# Patient Record
Sex: MALE | Race: WHITE | Employment: OTHER | URBAN - METROPOLITAN AREA
[De-identification: names, ages, dates, MRNs, and addresses within clinical notes are randomized per-mention and may not be internally consistent; named-entity substitution may affect disease eponyms.]

---

## 2017-02-02 ENCOUNTER — CLINICAL SUPPORT (OUTPATIENT)
Dept: CARDIOLOGY CLINIC | Age: 82
End: 2017-02-02

## 2017-02-02 DIAGNOSIS — Z79.01 LONG TERM (CURRENT) USE OF ANTICOAGULANTS: Primary | ICD-10-CM

## 2017-02-02 DIAGNOSIS — I10 ESSENTIAL HYPERTENSION, BENIGN: ICD-10-CM

## 2017-02-02 DIAGNOSIS — I48.0 PAROXYSMAL ATRIAL FIBRILLATION (HCC): ICD-10-CM

## 2017-02-02 LAB
INR BLD: 1.5
PT POC: 17.6 SEC
VALID INTERNAL CONTROL?: YES

## 2017-02-10 ENCOUNTER — CLINICAL SUPPORT (OUTPATIENT)
Dept: CARDIOLOGY CLINIC | Age: 82
End: 2017-02-10

## 2017-02-10 DIAGNOSIS — I48.0 PAROXYSMAL ATRIAL FIBRILLATION (HCC): ICD-10-CM

## 2017-02-10 DIAGNOSIS — Z79.01 LONG TERM (CURRENT) USE OF ANTICOAGULANTS: Primary | ICD-10-CM

## 2017-02-10 DIAGNOSIS — I10 ESSENTIAL HYPERTENSION, BENIGN: ICD-10-CM

## 2017-02-10 LAB
INR BLD: 1.9
PT POC: 21.5 SEC
VALID INTERNAL CONTROL?: YES

## 2017-03-01 ENCOUNTER — PATIENT OUTREACH (OUTPATIENT)
Dept: CARDIOLOGY CLINIC | Age: 82
End: 2017-03-01

## 2017-03-01 ENCOUNTER — CLINICAL SUPPORT (OUTPATIENT)
Dept: CARDIOLOGY CLINIC | Age: 82
End: 2017-03-01

## 2017-03-01 DIAGNOSIS — Z79.01 LONG TERM (CURRENT) USE OF ANTICOAGULANTS: Primary | ICD-10-CM

## 2017-03-01 DIAGNOSIS — I48.0 PAROXYSMAL ATRIAL FIBRILLATION (HCC): ICD-10-CM

## 2017-03-01 LAB
INR BLD: 1.6
PT POC: 19.4 SEC
VALID INTERNAL CONTROL?: YES

## 2017-03-01 NOTE — PROGRESS NOTES
Asked to assist with coverage determination for NOAC options for Mr. Debbi Monae- he does not have Part D coverage- he pays out of pocket for RX. Review of income requirements for Eliquis, Pradaxa and Xarelto- he will not meet so is not eligible for PAP. Ricky Guille is an option per provider- the co-pay card can contribute $270 toward the cost of medication- I checked with Albina Frias- the 30 day cost is $325 for either strength- 30 or 60 mg- it is a once a day medication. Therefore- his monthly out of pocket costs would be the difference of 325-270= $55 a month. Dosing determined by:  [facility] [facility] Calculator :    (140-89) 44.11 kg  cr cl- 1.26    =  77.14 for his Creatinine Clearance Value:77.14 mL/min    He will take 30 mg daily per above. Note: he had been maintained in therapeutic range but recently he has not been consistently in range. Reviewing lifestyle, health and current medications- there are no indicators for lower values- He will check with pharmacy ([facility]) to see if they have filled his RX with a different  for his warfarin. PLAN:  He will return next week and will discuss options and check with provider about changing from warfarin to Ricky Guille.

## 2017-03-08 ENCOUNTER — CLINICAL SUPPORT (OUTPATIENT)
Dept: CARDIOLOGY CLINIC | Age: 82
End: 2017-03-08

## 2017-03-08 DIAGNOSIS — Z79.01 LONG TERM (CURRENT) USE OF ANTICOAGULANTS: Primary | ICD-10-CM

## 2017-03-08 DIAGNOSIS — I10 ESSENTIAL HYPERTENSION, BENIGN: ICD-10-CM

## 2017-03-08 DIAGNOSIS — I48.0 PAROXYSMAL ATRIAL FIBRILLATION (HCC): ICD-10-CM

## 2017-03-08 LAB
INR BLD: 1.4
PT POC: 17.1 SEC
VALID INTERNAL CONTROL?: YES

## 2017-03-15 ENCOUNTER — CLINICAL SUPPORT (OUTPATIENT)
Dept: CARDIOLOGY CLINIC | Age: 82
End: 2017-03-15

## 2017-03-15 DIAGNOSIS — I48.0 PAROXYSMAL ATRIAL FIBRILLATION (HCC): ICD-10-CM

## 2017-03-15 DIAGNOSIS — Z79.01 LONG TERM (CURRENT) USE OF ANTICOAGULANTS: Primary | ICD-10-CM

## 2017-03-15 DIAGNOSIS — I63.411 EMBOLIC STROKE INVOLVING RIGHT MIDDLE CEREBRAL ARTERY (HCC): Primary | ICD-10-CM

## 2017-03-15 LAB
INR BLD: 2.5
PT POC: 29.8 SEC
VALID INTERNAL CONTROL?: YES

## 2017-03-15 NOTE — PROGRESS NOTES
Met with Mr. Connie Feliz- follow up from recent conversation about not maintaining therapeutic INR ranges. Consulted with Provider previously and today to confirm dose of Savaysa. Cr cl= 77.14- so he will take 60 mg daily. e will start taking Friday night, Saturday morning- depending if he will do an evening vs. Morning schedule for taking his Savaysa. Provided samples and voucher for free month trial as well as co-pay card- he has not had Medicare Part D coverage- he pays cash for all his medications so he is able to use the reduced co-pay cad which will provide $270 toward cost of the medication. Discussed him checking with pharmacy to determine lower price and I will send script for future use when he is needing to fill. Provided my contact card to call and let me know which pharmacy he needs to fill at. I will [facility] so pharmacist can determine price. Wrote for them not to fill.

## 2017-04-10 ENCOUNTER — PATIENT OUTREACH (OUTPATIENT)
Dept: CARDIOLOGY CLINIC | Age: 82
End: 2017-04-10

## 2017-04-10 DIAGNOSIS — I63.411 EMBOLIC STROKE INVOLVING RIGHT MIDDLE CEREBRAL ARTERY (HCC): ICD-10-CM

## 2017-04-10 DIAGNOSIS — I48.0 PAROXYSMAL ATRIAL FIBRILLATION (HCC): ICD-10-CM

## 2017-04-10 NOTE — TELEPHONE ENCOUNTER
Mr. Rojelio Hernández said that [facility] said they did not have  The script- he asked if I would send in one. See patient outreach for further details.

## 2017-04-10 NOTE — PROGRESS NOTES
Received phone call from Mr. An Barrios- he states that he is doing well on his [de-identified]. He wanted to let me know that the coupons are working well with the cost.   He thanked me for my help.

## 2017-05-01 ENCOUNTER — OFFICE VISIT (OUTPATIENT)
Dept: CARDIOLOGY CLINIC | Age: 82
End: 2017-05-01

## 2017-05-01 VITALS
SYSTOLIC BLOOD PRESSURE: 110 MMHG | DIASTOLIC BLOOD PRESSURE: 69 MMHG | HEIGHT: 69 IN | BODY MASS INDEX: 25.77 KG/M2 | HEART RATE: 47 BPM | OXYGEN SATURATION: 98 % | RESPIRATION RATE: 19 BRPM | WEIGHT: 174 LBS

## 2017-05-01 DIAGNOSIS — I48.0 PAROXYSMAL ATRIAL FIBRILLATION (HCC): ICD-10-CM

## 2017-05-01 DIAGNOSIS — I10 ESSENTIAL HYPERTENSION, BENIGN: ICD-10-CM

## 2017-05-01 DIAGNOSIS — I49.5 SINUS NODE DYSFUNCTION (HCC): Primary | ICD-10-CM

## 2017-05-01 DIAGNOSIS — E78.5 DYSLIPIDEMIA: ICD-10-CM

## 2017-05-01 NOTE — PROGRESS NOTES
Visit Vitals    /69 (BP 1 Location: Left arm, BP Patient Position: Sitting)    Pulse (!) 47    Resp 19    Ht 5' 9\" (1.753 m)    Wt 174 lb (78.9 kg)    SpO2 98%    BMI 25.7 kg/m2

## 2017-05-01 NOTE — PROGRESS NOTES
[address], [address]  [address],  [address]  [place], [address] [facility]    Office [phone]  Fax [phone]  Cell [phone]         Ivonne Simmonds is a 80 y.o. male. Last seen 1 year ago. Assessment  Encounter Diagnoses   Name Primary?  Sinus node dysfunction (HCC) Yes    Paroxysmal atrial fibrillation (HCC)     Dyslipidemia     Essential hypertension, benign      Recommendations:    Mr. Mika Ramirez has chronic AF in the setting of conduction disease (Sinus/AV Node dysfunction), completely asymptomatic. He has no ischemic or structural heart disease. He's had no bleeding problem on Savaysa. Bin Bailey continued rate controlled strategy. Reviewed symptoms that would raise concern for bradycardia. Normotensive on low dose Lisinopril. I encouraged him to remain active. Follow-up Disposition:  Return in about 6 months (around 11/1/2017). Subjective:    Mr. Mika Ramirez is doing well with no recurrent AF spells. Amiodarone was discontinued almost 1 year ago due to balance issues. He is normotensive today on Lisinopril alone. Denies any lightheadedness or dizziness. He remains active with housework without any exertional symptoms. Activity is limited by neuropathic pain in his feet and arms with chronic balance issues. Denies any falls. He is here with his wife today. Cardiac risk factors   HTN yes  DM  no  Smoking no  Family hx of CAD no    Cardiac testing  Echo Nov 2013 - LVH, EF 60%, LA upper nl  MRA neck/brain Nov 2013 - normal  Loop Monitor 1/17/14-PAC's and periods of ectopic atrial rhythm.   Period of Atrial Fibrillation with rates of  BPM with aberrant beats     Past Medical History:   Diagnosis Date    A-fib ([address]     Anxiety     Arthritis     right shoulder    [facility] 2002    colon resection    Cough     Depression 9/28/2015    DJD (degenerative joint disease) of knee     left TKR    Dyslipidemia     Embolic stroke involving right middle cerebral artery [facility]) Nov 2013    presented with dysarthria, MRI Several tiny acute infarcts in the right middle cerebral artery    Falls     Fatigue     Gout     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    Hypertension     Joint pain     Memory loss     Muscle pain     Paroxysmal atrial fibrillation ([address] 9/23/2014    Peripheral neuropathy (HCC)     Ringing in ears     Skipped beats     Vertigo         Current Outpatient Prescriptions   Medication Sig Dispense Refill    edoxaban (SAVAYSA) 60 mg tab tablet Take 1 Tab by mouth daily. 30 Tab 6    lisinopril (PRINIVIL, ZESTRIL) 10 mg tablet Take  by mouth daily.  folic acid (FOLVITE) 1 mg tablet Take  by mouth daily.  sertraline (ZOLOFT) 50 mg tablet Take  by mouth daily.  allopurinol (ZYLOPRIM) 300 mg tablet Take 300 mg by mouth every evening.  simvastatin (ZOCOR) 40 mg tablet Take 20 mg by mouth daily. No Known Allergies     . Retired commercial developer    Review of Systems  Constitutional: Negative for fever, chills, and diaphoresis. Positive for fatigue. Respiratory: Negative for cough, hemoptysis, sputum production, shortness of breath and wheezing. Cardiovascular: Negative for chest pain, palpitations, orthopnea, claudication, leg swelling and PND. Gastrointestinal: Negative for heartburn, nausea, vomiting, blood in stool and melena. Genitourinary: Negative for dysuria and flank pain. Musculoskeletal: Negative for joint pain and back pain. Skin: Negative for rash. Neurological: Negative for focal weakness, seizures, loss of consciousness, weakness and headaches. Positive for dysequilibrium, dizziness, and neuropathy. Endo/Heme/Allergies: Does not bruise/bleed easily. Psychiatric/Behavioral: Negative for memory loss. The patient does not have insomnia.       Physical Exam    Visit Vitals    /69 (BP 1 Location: Left arm, BP Patient Position: Sitting)  Pulse (!) 47    Resp 19    Ht 5' 9\" (1.753 m)    Wt 174 lb (78.9 kg)    SpO2 98%    BMI 25.7 kg/m2     Wt Readings from Last 3 Encounters:   05/01/17 174 lb (78.9 kg)   06/21/16 173 lb (78.5 kg)   04/12/16 171 lb 6.4 oz (77.7 kg)      General - well developed well nourished  Neck - JVP normal, thyroid nl  Cardiac - normal S1,S2, no murmurs, rubs or gallops. No clicks  Vascular - carotids without bruits, radials, femorals and pedal pulses equal bilateral  Lungs - clear to auscultation bilaterals, no rales ,wheezing or rhonchi  Abd - soft nontender, no HSM, no abd bruits  Extremities - no edema  Skin - no rash  Neuro - nonfocal  Psych - normal mood and affect    Cardiographics  ECG 5/20/14 - normal sinus rhythm, 1st degree AVB, otherwise normal  EKG 5/6/15 - atrial flutter 80  EKG 9/28/15 - sinus calderon 48, first degree AV block, , mild IVCD  EKG 4/12/16 - AF 50s  EKG 05/01/17-Atrial fibrillation/flutter 70s.      Written by Elsa [employer], as dictated by Nelly De Los Santos MD.   Nelly De Los Santos MD

## 2017-05-01 NOTE — MR AVS SNAPSHOT
Visit Information Date & Time Provider Department Dept. Phone Encounter #  
 5/1/2017  3:00 PM Lila Joel MD [facility] Ralph Pillai [phone] [id number] Follow-up Instructions Return in about 6 months (around 11/1/2017). Upcoming Health Maintenance Date Due DTaP/Tdap/Td series (1 - Tdap) 5/25/1948 ZOSTER VACCINE AGE 60> 5/25/1987 GLAUCOMA SCREENING Q2Y 5/25/1992 Pneumococcal 65+ Low/Medium Risk (1 of 2 - PCV13) 5/25/1992 MEDICARE YEARLY EXAM 5/25/1992 INFLUENZA AGE 9 TO ADULT 8/1/2017 Allergies as of 5/1/2017  Review Complete On: 5/1/2017 By: Monica Parrish No Known Allergies Current Immunizations  Never Reviewed No immunizations on file. Not reviewed this visit You Were Diagnosed With   
  
 Codes Comments Sinus node dysfunction (HCC)    -  Primary ICD-10-CM: I49.5 ICD-9-CM: 427.81 Paroxysmal atrial fibrillation (HCC)     ICD-10-CM: I48.0 ICD-9-CM: 427.31 Dyslipidemia     ICD-10-CM: E78.5 ICD-9-CM: 272.4 Essential hypertension, benign     ICD-10-CM: I10 
ICD-9-CM: 401.1 Vitals BP Pulse Resp Height(growth percentile) Weight(growth percentile) SpO2  
 110/69 (BP 1 Location: Left arm, BP Patient Position: Sitting) (!) 47 19 5' 9\" (1.753 m) 174 lb (78.9 kg) 98% BMI Smoking Status 25.7 kg/m2 Never Smoker Vitals History BMI and BSA Data Body Mass Index Body Surface Area 25.7 kg/m 2 1.96 m 2 Preferred Pharmacy Pharmacy Name Phone [facility] [phone] [facility] [place] [address] [phone] Your Updated Medication List  
  
   
This list is accurate as of: 5/1/17  3:52 PM.  Always use your most recent med list.  
  
  
  
  
 allopurinol 300 mg tablet Commonly known as:  Bettejane Chelsi Take 300 mg by mouth every evening.  
  
 edoxaban 60 mg Tab tablet Commonly known as:  SAVAYSA Take 1 Tab by mouth daily. folic acid 1 mg tablet Commonly known as:  Cuong Take  by mouth daily. lisinopril 10 mg tablet Commonly known as:  Devyn Casper Take  by mouth daily. simvastatin 40 mg tablet Commonly known as:  ZOCOR Take 20 mg by mouth daily. ZOLOFT 50 mg tablet Generic drug:  sertraline Take  by mouth daily. We Performed the Following AMB POC EKG ROUTINE W/ 12 LEADS, INTER & REP [52422 CPT(R)] Follow-up Instructions Return in about 6 months (around 11/1/2017). Introducing [facility] SERVICES! Dear Constantin Zavaleta: 
Thank you for requesting a [employer] account. Our records indicate that you already have an active [employer] account. You can access your account anytime at [email or web address] Did you know that you can access your hospital and ER discharge instructions at any time in [employer]? You can also review all of your test results from your hospital stay or ER visit. Additional Information If you have questions, please visit the Frequently Asked Questions section of the [employer] website at [email or web address] Remember, [employer] is NOT to be used for urgent needs. For medical emergencies, dial 911. Now available from your iPhone and Android! Please provide this summary of care documentation to your next provider. Your primary care clinician is listed as Randene Bumpers II. If you have any questions after today's visit, please call [phone].

## 2017-06-02 DIAGNOSIS — I63.411 EMBOLIC STROKE INVOLVING RIGHT MIDDLE CEREBRAL ARTERY (HCC): ICD-10-CM

## 2017-06-02 DIAGNOSIS — I48.0 PAROXYSMAL ATRIAL FIBRILLATION (HCC): ICD-10-CM

## 2017-06-02 NOTE — TELEPHONE ENCOUNTER
Provided samples for Mr. Stanford Reasoner for Savaysa 60 mg Daily. He is traveling out of state and cannot get a refill before he leaves- too early and will need more pills until he returns back home. He will  later today. Note- glad he is getting out of town with family and will have help with his wife's care- they will be visiting daughter at [facility].

## 2017-08-14 DIAGNOSIS — I48.0 PAROXYSMAL ATRIAL FIBRILLATION (HCC): ICD-10-CM

## 2017-08-14 DIAGNOSIS — I63.411 EMBOLIC STROKE INVOLVING RIGHT MIDDLE CEREBRAL ARTERY (HCC): ICD-10-CM

## 2017-08-14 NOTE — TELEPHONE ENCOUNTER
Cardiology NN note:  Met with Oscar Kelli Bautista- there was an overall cost increase done for Savays- effective first of June 2017- the price he will be paying outside the $270 co-pay assist is doubled- around $95 per month. Provided samples- he will be traveling again outside the state to [place]- he will need to make sure he has extra tablets to ensure he will not run out- does not want to get scripts filled out of town. He is dealing with local not national chain pharmacy.

## 2017-11-10 ENCOUNTER — OFFICE VISIT (OUTPATIENT)
Dept: CARDIOLOGY CLINIC | Age: 82
End: 2017-11-10

## 2017-11-10 VITALS
OXYGEN SATURATION: 94 % | DIASTOLIC BLOOD PRESSURE: 80 MMHG | BODY MASS INDEX: 24.96 KG/M2 | SYSTOLIC BLOOD PRESSURE: 118 MMHG | RESPIRATION RATE: 18 BRPM | HEART RATE: 72 BPM | WEIGHT: 169 LBS

## 2017-11-10 DIAGNOSIS — I63.411 EMBOLIC STROKE INVOLVING RIGHT MIDDLE CEREBRAL ARTERY (HCC): ICD-10-CM

## 2017-11-10 DIAGNOSIS — E78.5 DYSLIPIDEMIA: ICD-10-CM

## 2017-11-10 DIAGNOSIS — I10 ESSENTIAL HYPERTENSION, BENIGN: Primary | ICD-10-CM

## 2017-11-10 DIAGNOSIS — I49.5 SINUS NODE DYSFUNCTION (HCC): ICD-10-CM

## 2017-11-10 DIAGNOSIS — I48.20 CHRONIC ATRIAL FIBRILLATION (HCC): ICD-10-CM

## 2017-11-10 NOTE — PROGRESS NOTES
[address], [address] [facility]    Office [phone]  Fax [phone]         Randal Duncan is a 80 y.o. male. Last seen 6 months ago. Assessment  Encounter Diagnoses   Name Primary?  Essential hypertension, benign Yes    Chronic atrial fibrillation (HCC)     Sinus node dysfunction (HCC)     Dyslipidemia     Embolic stroke involving right middle cerebral artery [facility])      Recommendations:  Mr. Les Díaz has chronic AF in the setting of conduction disease (Sinus/AV Node dysfunction), completely asymptomatic. He has no ischemic or structural heart disease. He's had no bleeding problem on Savaysa. Alvaro Stands continued rate controlled strategy. Reviewed symptoms that would raise concern for bradycardia. Normotensive on low dose Lisinopril. He was encouraged to continue current medications, remain active and call with any new complaints or concerns. Follow-up Disposition:  Return in about 6 months (around 5/10/2018). Subjective:  Mr. Les Díaz denies any interval issues. He reports feeling well. He denies any tachycardia, lightheadedness or dizziness. He remains active with housework without any exertional symptoms. Activity is limited by neuropathic pain in his feet and arms with chronic balance issues. Denies any falls. He notes some emotional stress due to Mrs. Barr's health. Reports a history of \"feeling down\" but states that he is doing well with \"managing things\" at this time. No bleeding or bruising concerns on Savaysa. Lipids and routine lab work followed by Dr Elida Cavazos. Cardiac risk factors   HTN yes  DM  no  Smoking no  Family hx of CAD no    Cardiac testing  Echo Nov 2013 - LVH, EF 60%, LA upper nl  MRA neck/brain Nov 2013 - normal  Loop Monitor 1/17/14-PAC's and periods of ectopic atrial rhythm.   Period of Atrial Fibrillation with rates of  BPM with aberrant beats     Past Medical History:   Diagnosis Date    [facility] ([facility]     Anxiety     Arthritis     right shoulder    [facility]    colon resection    Cough     Depression 9/28/2015    DJD (degenerative joint disease) of knee     left TKR    Dyslipidemia     Embolic stroke involving right middle cerebral artery [facility]) Nov 2013    presented with dysarthria, MRI Several tiny acute infarcts in the right middle cerebral artery    Falls     Fatigue     Gout     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    Hypertension     Joint pain     Memory loss     Muscle pain     [facility] 9/23/2014    Peripheral neuropathy     Ringing in ears     Skipped beats     Vertigo         Current Outpatient Prescriptions   Medication Sig Dispense Refill    edoxaban (SAVAYSA) 60 mg tab tablet Take 1 Tab by mouth daily. 28 Tab 6    lisinopril (PRINIVIL, ZESTRIL) 10 mg tablet Take  by mouth daily.  folic acid (FOLVITE) 1 mg tablet Take  by mouth daily.  sertraline (ZOLOFT) 50 mg tablet Take 25 mg by mouth daily.  allopurinol (ZYLOPRIM) 300 mg tablet Take 300 mg by mouth every evening.  simvastatin (ZOCOR) 40 mg tablet Take 10 mg by mouth daily. No Known Allergies     . Retired commercial developer    Review of Systems  Constitutional: Negative for fever, chills, and diaphoresis. Positive for fatigue. Respiratory: Negative for cough, hemoptysis, sputum production, shortness of breath and wheezing. Cardiovascular: Negative for chest pain, palpitations, orthopnea, claudication, leg swelling and PND. Gastrointestinal: Negative for heartburn, nausea, vomiting, blood in stool and melena. Genitourinary: Negative for dysuria and flank pain. Musculoskeletal: Negative for joint pain and back pain. Skin: Negative for rash. Neurological: Negative for focal weakness, seizures, loss of consciousness, weakness and headaches. Endo/Heme/Allergies: Does not bruise/bleed easily. Psychiatric/Behavioral: Negative for memory loss. The patient does not have insomnia. Physical Exam  Visit Vitals    /80    Pulse 72    Resp 18    Wt 169 lb (76.7 kg)    SpO2 94%    BMI 24.96 kg/m2     Wt Readings from Last 3 Encounters:   11/10/17 169 lb (76.7 kg)   05/01/17 174 lb (78.9 kg)   06/21/16 173 lb (78.5 kg)      General - well developed well nourished  Neck - JVP normal, thyroid nl  Cardiac - irregular, no murmurs, rubs or gallops. No clicks  Vascular - carotids without bruits, radials, femorals and pedal pulses equal bilateral  Lungs - clear to auscultation bilaterals, no rales ,wheezing or rhonchi  Abd - soft nontender, no HSM, no abd bruits  Extremities - no edema  Skin - no rash  Neuro - nonfocal  Psych - normal mood and affect    Cardiographics  ECG 5/20/14 - normal sinus rhythm, 1st degree AVB, otherwise normal  EKG 5/6/15 - atrial flutter 80  EKG 9/28/15 - sinus calderon 48, first degree AV block, , mild IVCD  EKG 4/12/16 - AF 50s  EKG 05/01/17-Atrial fibrillation/flutter 70s.      Addison Dunbar NP

## 2017-11-10 NOTE — PATIENT INSTRUCTIONS
Please continue your current medications. Stay active and monitor for any abnormal bleeding on the Savaysa.       Please call us with any questions or concerns prior to your next office visist.

## 2017-11-10 NOTE — MR AVS SNAPSHOT
Visit Information Date & Time Provider Department Dept. Phone Encounter #  
 11/10/2017  9:00 AM Katie Fagan NP [facility] Manny Salguero [phone] [id number] Follow-up Instructions Return in about 6 months (around 5/10/2018). Upcoming Health Maintenance Date Due DTaP/Tdap/Td series (1 - Tdap) 5/25/1948 ZOSTER VACCINE AGE 60> 3/25/1987 GLAUCOMA SCREENING Q2Y 5/25/1992 Pneumococcal 65+ Low/Medium Risk (1 of 2 - PCV13) 5/25/1992 MEDICARE YEARLY EXAM 5/25/1992 Influenza Age 5 to Adult 8/1/2017 Allergies as of 11/10/2017  Review Complete On: 11/10/2017 By: Katie Fagan NP No Known Allergies Current Immunizations  Never Reviewed No immunizations on file. Not reviewed this visit You Were Diagnosed With   
  
 Codes Comments Essential hypertension, benign    -  Primary ICD-10-CM: I10 
ICD-9-CM: 343. 1 Chronic atrial fibrillation (HCC)     ICD-10-CM: B67.8 ICD-9-CM: 427.31 Sinus node dysfunction (HCC)     ICD-10-CM: I49.5 ICD-9-CM: 427.81 Dyslipidemia     ICD-10-CM: E78.5 ICD-9-CM: 272.4 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)     ICD-10-CM: H23.787 ICD-9-CM: 434.11 Vitals BP Pulse Resp Weight(growth percentile) SpO2 BMI  
 118/80 72 18 169 lb (76.7 kg) 94% 24.96 kg/m2 Smoking Status Never Smoker BMI and BSA Data Body Mass Index Body Surface Area 24.96 kg/m 2 1.93 m 2 Preferred Pharmacy Pharmacy Name Phone [facility] [address] [place] [address], [address] [facility] [address] [phone] Your Updated Medication List  
  
   
This list is accurate as of: 11/10/17  9:30 AM.  Always use your most recent med list.  
  
  
  
  
 allopurinol 300 mg tablet Commonly known as:  Rito Night Take 300 mg by mouth every evening.  
  
 edoxaban 60 mg Tab tablet Commonly known as:  SAVAYSA Take 1 Tab by mouth daily. folic acid 1 mg tablet Commonly known as:  Google Take  by mouth daily. lisinopril 10 mg tablet Commonly known as:  Sima Primmer Take  by mouth daily. simvastatin 40 mg tablet Commonly known as:  ZOCOR Take 10 mg by mouth daily. ZOLOFT 50 mg tablet Generic drug:  sertraline Take 25 mg by mouth daily. Follow-up Instructions Return in about 6 months (around 5/10/2018). Patient Instructions Please continue your current medications. Stay active and monitor for any abnormal bleeding on the Savaysa. Please call us with any questions or concerns prior to your next office visist.   
 
 
  
Introducing [facility] & HEALTH SERVICES! Dear Lauren Sears: 
Thank you for requesting a [employer] account. Our records indicate that you already have an active [employer] account. You can access your account anytime at [email or web address] Did you know that you can access your hospital and ER discharge instructions at any time in [employer]? You can also review all of your test results from your hospital stay or ER visit. Additional Information If you have questions, please visit the Frequently Asked Questions section of the [employer] website at [email or web address] Remember, [employer] is NOT to be used for urgent needs. For medical emergencies, dial 911. Now available from your iPhone and Android! Please provide this summary of care documentation to your next provider. Your primary care clinician is listed as Edwardo Alvarado Ii. If you have any questions after today's visit, please call [phone].

## 2017-12-27 DIAGNOSIS — I10 ESSENTIAL HYPERTENSION, BENIGN: ICD-10-CM

## 2017-12-27 DIAGNOSIS — E78.5 DYSLIPIDEMIA: ICD-10-CM

## 2017-12-27 DIAGNOSIS — I49.5 SINUS NODE DYSFUNCTION (HCC): ICD-10-CM

## 2017-12-27 DIAGNOSIS — I48.20 CHRONIC ATRIAL FIBRILLATION (HCC): ICD-10-CM

## 2017-12-27 DIAGNOSIS — I63.411 EMBOLIC STROKE INVOLVING RIGHT MIDDLE CEREBRAL ARTERY (HCC): ICD-10-CM

## 2017-12-27 NOTE — TELEPHONE ENCOUNTER
Received request from Mr. Isaura Lockwood for any assistance available to help with the costs of his Lashayreina Pipery- will be able to provide samples for one month- until he is able to look for lower costs- he pays out of pocket- he has no Medicare RX coverage and his income is too high to secure PAP for any NOAC.

## 2018-02-13 DIAGNOSIS — I63.411 EMBOLIC STROKE INVOLVING RIGHT MIDDLE CEREBRAL ARTERY (HCC): ICD-10-CM

## 2018-02-13 DIAGNOSIS — I48.20 CHRONIC ATRIAL FIBRILLATION (HCC): ICD-10-CM

## 2018-02-13 NOTE — TELEPHONE ENCOUNTER
Cardiology NN-CM note:  Met with Mr. Rosette Fernandez in the office to discuss options for NOAC cost.  He has been using the co-pay card- paying as a cash patient- because he has no Medicare Part D. Savaysa co-pay card helps with $270 toward the cost of their product as a \"cash paying\" participant. The price continues to go up on the total cost- initially he was paying about $58 out of pocket per month but has increased to $98 and now $126 out of pocket for one month. He does not qualify for PAP due to not meeting the income eligibility. Provided him the phone number for [employer]- to see if the could be helpful. He is also going to check into the price of the medication at [facility]. Printed script for him to take and an additional co-pay card. Provided samples until he can secure option. Out of the 4 choices for NOAC- this was the one that was the lowest cost option for him. He would prefer not to return to warfarin. He will investigate and get back with me.

## 2018-05-11 ENCOUNTER — OFFICE VISIT (OUTPATIENT)
Dept: CARDIOLOGY CLINIC | Age: 83
End: 2018-05-11

## 2018-05-11 VITALS
BODY MASS INDEX: 25.06 KG/M2 | HEART RATE: 70 BPM | HEIGHT: 69 IN | RESPIRATION RATE: 20 BRPM | DIASTOLIC BLOOD PRESSURE: 58 MMHG | OXYGEN SATURATION: 97 % | WEIGHT: 169.2 LBS | SYSTOLIC BLOOD PRESSURE: 104 MMHG

## 2018-05-11 DIAGNOSIS — I10 ESSENTIAL HYPERTENSION, BENIGN: ICD-10-CM

## 2018-05-11 DIAGNOSIS — I48.20 CHRONIC ATRIAL FIBRILLATION (HCC): Primary | ICD-10-CM

## 2018-05-11 DIAGNOSIS — E78.5 DYSLIPIDEMIA: ICD-10-CM

## 2018-05-11 DIAGNOSIS — I49.5 SINUS NODE DYSFUNCTION (HCC): ICD-10-CM

## 2018-05-11 RX ORDER — LISINOPRIL 10 MG/1
10 TABLET ORAL EVERY OTHER DAY
Qty: 45 TAB | Refills: 3
Start: 2018-05-11 | End: 2018-11-12

## 2018-05-11 RX ORDER — SIMVASTATIN 10 MG/1
10 TABLET, FILM COATED ORAL
COMMUNITY

## 2018-05-11 NOTE — PATIENT INSTRUCTIONS
Reduce Lisinopril to 10 mg every other day. Monitor your blood pressure at home. Think about getting physical therapy.

## 2018-05-11 NOTE — PROGRESS NOTES
[id number]  [place], [address] [facility]    Office [phone]  Fax [phone]         Jessy Franco is a 80 y.o. male. Last seen 6 months ago by Kole Bashir NP. Last seen 1 year ago by me. Assessment  Encounter Diagnoses   Name Primary?  Chronic atrial fibrillation (HCC) Yes    Sinus node dysfunction (HCC)     Dyslipidemia     Essential hypertension, benign      Recommendations:  Mr. Summer Thapa has chronic AF in the setting of conduction disease (Sinus/AV Node dysfunction), completely asymptomatic. He has no sxs of bradycardia. Continue anticoagulation with Savaysa. HTN. Low normal BP on monotherapy with Lisinopril. I am concerned about orthostatic potential. Reduce to 10 mg every other day. Monitor BP at home. Peripheral neuropathy. Concerned about balance and stamina. I encouraged him to consider PT at . Follow-up Disposition:  Return in about 6 months (around 11/11/2018). Subjective:  Mr. Summer Thapa feels well overall although his daughter has noticed that he tends to fall asleep more often. He leads a fairly sedentary lifestyle and primarily stays in his house. He denies any exertional sxs with his ADLs. He states his energy is low overall. He does not monitor his BP at home. He will feel a little dizzy/lightheaded with positional changes. He notes the numbness from his peripheral neuropathy in his legs and hands has been significantly bothering him. He admits he does not religiously do exercises to improve this. He has not done any PT recently. Patient denies any exertional chest pain, dyspnea, palpitations, syncope, orthopnea, edema or paroxysmal nocturnal dyspnea. No bleeding issues on Savaysa. Pt lives at [facility]. He is here with his daughter today.     Cardiac risk factors   HTN yes  DM  no  Smoking no  Family hx of CAD no    Cardiac testing  Echo Nov 2013 - LVH, EF 60%, LA upper nl  MRA neck/brain Nov 2013 - normal  Loop Monitor 1/17/14-PAC's and periods of ectopic atrial rhythm. Period of Atrial Fibrillation with rates of  BPM with aberrant beats     Past Medical History:   Diagnosis Date    [facility]fib [facility] [address]     Anxiety     Arthritis     right shoulder    Colon cancer ([facility] [address] 2002    colon resection    Cough     Depression 9/28/2015    DJD (degenerative joint disease) of knee     left TKR    Dyslipidemia     Embolic stroke involving right middle cerebral artery [facility]) Nov 2013    presented with dysarthria, MRI Several tiny acute infarcts in the right middle cerebral artery    Falls     Fatigue     Gout     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    Hypertension     Joint pain     Memory loss     Muscle pain     Paroxysmal atrial fibrillation ([facility] [address] 9/23/2014    Peripheral neuropathy     Ringing in ears     Skipped beats     Vertigo         Current Outpatient Prescriptions   Medication Sig Dispense Refill    simvastatin (ZOCOR) 20 mg tablet Take 10 mg by mouth nightly.  SAVAYSA 60 mg tab tablet TAKE ONE TABLET BY MOUTH EVERY DAY 30 Tab 6    lisinopril (PRINIVIL, ZESTRIL) 10 mg tablet Take  by mouth daily.  folic acid (FOLVITE) 1 mg tablet Take  by mouth daily.  sertraline (ZOLOFT) 50 mg tablet Take 50 mg by mouth daily.  allopurinol (ZYLOPRIM) 300 mg tablet Take 300 mg by mouth every evening. No Known Allergies     . Retired commercial developer    Review of Systems  Constitutional: Negative for fever, chills, and diaphoresis. +fatigue, +decreased energy. Respiratory: Negative for cough, hemoptysis, sputum production, shortness of breath and wheezing. Cardiovascular: Negative for chest pain, palpitations, orthopnea, claudication, leg swelling and PND. Gastrointestinal: Negative for heartburn, nausea, vomiting, blood in stool and melena. Genitourinary: Negative for dysuria and flank pain.    Musculoskeletal: Negative for joint pain and back pain.   Skin: Negative for rash. Neurological: Negative for focal weakness, seizures, loss of consciousness, weakness and headaches. +bilateral hand and foot neuropathy, +dizzy/lightheaded with positional changes  Endo/Heme/Allergies: Does not bruise/bleed easily. Psychiatric/Behavioral: Negative for memory loss. The patient does not have insomnia. Physical Exam  Visit Vitals    /58 (BP 1 Location: Left arm, BP Patient Position: Sitting)    Pulse 70    Resp 20    Ht 5' 9\" (1.753 m)    Wt 169 lb 3.2 oz (76.7 kg)    SpO2 97%    BMI 24.99 kg/m2     Wt Readings from Last 3 Encounters:   05/11/18 169 lb 3.2 oz (76.7 kg)   11/10/17 169 lb (76.7 kg)   05/01/17 174 lb (78.9 kg)      General - well developed well nourished  Neck - JVP normal, thyroid nl  Cardiac - irregular, no murmurs, rubs or gallops. No clicks  Vascular - carotids without bruits, radials, femorals and pedal pulses equal bilateral  Lungs - clear to auscultation bilaterals, no rales ,wheezing or rhonchi  Abd - soft nontender, no HSM, no abd bruits  Extremities - no edema  Skin - no rash  Neuro - nonfocal  Psych - normal mood and affect    Cardiographics  ECG 5/20/14 - normal sinus rhythm, 1st degree AVB, otherwise normal  EKG 5/6/15 - atrial flutter 80  EKG 9/28/15 - sinus calderon 48, first degree AV block, , mild IVCD  EKG 4/12/16 - AF 50s  EKG 05/01/17-Atrial fibrillation/flutter 70s.    EKG 5/11/18- AF 80s, frequent PVCs    Written by Fabby Mejia, dictated by Fadi Hodges MD.  Fadi Hodges MD

## 2018-05-11 NOTE — PROGRESS NOTES
Visit Vitals    /58 (BP 1 Location: Left arm, BP Patient Position: Sitting)    Pulse 70    Resp 20    Ht 5' 9\" (1.753 m)    Wt 169 lb 3.2 oz (76.7 kg)    SpO2 97%    BMI 24.99 kg/m2

## 2018-05-11 NOTE — MR AVS SNAPSHOT
[address] [facility] [address] [id number] Patient: Kierra Cordero MRN: [id number] RXF:[id number] Visit Information Date & Time Provider Department Dept. Phone Encounter #  
 5/11/2018  2:20 PM Christiano Curran MD [facility] Kendrick Blankenship [phone] [id number] Follow-up Instructions Return in about 6 months (around 11/11/2018). Upcoming Health Maintenance Date Due DTaP/Tdap/Td series (1 - Tdap) 5/25/1948 ZOSTER VACCINE AGE 60> 3/25/1987 GLAUCOMA SCREENING Q2Y 5/25/1992 Pneumococcal 65+ Low/Medium Risk (1 of 2 - PCV13) 5/25/1992 MEDICARE YEARLY EXAM 3/14/2018 Influenza Age 5 to Adult 8/1/2018 Allergies as of 5/11/2018  Review Complete On: 5/11/2018 By: Olayinka Soler LPN No Known Allergies Current Immunizations  Never Reviewed No immunizations on file. Not reviewed this visit You Were Diagnosed With   
  
 Codes Comments Chronic atrial fibrillation (HCC)    -  Primary ICD-10-CM: R39.5 ICD-9-CM: 427.31 Sinus node dysfunction (HCC)     ICD-10-CM: I49.5 ICD-9-CM: 427.81 Dyslipidemia     ICD-10-CM: E78.5 ICD-9-CM: 272.4 Essential hypertension, benign     ICD-10-CM: I10 
ICD-9-CM: 401.1 Vitals BP Pulse Resp Height(growth percentile) Weight(growth percentile) SpO2  
 104/58 (BP 1 Location: Left arm, BP Patient Position: Sitting) 70 20 5' 9\" (1.753 m) 169 lb 3.2 oz (76.7 kg) 97% BMI Smoking Status 24.99 kg/m2 Never Smoker Vitals History BMI and BSA Data Body Mass Index Body Surface Area 24.99 kg/m 2 1.93 m 2 Preferred Pharmacy Pharmacy Name Phone [address], [address] [phone] Your Updated Medication List  
  
   
This list is accurate as of 5/11/18  3:09 PM.  Always use your most recent med list.  
  
  
  
  
 allopurinol 300 mg tablet Commonly known as:  Merna Jangs Take 300 mg by mouth every evening. folic acid 1 mg tablet Commonly known as:  Google Take  by mouth daily. lisinopril 10 mg tablet Commonly known as:  Burma Fairy Take  by mouth daily. SAVAYSA 60 mg Tab tablet Generic drug:  edoxaban TAKE ONE TABLET BY MOUTH EVERY DAY  
  
 ZOCOR 20 mg tablet Generic drug:  simvastatin Take 10 mg by mouth nightly. ZOLOFT 50 mg tablet Generic drug:  sertraline Take 50 mg by mouth daily. We Performed the Following AMB POC EKG ROUTINE W/ 12 LEADS, INTER & REP [36637 CPT(R)] Follow-up Instructions Return in about 6 months (around 11/11/2018). Patient Instructions Reduce Lisinopril to 10 mg every other day. Monitor your blood pressure at home. Think about getting physical therapy. Introducing [facility] & HEALTH SERVICES! Dear Charisma Bravo: 
Thank you for requesting a Playboox account. Our records indicate that you already have an active Playboox account. You can access your account anytime at [email or web address] Did you know that you can access your hospital and ER discharge instructions at any time in Playboox? You can also review all of your test results from your hospital stay or ER visit. Additional Information If you have questions, please visit the Frequently Asked Questions section of the Playboox website at [email or web address] Remember, Playboox is NOT to be used for urgent needs. For medical emergencies, dial 911. Now available from your iPhone and Android! Please provide this summary of care documentation to your next provider. Your primary care clinician is listed as Jeniffer Lai Ii. If you have any questions after today's visit, please call [phone].

## 2018-07-08 ENCOUNTER — HOSPITAL ENCOUNTER (EMERGENCY)
Age: 83
Discharge: HOME | End: 2018-07-08
Attending: EMERGENCY MEDICINE | Admitting: EMERGENCY MEDICINE
Payer: MEDICARE

## 2018-07-08 VITALS
DIASTOLIC BLOOD PRESSURE: 74 MMHG | HEIGHT: 67 IN | SYSTOLIC BLOOD PRESSURE: 113 MMHG | TEMPERATURE: 98 F | HEART RATE: 73 BPM | OXYGEN SATURATION: 96 % | BODY MASS INDEX: 26.68 KG/M2 | WEIGHT: 170 LBS | RESPIRATION RATE: 16 BRPM

## 2018-07-08 DIAGNOSIS — R00.2 PALPITATIONS: Primary | ICD-10-CM

## 2018-07-08 LAB
ANION GAP SERPL CALC-SCNC: 8 MMOL/L (ref 5–15)
BASOPHILS # BLD: 0 K/UL (ref 0–0.1)
BASOPHILS NFR BLD: 0 % (ref 0–1)
BUN SERPL-MCNC: 19 MG/DL (ref 6–20)
BUN/CREAT SERPL: 17 (ref 12–20)
CALCIUM SERPL-MCNC: 8.9 MG/DL (ref 8.5–10.1)
CHLORIDE SERPL-SCNC: 108 MMOL/L (ref 97–108)
CO2 SERPL-SCNC: 27 MMOL/L (ref 21–32)
CREAT SERPL-MCNC: 1.11 MG/DL (ref 0.7–1.3)
DIFFERENTIAL METHOD BLD: ABNORMAL
EOSINOPHIL # BLD: 0.2 K/UL (ref 0–0.4)
EOSINOPHIL NFR BLD: 2 % (ref 0–7)
ERYTHROCYTE [DISTWIDTH] IN BLOOD BY AUTOMATED COUNT: 14.7 % (ref 11.5–14.5)
GLUCOSE SERPL-MCNC: 113 MG/DL (ref 65–100)
HCT VFR BLD AUTO: 36.5 % (ref 36.6–50.3)
HGB BLD-MCNC: 11.8 G/DL (ref 12.1–17)
IMM GRANULOCYTES # BLD: 0 K/UL (ref 0–0.04)
IMM GRANULOCYTES NFR BLD AUTO: 0 % (ref 0–0.5)
LYMPHOCYTES # BLD: 1.6 K/UL (ref 0.8–3.5)
LYMPHOCYTES NFR BLD: 19 % (ref 12–49)
MAGNESIUM SERPL-MCNC: 2.3 MG/DL (ref 1.6–2.4)
MCH RBC QN AUTO: 33.1 PG (ref 26–34)
MCHC RBC AUTO-ENTMCNC: 32.3 G/DL (ref 30–36.5)
MCV RBC AUTO: 102.2 FL (ref 80–99)
MONOCYTES # BLD: 0.8 K/UL (ref 0–1)
MONOCYTES NFR BLD: 10 % (ref 5–13)
NEUTS SEG # BLD: 5.9 K/UL (ref 1.8–8)
NEUTS SEG NFR BLD: 70 % (ref 32–75)
NRBC # BLD: 0 K/UL (ref 0–0.01)
NRBC BLD-RTO: 0 PER 100 WBC
PLATELET # BLD AUTO: 207 K/UL (ref 150–400)
PMV BLD AUTO: 10.6 FL (ref 8.9–12.9)
POTASSIUM SERPL-SCNC: 4.2 MMOL/L (ref 3.5–5.1)
RBC # BLD AUTO: 3.57 M/UL (ref 4.1–5.7)
SODIUM SERPL-SCNC: 143 MMOL/L (ref 136–145)
TROPONIN I SERPL-MCNC: <0.05 NG/ML
WBC # BLD AUTO: 8.5 K/UL (ref 4.1–11.1)

## 2018-07-08 PROCEDURE — 93005 ELECTROCARDIOGRAM TRACING: CPT

## 2018-07-08 PROCEDURE — 83735 ASSAY OF MAGNESIUM: CPT | Performed by: EMERGENCY MEDICINE

## 2018-07-08 PROCEDURE — 80048 BASIC METABOLIC PNL TOTAL CA: CPT | Performed by: EMERGENCY MEDICINE

## 2018-07-08 PROCEDURE — 99285 EMERGENCY DEPT VISIT HI MDM: CPT

## 2018-07-08 PROCEDURE — 36415 COLL VENOUS BLD VENIPUNCTURE: CPT | Performed by: EMERGENCY MEDICINE

## 2018-07-08 PROCEDURE — 85025 COMPLETE CBC W/AUTO DIFF WBC: CPT | Performed by: EMERGENCY MEDICINE

## 2018-07-08 PROCEDURE — 84484 ASSAY OF TROPONIN QUANT: CPT | Performed by: EMERGENCY MEDICINE

## 2018-07-08 NOTE — ED NOTES
Pt reports he came into the ED for a \"high heart rate\". Pt sinus rhythm with occasional PVCs on monitor. Pt denies nausea/vomiting/shortness of breath/chest pain and pressure/jaw and arm pain.

## 2018-07-08 NOTE — ED PROVIDER NOTES
HPI Comments: 80 y.o. male with extensive past medical history, please see list, significant for HTN, A-fib, embolic stroke, and vertigo who presents via private vehicle from home accompanied by his daughter and wife with chief complaint of palpitations. Patient measured his HR at home with finger pulse oximeter and decided to come to the ED after noting a reading of 144. Patient also c/o cough productive of sputum, but notes he has experienced cold/flu-like symptoms for the past week and taken OTC medication. Patient notes normal PO intake. Patient denies chest pain, abdominal pain, shortness of breath, n/v, and urinary or bowel changes. There are no other acute medical concerns at this time. Social hx: Never tobacco smoker; Denies smokeless tobacco use; Endorses rare EtOH use  PCP: Shawn Pabon MD    Note written by Eva Galicia, as dictated by Juan Manuel Cain. Ciatlin Zee MD 6:50 PM    The history is provided by the patient, the spouse and a relative.         Past Medical History:   Diagnosis Date    A-fib [facility])     Anxiety     Arthritis     right shoulder    Colon cancer ([facility] [address] 2002    colon resection    Cough     Depression 9/28/2015    DJD (degenerative joint disease) of knee     left TKR    Dyslipidemia     Embolic stroke involving right middle cerebral artery [facility]) Nov 2013    presented with dysarthria, MRI Several tiny acute infarcts in the right middle cerebral artery    Falls     Fatigue     Gout     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    Hypertension     Joint pain     Memory loss     Muscle pain     Paroxysmal atrial fibrillation ([facility] [address] 9/23/2014    Peripheral neuropathy     Ringing in ears     Skipped beats     Vertigo        Past Surgical History:   Procedure Laterality Date    ABDOMEN SURGERY PROC UNLISTED      colorectal    CARDIAC SURG PROCEDURE UNLIST      cardio version    HX CATARACT REMOVAL      HX GI      colon resection    HX ORTHOPAEDIC      left knee, left shoulder         Family History:   Problem Relation Age of Onset    Stroke Father     Heart Disease Father     Stroke Sister        Social History     Social History    Marital status:      Spouse name: N/A    Number of children: N/A    Years of education: N/A     Occupational History    Not on file. Social History Main Topics    Smoking status: Never Smoker    Smokeless tobacco: Never Used    Alcohol use 0.6 oz/week     1 Standard drinks or equivalent per week      Comment: rarely    Drug use: Not on file    Sexual activity: Not on file     Other Topics Concern    Not on file     Social History Narrative         ALLERGIES: Review of patient's allergies indicates no known allergies. Review of Systems   Constitutional: Negative for chills and fever. HENT: Negative for ear pain and sore throat. Eyes: Negative for pain. Respiratory: Positive for cough (productive of sputum). Negative for chest tightness and shortness of breath. Cardiovascular: Positive for palpitations. Negative for chest pain and leg swelling. Gastrointestinal: Negative for abdominal pain, constipation, diarrhea, nausea and vomiting. Genitourinary: Negative for difficulty urinating, dysuria and flank pain. Musculoskeletal: Negative for back pain. Skin: Negative for rash. Neurological: Negative for headaches. All other systems reviewed and are negative. Note written by Eva Pak, as dictated by Cat Harrison MD 6:50 PM    Vitals:    07/08/18 1739 07/08/18 1800   BP: 184/72 139/76   Pulse: (!) 147 87   Resp: 15 13   Temp: 98 °F (36.7 °C)    SpO2: 100% 97%   Weight: 77.1 kg (170 lb)    Height: 5' 7\" (1.702 m)             Physical Exam   Constitutional: No distress. Elderly; well-appearing. HENT:   Head: Normocephalic and atraumatic. Eyes: Conjunctivae are normal. Pupils are equal, round, and reactive to light. No scleral icterus.    Neck: No tracheal deviation present. Cardiovascular: Normal rate and regular rhythm. Pulmonary/Chest: Effort normal and breath sounds normal. No stridor. No respiratory distress. Abdominal: Soft. He exhibits no distension. There is no tenderness. Musculoskeletal: He exhibits no edema or deformity. Neurological: He is alert. Skin: Skin is warm and dry. Psychiatric: He has a normal mood and affect. Nursing note and vitals reviewed. Note written by Eva Mcdonald, as dictated by Isidro Lafleur. Michelle Ca MD 6:50 PM    MDM  Number of Diagnoses or Management Options  Palpitations:   Diagnosis management comments: 79 yo male with h/o atrial fib presents with c/o tachycardia on home pulse ox. Initially tachy at triage but remained with normal HR during ED stay. - labs unremarkable  - advised to follow up with Cardiologist  - Patient dissatisfied and angry with wait. Unfortunately ED was inundated with critical patients during his stay and his visit exceeded 2.5 hours. I apologized for the delay and attempted to explain. Pt was still angry at time of discharge. - He was given return precautions. Isidro Lafleur. Michelle Ca MD          ED Course       Procedures     ED EKG interpretation:  Rhythm: atrial fib; and irregular. Rate (approx.): 98; Axis: left axis deviation; ST/T wave: non-specific changes; Minimal voltage criteria for LVH. Prolonged QT. Abnormal ECG. Note written by Eva Mcdonald, as dictated by Isidro Lafleur.  Michelle Ca MD 5:47 PM

## 2018-07-08 NOTE — ED TRIAGE NOTES
PT reports tachycardic. PT reports having a history of a-fib. Reports that he dioes not stay in a-fib. During triage heart rate is palpable to be 147 and irregular.

## 2018-07-09 LAB
ATRIAL RATE: 98 BPM
CALCULATED R AXIS, ECG10: -35 DEGREES
CALCULATED T AXIS, ECG11: 58 DEGREES
DIAGNOSIS, 93000: NORMAL
Q-T INTERVAL, ECG07: 380 MS
QRS DURATION, ECG06: 100 MS
QTC CALCULATION (BEZET), ECG08: 485 MS
VENTRICULAR RATE, ECG03: 98 BPM

## 2018-07-09 NOTE — DISCHARGE INSTRUCTIONS
Palpitations: Care Instructions  Your Care Instructions    Heart palpitations are the uncomfortable sensation that your heart is beating fast or irregularly. You might feel pounding or fluttering in your chest. It might feel like your heart is skipping a beat. Although palpitations may be caused by a heart problem, they also occur because of stress, fatigue, or use of alcohol, caffeine, or nicotine. Many medicines, including diet pills, antihistamines, decongestants, and some herbal products, can cause heart palpitations. Nearly everyone has palpitations from time to time. Depending on your symptoms, your doctor may need to do more tests to try to find the cause of your palpitations. Follow-up care is a key part of your treatment and safety. Be sure to make and go to all appointments, and call your doctor if you are having problems. It's also a good idea to know your test results and keep a list of the medicines you take. How can you care for yourself at home? · Avoid caffeine, nicotine, and excess alcohol. · Do not take illegal drugs, such as methamphetamines and cocaine. · Do not take weight loss or diet medicines unless you talk with your doctor first.  · Get plenty of sleep. · Do not overeat. · If you have palpitations again, take deep breaths and try to relax. This may slow a racing heart. · If you start to feel lightheaded, lie down to avoid injuries that might result if you pass out and fall down. · Keep a record of your palpitations and bring it to your next doctor's appointment. Write down:  ¨ The date and time. ¨ Your pulse. (If your heart is beating fast, it may be hard to count your pulse.)  ¨ What you were doing when the palpitations started. ¨ How long the palpitations lasted. ¨ Any other symptoms. · If an activity causes palpitations, slow down or stop. Talk to your doctor before you do that activity again. · Take your medicines exactly as prescribed.  Call your doctor if you think you are having a problem with your medicine. When should you call for help? Call 911 anytime you think you may need emergency care. For example, call if:  ? · You passed out (lost consciousness). ? · You have symptoms of a heart attack. These may include:  ¨ Chest pain or pressure, or a strange feeling in the chest.  ¨ Sweating. ¨ Shortness of breath. ¨ Pain, pressure, or a strange feeling in the back, neck, jaw, or upper belly or in one or both shoulders or arms. ¨ Lightheadedness or sudden weakness. ¨ A fast or irregular heartbeat. After you call 911, the  may tell you to chew 1 adult-strength or 2 to 4 low-dose aspirin. Wait for an ambulance. Do not try to drive yourself. ? · You have symptoms of a stroke. These may include:  ¨ Sudden numbness, tingling, weakness, or loss of movement in your face, arm, or leg, especially on only one side of your body. ¨ Sudden vision changes. ¨ Sudden trouble speaking. ¨ Sudden confusion or trouble understanding simple statements. ¨ Sudden problems with walking or balance. ¨ A sudden, severe headache that is different from past headaches. ?Call your doctor now or seek immediate medical care if:  ? · You have heart palpitations and:  ¨ Are dizzy or lightheaded, or you feel like you may faint. ¨ Have new or increased shortness of breath. ? Watch closely for changes in your health, and be sure to contact your doctor if:  ? · You continue to have heart palpitations. Where can you learn more? Go to http://olivier-jj.info/. Enter R508 in the search box to learn more about \"Palpitations: Care Instructions. \"  Current as of: September 21, 2016  Content Version: 11.4  © 0262-9291 [employer]. Care instructions adapted under license by [employer] (which disclaims liability or warranty for this information).  If you have questions about a medical condition or this instruction, always ask your healthcare professional. [address] any warranty or liability for your use of this information.

## 2018-07-16 ENCOUNTER — OFFICE VISIT (OUTPATIENT)
Dept: CARDIOLOGY CLINIC | Age: 83
End: 2018-07-16

## 2018-07-16 VITALS
RESPIRATION RATE: 16 BRPM | HEART RATE: 102 BPM | WEIGHT: 166.6 LBS | OXYGEN SATURATION: 97 % | BODY MASS INDEX: 26.15 KG/M2 | SYSTOLIC BLOOD PRESSURE: 102 MMHG | HEIGHT: 67 IN | DIASTOLIC BLOOD PRESSURE: 72 MMHG

## 2018-07-16 DIAGNOSIS — I48.20 CHRONIC ATRIAL FIBRILLATION (HCC): Primary | ICD-10-CM

## 2018-07-16 DIAGNOSIS — E78.5 DYSLIPIDEMIA: ICD-10-CM

## 2018-07-16 DIAGNOSIS — I49.5 SINUS NODE DYSFUNCTION (HCC): ICD-10-CM

## 2018-07-16 DIAGNOSIS — I10 ESSENTIAL HYPERTENSION, BENIGN: ICD-10-CM

## 2018-07-16 NOTE — MR AVS SNAPSHOT
16[address] [facility] [address] 100[address] [facility] 
[phone] Patient: Toni Monge MRN: [id number] ANT:2/07/2597 Visit Information Date & Time Provider Department Dept. Phone Encounter #  
 7/16/2018  4:00 PM Alfredo Rodríguez MD [facility] Carson Whitaker [phone] [id number] Your Appointments 11/12/2018  1:40 PM  
ESTABLISHED PATIENT with Alfredo Rodríguez MD  
[facility] (COMFORT SCHEDULING) Appt Note: 6 mo fup  
 [address] [facility] 600 100[address] [facility]  
[address] [facility] [address] [address] Upcoming Health Maintenance Date Due DTaP/Tdap/Td series (1 - Tdap) 5/25/1948 ZOSTER VACCINE AGE 60> 3/25/1987 GLAUCOMA SCREENING Q2Y 5/25/1992 Pneumococcal 65+ Low/Medium Risk (1 of 2 - PCV13) 5/25/1992 MEDICARE YEARLY EXAM 3/14/2018 Influenza Age 5 to Adult 8/1/2018 Allergies as of 7/16/2018  Review Complete On: 7/16/2018 By: Angelica Samson LPN No Known Allergies Current Immunizations  Reviewed on 7/16/2018 No immunizations on file. Reviewed by Angelica Samson LPN on 5/91/3929 at  3:56 PM  
You Were Diagnosed With   
  
 Codes Comments Chronic atrial fibrillation (HCC)    -  Primary ICD-10-CM: P04.5 ICD-9-CM: 427.31 Sinus node dysfunction (HCC)     ICD-10-CM: I49.5 ICD-9-CM: 427.81 Dyslipidemia     ICD-10-CM: E78.5 ICD-9-CM: 272.4 Essential hypertension, benign     ICD-10-CM: I10 
ICD-9-CM: 401.1 Vitals BP Pulse Resp Height(growth percentile) Weight(growth percentile) SpO2  
 102/72 (BP 1 Location: Left arm, BP Patient Position: Sitting) (!) 102 16 5' 7\" (1.702 m) 166 lb 9.6 oz (75.6 kg) 97% BMI Smoking Status 26.09 kg/m2 Never Smoker Vitals History BMI and BSA Data Body Mass Index Body Surface Area 26.09 kg/m 2 1.89 m 2 Preferred Pharmacy Pharmacy Name Phone [address], [address] [phone] Your Updated Medication List  
  
   
This list is accurate as of 7/16/18  4:47 PM.  Always use your most recent med list.  
  
  
  
  
 allopurinol 300 mg tablet Commonly known as:  Andrea Will Take 300 mg by mouth every evening. folic acid 1 mg tablet Commonly known as:  Google Take  by mouth daily. lisinopril 10 mg tablet Commonly known as:  Robold Files Take 1 Tab by mouth every other day. SAVAYSA 60 mg Tab tablet Generic drug:  edoxaban TAKE ONE TABLET BY MOUTH EVERY DAY  
  
 ZOCOR 20 mg tablet Generic drug:  simvastatin Take 10 mg by mouth nightly. ZOLOFT 50 mg tablet Generic drug:  sertraline Take 50 mg by mouth daily. Introducing [facility] & HEALTH SERVICES! Dear Spring Cueto: 
Thank you for requesting a Nint[employer] account. Our records indicate that you already have an active Nintu Oy account. You can access your account anytime at [email or web address] Did you know that you can access your hospital and ER discharge instructions at any time in Nintu Oy? You can also review all of your test results from your hospital stay or ER visit. Additional Information If you have questions, please visit the Frequently Asked Questions section of the Nintu Oy website at [email or web address] Remember, Nintu Oy is NOT to be used for urgent needs. For medical emergencies, dial 911. Now available from your iPhone and Android! Please provide this summary of care documentation to your next provider. Your primary care clinician is listed as Gladys Spence Ii. If you have any questions after today's visit, please call [phone].

## 2018-07-16 NOTE — PROGRESS NOTES
[id number] [place], [address] [facility]    Office [phone]  Fax [phone]         Lena Duenas is a 80 y.o. male. Last seen by me 2 months ago. Assessment  Encounter Diagnoses   Name Primary?  Chronic atrial fibrillation (HCC) Yes    Sinus node dysfunction (HCC)     Dyslipidemia     Essential hypertension, benign      Recommendations:  Mr. Micha Rodriguez has chronic AF in the setting of conduction disease (Sinus/AV Node dysfunction), completely asymptomatic, now with tachy/calderon. Will get 24 hour Holter to evaluate heart rate. We discussed the role of pacemaker should this be necessary. Continue anticoagulation with Savaysa. HTN. Normotensive on low dose lisinopril. Phone follow up after reviewing tests    Follow-up Disposition: Not on File     Subjective:  Mr. Micha Rodriguez states on 7/8/18 his pulse ox showed his pulse was 168. He was not symptomatic, but presented to the ED for evaluation. Per daughter, his pulse also occasionally decreases to the 40s. He states he intermittently becomes lightheaded/dizzy. Patient denies any exertional chest pain, dyspnea, syncope, orthopnea, edema or paroxysmal nocturnal dyspnea. He is here with his daughter today. Cardiac risk factors   HTN yes  DM  no  Smoking no  Family hx of CAD no    Cardiac testing  Echo Nov 2013 - LVH, EF 60%, LA upper nl  MRA neck/brain Nov 2013 - normal  Loop Monitor 1/17/14-PAC's and periods of ectopic atrial rhythm.   Period of Atrial Fibrillation with rates of  BPM with aberrant beats     Past Medical History:   Diagnosis Date    Anxiety     Arthritis     right shoulder    Atrial fibrillation, chronic ([address] 09/23/2014    Colon cancer ([address] 2002    colon resection    Depression 9/28/2015    DJD (degenerative joint disease) of knee     left TKR    Dyslipidemia     Embolic stroke involving right middle cerebral artery [facility]) Nov 2013    presented with dysarthria, MRI Several tiny acute infarcts in the right middle cerebral artery    Falls     Gout     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    Hypertension     Memory loss     Peripheral neuropathy     Vertigo       Current Outpatient Prescriptions   Medication Sig Dispense Refill    simvastatin (ZOCOR) 20 mg tablet Take 10 mg by mouth nightly.  lisinopril (PRINIVIL, ZESTRIL) 10 mg tablet Take 1 Tab by mouth every other day. 45 Tab 3    SAVAYSA 60 mg tab tablet TAKE ONE TABLET BY MOUTH EVERY DAY 30 Tab 6    folic acid (FOLVITE) 1 mg tablet Take  by mouth daily.  sertraline (ZOLOFT) 50 mg tablet Take 50 mg by mouth daily.  allopurinol (ZYLOPRIM) 300 mg tablet Take 300 mg by mouth every evening. No Known Allergies     . Retired commercial developer    Review of Systems  Constitutional: Negative for fever, chills, and diaphoresis. Respiratory: Negative for cough, hemoptysis, sputum production, shortness of breath and wheezing. Cardiovascular: Negative for chest pain, palpitations, orthopnea, claudication, leg swelling and PND. Gastrointestinal: Negative for heartburn, nausea, vomiting, blood in stool and melena. Genitourinary: Negative for dysuria and flank pain. Musculoskeletal: Negative for joint pain and back pain. Skin: Negative for rash. Neurological: Negative for focal weakness, seizures, loss of consciousness, weakness and headaches. +intermittent dizziness/lightheadedness  Endo/Heme/Allergies: Does not bruise/bleed easily. Psychiatric/Behavioral: Negative for memory loss. The patient does not have insomnia.       Physical Exam  Visit Vitals    /72 (BP 1 Location: Left arm, BP Patient Position: Sitting)    Pulse (!) 102    Resp 16    Ht 5' 7\" (1.702 m)    Wt 166 lb 9.6 oz (75.6 kg)    SpO2 97%    BMI 26.09 kg/m2     Wt Readings from Last 3 Encounters:   07/16/18 166 lb 9.6 oz (75.6 kg)   07/08/18 170 lb (77.1 kg)   05/11/18 169 lb 3.2 oz (76.7 kg) General - well developed well nourished  Neck - JVP normal, thyroid nl  Cardiac - irregular, no murmurs, rubs or gallops. No clicks  Vascular - carotids without bruits, radials, femorals and pedal pulses equal bilateral  Lungs - clear to auscultation bilaterals, no rales ,wheezing or rhonchi  Abd - soft nontender, no HSM, no abd bruits  Extremities - no edema  Skin - no rash  Neuro - nonfocal  Psych - normal mood and affect    Cardiographics  ECG 5/20/14 - normal sinus rhythm, 1st degree AVB, otherwise normal  EKG 5/6/15 - atrial flutter 80  EKG 9/28/15 - sinus calderon 48, first degree AV block, , mild IVCD  EKG 4/12/16 - AF 50s  EKG 05/01/17-Atrial fibrillation/flutter 70s. EKG 5/11/18- AF 80s, frequent PVCs    Written by Capo Charles, as dictated by Dr. Chetna Ruiz.      Chetna Ruiz MD

## 2018-07-16 NOTE — PROGRESS NOTES
Chief Complaint   Patient presents with    Follow-up     chronic atrial fib,embolic stroke involving right middle cerebral artery    Cholesterol Problem    Hypertension     1. Have you been to the ER, urgent care clinic since your last visit? Hospitalized since your last visit? [place] [place]    2. Have you seen or consulted any other health care providers outside of the [facility] since your last visit? Include any pap smears or colon screening.  No    Visit Vitals    /72 (BP 1 Location: Left arm, BP Patient Position: Sitting)    Pulse (!) 102    Resp 16    Ht 5' 7\" (1.702 m)    Wt 166 lb 9.6 oz (75.6 kg)    SpO2 97%    BMI 26.09 kg/m2

## 2018-07-23 ENCOUNTER — CLINICAL SUPPORT (OUTPATIENT)
Dept: CARDIOLOGY CLINIC | Age: 83
End: 2018-07-23

## 2018-07-23 DIAGNOSIS — I48.20 CHRONIC ATRIAL FIBRILLATION (HCC): ICD-10-CM

## 2018-07-23 DIAGNOSIS — I49.5 SINUS NODE DYSFUNCTION (HCC): ICD-10-CM

## 2018-07-23 DIAGNOSIS — I10 ESSENTIAL HYPERTENSION, BENIGN: ICD-10-CM

## 2018-07-23 DIAGNOSIS — E78.5 DYSLIPIDEMIA: ICD-10-CM

## 2018-08-07 ENCOUNTER — TELEPHONE (OUTPATIENT)
Dept: CARDIOLOGY CLINIC | Age: 83
End: 2018-08-07

## 2018-08-07 NOTE — TELEPHONE ENCOUNTER
Pt notified that haltor monitor results are not available yet.   Pt will be called when results available

## 2018-09-17 ENCOUNTER — TELEPHONE (OUTPATIENT)
Dept: CARDIOLOGY CLINIC | Age: 83
End: 2018-09-17

## 2018-09-17 NOTE — TELEPHONE ENCOUNTER
Cardiac testing  Echo Nov 2013 - LVH, EF 60%, LA upper nl  MRA neck/brain Nov 2013 - normal  Loop Monitor 1/17/14-PAC's and periods of ectopic atrial rhythm. Period of Atrial Fibrillation with rates of  BPM with aberrant beats    24 Hr Holter monitor 7/23/18 - Atrial flutter, rates 67 to 149. Rare episodes of AF with RVR. Frequent PVC's. Mr. Avril Jones was seen by Dr. Rachelle Muniz on 7/16/18 - hx of chronic AF with concerns for tachy/calderon syndrome based on home monitoring of HR's, asymptomatic. I have reviewed data from Holter monitor with Dr. Lila Red. Note, frequent PVC's, but no evidence of bradycardia. Bradycardia reported from home monitoring is likely reflective of incorrect readings due to excessive PVC's. No indication for pacemaker at this time. Plan to continue current medication regimen and follow up as previously scheduled. Encouraged him to call back prior to follow up with any questions or concerns.

## 2018-11-12 ENCOUNTER — OFFICE VISIT (OUTPATIENT)
Dept: CARDIOLOGY CLINIC | Age: 83
End: 2018-11-12

## 2018-11-12 VITALS
DIASTOLIC BLOOD PRESSURE: 70 MMHG | HEIGHT: 67 IN | SYSTOLIC BLOOD PRESSURE: 98 MMHG | WEIGHT: 163.3 LBS | BODY MASS INDEX: 25.63 KG/M2 | OXYGEN SATURATION: 96 % | HEART RATE: 100 BPM

## 2018-11-12 DIAGNOSIS — I48.20 CHRONIC ATRIAL FIBRILLATION (HCC): Primary | ICD-10-CM

## 2018-11-12 DIAGNOSIS — E78.5 DYSLIPIDEMIA: ICD-10-CM

## 2018-11-12 DIAGNOSIS — I49.5 SINUS NODE DYSFUNCTION (HCC): ICD-10-CM

## 2018-11-12 DIAGNOSIS — I10 ESSENTIAL HYPERTENSION, BENIGN: ICD-10-CM

## 2018-11-12 NOTE — PROGRESS NOTES
[id number], [address] [facility]    Office [phone]  Fax [phone]         Ba Mauricio is a 80 y.o. male. Last seen by me 4 months ago. Assessment  Encounter Diagnoses   Name Primary?  Chronic atrial fibrillation (HCC) Yes    Sinus node dysfunction (HCC)     Dyslipidemia     Essential hypertension, benign         Recommendations:  Mr. Yovani Davis has chronic AF in the setting of conduction disease (Sinus/AV Node dysfunction), completely asymptomatic, now with RVR. 24 hour holter in 7/2018 demonstrated no bradycardia but frequent PVCs. Will replace lisinopril with Atenolol 25 mg daily. Reassess HR and BP in 2-3 weeks. discussed the role of pacemaker should this be necessary. Continue anticoagulation with Savaysa. He is under considerable stress due to his wife's recent fall. He is her sole caregiver. Follow-up Disposition: Not on File     Subjective:  Mr. 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 He reports associated mental stress and some difficulty sleeping. He states his daughter lives nearby, which is helpful for him. He states he sometimes feels his heart rate is fast.     Patient denies any exertional chest pain, dyspnea, syncope, orthopnea, edema or paroxysmal nocturnal dyspnea. Cardiac risk factors   HTN yes  DM  no  Smoking no  Family hx of CAD no    Cardiac testing  Echo Nov 2013 - LVH, EF 60%, LA upper nl  MRA neck/brain Nov 2013 - normal  Loop Monitor 1/17/14-PAC's and periods of ectopic atrial rhythm. Period of Atrial Fibrillation with rates of  BPM with aberrant beats    24 Hr Holter monitor 7/23/18 - Atrial flutter, rates 67 to 149. Rare episodes of AF with RVR. Frequent PVC's.     Past Medical History:   Diagnosis Date    Anxiety     Arthritis     right shoulder    Atrial fibrillation, chronic ([address] 09/23/2014    [address] cancer ([facility] 2002    colon resection    Depression 9/28/2015    DJD (degenerative joint disease) of knee     left TKR    Dyslipidemia     Embolic stroke involving right middle cerebral artery [facility]) Nov 2013    presented with dysarthria, MRI Several tiny acute infarcts in the right middle cerebral artery    Falls     Gout     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    Hypertension     Memory loss     Peripheral neuropathy     Vertigo       Current Outpatient Medications   Medication Sig Dispense Refill    simvastatin (ZOCOR) 20 mg tablet Take 10 mg by mouth nightly.  SAVAYSA 60 mg tab tablet TAKE ONE TABLET BY MOUTH EVERY DAY 30 Tab 6    folic acid (FOLVITE) 1 mg tablet Take  by mouth daily.  sertraline (ZOLOFT) 50 mg tablet Take 50 mg by mouth daily.  allopurinol (ZYLOPRIM) 300 mg tablet Take 300 mg by mouth every evening. No Known Allergies     . Retired commercial developer    Review of Systems  Constitutional: Negative for fever, chills, and diaphoresis. Respiratory: Negative for cough, hemoptysis, sputum production, shortness of breath and wheezing. Cardiovascular: Negative for chest pain, palpitations, orthopnea, claudication, leg swelling and PND. Gastrointestinal: Negative for heartburn, nausea, vomiting, blood in stool and melena. Genitourinary: Negative for dysuria and flank pain. Musculoskeletal: Negative for joint pain and back pain. Skin: Negative for rash. Neurological: Negative for focal weakness, seizures, loss of consciousness, weakness and headaches. +intermittent dizziness/lightheadedness  Endo/Heme/Allergies: Does not bruise/bleed easily. Psychiatric/Behavioral: Negative for memory loss. The patient does not have insomnia.       Physical Exam  Visit Vitals  BP 98/70 (BP 1 Location: Right arm, BP Patient Position: Sitting)   Pulse 100   Ht 5' 7\" (1.702 m)   Wt 163 lb 4.8 oz (74.1 kg)   SpO2 96%   BMI 25.58 kg/m²     Wt Readings from Last 3 Encounters:   11/12/18 163 lb 4.8 oz (74.1 kg)   07/16/18 166 lb 9.6 oz (75.6 kg)   07/08/18 170 lb (77.1 kg)      General - well developed well nourished  Neck - JVP normal, thyroid nl  Cardiac - irregular, no murmurs, rubs or gallops. No clicks  Vascular - carotids without bruits, radials, femorals and pedal pulses equal bilateral  Lungs - clear to auscultation bilaterals, no rales ,wheezing or rhonchi  Abd - soft nontender, no HSM, no abd bruits  Extremities - no edema  Skin - no rash  Neuro - nonfocal  Psych - normal mood and affect    Cardiographics  ECG 5/20/14 - normal sinus rhythm, 1st degree AVB, otherwise normal  EKG 5/6/15 - atrial flutter 80  EKG 9/28/15 - sinus calderon 48, first degree AV block, , mild IVCD  EKG 4/12/16 - AF 50s  EKG 05/01/17-Atrial fibrillation/flutter 70s. EKG 5/11/18- AF 80s, frequent PVCs    Written by Sathya Cason, as dictated by Dr. Edwin Isaacs.      Edwin Isaacs MD

## 2018-11-12 NOTE — PROGRESS NOTES
Visit Vitals  BP 98/70 (BP 1 Location: Right arm, BP Patient Position: Sitting)   Pulse 100   Ht 5' 7\" (1.702 m)   Wt 163 lb 4.8 oz (74.1 kg)   SpO2 96%   BMI 25.58 kg/m²      No cardiac complaints today per patient

## 2018-11-13 ENCOUNTER — TELEPHONE (OUTPATIENT)
Dept: CARDIOLOGY CLINIC | Age: 83
End: 2018-11-13

## 2018-11-13 NOTE — TELEPHONE ENCOUNTER
Pharmacist from [facility] called following up on a replacement for Lisinopril medication patient called pharmacist about. Patient currently at Pharmacy now.   Phone # [phone]  Call passed to [facility]

## 2018-11-16 RX ORDER — ATENOLOL 25 MG/1
25 TABLET ORAL DAILY
Qty: 90 TAB | Refills: 3 | Status: SHIPPED | OUTPATIENT
Start: 2018-11-16 | End: 2019-01-14 | Stop reason: SDUPTHER

## 2018-11-26 ENCOUNTER — OFFICE VISIT (OUTPATIENT)
Dept: CARDIOLOGY CLINIC | Age: 83
End: 2018-11-26

## 2018-11-26 VITALS
HEIGHT: 67 IN | RESPIRATION RATE: 16 BRPM | BODY MASS INDEX: 25.8 KG/M2 | WEIGHT: 164.4 LBS | OXYGEN SATURATION: 97 % | SYSTOLIC BLOOD PRESSURE: 128 MMHG | HEART RATE: 64 BPM | DIASTOLIC BLOOD PRESSURE: 72 MMHG

## 2018-11-26 DIAGNOSIS — Z79.01 LONG TERM (CURRENT) USE OF ANTICOAGULANTS: ICD-10-CM

## 2018-11-26 DIAGNOSIS — I49.5 SINUS NODE DYSFUNCTION (HCC): ICD-10-CM

## 2018-11-26 DIAGNOSIS — I48.20 CHRONIC ATRIAL FIBRILLATION (HCC): Primary | ICD-10-CM

## 2018-11-26 DIAGNOSIS — I10 ESSENTIAL HYPERTENSION, BENIGN: ICD-10-CM

## 2018-11-26 NOTE — PROGRESS NOTES
Angelica Brewer, ROXY  [address]  [place], [address] [facility]    Office [phone]  Fax [phone]        Natalie Tamayo is a 80 y.o. male who is followed outpatient by Dr. Dorothea Leger and was last seen 11/12/18. Patient is here today for follow-up of his afib. Assessment  Encounter Diagnoses   Name Primary?  Sinus node dysfunction (HCC)     Chronic atrial fibrillation (HCC) Yes    Long term (current) use of anticoagulants     Essential hypertension, benign        Recommendations:  Mr. Gabriel Hernandez has chronic AF in the setting of conduction disease (Sinus/AV Node dysfunction), completely asymptomatic, prev with RVR. 24 hour holter in 7/2018 demonstrated no bradycardia but frequent PVCs. Pt now on Atenolol 25 mg daily with improvement in HR and BP. Continue anticoagulation with Savaysa.      He is under considerable stress due to his wife's recent fall and multiple other medical conditions; he reports hx of depression but feels his is doing well currently.       Follow-up Disposition:  Return in about 6 months (around 5/26/2019), or if symptoms worsen or fail to improve. Subjective:  Mr. 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 He reports associated mental stress and history of depression. He states his daughter lives nearby and is very helpful. At last visit pt's lisinopril was changed to atenolol. Since this time pt has had less dizziness. Denies racing heart beat when not appropriate (such as during activity). Continues to have palpitations / skipped heart beats.       Patient denies any exertional chest pain, dyspnea, syncope, orthopnea, edema or paroxysmal nocturnal dyspnea. Feels he is doing well. Denies abnormal bleeding. Cardiac testing  Echo Nov 2013 - LVH, EF 60%, LA upper nl  MRA neck/brain Nov 2013 - normal  Loop Monitor 1/17/14-PAC's and periods of ectopic atrial rhythm.  Period of Atrial Fibrillation with rates of  BPM with aberrant beats    24 Hr Holter monitor 7/23/18 - Atrial flutter, rates 67 to 149. Rare episodes of AF with RVR. Frequent PVC's. Past Medical History:   Diagnosis Date    Anxiety     Arthritis     right shoulder    Atrial fibrillation, chronic ([facility] 09/23/2014    Colon cancer ([facility] [address] 2002    colon resection    Depression 9/28/2015    DJD (degenerative joint disease) of knee     left TKR    Dyslipidemia     Embolic stroke involving right middle cerebral artery [facility]) Nov 2013    presented with dysarthria, MRI Several tiny acute infarcts in the right middle cerebral artery    Falls     Gout     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    Hypertension     Memory loss     Peripheral neuropathy     Vertigo         Current Outpatient Medications   Medication Sig Dispense Refill    atenolol (TENORMIN) 25 mg tablet Take 1 Tab by mouth daily. 90 Tab 3    simvastatin (ZOCOR) 20 mg tablet Take 10 mg by mouth nightly.  SAVAYSA 60 mg tab tablet TAKE ONE TABLET BY MOUTH EVERY DAY 30 Tab 6    folic acid (FOLVITE) 1 mg tablet Take  by mouth daily.  sertraline (ZOLOFT) 50 mg tablet Take 50 mg by mouth daily.  allopurinol (ZYLOPRIM) 300 mg tablet Take 300 mg by mouth every evening. No Known Allergies       Review of Systems  Constitutional: Negative for fever, chills, and diaphoresis. Respiratory: Negative for cough, hemoptysis, sputum production, shortness of breath and wheezing. Cardiovascular: Negative for chest pain, orthopnea, claudication, leg swelling and PND. +palpiations / skipped beats   Gastrointestinal: Negative for heartburn, nausea, vomiting, blood in stool and melena. Genitourinary: Negative for dysuria and flank pain. Neurological: Negative for focal weakness, seizures, loss of consciousness, weakness and headaches. +intermittent dizziness/lightheadedness with positional changes  Endo/Heme/Allergies: Negative for abnormal bleeding. Psychiatric/Behavioral: Negative for memory loss. Physical Exam    Visit Vitals  /72 (BP 1 Location: Left arm, BP Patient Position: Sitting)   Pulse 64   Resp 16   Ht 5' 7\" (1.702 m)   Wt 164 lb 6.4 oz (74.6 kg)   SpO2 97%   BMI 25.75 kg/m²     Wt Readings from Last 3 Encounters:   11/26/18 164 lb 6.4 oz (74.6 kg)   11/12/18 163 lb 4.8 oz (74.1 kg)   07/16/18 166 lb 9.6 oz (75.6 kg)      General - well developed well nourished  Neck - JVP normal  Cardiac - irregular, no murmurs, rubs or gallops. No clicks  Vascular - radials and pedal pulses equal bilateral  Lungs - clear to auscultation bilaterals, no rales ,wheezing or rhonchi  Abd - soft nontender, non distended, +BS  Extremities - no edema, warm, well perfused  Skin - no rash  Neuro - nonfocal  Psych - normal mood and affect      Cardiographics  ECG 5/20/14 - normal sinus rhythm, 1st degree AVB, otherwise normal  EKG 5/6/15 - atrial flutter 80  EKG 9/28/15 - sinus calderon 48, first degree AV block, , mild IVCD  EKG 4/12/16 - AF 50s  EKG 05/01/17-Atrial fibrillation/flutter 70s.    EKG 5/11/18- AF 80s, frequent PVCs  EKG 11/26/18 - AF 60s, PVC    Niki Alyson, ANP

## 2018-11-26 NOTE — PROGRESS NOTES
Room 7 [facility])  Visit Vitals  /72 (BP 1 Location: Left arm, BP Patient Position: Sitting)   Pulse 64   Resp 16   Ht 5' 7\" (1.702 m)   Wt 164 lb 6.4 oz (74.6 kg)   SpO2 97%   BMI 25.75 kg/m²     C/o occasional \" balance issues\", denies dizziness

## 2018-12-20 ENCOUNTER — TELEPHONE (OUTPATIENT)
Dept: CARDIOLOGY CLINIC | Age: 83
End: 2018-12-20

## 2018-12-20 DIAGNOSIS — I48.20 CHRONIC ATRIAL FIBRILLATION (HCC): Primary | ICD-10-CM

## 2018-12-20 DIAGNOSIS — R00.0 TACHYCARDIA: ICD-10-CM

## 2018-12-20 NOTE — TELEPHONE ENCOUNTER
----- Message from Dain Anderson MD sent at 12/20/2018  9:54 AM EST -----  Regarding: Holter  Chronic Afib with tachycardia. Atenolol increased. Nasima Sharma, can you arrange for 24 holter next week?      Ny, office visit after completed  Thx!

## 2018-12-20 NOTE — TELEPHONE ENCOUNTER
----- Message from Viraj Lopez MD sent at 12/20/2018  9:54 AM EST -----  Regarding: Holter  Chronic Afib with tachycardia. Atenolol increased. Manuel Pulido, can you arrange for 24 holter next week?      Ny, office visit after completed  Thx!

## 2018-12-20 NOTE — PROGRESS NOTES
Saw Dr Kori Flowers for back pain. HR noted be in 130s, asx. Sys . Advise increasing atenolol to 25mg bid.  Will arrange for another 24 holter next week, then f/u visit

## 2018-12-27 ENCOUNTER — DOCUMENTATION ONLY (OUTPATIENT)
Dept: CARDIOLOGY CLINIC | Age: 83
End: 2018-12-27

## 2018-12-27 ENCOUNTER — CLINICAL SUPPORT (OUTPATIENT)
Dept: CARDIOLOGY CLINIC | Age: 83
End: 2018-12-27

## 2018-12-27 DIAGNOSIS — I48.20 CHRONIC ATRIAL FIBRILLATION (HCC): ICD-10-CM

## 2018-12-27 DIAGNOSIS — R00.0 TACHYCARDIA: ICD-10-CM

## 2018-12-27 NOTE — PROGRESS NOTES
Pt started 24hr holter monitor for atrial fibrillation per Dr. Raheem Billy. Pt given instructions and will return monitor tomorrow. Pt verbalized understanding.

## 2019-01-02 NOTE — PROGRESS NOTES
Applied 24 hr holter per Dr Krissy Blankenship dx: afib. Pt has #[id number] & due back on 12/28. Chargeable visit.

## 2019-01-14 ENCOUNTER — OFFICE VISIT (OUTPATIENT)
Dept: CARDIOLOGY CLINIC | Age: 84
End: 2019-01-14

## 2019-01-14 VITALS
HEIGHT: 67 IN | BODY MASS INDEX: 25.75 KG/M2 | OXYGEN SATURATION: 96 % | RESPIRATION RATE: 20 BRPM | HEART RATE: 62 BPM | DIASTOLIC BLOOD PRESSURE: 60 MMHG | SYSTOLIC BLOOD PRESSURE: 110 MMHG

## 2019-01-14 DIAGNOSIS — I48.20 CHRONIC ATRIAL FIBRILLATION (HCC): Primary | ICD-10-CM

## 2019-01-14 DIAGNOSIS — I49.5 SINUS NODE DYSFUNCTION (HCC): ICD-10-CM

## 2019-01-14 DIAGNOSIS — E78.5 DYSLIPIDEMIA: ICD-10-CM

## 2019-01-14 DIAGNOSIS — I10 ESSENTIAL HYPERTENSION, BENIGN: ICD-10-CM

## 2019-01-14 RX ORDER — ATENOLOL 50 MG/1
50 TABLET ORAL DAILY
Qty: 90 TAB | Refills: 3 | Status: SHIPPED | OUTPATIENT
Start: 2019-01-14 | End: 2020-02-20

## 2019-01-14 NOTE — PROGRESS NOTES
[id number], [address] [facility]  Office [phone]  Fax [phone]    Ratna Ritchie is a 80 y.o. male. Here to review the results of his recent Holter monitor. Assessment  Encounter Diagnoses   Name Primary?  Chronic atrial fibrillation (HCC) Yes    Sinus node dysfunction (HCC)     Essential hypertension, benign     Dyslipidemia       Recommendations:  Mr. Rhys Burden has chronic AF in the setting of conduction disease (Sinus/AV Node dysfunction), completely asymptomatic with episodes of tachycardia. Update Holter on Atenolol 50 mg/d demonstrated acceptable HR control. Need to watch for symptomatic bradycardia. Discussed the role of pacemaker should this be necessary. Continue anticoagulation with Savaysa. His quality of life remains good. He has a sharp wit. Follow-up Disposition:  Return in about 6 months (around 7/14/2019). Subjective:  Mr. Rhys Burden saw me 3 months ago and then saw BODØ 2 months ago and was doing fine. He saw Dr. Tatiana Peraza in 12/2018 and his HR was noted to be in the 130, asx. I advise he increase Atenolol to 25 mg BID. Subsequent 24 hour holter demonstrated average HR 82, ranging from . He was in atrial flutter the entire time. Today, he states he is doing well. He denies any exertional chest pain, dyspnea, syncope, orthopnea, edema or paroxysmal nocturnal dyspnea. He is here today with his wife and his daughter     Cardiac risk factors   HTN yes  DM  no  Smoking no  Family hx of CAD no    Cardiac testing  Echo Nov 2013 - LVH, EF 60%, LA upper nl  MRA neck/brain Nov 2013 - normal  Loop Monitor 1/17/14-PAC's and periods of ectopic atrial rhythm. Period of Atrial Fibrillation with rates of  BPM with aberrant beats    24 Hr Holter monitor 7/23/18 - Atrial flutter, rates 67 to 149. Rare episodes of AF with RVR. Frequent PVC's.     Past Medical History:   Diagnosis Date    Anxiety     Arthritis     right shoulder    Atrial fibrillation, chronic ([facility] [address] 09/23/2014    Colon cancer ([facility] [address] [address] 2002    colon resection    Depression 9/28/2015    DJD (degenerative joint disease) of knee     left TKR    Dyslipidemia     Embolic stroke involving right middle cerebral artery [facility]) Nov 2013    presented with dysarthria, MRI Several tiny acute infarcts in the right middle cerebral artery    Falls     Gout     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    Hypertension     Memory loss     Peripheral neuropathy     Vertigo       Current Outpatient Medications   Medication Sig Dispense Refill    atenolol (TENORMIN) 25 mg tablet Take 1 Tab by mouth daily. (Patient taking differently: Take 50 mg by mouth daily.) 90 Tab 3    simvastatin (ZOCOR) 20 mg tablet Take 10 mg by mouth nightly.  SAVAYSA 60 mg tab tablet TAKE ONE TABLET BY MOUTH EVERY DAY 30 Tab 6    folic acid (FOLVITE) 1 mg tablet Take  by mouth daily.  sertraline (ZOLOFT) 50 mg tablet Take 50 mg by mouth daily.  allopurinol (ZYLOPRIM) 300 mg tablet Take 150 mg by mouth every evening. No Known Allergies     . Retired commercial developer    Review of Systems  Constitutional: Negative for fever, chills, and diaphoresis. Respiratory: Negative for cough, hemoptysis, sputum production, shortness of breath and wheezing. Cardiovascular: Negative for chest pain, palpitations, orthopnea, claudication, leg swelling and PND. Gastrointestinal: Negative for heartburn, nausea, vomiting, blood in stool and melena. Genitourinary: Negative for dysuria and flank pain. Musculoskeletal: Negative for joint pain and back pain. Skin: Negative for rash. Neurological: Negative for focal weakness, seizures, loss of consciousness, weakness and headaches. +intermittent dizziness/lightheadedness  Endo/Heme/Allergies: Does not bruise/bleed easily. Psychiatric/Behavioral: Negative for memory loss. The patient does not have insomnia.       Physical Exam  Visit Vitals  /60   Pulse 62   Resp 20   Ht 5' 7\" (1.702 m)   SpO2 96%   BMI 25.75 kg/m²     Wt Readings from Last 3 Encounters:   11/26/18 164 lb 6.4 oz (74.6 kg)   11/12/18 163 lb 4.8 oz (74.1 kg)   07/16/18 166 lb 9.6 oz (75.6 kg)      General - well developed well nourished  Neck - JVP normal, thyroid nl  Cardiac - irregular, no murmurs, rubs or gallops. No clicks  Vascular - carotids without bruits, radials, femorals and pedal pulses equal bilateral  Lungs - clear to auscultation bilaterals, no rales ,wheezing or rhonchi  Abd - soft nontender, no HSM, no abd bruits  Extremities - no edema  Skin - no rash  Neuro - nonfocal  Psych - normal mood and affect    Cardiographics  ECG 5/20/14 - normal sinus rhythm, 1st degree AVB, otherwise normal  EKG 5/6/15 - atrial flutter 80  EKG 9/28/15 - sinus calderon 48, first degree AV block, , mild IVCD  EKG 4/12/16 - AF 50s  EKG 05/01/17-Atrial fibrillation/flutter 70s. EKG 5/11/18- AF 80s, frequent PVCs    Written by Shawnee Mora, as dictated by Dr. Nguyen White.      Nguyen White MD

## 2019-01-14 NOTE — PROGRESS NOTES
Visit Vitals  /60   Pulse 62   Resp 20   Ht 5' 7\" (1.702 m)   SpO2 96%   BMI 25.75 kg/m²     Denies any complaints at this time.

## 2019-05-31 ENCOUNTER — OFFICE VISIT (OUTPATIENT)
Dept: CARDIOLOGY CLINIC | Age: 84
End: 2019-05-31

## 2019-05-31 VITALS
WEIGHT: 153 LBS | SYSTOLIC BLOOD PRESSURE: 118 MMHG | RESPIRATION RATE: 20 BRPM | HEART RATE: 60 BPM | BODY MASS INDEX: 24.01 KG/M2 | HEIGHT: 67 IN | DIASTOLIC BLOOD PRESSURE: 60 MMHG | OXYGEN SATURATION: 98 %

## 2019-05-31 DIAGNOSIS — I49.5 SINUS NODE DYSFUNCTION (HCC): ICD-10-CM

## 2019-05-31 DIAGNOSIS — I48.20 CHRONIC ATRIAL FIBRILLATION (HCC): Primary | ICD-10-CM

## 2019-05-31 NOTE — PROGRESS NOTES
Visit Vitals  /60   Pulse 60   Resp 20   Ht 5' 7\" (1.702 m)   Wt 153 lb (69.4 kg)   SpO2 98%   BMI 23.96 kg/m²     Denies CP  Occasional SOB  Denies edema.   Increase fatigue

## 2019-05-31 NOTE — PROGRESS NOTES
[facility]  [address]  [address]  [place], [address] [facility]    Office [phone]  Fax [phone]  Cell [phone]      Carlos Martinez is a 80 y.o. male. Last seen by me 5 months ago. Assessment  Encounter Diagnoses   Name Primary?  Chronic atrial fibrillation (HCC) Yes    Sinus node dysfunction (HCC)         Recommendations:  1. Chronic AF in the setting of conduction disease (Sinus/AV Node dysfunction), with no symptoms of bradycardia. Recent Holter demonstrated acceptable HR control. Continue anticoagulation with Savaysa. His quality of life is fairly good other than recent issues with balance and neuropathy. His mind remains sharp. He will be traveling to [place] this summer with his family. Follow-up and Dispositions    · Return in about 4 months (around 9/30/2019). Patient Active Problem List    Diagnosis    Chronic atrial fibrillation (HCC)    Sinus node dysfunction (HCC)    Depression    Dyslipidemia    Gout    Hx of carcinoma in situ of prostate    Essential hypertension, benign    Kidney stones    Embolic stroke involving right middle cerebral artery ([address]     presented with dysarthria, MRI Several tiny acute infarcts in the right middle cerebral artery         Subjective:  Pt reports that he has been doing okay, however, he feels his fatigue has slightly worsened in the interim, stating that his daily activities are not quite as easy as before. He ambulates with a cane but notes he fell recently and bruised his ribs when walking to the bathroom. He is not doing much activity at home due to his neuropathy. He endorses occasional lightheadedness which is no different from before (he attributes it to age). He denies any exertional chest pain, dyspnea, syncope, orthopnea, edema or paroxysmal nocturnal dyspnea. He is here today with his daughter. He plans on driving to [place] soon.      Cardiac risk factors   HTN yes  DM no  Smoking no  Family hx of CAD no    Cardiac testing  Echo Nov 2013 - LVH, EF 60%, LA upper nl  MRA neck/brain Nov 2013 - normal  Loop Monitor 1/17/14-PAC's and periods of ectopic atrial rhythm. Period of Atrial Fibrillation with rates of  BPM with aberrant beats    24 Hr Holter monitor 7/23/18 - Atrial flutter, rates 67 to 149. Rare episodes of AF with RVR. Frequent PVC's. Past Medical History:   Diagnosis Date    Anxiety     Arthritis     right shoulder    Atrial fibrillation, chronic ([facility] [address] 09/23/2014    Colon cancer ([facility] [address] 2002    colon resection    Depression 9/28/2015    DJD (degenerative joint disease) of knee     left TKR    Dyslipidemia     Embolic stroke involving right middle cerebral artery [facility]) Nov 2013    presented with dysarthria, MRI Several tiny acute infarcts in the right middle cerebral artery    Falls     Gout     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    Hypertension     Memory loss     Peripheral neuropathy     Vertigo       Current Outpatient Medications   Medication Sig Dispense Refill    SAVAYSA 60 mg tab tablet TAKE ONE TABLET BY MOUTH EVERY DAY 30 Tab 6    atenolol (TENORMIN) 50 mg tablet Take 1 Tab by mouth daily. 90 Tab 3    simvastatin (ZOCOR) 20 mg tablet Take 10 mg by mouth nightly.  folic acid (FOLVITE) 1 mg tablet Take  by mouth daily.  sertraline (ZOLOFT) 50 mg tablet Take 50 mg by mouth daily.  allopurinol (ZYLOPRIM) 300 mg tablet Take 150 mg by mouth every evening. No Known Allergies     . Retired commercial developer    Review of Systems  Constitutional: Negative for fever, chills, and diaphoresis. Respiratory: Negative for cough, hemoptysis, sputum production, shortness of breath and wheezing. Cardiovascular: Negative for chest pain, palpitations, orthopnea, claudication, leg swelling and PND. Gastrointestinal: Negative for heartburn, nausea, vomiting, blood in stool and melena.    Genitourinary: Negative for dysuria and flank pain. Musculoskeletal: Negative for joint pain and back pain. Skin: Negative for rash. Neurological: Negative for focal weakness, seizures, loss of consciousness, weakness and headaches. +intermittent dizziness/lightheadedness  Endo/Heme/Allergies: Does not bruise/bleed easily. Psychiatric/Behavioral: Negative for memory loss. The patient does not have insomnia. Physical Exam  Visit Vitals  /60   Pulse 60   Resp 20   Ht 5' 7\" (1.702 m)   Wt 153 lb (69.4 kg)   SpO2 98%   BMI 23.96 kg/m²     Wt Readings from Last 3 Encounters:   05/31/19 153 lb (69.4 kg)   11/26/18 164 lb 6.4 oz (74.6 kg)   11/12/18 163 lb 4.8 oz (74.1 kg)      General - well developed well nourished  Neck - JVP normal, thyroid nl  Cardiac - irregular rhythm, no murmurs, rubs or gallops. No clicks  Vascular - carotids without bruits, radials, femorals and pedal pulses equal bilateral  Lungs - clear to auscultation bilaterals, no rales ,wheezing or rhonchi  Abd - soft nontender, no HSM, no abd bruits  Extremities - no edema  Skin - no rash  Neuro - nonfocal  Psych - normal mood and affect    Cardiographics  ECG 5/20/14 - normal sinus rhythm, 1st degree AVB, otherwise normal  EKG 5/6/15 - atrial flutter 80  EKG 9/28/15 - sinus calderon 48, first degree AV block, , mild IVCD  EKG 4/12/16 - AF 50s  EKG 05/01/17-Atrial fibrillation/flutter 70s. EKG 5/11/18- AF 80s, frequent PVCs      Medical documentation entered into the chart by Luann Valadez medical scribe for Vivi Eduardo MD on 5/31/2019.      Vivi Eduardo MD

## 2019-10-08 ENCOUNTER — OFFICE VISIT (OUTPATIENT)
Dept: CARDIOLOGY CLINIC | Age: 84
End: 2019-10-08

## 2019-10-08 VITALS
HEIGHT: 67 IN | RESPIRATION RATE: 20 BRPM | BODY MASS INDEX: 24.01 KG/M2 | SYSTOLIC BLOOD PRESSURE: 130 MMHG | DIASTOLIC BLOOD PRESSURE: 70 MMHG | WEIGHT: 153 LBS | OXYGEN SATURATION: 93 % | HEART RATE: 62 BPM

## 2019-10-08 DIAGNOSIS — E78.5 DYSLIPIDEMIA: ICD-10-CM

## 2019-10-08 DIAGNOSIS — I49.5 SINUS NODE DYSFUNCTION (HCC): ICD-10-CM

## 2019-10-08 DIAGNOSIS — I48.20 CHRONIC ATRIAL FIBRILLATION (HCC): Primary | ICD-10-CM

## 2019-10-08 NOTE — LETTER
11/19/19 Patient: Allison Roland YOB: 1927 Date of Visit: 10/8/2019 Nasima Staley MD 
[address] [address] VIA Facsimile: [phone] Dear Nasima Staley MD, Thank you for referring Mr. Ilda Ferris to [address] [place] [address] for evaluation. My notes for this consultation are attached. If you have questions, please do not hesitate to call me. I look forward to following your patient along with you. Sincerely, Gael Mitchell MD

## 2019-10-08 NOTE — PROGRESS NOTES
Visit Vitals  /70   Pulse 62   Resp 20   Ht 5' 7\" (1.702 m)   Wt 153 lb (69.4 kg)   SpO2 93%   BMI 23.96 kg/m²     Increase in SOB , Denies CP and edema.   Moved from [address] [place] [address] to [facility]

## 2019-10-08 NOTE — PROGRESS NOTES
[address]  [address]  [address]  [place], [address] [facility]    Office [phone]  Fax [phone]  Cell [phone]      Jay Jay Pugh is a 80 y.o. male. Last seen by me 5 months ago. Assessment  Encounter Diagnoses   Name Primary?  Chronic atrial fibrillation Yes    Sinus node dysfunction (HCC)     Dyslipidemia         Recommendations:    Chronic AF in the setting of conduction disease (Sinus/AV Node dysfunction), with no symptoms of bradycardia. Recent Holter 1 year demonstrated acceptable HR control. Continue anticoagulation with Savaysa. His quality of life is fairly good other than balance and neuropathy. Follow-up and Dispositions    · Return in about 4 months (around 2/8/2020). Subjective: Jay Jay Pugh reports he moved from [facility] to [facility] because the food became an issue. He is doing well overall with no interval issues. His stamina is improving. He walks with no exertional sxs as long as he paces himself. He denies any exertional chest pain, dyspnea, syncope, orthopnea, edema or paroxysmal nocturnal dyspnea. He ambulates with a cane. Cardiac risk factors   HTN yes  DM  no  Smoking no  Family hx of CAD no    Cardiac testing  Echo Nov 2013 - LVH, EF 60%, LA upper nl  MRA neck/brain Nov 2013 - normal  Loop Monitor 1/17/14-PAC's and periods of ectopic atrial rhythm. Period of Atrial Fibrillation with rates of  BPM with aberrant beats    24 Hr Holter monitor 7/23/18 - Atrial flutter, rates 67 to 149. Rare episodes of AF with RVR. Frequent PVC's.     Past Medical History:   Diagnosis Date    Anxiety     Arthritis     right shoulder    Atrial fibrillation, chronic 09/23/2014    Colon cancer ([facility] [address] 2002    colon resection    Depression 9/28/2015    DJD (degenerative joint disease) of knee     left TKR    Dyslipidemia     Embolic stroke involving right middle cerebral artery [facility]) Nov 2013 presented with dysarthria, MRI Several tiny acute infarcts in the right middle cerebral artery    Falls     Gout     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    Hypertension     Memory loss     Peripheral neuropathy     Vertigo       Current Outpatient Medications   Medication Sig Dispense Refill    SAVAYSA 60 mg tab tablet TAKE ONE TABLET BY MOUTH EVERY DAY 30 Tab 6    atenolol (TENORMIN) 50 mg tablet Take 1 Tab by mouth daily. 90 Tab 3    simvastatin (ZOCOR) 20 mg tablet Take 10 mg by mouth nightly.  folic acid (FOLVITE) 1 mg tablet Take  by mouth daily.  sertraline (ZOLOFT) 50 mg tablet Take 50 mg by mouth daily.  allopurinol (ZYLOPRIM) 300 mg tablet Take 150 mg by mouth every evening. No Known Allergies     . Retired commercial developer    Review of Systems  Constitutional: Negative for fever, chills, and diaphoresis. Respiratory: Negative for cough, hemoptysis, sputum production, shortness of breath and wheezing. Cardiovascular: Negative for chest pain, palpitations, orthopnea, claudication, leg swelling and PND. Gastrointestinal: Negative for heartburn, nausea, vomiting, blood in stool and melena. Genitourinary: Negative for dysuria and flank pain. Musculoskeletal: Negative for joint pain and back pain. Skin: Negative for rash. Neurological: Negative for focal weakness, seizures, loss of consciousness, weakness and headaches. Endo/Heme/Allergies: Does not bruise/bleed easily. Psychiatric/Behavioral: Negative for memory loss. The patient does not have insomnia.       Physical Exam  Visit Vitals  /70   Pulse 62   Resp 20   Ht 5' 7\" (1.702 m)   Wt 153 lb (69.4 kg)   SpO2 93%   BMI 23.96 kg/m²     Wt Readings from Last 3 Encounters:   10/08/19 153 lb (69.4 kg)   05/31/19 153 lb (69.4 kg)   11/26/18 164 lb 6.4 oz (74.6 kg)      General - well developed well nourished  Neck - JVP normal, thyroid nl  Cardiac - irregular rhythm, no murmurs, rubs or gallops. No clicks  Vascular - carotids without bruits, radials, femorals and pedal pulses equal bilateral  Lungs - clear to auscultation bilaterals, no rales ,wheezing or rhonchi  Abd - soft nontender, no HSM, no abd bruits  Extremities - no edema  Skin - no rash  Neuro - nonfocal  Psych - normal mood and affect    Cardiographics  EKG 5/6/15 - atrial flutter 80  EKG 9/28/15 - sinus calderon 48, first degree AV block, , mild IVCD  EKG 4/12/16 - AF 50s  EKG 05/01/17-Atrial fibrillation/flutter 70s. EKG 5/11/18- AF 80s, frequent PVCs      Written by Harvy Schwab, as dictated by Alice Marshall M.D.      Alice Marshall MD

## 2020-02-06 ENCOUNTER — TELEPHONE (OUTPATIENT)
Dept: CARDIOLOGY CLINIC | Age: 85
End: 2020-02-06

## 2020-02-06 NOTE — TELEPHONE ENCOUNTER
Left message at [facility] that appointment on Monday cancelled and needs to reschedule.  Also patient needs to leave current phone number

## 2020-02-20 ENCOUNTER — OFFICE VISIT (OUTPATIENT)
Dept: CARDIOLOGY CLINIC | Age: 85
End: 2020-02-20

## 2020-02-20 VITALS
BODY MASS INDEX: 23.86 KG/M2 | DIASTOLIC BLOOD PRESSURE: 86 MMHG | SYSTOLIC BLOOD PRESSURE: 120 MMHG | WEIGHT: 152 LBS | OXYGEN SATURATION: 99 % | HEART RATE: 59 BPM | HEIGHT: 67 IN

## 2020-02-20 DIAGNOSIS — E78.5 DYSLIPIDEMIA: ICD-10-CM

## 2020-02-20 DIAGNOSIS — I63.411 EMBOLIC STROKE INVOLVING RIGHT MIDDLE CEREBRAL ARTERY (HCC): ICD-10-CM

## 2020-02-20 DIAGNOSIS — I49.5 SINUS NODE DYSFUNCTION (HCC): ICD-10-CM

## 2020-02-20 DIAGNOSIS — I10 ESSENTIAL HYPERTENSION, BENIGN: ICD-10-CM

## 2020-02-20 DIAGNOSIS — I48.20 CHRONIC ATRIAL FIBRILLATION (HCC): Primary | ICD-10-CM

## 2020-02-20 NOTE — PATIENT INSTRUCTIONS
- Reduce atenolol to half tablet daily  - Check blood pressure at least 3-4 times a week and keep a log of it and heart rate. Bring log when you see me next.

## 2020-02-20 NOTE — LETTER
2/23/20 Patient: Oh Class YOB: 1927 Date of Visit: 2/20/2020 Gisella Nicholas MD 
[address] [address] VIA Facsimile: [phone] Dear Gisella Nicholas MD, Thank you for referring Mr. Rose John to [address] for evaluation. My notes for this consultation are attached. If you have questions, please do not hesitate to call me. I look forward to following your patient along with you. Sincerely, Amparo Sidhu MD

## 2020-02-20 NOTE — PROGRESS NOTES
Adi Hall is a 80 y.o. male    Chief Complaint   Patient presents with    Irregular Heart Beat     afib       Chest pain   SOB no  Dizziness no  Swelling no  Recent hospital visit no  Refills no  Visit Vitals  Ht 5' 7\" (1.702 m)   BMI 23.96 kg/m²

## 2020-02-20 NOTE — PROGRESS NOTES
[address]  [address]  [place], [address] [facility]    Office [phone]  Fax [phone]  Cell [phone]      Adi Hall is a 80 y.o. male. Last seen by me 4 months ago. Assessment  Encounter Diagnoses   Name Primary?  Chronic atrial fibrillation Yes    Essential hypertension, benign     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)     Sinus node dysfunction (HCC)     Dyslipidemia         Recommendations:    Chronic AF w/ flutter cycle of 266 ms in the setting of conduction disease (Sinus/AV Node dysfunction). No clear symptoms of bradycardia, other than positional dizziness. Holter 2018 demonstrated acceptable HR control.   - Will reduce Atenolol to 25 mg/d  - Monitor HR and BP at home  - Discussed potential role of pacemaker  - Continue anticoagulation with Savaysa for stroke prevention    His quality of life is fairly good other than balance and neuropathy. F/u 3 months. Follow-up and Dispositions    · Return in about 3 months (around 5/20/2020). Subjective: Adi Hall reports he is experiencing balance issues and abnormal weight loss. He reports no problems with diet, but increased bowel movements, may be absorption component. He reports stable pattern of SOB. He reports positional dizziness on standing that resolves within a few seconds of standing. We discussed possibility of pacemaker in the future for symptomatic bradytcardia    He ambulates with cane and/or rollator. He is here today with his son. Cardiac risk factors   HTN yes  DM  no  Smoking no  Family hx of CAD no    Cardiac testing  Echo Nov 2013 - LVH, EF 60%, LA upper nl  MRA neck/brain Nov 2013 - normal  Loop Monitor 1/17/14-PAC's and periods of ectopic atrial rhythm. Period of Atrial Fibrillation with rates of  BPM with aberrant beats    24 Hr Holter monitor 7/23/18 - Atrial flutter, rates 67 to 149.  Rare episodes of AF with RVR. Frequent PVC's. Past Medical History:   Diagnosis Date    Anxiety     Arthritis     right shoulder    Atrial fibrillation, chronic 09/23/2014    Colon cancer ([facility] [address] 2002    colon resection    Depression 9/28/2015    DJD (degenerative joint disease) of knee     left TKR    Dyslipidemia     Embolic stroke involving right middle cerebral artery [facility]) Nov 2013    presented with dysarthria, MRI Several tiny acute infarcts in the right middle cerebral artery    Falls     Gout     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    Hypertension     Memory loss     Peripheral neuropathy     Vertigo       Current Outpatient Medications   Medication Sig Dispense Refill    SAVAYSA 60 mg tab tablet TAKE ONE TABLET BY MOUTH EVERY DAY 30 Tab 6    atenolol (TENORMIN) 50 mg tablet Take 1 Tab by mouth daily. 90 Tab 3    simvastatin (ZOCOR) 20 mg tablet Take 10 mg by mouth nightly.  folic acid (FOLVITE) 1 mg tablet Take  by mouth daily.  sertraline (ZOLOFT) 50 mg tablet Take 50 mg by mouth daily.  allopurinol (ZYLOPRIM) 300 mg tablet Take 150 mg by mouth every evening. No Known Allergies     . Retired commercial developer    Review of Systems  Constitutional: Negative for fever, chills, and diaphoresis. +abnormal weight loss  Respiratory: Negative for cough, hemoptysis, sputum production, shortness of breath and wheezing. Cardiovascular: Negative for chest pain, palpitations, orthopnea, claudication, leg swelling and PND.  +dizziness, SOB  Gastrointestinal: Negative for heartburn, nausea, vomiting, blood in stool and melena. Genitourinary: Negative for dysuria and flank pain. Musculoskeletal: Negative for joint pain and back pain. Skin: Negative for rash. Neurological: Negative for focal weakness, seizures, loss of consciousness, weakness and headaches. +balance issues  Endo/Heme/Allergies: Does not bruise/bleed easily.    Psychiatric/Behavioral: Negative for memory loss. The patient does not have insomnia. Physical Exam  Visit Vitals  /86 (BP 1 Location: Left arm, BP Patient Position: Sitting)   Pulse (!) 59   Ht 5' 7\" (1.702 m)   Wt 152 lb (68.9 kg)   SpO2 99%   BMI 23.81 kg/m²     Wt Readings from Last 3 Encounters:   02/20/20 152 lb (68.9 kg)   10/08/19 153 lb (69.4 kg)   05/31/19 153 lb (69.4 kg)      General - well developed well nourished  Neck - JVP normal, thyroid nl  Cardiac - irregular rhythm, no murmurs, rubs or gallops. No clicks  Vascular - carotids without bruits, radials, femorals and pedal pulses equal bilateral  Lungs - clear to auscultation bilaterals, no rales ,wheezing or rhonchi  Abd - soft nontender, no HSM, no abd bruits  Extremities - no edema  Skin - no rash  Neuro - nonfocal  Psych - normal mood and affect    Cardiographics  EKG 5/6/15 - atrial flutter 80  EKG 9/28/15 - sinus calderon 48, first degree AV block, , mild IVCD  EKG 4/12/16 - AF 50s  EKG 05/01/17-Atrial fibrillation/flutter 70s. EKG 5/11/18 - AF 80s, frequent PVCs  EKG 2/20/20 -  AF, VR 58, LVH      Written by [facility], as dictated by Cecilio Roberts M.D.      Cecilio Roberts MD

## 2020-02-23 RX ORDER — ATENOLOL 25 MG/1
25 TABLET ORAL DAILY
Qty: 90 TAB | Refills: 3
Start: 2020-02-23 | End: 2020-03-16 | Stop reason: DRUGHIGH

## 2020-03-16 RX ORDER — ATENOLOL 25 MG/1
25 TABLET ORAL DAILY
Qty: 90 TAB | Refills: 0 | Status: SHIPPED | OUTPATIENT
Start: 2020-03-16 | End: 2021-07-31

## 2020-03-16 NOTE — TELEPHONE ENCOUNTER
Refill approved  Atenolol decreased to 25mg daily  Requested Prescriptions     Pending Prescriptions Disp Refills    atenoloL (TENORMIN) 25 mg tablet [Pharmacy Med Name: Atenolol Oral Tablet 50 MG] 90 Tab 0     Sig: Take 1 Tab by mouth daily.

## 2021-01-19 ENCOUNTER — HOSPITAL ENCOUNTER (EMERGENCY)
Age: 86
Discharge: HOME | End: 2021-01-19
Attending: EMERGENCY MEDICINE
Payer: MEDICARE

## 2021-01-19 VITALS
BODY MASS INDEX: 23.86 KG/M2 | HEART RATE: 68 BPM | TEMPERATURE: 98.7 F | RESPIRATION RATE: 29 BRPM | DIASTOLIC BLOOD PRESSURE: 67 MMHG | WEIGHT: 152 LBS | SYSTOLIC BLOOD PRESSURE: 129 MMHG | OXYGEN SATURATION: 94 % | HEIGHT: 67 IN

## 2021-01-19 DIAGNOSIS — R53.81 MALAISE AND FATIGUE: ICD-10-CM

## 2021-01-19 DIAGNOSIS — R53.83 MALAISE AND FATIGUE: ICD-10-CM

## 2021-01-19 DIAGNOSIS — R11.2 NAUSEA VOMITING AND DIARRHEA: ICD-10-CM

## 2021-01-19 DIAGNOSIS — U07.1 COVID-19: Primary | ICD-10-CM

## 2021-01-19 DIAGNOSIS — E86.0 DEHYDRATION: ICD-10-CM

## 2021-01-19 DIAGNOSIS — R19.7 NAUSEA VOMITING AND DIARRHEA: ICD-10-CM

## 2021-01-19 LAB
ALBUMIN SERPL-MCNC: 3.1 G/DL (ref 3.5–5)
ALBUMIN/GLOB SERPL: 0.8 {RATIO} (ref 1.1–2.2)
ALP SERPL-CCNC: 64 U/L (ref 45–117)
ALT SERPL-CCNC: 14 U/L (ref 12–78)
ANION GAP SERPL CALC-SCNC: 7 MMOL/L (ref 5–15)
AST SERPL-CCNC: 33 U/L (ref 15–37)
BASOPHILS # BLD: 0 K/UL (ref 0–0.1)
BASOPHILS NFR BLD: 0 % (ref 0–1)
BILIRUB SERPL-MCNC: 0.7 MG/DL (ref 0.2–1)
BUN SERPL-MCNC: 19 MG/DL (ref 6–20)
BUN/CREAT SERPL: 17 (ref 12–20)
CALCIUM SERPL-MCNC: 8.5 MG/DL (ref 8.5–10.1)
CHLORIDE SERPL-SCNC: 105 MMOL/L (ref 97–108)
CO2 SERPL-SCNC: 24 MMOL/L (ref 21–32)
COMMENT, HOLDF: NORMAL
CREAT SERPL-MCNC: 1.09 MG/DL (ref 0.7–1.3)
DIFFERENTIAL METHOD BLD: ABNORMAL
EOSINOPHIL # BLD: 0 K/UL (ref 0–0.4)
EOSINOPHIL NFR BLD: 0 % (ref 0–7)
ERYTHROCYTE [DISTWIDTH] IN BLOOD BY AUTOMATED COUNT: 14.6 % (ref 11.5–14.5)
GLOBULIN SER CALC-MCNC: 3.7 G/DL (ref 2–4)
GLUCOSE SERPL-MCNC: 115 MG/DL (ref 65–100)
HCT VFR BLD AUTO: 37.8 % (ref 36.6–50.3)
HGB BLD-MCNC: 12.9 G/DL (ref 12.1–17)
IMM GRANULOCYTES # BLD AUTO: 0 K/UL
IMM GRANULOCYTES NFR BLD AUTO: 0 %
LYMPHOCYTES # BLD: 0.6 K/UL (ref 0.8–3.5)
LYMPHOCYTES NFR BLD: 10 % (ref 12–49)
MCH RBC QN AUTO: 33 PG (ref 26–34)
MCHC RBC AUTO-ENTMCNC: 34.1 G/DL (ref 30–36.5)
MCV RBC AUTO: 96.7 FL (ref 80–99)
MONOCYTES # BLD: 0.3 K/UL (ref 0–1)
MONOCYTES NFR BLD: 5 % (ref 5–13)
NEUTS BAND NFR BLD MANUAL: 14 % (ref 0–6)
NEUTS SEG # BLD: 4.6 K/UL (ref 1.8–8)
NEUTS SEG NFR BLD: 71 % (ref 32–75)
NRBC # BLD: 0 K/UL (ref 0–0.01)
NRBC BLD-RTO: 0 PER 100 WBC
PLATELET # BLD AUTO: 162 K/UL (ref 150–400)
PMV BLD AUTO: 11.2 FL (ref 8.9–12.9)
POTASSIUM SERPL-SCNC: 4 MMOL/L (ref 3.5–5.1)
PROT SERPL-MCNC: 6.8 G/DL (ref 6.4–8.2)
RBC # BLD AUTO: 3.91 M/UL (ref 4.1–5.7)
RBC MORPH BLD: ABNORMAL
SAMPLES BEING HELD,HOLD: NORMAL
SODIUM SERPL-SCNC: 136 MMOL/L (ref 136–145)
WBC # BLD AUTO: 5.5 K/UL (ref 4.1–11.1)
WBC MORPH BLD: ABNORMAL

## 2021-01-19 PROCEDURE — 80053 COMPREHEN METABOLIC PANEL: CPT

## 2021-01-19 PROCEDURE — 85025 COMPLETE CBC W/AUTO DIFF WBC: CPT

## 2021-01-19 PROCEDURE — 74011250636 HC RX REV CODE- 250/636: Performed by: EMERGENCY MEDICINE

## 2021-01-19 PROCEDURE — 74011250637 HC RX REV CODE- 250/637: Performed by: EMERGENCY MEDICINE

## 2021-01-19 PROCEDURE — 99284 EMERGENCY DEPT VISIT MOD MDM: CPT

## 2021-01-19 PROCEDURE — 96361 HYDRATE IV INFUSION ADD-ON: CPT

## 2021-01-19 PROCEDURE — 93005 ELECTROCARDIOGRAM TRACING: CPT

## 2021-01-19 PROCEDURE — 36415 COLL VENOUS BLD VENIPUNCTURE: CPT

## 2021-01-19 PROCEDURE — 96360 HYDRATION IV INFUSION INIT: CPT

## 2021-01-19 RX ORDER — UREA 10 %
220 LOTION (ML) TOPICAL DAILY
Qty: 30 TAB | Refills: 0 | Status: SHIPPED | OUTPATIENT
Start: 2021-01-19

## 2021-01-19 RX ORDER — ACETAMINOPHEN 500 MG
1000 TABLET ORAL
Status: COMPLETED | OUTPATIENT
Start: 2021-01-19 | End: 2021-01-19

## 2021-01-19 RX ORDER — ASCORBIC ACID 500 MG
500 TABLET ORAL 2 TIMES DAILY
Qty: 60 TAB | Refills: 0 | Status: SHIPPED | OUTPATIENT
Start: 2021-01-19 | End: 2021-02-18

## 2021-01-19 RX ORDER — ONDANSETRON 4 MG/1
4 TABLET, ORALLY DISINTEGRATING ORAL
Qty: 20 TAB | Refills: 0 | Status: SHIPPED | OUTPATIENT
Start: 2021-01-19

## 2021-01-19 RX ORDER — ONDANSETRON 4 MG/1
4 TABLET, ORALLY DISINTEGRATING ORAL
Qty: 20 TAB | Refills: 0 | Status: SHIPPED | OUTPATIENT
Start: 2021-01-19 | End: 2021-01-19 | Stop reason: SDUPTHER

## 2021-01-19 RX ADMIN — SODIUM CHLORIDE 1000 ML: 9 INJECTION, SOLUTION INTRAVENOUS at 19:44

## 2021-01-19 RX ADMIN — ACETAMINOPHEN 1000 MG: 500 TABLET ORAL at 21:03

## 2021-01-20 ENCOUNTER — PATIENT OUTREACH (OUTPATIENT)
Dept: CASE MANAGEMENT | Age: 86
End: 2021-01-20

## 2021-01-20 LAB
ATRIAL RATE: 97 BPM
CALCULATED R AXIS, ECG10: -21 DEGREES
CALCULATED T AXIS, ECG11: 46 DEGREES
DIAGNOSIS, 93000: NORMAL
Q-T INTERVAL, ECG07: 452 MS
QRS DURATION, ECG06: 84 MS
QTC CALCULATION (BEZET), ECG08: 477 MS
VENTRICULAR RATE, ECG03: 67 BPM

## 2021-01-20 NOTE — ED PROVIDER NOTES
Shunt is a 80year-old gentleman with a history of atrial fibrillation and a recent diagnosis of COVID-19 infection who was sent in by his family because he has had fevers and fatigue. Patient has no complaints and reports that he is \"okay\". He has not had any pain. History and review of systems are limited by an inability to communicate with the and secondary to hearing impairment. The history is provided by the EMS personnel. History limited by: patient extremely hard of hearing.         Past Medical History:   Diagnosis Date    Anxiety     Arthritis     right shoulder    Atrial fibrillation, chronic 09/23/2014    Colon cancer ([facility] [address] 2002    colon resection    Depression 9/28/2015    DJD (degenerative joint disease) of knee     left TKR    Dyslipidemia     Embolic stroke involving right middle cerebral artery [facility]) Nov 2013    presented with dysarthria, MRI Several tiny acute infarcts in the right middle cerebral artery    Falls     Gout     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    Hypertension     Memory loss     Peripheral neuropathy     Vertigo        Past Surgical History:   Procedure Laterality Date    ABDOMEN SURGERY PROC UNLISTED      colorectal    CARDIAC SURG PROCEDURE UNLIST      cardio version    HX CATARACT REMOVAL      HX GI      colon resection    HX ORTHOPAEDIC      left knee, left shoulder         Family History:   Problem Relation Age of Onset    Stroke Father     Heart Disease Father     Stroke Sister        Social History     Socioeconomic History    Marital status:      Spouse name: Not on file    Number of children: Not on file    Years of education: Not on file    Highest education level: Not on file   Occupational History    Not on file   Social Needs    Financial resource strain: Not on file    Food insecurity     Worry: Not on file     Inability: Not on file    Transportation needs     Medical: Not on file     Non-medical: Not on file   Tobacco Use   • Smoking status: Never Smoker   • Smokeless tobacco: Never Used   Substance and Sexual Activity   • Alcohol use: Yes     Alcohol/week: 1.0 standard drinks     Types: 1 Standard drinks or equivalent per week     Comment: rarely   • Drug use: Not on file   • Sexual activity: Not on file   Lifestyle   • Physical activity     Days per week: Not on file     Minutes per session: Not on file   • Stress: Not on file   Relationships   • Social connections     Talks on phone: Not on file     Gets together: Not on file     Attends Orthodoxy service: Not on file     Active member of club or organization: Not on file     Attends meetings of clubs or organizations: Not on file     Relationship status: Not on file   • Intimate partner violence     Fear of current or ex partner: Not on file     Emotionally abused: Not on file     Physically abused: Not on file     Forced sexual activity: Not on file   Other Topics Concern   • Not on file   Social History Narrative   • Not on file         ALLERGIES: Patient has no known allergies.    Review of Systems   Unable to perform ROS: Other   Constitutional: Positive for activity change, fatigue and fever.   Gastrointestinal: Positive for diarrhea, nausea and vomiting.       Vitals:    01/19/21 1921   BP: 129/67   Pulse: 68   Resp: 29   Temp: 98.7 °F (37.1 °C)   SpO2: 94%   Weight: 68.9 kg (152 lb)   Height: 5' 7\" (1.702 m)            Physical Exam  Vitals signs and nursing note reviewed.   Constitutional:       General: He is not in acute distress.     Appearance: He is well-developed.   HENT:      Head: Normocephalic and atraumatic.   Eyes:      Conjunctiva/sclera: Conjunctivae normal.      Pupils: Pupils are equal, round, and reactive to light.   Neck:      Musculoskeletal: Normal range of motion.   Cardiovascular:      Rate and Rhythm: Normal rate and regular rhythm.   Pulmonary:      Effort: Pulmonary effort is normal. Tachypnea present. No accessory muscle  usage or respiratory distress. Abdominal:      General: There is no distension. Palpations: Abdomen is soft. Tenderness: There is no abdominal tenderness. Musculoskeletal: Normal range of motion. Skin:     General: Skin is warm and dry. Capillary Refill: Capillary refill takes less than 2 seconds. Neurological:      Mental Status: He is alert. Psychiatric:         Mood and Affect: Mood normal.         Behavior: Behavior normal.          MDM       Procedures      EKG at 7:24 PM  Atrial fibrillation, 67 bpm, nonspecific lateral ST depressions, nonspecific T wave flattening, no STEMI  EKG interpreted by Eleno Morrison MD       Clara Fuentes was evaluated in the Emergency Department on 1/19/2021 for the symptoms described in the history of present illness. He/she was evaluated in the context of the global COVID-19 pandemic, which necessitated consideration that the patient might be at risk for infection with the SARS-CoV-2 virus that causes COVID-19. Institutional protocols and algorithms that pertain to the evaluation of patients at risk for COVID-19 are in a state of rapid change based on information released by regulatory bodies including the CDC and federal and state organizations. These policies and algorithms were followed during the patient's care in the ED. patient was ambulatory in the emergency department without episodes of significant desaturation or respiratory distress. I had an extensive dissection with his daughter, [facility], about monitoring his symptoms, using a pulse oximeter to check for low oxygen levels, and precautions for returning to the emergency department with any worsening signs or symptoms. Patient is already on monoclonal antibody treatment as well as zinc and vitamin C.  Prescription for Zofran to help manage his gastrointestinal symptoms was provided.     Surrogate Decision Maker (Who do you want to make decisions for you in the event you are not able to?): Brett Figueroa, daughter [phone]      The patient's results have been reviewed with them and/or available family. Patient and/or family verbally conveyed their understanding and agreement of the patient's signs, symptoms, diagnosis, treatment and prognosis and additionally agree to follow up as recommended in the discharge instructions or to return to the Emergency Room should their condition change prior to their follow-up appointment. The patient/family verbally agrees with the care-plan and verbally conveys that all of their questions have been answered. The discharge instructions have also been provided to the patient and/or family with some educational information regarding the patient's diagnosis as well a list of reasons why the patient would want to return to the ER prior to their follow-up appointment, should their condition change.

## 2021-01-20 NOTE — ED NOTES
Discharge given by provider. Pt verbalizes understanding. Pt is leaving by wheelchair with daughter.

## 2021-01-20 NOTE — ED TRIAGE NOTES
Coming from home spiked fever today 102, denies difficulty breathing/sob \"feels sick and weak\". Admits to n/v/d. Covid+ x1week.

## 2021-01-20 NOTE — PROGRESS NOTES
Patient contacted regarding DAGDL-12 diagnosis\". Discussed COVID-19 related testing which was not done at this time. Test results were not done. Patient informed of results, if available? N/A     Care Transition Nurse/ Ambulatory Care Manager contacted the patient by telephone to perform post discharge assessment; unable to reach patient. Call within 2 business days of discharge: Yes     Pt was advised to f/u with PCP (Dr. Carol Harmon Provider) post-discharge. Patient has following risk factors of: no known risk factors. Rx - zinc sulfate, vitamin c, zofran.

## 2021-01-21 NOTE — PROGRESS NOTES
1/21/2021  1:21 PM    Second patient outreach attempt by this ACM to perform initial post-discharge assessment; unable to reach patient. [employer] message routed to patient with COVID-19 resources. COVID Care Transitions episode resolved at this time due to ACM inability to reach patient.

## 2021-04-08 ENCOUNTER — ANCILLARY PROCEDURE (OUTPATIENT)
Dept: CARDIOLOGY CLINIC | Age: 86
End: 2021-04-08
Payer: MEDICARE

## 2021-04-08 VITALS
WEIGHT: 152 LBS | BODY MASS INDEX: 23.86 KG/M2 | DIASTOLIC BLOOD PRESSURE: 70 MMHG | HEIGHT: 67 IN | SYSTOLIC BLOOD PRESSURE: 128 MMHG

## 2021-04-08 DIAGNOSIS — I10 ESSENTIAL HYPERTENSION: ICD-10-CM

## 2021-04-08 DIAGNOSIS — I48.20 CHRONIC ATRIAL FIBRILLATION (HCC): ICD-10-CM

## 2021-04-08 DIAGNOSIS — I31.39 PERICARDIAL EFFUSION: ICD-10-CM

## 2021-04-08 PROCEDURE — 93306 TTE W/DOPPLER COMPLETE: CPT | Performed by: SPECIALIST

## 2021-04-09 LAB
ECHO AO ROOT DIAM: 3.28 CM
ECHO AV AREA PEAK VELOCITY: 1.5 CM2
ECHO AV AREA VTI: 1.74 CM2
ECHO AV AREA/BSA PEAK VELOCITY: 0.8 CM2/M2
ECHO AV AREA/BSA VTI: 1 CM2/M2
ECHO AV MEAN GRADIENT: 8.66 MMHG
ECHO AV PEAK GRADIENT: 16.03 MMHG
ECHO AV PEAK VELOCITY: 200.2 CM/S
ECHO AV VTI: 35.26 CM
ECHO EST RA PRESSURE: 3 MMHG
ECHO LA AREA 4C: 23.45 CM2
ECHO LA MAJOR AXIS: 4.32 CM
ECHO LA MINOR AXIS: 2.4 CM
ECHO LA VOL 2C: 57.97 ML (ref 18–58)
ECHO LA VOL 4C: 69.68 ML (ref 18–58)
ECHO LA VOL BP: 67.71 ML (ref 18–58)
ECHO LA VOL/BSA BIPLANE: 37.63 ML/M2 (ref 16–28)
ECHO LA VOLUME INDEX A2C: 32.22 ML/M2 (ref 16–28)
ECHO LA VOLUME INDEX A4C: 38.72 ML/M2 (ref 16–28)
ECHO LV E' LATERAL VELOCITY: 5.23 CM/S
ECHO LV E' SEPTAL VELOCITY: 2.91 CM/S
ECHO LV EDV A2C: 83.5 ML
ECHO LV EDV A4C: 72.36 ML
ECHO LV EDV BP: 78.49 ML (ref 67–155)
ECHO LV EDV INDEX A4C: 40.2 ML/M2
ECHO LV EDV INDEX BP: 43.6 ML/M2
ECHO LV EDV NDEX A2C: 46.4 ML/M2
ECHO LV EJECTION FRACTION A2C: 60 PERCENT
ECHO LV EJECTION FRACTION A4C: 60 PERCENT
ECHO LV EJECTION FRACTION BIPLANE: 60.5 PERCENT (ref 55–100)
ECHO LV ESV A2C: 33.25 ML
ECHO LV ESV A4C: 28.74 ML
ECHO LV ESV BP: 30.98 ML (ref 22–58)
ECHO LV ESV INDEX A2C: 18.5 ML/M2
ECHO LV ESV INDEX A4C: 16 ML/M2
ECHO LV ESV INDEX BP: 17.2 ML/M2
ECHO LV INTERNAL DIMENSION DIASTOLIC: 3.79 CM (ref 4.2–5.9)
ECHO LV INTERNAL DIMENSION SYSTOLIC: 2.38 CM
ECHO LV IVSD: 1.3 CM (ref 0.6–1)
ECHO LV MASS 2D: 181.7 G (ref 88–224)
ECHO LV MASS INDEX 2D: 101 G/M2 (ref 49–115)
ECHO LV POSTERIOR WALL DIASTOLIC: 1.39 CM (ref 0.6–1)
ECHO LVOT DIAM: 2.06 CM
ECHO LVOT PEAK GRADIENT: 3.23 MMHG
ECHO LVOT PEAK VELOCITY: 89.87 CM/S
ECHO LVOT SV: 61.5 ML
ECHO LVOT VTI: 18.39 CM
ECHO MV A VELOCITY: 37.84 CM/S
ECHO MV AREA PHT: 2.1 CM2
ECHO MV E DECELERATION TIME (DT): 360.97 MS
ECHO MV E VELOCITY: 72.75 CM/S
ECHO MV E/A RATIO: 1.92
ECHO MV E/E' LATERAL: 13.91
ECHO MV E/E' RATIO (AVERAGED): 19.46
ECHO MV E/E' SEPTAL: 25
ECHO MV PRESSURE HALF TIME (PHT): 104.68 MS
ECHO RA AREA 4C: 19.08 CM2
ECHO RIGHT VENTRICULAR SYSTOLIC PRESSURE (RVSP): 48.37 MMHG
ECHO RV INTERNAL DIMENSION: 3.91 CM
ECHO RV TAPSE: 2.04 CM (ref 1.5–2)
ECHO TV REGURGITANT MAX VELOCITY: 336.77 CM/S
ECHO TV REGURGITANT PEAK GRADIENT: 45.37 MMHG
LA VOL DISK BP: 63.64 ML (ref 18–58)

## 2021-07-01 ENCOUNTER — APPOINTMENT (OUTPATIENT)
Dept: CT IMAGING | Age: 86
End: 2021-07-01
Attending: STUDENT IN AN ORGANIZED HEALTH CARE EDUCATION/TRAINING PROGRAM
Payer: MEDICARE

## 2021-07-01 ENCOUNTER — HOSPITAL ENCOUNTER (EMERGENCY)
Age: 86
Discharge: HOME | End: 2021-07-01
Attending: STUDENT IN AN ORGANIZED HEALTH CARE EDUCATION/TRAINING PROGRAM
Payer: MEDICARE

## 2021-07-01 VITALS
RESPIRATION RATE: 16 BRPM | OXYGEN SATURATION: 96 % | TEMPERATURE: 98.4 F | HEART RATE: 52 BPM | SYSTOLIC BLOOD PRESSURE: 109 MMHG | DIASTOLIC BLOOD PRESSURE: 54 MMHG | WEIGHT: 152 LBS | BODY MASS INDEX: 23.86 KG/M2 | HEIGHT: 67 IN
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
## 2021-07-01 DIAGNOSIS — S01.01XA LACERATION OF SCALP, INITIAL ENCOUNTER: ICD-10-CM

## 2021-07-01 LAB
ALBUMIN SERPL-MCNC: 3.2 G/DL (ref 3.5–5)
ALBUMIN/GLOB SERPL: 0.8 {RATIO} (ref 1.1–2.2)
ALP SERPL-CCNC: 88 U/L (ref 45–117)
ALT SERPL-CCNC: 16 U/L (ref 12–78)
ANION GAP SERPL CALC-SCNC: 4 MMOL/L (ref 5–15)
APPEARANCE UR: ABNORMAL
AST SERPL-CCNC: 24 U/L (ref 15–37)
BACTERIA URNS QL MICRO: NEGATIVE /HPF
BASOPHILS # BLD: 0 K/UL (ref 0–0.1)
BASOPHILS NFR BLD: 0 % (ref 0–1)
BILIRUB SERPL-MCNC: 0.4 MG/DL (ref 0.2–1)
BILIRUB UR QL: NEGATIVE
BUN SERPL-MCNC: 27 MG/DL (ref 6–20)
BUN/CREAT SERPL: 20 (ref 12–20)
CALCIUM SERPL-MCNC: 8.4 MG/DL (ref 8.5–10.1)
CHLORIDE SERPL-SCNC: 109 MMOL/L (ref 97–108)
CO2 SERPL-SCNC: 28 MMOL/L (ref 21–32)
COLOR UR: ABNORMAL
CREAT SERPL-MCNC: 1.36 MG/DL (ref 0.7–1.3)
DIFFERENTIAL METHOD BLD: ABNORMAL
EOSINOPHIL # BLD: 0.2 K/UL (ref 0–0.4)
EOSINOPHIL NFR BLD: 2 % (ref 0–7)
EPITH CASTS URNS QL MICRO: ABNORMAL /LPF
ERYTHROCYTE [DISTWIDTH] IN BLOOD BY AUTOMATED COUNT: 17 % (ref 11.5–14.5)
GLOBULIN SER CALC-MCNC: 3.8 G/DL (ref 2–4)
GLUCOSE SERPL-MCNC: 144 MG/DL (ref 65–100)
GLUCOSE UR STRIP.AUTO-MCNC: NEGATIVE MG/DL
HCT VFR BLD AUTO: 35.8 % (ref 36.6–50.3)
HGB BLD-MCNC: 11.4 G/DL (ref 12.1–17)
HGB UR QL STRIP: ABNORMAL
IMM GRANULOCYTES # BLD AUTO: 0 K/UL (ref 0–0.04)
IMM GRANULOCYTES NFR BLD AUTO: 0 % (ref 0–0.5)
KETONES UR QL STRIP.AUTO: NEGATIVE MG/DL
LEUKOCYTE ESTERASE UR QL STRIP.AUTO: NEGATIVE
LYMPHOCYTES # BLD: 0.8 K/UL (ref 0.8–3.5)
LYMPHOCYTES NFR BLD: 9 % (ref 12–49)
MCH RBC QN AUTO: 32.5 PG (ref 26–34)
MCHC RBC AUTO-ENTMCNC: 31.8 G/DL (ref 30–36.5)
MCV RBC AUTO: 102 FL (ref 80–99)
MONOCYTES # BLD: 0.7 K/UL (ref 0–1)
MONOCYTES NFR BLD: 7 % (ref 5–13)
NEUTS SEG # BLD: 7.4 K/UL (ref 1.8–8)
NEUTS SEG NFR BLD: 82 % (ref 32–75)
NITRITE UR QL STRIP.AUTO: NEGATIVE
NRBC # BLD: 0 K/UL (ref 0–0.01)
NRBC BLD-RTO: 0 PER 100 WBC
PH UR STRIP: 6 [PH] (ref 5–8)
PLATELET # BLD AUTO: 209 K/UL (ref 150–400)
PMV BLD AUTO: 10.8 FL (ref 8.9–12.9)
POTASSIUM SERPL-SCNC: 4.3 MMOL/L (ref 3.5–5.1)
PROT SERPL-MCNC: 7 G/DL (ref 6.4–8.2)
PROT UR STRIP-MCNC: ABNORMAL MG/DL
RBC # BLD AUTO: 3.51 M/UL (ref 4.1–5.7)
RBC #/AREA URNS HPF: ABNORMAL /HPF (ref 0–5)
SODIUM SERPL-SCNC: 141 MMOL/L (ref 136–145)
SP GR UR REFRACTOMETRY: 1.02 (ref 1–1.03)
TROPONIN I SERPL-MCNC: 0.07 NG/ML
TROPONIN I SERPL-MCNC: 0.07 NG/ML
UR CULT HOLD, URHOLD: NORMAL
UROBILINOGEN UR QL STRIP.AUTO: 0.2 EU/DL (ref 0.2–1)
WBC # BLD AUTO: 9.2 K/UL (ref 4.1–11.1)
WBC URNS QL MICRO: ABNORMAL /HPF (ref 0–4)

## 2021-07-01 PROCEDURE — 84484 ASSAY OF TROPONIN QUANT: CPT

## 2021-07-01 PROCEDURE — 80053 COMPREHEN METABOLIC PANEL: CPT

## 2021-07-01 PROCEDURE — 99285 EMERGENCY DEPT VISIT HI MDM: CPT

## 2021-07-01 PROCEDURE — 70450 CT HEAD/BRAIN W/O DYE: CPT

## 2021-07-01 PROCEDURE — 36415 COLL VENOUS BLD VENIPUNCTURE: CPT

## 2021-07-01 PROCEDURE — 74011000250 HC RX REV CODE- 250: Performed by: STUDENT IN AN ORGANIZED HEALTH CARE EDUCATION/TRAINING PROGRAM

## 2021-07-01 PROCEDURE — 72125 CT NECK SPINE W/O DYE: CPT

## 2021-07-01 PROCEDURE — 75810000293 HC SIMP/SUPERF WND  RPR

## 2021-07-01 PROCEDURE — 85025 COMPLETE CBC W/AUTO DIFF WBC: CPT

## 2021-07-01 PROCEDURE — 81001 URINALYSIS AUTO W/SCOPE: CPT

## 2021-07-01 PROCEDURE — 93005 ELECTROCARDIOGRAM TRACING: CPT

## 2021-07-01 RX ORDER — LIDOCAINE-EPINEPHRINE-TETRACAINE EXTERNAL SOLN 4-0.05-0.5% 4-0.05-0.5 %
2 SOLUTION TOPICAL
Status: COMPLETED | OUTPATIENT
Start: 2021-07-01 | End: 2021-07-01

## 2021-07-01 RX ADMIN — LIDOCAINE-EPINEPHRINE-TETRACAINE EXTERNAL SOLN 4-0.05-0.5% 2 ML: 4-0.05-0.5 SOLUTION at 16:36

## 2021-07-01 NOTE — ED TRIAGE NOTES
Pt arrives by ems from [facility]; per ems pt slipped and hit back of head small laceration, no loss of consciousness, no blood thinners.

## 2021-07-01 NOTE — ED PROVIDER NOTES
The history is provided by the patient and a relative (daughter). Fall  The accident occurred 3 to 5 hours ago. The fall occurred while standing. He fell from a height of ground level. The volume of blood lost was minimal. The point of impact was the head. The patient is experiencing no pain. He was ambulatory at the scene. There was no entrapment after the fall. Associated symptoms include laceration (occiput). Pertinent negatives include no fever, no numbness, no bowel incontinence, no nausea, no vomiting, no headaches, no extremity weakness, no loss of consciousness and no tingling. The risk factors include dementia, being elderly and recurrent falls. Daughter reports that patient has been having worsening peripheral neuropathy, with a recent increase in his Lyrica dose. She also clarified that he is on Xarelto for stroke prevention in Afib.     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    Hypertension     Memory loss     Peripheral neuropathy     Vertigo        Past Surgical History:   Procedure Laterality Date    ABDOMEN SURGERY PROC UNLISTED      colorectal    CARDIAC SURG PROCEDURE UNLIST      cardio version    HX CATARACT REMOVAL      HX GI      colon resection    HX ORTHOPAEDIC      left knee, left shoulder         Family History:   Problem Relation Age of Onset    Stroke Father     Heart Disease Father     Stroke Sister        Social History     Socioeconomic History    Marital status:      Spouse name: Not on file    Number of children: Not on file    Years of education: Not on file    Highest education level: Not on file   Occupational History    Not on file   Tobacco Use    Smoking status: Never Smoker    Smokeless tobacco: Never Used   Substance and Sexual Activity    Alcohol use: Yes     Alcohol/week: 1.0 standard drinks     Types: 1 Standard drinks or equivalent per week     Comment: rarely    Drug use: Not on file    Sexual activity: Not on file   Other Topics Concern    Not on file   Social History Narrative    Not on file     Social Determinants of Health     Financial Resource Strain:     Difficulty of Paying Living Expenses:    Food Insecurity:     Worried About Running Out of Food in the Last Year:     [address] in the Last Year:    Transportation Needs:     Lack of Transportation (Medical):  Lack of Transportation (Non-Medical):    Physical Activity:     Days of Exercise per Week:     Minutes of Exercise per Session:    Stress:     Feeling of Stress :    Social Connections:     Frequency of Communication with Friends and Family:     Frequency of Social Gatherings with Friends and Family:     Attends [employer] Services:     Active Member of Clubs or Organizations:     Attends Club or Organization Meetings:     Marital Status:    Intimate Partner Violence:     Fear of Current or Ex-Partner:     Emotionally Abused:     Physically Abused:     Sexually Abused: ALLERGIES: Patient has no known allergies. Review of Systems   Constitutional: Negative for chills and fever. Respiratory: Negative for cough and shortness of breath. Cardiovascular: Negative for chest pain and leg swelling. Gastrointestinal: Negative for bowel incontinence, nausea and vomiting. Genitourinary: Negative for decreased urine volume and difficulty urinating. Musculoskeletal: Negative for arthralgias, back pain, extremity weakness, joint swelling and neck pain.    Skin: Positive for wound (occiput). Neurological: Positive for syncope. Negative for tingling, tremors, loss of consciousness, numbness and headaches. All other systems reviewed and are negative. Vitals:    07/01/21 1537   BP: (!) 135/59   Pulse: (!) 52   Resp: 16   SpO2: 100%   Weight: 68.9 kg (152 lb)   Height: 5' 7\" (1.702 m)          Physical Exam  Constitutional:       General: He is not in acute distress. Appearance: He is not ill-appearing. HENT:      Head: Normocephalic. Comments: Trauma noted to occiput, 3cm laceration present  Eyes:      Extraocular Movements: Extraocular movements intact. Conjunctiva/sclera: Conjunctivae normal.      Pupils: Pupils are equal, round, and reactive to light. Neck:     Non-tender to palpation     Full ROM  Cardiovascular:      Rate and Rhythm: Regular rhythm. Bradycardia present. Heart sounds: No murmur heard. No friction rub. No gallop. Pulmonary:      Effort: Pulmonary effort is normal.      Breath sounds: Normal breath sounds. Abdominal:      General: Abdomen is flat. There is no distension. Palpations: Abdomen is soft. Tenderness: There is no abdominal tenderness. There is no guarding. Musculoskeletal:         General: No swelling, tenderness or deformity. Skin:     General: Skin is warm and dry. Coloration: Skin is not jaundiced. Findings: Bruising and laceration (occiput) present. Neurological:      General: No focal deficit present. Mental Status: He is alert. Cranial Nerves: No cranial nerve deficit. Motor: No weakness. MDM  81 yo male with hx of Afib brought in by EMS for GLF on Xarelto from [facility]. Patient reports feeling weak and legs giving out, leading to him hitting back of skull, which now has a 3cm laceration. Differential diagnosis includes Afib, symptomatic bradycardia, and peripheral neuropathy. EKG demonstrates Afib and no heart block.  CT head and neck showed no acute findings, reducing concern for ICH. Patient stable and neurologically intact. Laceration was closed as described below. Orders Placed This Encounter    WOUND CLOSURE BY ADHESIVE    CT HEAD WO CONT    CT SPINE CERV WO CONT    COMPREHENSIVE METABOLIC PANEL    CBC WITH AUTOMATED DIFF    URINALYSIS W/ RFLX MICROSCOPIC    TROPONIN I    Hold Sample    EKG, 12 LEAD, INITIAL    lidocaine/EPINEPHrine/tetracaine (LET) topical soln       Wound Closure by Adhesive    Date/Time: 7/1/2021 5:28 PM  Performed by: Jamal Landry MD  Authorized by: Jaz Tariq MD     Consent:     Consent obtained:  Verbal    Consent given by:  Patient and healthcare agent (daughter)  Anesthesia (see MAR for exact dosages): Anesthesia method:  Topical application    Topical anesthetic:  LET  Laceration details:     Location:  Scalp    Scalp location:  Occipital    Length (cm):  3  Repair type:     Repair type:  Simple  Pre-procedure details:     Preparation:  Patient was prepped and draped in usual sterile fashion  Exploration:     Hemostasis achieved with:  LET and epinephrine    Wound exploration: wound explored through full range of motion    Treatment:     Area cleansed with:  Betadine and saline    Amount of cleaning:  Standard    Irrigation solution:  Sterile saline    Irrigation method:  Syringe    Visualized foreign bodies/material removed: no    Skin repair:     Repair method:  Sutures    Suture size:  5-0    Suture material:  Prolene    Suture technique:  Simple interrupted    Number of sutures:  3  Approximation:     Approximation:  Close  Post-procedure details:     Dressing:  Non-adherent dressing    Patient tolerance of procedure: Tolerated well, no immediate complications                     I personally saw and examined the patient. I have reviewed and agree with the residents findings, including all diagnostic interpretations, and plans as written.  I was present during the key portions of separately billed procedures.   Oleg Jones MD

## 2021-07-06 LAB
ATRIAL RATE: 72 BPM
CALCULATED R AXIS, ECG10: -13 DEGREES
CALCULATED T AXIS, ECG11: 67 DEGREES
DIAGNOSIS, 93000: NORMAL
Q-T INTERVAL, ECG07: 514 MS
QRS DURATION, ECG06: 100 MS
QTC CALCULATION (BEZET), ECG08: 478 MS
VENTRICULAR RATE, ECG03: 52 BPM

## 2021-07-18 ENCOUNTER — HOSPITAL ENCOUNTER (INPATIENT)
Age: 86
LOS: 8 days | Discharge: NURSING, REHAB OR LONG-TERM CARE | DRG: 481 | End: 2021-07-26
Attending: EMERGENCY MEDICINE | Admitting: INTERNAL MEDICINE
Payer: MEDICARE

## 2021-07-18 ENCOUNTER — APPOINTMENT (OUTPATIENT)
Dept: GENERAL RADIOLOGY | Age: 86
DRG: 481 | End: 2021-07-18
Attending: EMERGENCY MEDICINE
Payer: MEDICARE

## 2021-07-18 ENCOUNTER — APPOINTMENT (OUTPATIENT)
Dept: CT IMAGING | Age: 86
DRG: 481 | End: 2021-07-18
Attending: EMERGENCY MEDICINE
Payer: MEDICARE

## 2021-07-18 DIAGNOSIS — J18.9 PNEUMONIA DUE TO INFECTIOUS ORGANISM, UNSPECIFIED LATERALITY, UNSPECIFIED PART OF LUNG: ICD-10-CM

## 2021-07-18 DIAGNOSIS — S72.002A CLOSED FRACTURE OF LEFT HIP, INITIAL ENCOUNTER (HCC): Primary | ICD-10-CM

## 2021-07-18 PROBLEM — S72.009A HIP FRACTURE (HCC): Status: ACTIVE | Noted: 2021-07-18

## 2021-07-18 LAB
ALBUMIN SERPL-MCNC: 2.9 G/DL (ref 3.5–5)
ALBUMIN/GLOB SERPL: 0.8 {RATIO} (ref 1.1–2.2)
ALP SERPL-CCNC: 75 U/L (ref 45–117)
ALT SERPL-CCNC: 14 U/L (ref 12–78)
ANION GAP SERPL CALC-SCNC: 5 MMOL/L (ref 5–15)
APPEARANCE UR: CLEAR
AST SERPL-CCNC: 23 U/L (ref 15–37)
BACTERIA URNS QL MICRO: NEGATIVE /HPF
BASOPHILS # BLD: 0 K/UL (ref 0–0.1)
BASOPHILS NFR BLD: 0 % (ref 0–1)
BILIRUB SERPL-MCNC: 0.6 MG/DL (ref 0.2–1)
BILIRUB UR QL: NEGATIVE
BUN SERPL-MCNC: 20 MG/DL (ref 6–20)
BUN/CREAT SERPL: 21 (ref 12–20)
CALCIUM SERPL-MCNC: 8.1 MG/DL (ref 8.5–10.1)
CALCULATED R AXIS, ECG10: -26 DEGREES
CALCULATED T AXIS, ECG11: 121 DEGREES
CHLORIDE SERPL-SCNC: 110 MMOL/L (ref 97–108)
CO2 SERPL-SCNC: 26 MMOL/L (ref 21–32)
COLOR UR: ABNORMAL
COMMENT, HOLDF: NORMAL
COMMENT, HOLDF: NORMAL
COVID-19 RAPID TEST, COVR: NOT DETECTED
CREAT SERPL-MCNC: 0.96 MG/DL (ref 0.7–1.3)
DIAGNOSIS, 93000: NORMAL
DIFFERENTIAL METHOD BLD: ABNORMAL
EOSINOPHIL # BLD: 0.1 K/UL (ref 0–0.4)
EOSINOPHIL NFR BLD: 1 % (ref 0–7)
EPITH CASTS URNS QL MICRO: ABNORMAL /LPF
ERYTHROCYTE [DISTWIDTH] IN BLOOD BY AUTOMATED COUNT: 16.7 % (ref 11.5–14.5)
GLOBULIN SER CALC-MCNC: 3.5 G/DL (ref 2–4)
GLUCOSE SERPL-MCNC: 141 MG/DL (ref 65–100)
GLUCOSE UR STRIP.AUTO-MCNC: NEGATIVE MG/DL
HCT VFR BLD AUTO: 33 % (ref 36.6–50.3)
HGB BLD-MCNC: 10.5 G/DL (ref 12.1–17)
HGB UR QL STRIP: ABNORMAL
HYALINE CASTS URNS QL MICRO: ABNORMAL /LPF (ref 0–5)
IMM GRANULOCYTES # BLD AUTO: 0.1 K/UL (ref 0–0.04)
IMM GRANULOCYTES NFR BLD AUTO: 1 % (ref 0–0.5)
KETONES UR QL STRIP.AUTO: NEGATIVE MG/DL
LACTATE SERPL-SCNC: 2.3 MMOL/L (ref 0.4–2)
LEUKOCYTE ESTERASE UR QL STRIP.AUTO: NEGATIVE
LYMPHOCYTES # BLD: 0.7 K/UL (ref 0.8–3.5)
LYMPHOCYTES NFR BLD: 6 % (ref 12–49)
MCH RBC QN AUTO: 32.3 PG (ref 26–34)
MCHC RBC AUTO-ENTMCNC: 31.8 G/DL (ref 30–36.5)
MCV RBC AUTO: 101.5 FL (ref 80–99)
MONOCYTES # BLD: 0.7 K/UL (ref 0–1)
MONOCYTES NFR BLD: 6 % (ref 5–13)
NEUTS SEG # BLD: 10.5 K/UL (ref 1.8–8)
NEUTS SEG NFR BLD: 86 % (ref 32–75)
NITRITE UR QL STRIP.AUTO: NEGATIVE
NRBC # BLD: 0 K/UL (ref 0–0.01)
NRBC BLD-RTO: 0 PER 100 WBC
PH UR STRIP: 5.5 [PH] (ref 5–8)
PLATELET # BLD AUTO: 190 K/UL (ref 150–400)
PMV BLD AUTO: 10.5 FL (ref 8.9–12.9)
POTASSIUM SERPL-SCNC: 4 MMOL/L (ref 3.5–5.1)
PROT SERPL-MCNC: 6.4 G/DL (ref 6.4–8.2)
PROT UR STRIP-MCNC: NEGATIVE MG/DL
Q-T INTERVAL, ECG07: 414 MS
QRS DURATION, ECG06: 120 MS
QTC CALCULATION (BEZET), ECG08: 531 MS
RBC # BLD AUTO: 3.25 M/UL (ref 4.1–5.7)
RBC #/AREA URNS HPF: ABNORMAL /HPF (ref 0–5)
RBC MORPH BLD: ABNORMAL
RBC MORPH BLD: ABNORMAL
SAMPLES BEING HELD,HOLD: NORMAL
SAMPLES BEING HELD,HOLD: NORMAL
SODIUM SERPL-SCNC: 141 MMOL/L (ref 136–145)
SOURCE, COVRS: NORMAL
SP GR UR REFRACTOMETRY: 1.02 (ref 1–1.03)
UROBILINOGEN UR QL STRIP.AUTO: 0.2 EU/DL (ref 0.2–1)
VENTRICULAR RATE, ECG03: 99 BPM
WBC # BLD AUTO: 12.1 K/UL (ref 4.1–11.1)
WBC URNS QL MICRO: ABNORMAL /HPF (ref 0–4)

## 2021-07-18 PROCEDURE — 71046 X-RAY EXAM CHEST 2 VIEWS: CPT

## 2021-07-18 PROCEDURE — 73552 X-RAY EXAM OF FEMUR 2/>: CPT

## 2021-07-18 PROCEDURE — 99285 EMERGENCY DEPT VISIT HI MDM: CPT

## 2021-07-18 PROCEDURE — 87040 BLOOD CULTURE FOR BACTERIA: CPT

## 2021-07-18 PROCEDURE — 83605 ASSAY OF LACTIC ACID: CPT

## 2021-07-18 PROCEDURE — 80053 COMPREHEN METABOLIC PANEL: CPT

## 2021-07-18 PROCEDURE — 81001 URINALYSIS AUTO W/SCOPE: CPT

## 2021-07-18 PROCEDURE — 93005 ELECTROCARDIOGRAM TRACING: CPT

## 2021-07-18 PROCEDURE — 74011000250 HC RX REV CODE- 250: Performed by: INTERNAL MEDICINE

## 2021-07-18 PROCEDURE — 73502 X-RAY EXAM HIP UNI 2-3 VIEWS: CPT

## 2021-07-18 PROCEDURE — 74011250636 HC RX REV CODE- 250/636: Performed by: EMERGENCY MEDICINE

## 2021-07-18 PROCEDURE — 74011250636 HC RX REV CODE- 250/636: Performed by: INTERNAL MEDICINE

## 2021-07-18 PROCEDURE — 36415 COLL VENOUS BLD VENIPUNCTURE: CPT

## 2021-07-18 PROCEDURE — 87635 SARS-COV-2 COVID-19 AMP PRB: CPT

## 2021-07-18 PROCEDURE — 85025 COMPLETE CBC W/AUTO DIFF WBC: CPT

## 2021-07-18 PROCEDURE — 65270000029 HC RM PRIVATE

## 2021-07-18 PROCEDURE — 70450 CT HEAD/BRAIN W/O DYE: CPT

## 2021-07-18 RX ORDER — ACETAMINOPHEN 325 MG/1
650 TABLET ORAL
Status: DISCONTINUED | OUTPATIENT
Start: 2021-07-18 | End: 2021-07-20

## 2021-07-18 RX ORDER — ONDANSETRON 2 MG/ML
4 INJECTION INTRAMUSCULAR; INTRAVENOUS
Status: DISCONTINUED | OUTPATIENT
Start: 2021-07-18 | End: 2021-07-26 | Stop reason: HOSPADM

## 2021-07-18 RX ORDER — PREGABALIN 75 MG/1
75 CAPSULE ORAL 2 TIMES DAILY
COMMUNITY
End: 2021-08-03

## 2021-07-18 RX ORDER — DULOXETIN HYDROCHLORIDE 30 MG/1
90 CAPSULE, DELAYED RELEASE ORAL DAILY
COMMUNITY

## 2021-07-18 RX ORDER — MORPHINE SULFATE 2 MG/ML
2 INJECTION, SOLUTION INTRAMUSCULAR; INTRAVENOUS
Status: COMPLETED | OUTPATIENT
Start: 2021-07-18 | End: 2021-07-18

## 2021-07-18 RX ORDER — SODIUM CHLORIDE 0.9 % (FLUSH) 0.9 %
5-40 SYRINGE (ML) INJECTION EVERY 8 HOURS
Status: DISCONTINUED | OUTPATIENT
Start: 2021-07-18 | End: 2021-07-26 | Stop reason: HOSPADM

## 2021-07-18 RX ORDER — POLYETHYLENE GLYCOL 3350 17 G/17G
17 POWDER, FOR SOLUTION ORAL DAILY PRN
Status: DISCONTINUED | OUTPATIENT
Start: 2021-07-18 | End: 2021-07-26 | Stop reason: HOSPADM

## 2021-07-18 RX ORDER — ONDANSETRON 4 MG/1
4 TABLET, ORALLY DISINTEGRATING ORAL
Status: DISCONTINUED | OUTPATIENT
Start: 2021-07-18 | End: 2021-07-26 | Stop reason: HOSPADM

## 2021-07-18 RX ORDER — ENOXAPARIN SODIUM 100 MG/ML
40 INJECTION SUBCUTANEOUS DAILY
Status: DISCONTINUED | OUTPATIENT
Start: 2021-07-18 | End: 2021-07-18

## 2021-07-18 RX ORDER — SODIUM CHLORIDE 0.9 % (FLUSH) 0.9 %
5-40 SYRINGE (ML) INJECTION AS NEEDED
Status: DISCONTINUED | OUTPATIENT
Start: 2021-07-18 | End: 2021-07-26 | Stop reason: HOSPADM

## 2021-07-18 RX ORDER — ACETAMINOPHEN 650 MG/1
650 SUPPOSITORY RECTAL
Status: DISCONTINUED | OUTPATIENT
Start: 2021-07-18 | End: 2021-07-20

## 2021-07-18 RX ADMIN — Medication 10 ML: at 13:09

## 2021-07-18 RX ADMIN — Medication 10 ML: at 23:26

## 2021-07-18 RX ADMIN — SODIUM CHLORIDE 1000 ML: 9 INJECTION, SOLUTION INTRAVENOUS at 13:07

## 2021-07-18 RX ADMIN — MORPHINE SULFATE 2 MG: 2 INJECTION, SOLUTION INTRAMUSCULAR; INTRAVENOUS at 13:04

## 2021-07-18 RX ADMIN — AZITHROMYCIN MONOHYDRATE 500 MG: 500 INJECTION, POWDER, LYOPHILIZED, FOR SOLUTION INTRAVENOUS at 13:07

## 2021-07-18 RX ADMIN — CEFTRIAXONE 1 G: 1 INJECTION, POWDER, FOR SOLUTION INTRAMUSCULAR; INTRAVENOUS at 10:00

## 2021-07-18 NOTE — ED TRIAGE NOTES
Patient arrives via EMS with c/o GLF this AM at nursing home. Pt reports left hip pain. Patient is [place], right ear is easier to understand. No LOC, denies hitting head, pt is on xarelto.

## 2021-07-18 NOTE — H&P
Hospitalist Admission Note    NAME: Fina Etienne   :  1927   MRN:  [id number]     Date/Time:  2021 1:38 PM    Patient PCP: Trent Shelton MD  ________________________________________________________________________    Given the patient's current clinical presentation, I have a high level of concern for decompensation if discharged from the emergency department. Complex decision making was performed, which includes reviewing the patient's available past medical records, laboratory results, and x-ray films. My assessment of this patient's clinical condition and my plan of care is as follows. Assessment / Plan:    #L displaced intertrochanteric femur fx: Highly functional, independent 89M with minimal comorbidities, no dementia. Low risk for surgery, likely this afternoon. Last xarelto last night   - Per ortho    #CAP?: CXR  with LLL consolidation vs pna. WBC is 12.1 with left shift but this could quite easily be 2/2 fracture. He has no sx and no hypoxemia. Received abx x 1 in ER   - Check procal   - Hold on further abx    #Afib: Recently had atenolol discontinued due orthostasis. Presently in NSR   - Can resume Xarelto post-op    #Neuropathy: On pregabalin 75mg bid, duloxetine 90mg    #Hx uric acid stones: Allopurinol        I have personally reviewed the radiographs, laboratory data in Epic and decisions and statements above are based partially on this personal interpretation. Code Status: DNR/DNI  DVT Prophylaxis: DOAC  GI Prophylaxis: not indicated       Subjective:   CHIEF COMPLAINT: \"fall\"    HISTORY OF PRESENT ILLNESS:     Donte Sportsman is a 80 y.o.  M hx Afib presents after mechanical fall. He lives in an independent living facility, but has meals prepared for him. He had gone to the bathroom this morning and was returning to be when he felt like his sock slipped and he went down. He did not have lightheadedness, white out, cp, sob before this. No LOC.  He called daughter who called 911.     In ER, he is noted to have L displaced intertrochanteric femur fracture as well as mild leukocytosis on some c/f pneumonia, though he denies cough, fever, chills recently. He has never had any problems with anesthesia in the past. Last took xarelto last night. PCP stopped atenolol recently. Past Medical History:   Diagnosis Date    Anxiety     Arthritis     right shoulder    Atrial fibrillation, chronic 09/23/2014    Colon cancer ([address] 2002    colon resection    Depression 9/28/2015    DJD (degenerative joint disease) of knee     left TKR    Dyslipidemia     Embolic stroke involving right middle cerebral artery [facility]) Nov 2013    presented with dysarthria, MRI Several tiny acute infarcts in the right middle cerebral artery    Falls     Gout     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    Hypertension     Memory loss     Peripheral neuropathy     Vertigo       Past Surgical History:   Procedure Laterality Date    ABDOMEN SURGERY PROC UNLISTED      colorectal    CARDIAC SURG PROCEDURE UNLIST      cardio version    HX CATARACT REMOVAL      HX GI      colon resection    HX ORTHOPAEDIC      left knee, left shoulder     Social History     Tobacco Use    Smoking status: Never Smoker    Smokeless tobacco: Never Used   Substance Use Topics    Alcohol use: Yes     Alcohol/week: 1.0 standard drinks     Types: 1 Standard drinks or equivalent per week     Comment: rarely      Family History   Problem Relation Age of Onset    Stroke Father     Heart Disease Father     Stroke Sister         No Known Allergies     Prior to Admission medications    Medication Sig Start Date End Date Taking? Authorizing Provider   rivaroxaban (Xarelto) 15 mg tab tablet Take 15 mg by mouth daily (with dinner). Yes Other, MD Yanelis   pregabalin (LYRICA) 75 mg capsule Take 75 mg by mouth. Yes Other, MD Yanelis   DULoxetine (CYMBALTA) 30 mg capsule Take 90 mg by mouth daily.    Yes Other, MD Yanelis   simvastatin (ZOCOR) 20 mg tablet Take 10 mg by mouth nightly. Yes Provider, Historical   allopurinol (ZYLOPRIM) 300 mg tablet Take 150 mg by mouth every evening. Yes Other, MD Yanelis   zinc sulfate 220 mg tablet Take 1 Tab by mouth daily. 1/19/21   Hue Arredondo MD   ondansetron (Zofran ODT) 4 mg disintegrating tablet 1 Tab by SubLINGual route every eight (8) hours as needed for Nausea or Vomiting. 1/19/21   Hue Arredondo MD   atenoloL (TENORMIN) 25 mg tablet Take 1 Tab by mouth daily. 3/16/20   Lisa Wheat MD   SAVAYSA 60 mg tab tablet TAKE ONE TABLET BY MOUTH EVERY DAY 5/23/19   Jordan Walsh NP   folic acid (FOLVITE) 1 mg tablet Take  by mouth daily. Provider, Historical   sertraline (ZOLOFT) 50 mg tablet Take 50 mg by mouth daily.     Provider, Historical     REVIEW OF SYSTEMS:  See HPI for details  General: negative for fever, chills, sweats, weakness, weight loss  Eyes: negative for blurred vision, eye pain, loss of vision, diplopia  Ear Nose and Throat: negative for rhinorrhea, pharyngitis, otalgia, tinnitus, speech or swallowing difficulties  Respiratory:  negative for pleuritic pain, cough, sputum production, wheezing, SOB, VALDOVINOS  Cardiology:  negative for chest pain, palpitations, orthopnea, PND, edema, syncope   Gastrointestinal: negative for abdominal pain, N/V, dysphagia, change in bowel habits, bleeding  Genitourinary: negative for frequency, urgency, dysuria, hematuria, incontinence  Muskuloskeletal :hip pain  Hematology: negative for easy bruising, bleeding, lymphadenopathy  Dermatological: negative for rash, ulceration, mole change, new lesion  Endocrine: negative for hot flashes or polydipsia  Neurological: negative for headache, dizziness, confusion, focal weakness, paresthesia, memory loss, gait disturbance  Psychological: negative for anxiety, depression, agitation      Objective:   VITALS:    Visit Vitals  /79 (BP 1 Location: Left upper arm, BP Patient Position: At rest)   Pulse 97   Temp 98.1 °F (36.7 °C)   Resp 18   SpO2 97%     PHYSICAL EXAM:    Physical Exam:    Gen: Well-developed, well-nourished, in no acute distress  HEENT:  Pink conjunctivae, PERRL, hearing intact to voice, moist mucous membranes  Neck: Supple, without masses, thyroid non-tender  Resp: No accessory muscle use, clear breath sounds without wheezes rales or rhonchi  Card: No murmurs, normal S1, S2 without thrills, bruits or peripheral edema  Abd:  Soft, non-tender, non-distended, normoactive bowel sounds are present, no palpable organomegaly and no detectable hernias  Lymph:  No cervical or inguinal adenopathy  Musc: No cyanosis or clubbing  Skin: No rashes or ulcers, skin turgor is good  Neuro:  Cranial nerves are grossly intact, no focal motor weakness, follows commands appropriately  Psych:  Good insight, oriented to person, place and time, alert          _______________________________________________________________________  Care Plan discussed with:    Comments   Patient xx Discussed with patient in room. POC outlined and Questions answered    Family  x    RN x    Care Manager                    Consultant:  harsh OSWALD MD   _______________________________________________________________________  Recommended Disposition:   Home with Family    HH/PT/OT/RN    SNF/LTC x   MARK    ________________________________________________________________________  TOTAL TIME:  45 Minutes        Comments   >50% of visit spent in counseling and coordination of care  Chart reviewed  Discussion with patient and/or family and questions answered     ________________________________________________________________________  Signed: Nelda Carr MD    This note will be viewable in [address] Procedures: see electronic medical records for all procedures/Xrays and details which were not copied into this note but were reviewed prior to creation of Plan.     LAB DATA REVIEWED:    Recent Results (from the past 24 hour(s))   METABOLIC PANEL, COMPREHENSIVE    Collection Time: 07/18/21  8:14 AM   Result Value Ref Range    Sodium 141 136 - 145 mmol/L    Potassium 4.0 3.5 - 5.1 mmol/L    Chloride 110 (H) 97 - 108 mmol/L    CO2 26 21 - 32 mmol/L    Anion gap 5 5 - 15 mmol/L    Glucose 141 (H) 65 - 100 mg/dL    BUN 20 6 - 20 MG/DL    Creatinine 0.96 0.70 - 1.30 MG/DL    BUN/Creatinine ratio 21 (H) 12 - 20      GFR est AA >60 >60 ml/min/1.73m2    GFR est non-AA >60 >60 ml/min/1.73m2    Calcium 8.1 (L) 8.5 - 10.1 MG/DL    Bilirubin, total 0.6 0.2 - 1.0 MG/DL    ALT (SGPT) 14 12 - 78 U/L    AST (SGOT) 23 15 - 37 U/L    Alk. phosphatase 75 45 - 117 U/L    Protein, total 6.4 6.4 - 8.2 g/dL    Albumin 2.9 (L) 3.5 - 5.0 g/dL    Globulin 3.5 2.0 - 4.0 g/dL    A-G Ratio 0.8 (L) 1.1 - 2.2     CBC WITH AUTOMATED DIFF    Collection Time: 07/18/21  8:14 AM   Result Value Ref Range    WBC 12.1 (H) 4.1 - 11.1 K/uL    RBC 3.25 (L) 4.10 - 5.70 M/uL    HGB 10.5 (L) 12.1 - 17.0 g/dL    HCT 33.0 (L) 36.6 - 50.3 %    .5 (H) 80.0 - 99.0 FL    MCH 32.3 26.0 - 34.0 PG    MCHC 31.8 30.0 - 36.5 g/dL    RDW 16.7 (H) 11.5 - 14.5 %    PLATELET 279 382 - 413 K/uL    MPV 10.5 8.9 - 12.9 FL    NRBC 0.0 0  WBC    ABSOLUTE NRBC 0.00 0.00 - 0.01 K/uL    NEUTROPHILS 86 (H) 32 - 75 %    LYMPHOCYTES 6 (L) 12 - 49 %    MONOCYTES 6 5 - 13 %    EOSINOPHILS 1 0 - 7 %    BASOPHILS 0 0 - 1 %    IMMATURE GRANULOCYTES 1 (H) 0.0 - 0.5 %    ABS. NEUTROPHILS 10.5 (H) 1.8 - 8.0 K/UL    ABS. LYMPHOCYTES 0.7 (L) 0.8 - 3.5 K/UL    ABS. MONOCYTES 0.7 0.0 - 1.0 K/UL    ABS. EOSINOPHILS 0.1 0.0 - 0.4 K/UL    ABS. BASOPHILS 0.0 0.0 - 0.1 K/UL    ABS. IMM.  GRANS. 0.1 (H) 0.00 - 0.04 K/UL    DF SMEAR SCANNED      RBC COMMENTS ANISOCYTOSIS  1+        RBC COMMENTS MACROCYTOSIS  1+       SAMPLES BEING HELD    Collection Time: 07/18/21  8:14 AM   Result Value Ref Range    SAMPLES BEING HELD 1RD     COMMENT        Add-on orders for these samples will be processed based on acceptable specimen integrity and analyte stability, which may vary by analyte.    COVID-19 RAPID TEST    Collection Time: 07/18/21 10:48 AM   Result Value Ref Range    Specimen source Nasopharyngeal      COVID-19 rapid test Not detected NOTD     URINALYSIS W/MICROSCOPIC    Collection Time: 07/18/21 11:59 AM   Result Value Ref Range    Color YELLOW/STRAW      Appearance CLEAR CLEAR      Specific gravity 1.019 1.003 - 1.030      pH (UA) 5.5 5.0 - 8.0      Protein Negative NEG mg/dL    Glucose Negative NEG mg/dL    Ketone Negative NEG mg/dL    Bilirubin Negative NEG      Blood TRACE (A) NEG      Urobilinogen 0.2 0.2 - 1.0 EU/dL    Nitrites Negative NEG      Leukocyte Esterase Negative NEG      WBC 0-4 0 - 4 /hpf    RBC 10-20 0 - 5 /hpf    Epithelial cells FEW FEW /lpf    Bacteria Negative NEG /hpf    Hyaline cast 0-2 0 - 5 /lpf

## 2021-07-18 NOTE — ED NOTES
TRANSFER - OUT REPORT:    Verbal report given to 4th floor RN (name) on Wilver Pina  being transferred to 4th floor (unit) for routine progression of care       Report consisted of patients Situation, Background, Assessment and   Recommendations(SBAR). Information from the following report(s) SBAR, Kardex, ED Summary, [facility] and Recent Results was reviewed with the receiving nurse. Lines:   Peripheral IV 07/18/21 Left Arm (Active)        Opportunity for questions and clarification was provided. Patient transported with:   Tech     Receiving RN aware that lactic acid and blood cultures need to collected and fluid and antibiotics need to be started upon arrival to floor.

## 2021-07-18 NOTE — ED NOTES
This RN attempted to call report at this time. 4th floor charge nurse made aware of patient transport at this time.

## 2021-07-18 NOTE — CONSULTS
ORTHOPEDIC SURGERY CONSULT    Subjective:     Date of Consultation:  July 18, 2021    Referring Physician:  Dr. Nikkie Chavis is a 80 y.o.  male who is being seen for a left displaced intertrochanteric femur fracture. He has a past medical history of gout, AF on xarelto, polyneuropathy, dyslipidemia, HTN, depression, and CVA. He presented from [address] [place] [address] this morning after sustaining a ground level fall. This was unwitnessed, but per the patient he was in the bathroom and turned to walk down the hallway when his legs gave \"out. \"  He reports no shortness of breath, chest pain, or vision changes prior to the fall. He remembers the entire event. He reported severe left hip pain and was able to call his daughter who called 911. He was transported to [facility] ER with the above injury. We have been consulted to manage his fracture. Patient Active Problem List    Diagnosis Date Noted    Hip fracture [address] 07/18/2021    CAP (community acquired pneumonia) 07/18/2021    Chronic atrial fibrillation ([facility] 11/10/2017    Sinus node dysfunction ([address] 04/12/2016    Depression 09/28/2015    Dyslipidemia 09/23/2014    Gout     Hx of carcinoma in situ of prostate     Essential hypertension, benign 11/16/2013    Kidney stones 66/04/0092    Embolic stroke involving right middle cerebral artery ([address] 11/01/2013     Family History   Problem Relation Age of Onset    Stroke Father     Heart Disease Father     Stroke Sister       Social History     Tobacco Use    Smoking status: Never Smoker    Smokeless tobacco: Never Used   Substance Use Topics    Alcohol use:  Yes     Alcohol/week: 1.0 standard drinks     Types: 1 Standard drinks or equivalent per week     Comment: rarely     Past Medical History:   Diagnosis Date    Anxiety     Arthritis     right shoulder    Atrial fibrillation, chronic 09/23/2014    Colon cancer ([address] 2002    colon resection    Depression 9/28/2015    DJD (degenerative joint disease) of knee     left TKR    Dyslipidemia     Embolic stroke involving right middle cerebral artery [facility]) Nov 2013    presented with dysarthria, MRI Several tiny acute infarcts in the right middle cerebral artery    Falls     Gout     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    Hypertension     Memory loss     Peripheral neuropathy     Vertigo       Past Surgical History:   Procedure Laterality Date    ABDOMEN SURGERY PROC UNLISTED      colorectal    CARDIAC SURG PROCEDURE UNLIST      cardio version    HX CATARACT REMOVAL      HX GI      colon resection    HX ORTHOPAEDIC      left knee, left shoulder      Prior to Admission medications    Medication Sig Start Date End Date Taking? Authorizing Provider   rivaroxaban (Xarelto) 15 mg tab tablet Take 15 mg by mouth daily (with dinner). Yes Other, MD Yanelis   pregabalin (LYRICA) 75 mg capsule Take 75 mg by mouth. Yes Other, MD Yanelis   DULoxetine (CYMBALTA) 30 mg capsule Take 90 mg by mouth daily. Yes Other, MD Yanelis   simvastatin (ZOCOR) 20 mg tablet Take 10 mg by mouth nightly. Yes Provider, Historical   allopurinol (ZYLOPRIM) 300 mg tablet Take 150 mg by mouth every evening. Yes Other, MD Yanelis   zinc sulfate 220 mg tablet Take 1 Tab by mouth daily. 1/19/21   Justyna Nguyen MD   ondansetron (Zofran ODT) 4 mg disintegrating tablet 1 Tab by SubLINGual route every eight (8) hours as needed for Nausea or Vomiting. 1/19/21   Justyna Nguyen MD   atenoloL (TENORMIN) 25 mg tablet Take 1 Tab by mouth daily. 3/16/20   Andrea Ervin MD   SAVAYSA 60 mg tab tablet TAKE ONE TABLET BY MOUTH EVERY DAY 5/23/19   Yun Gonzales NP   folic acid (FOLVITE) 1 mg tablet Take  by mouth daily. Provider, Historical   sertraline (ZOLOFT) 50 mg tablet Take 50 mg by mouth daily.     Provider, Historical     Current Facility-Administered Medications   Medication Dose Route Frequency    azithromycin (ZITHROMAX) 500 mg in 0.9% sodium chloride 250 mL (VIAL-MATE)  500 mg IntraVENous Q24H    sodium chloride 0.9 % bolus infusion 1,000 mL  1,000 mL IntraVENous ONCE    morphine injection 2 mg  2 mg IntraVENous NOW    sodium chloride (NS) flush 5-40 mL  5-40 mL IntraVENous Q8H    sodium chloride (NS) flush 5-40 mL  5-40 mL IntraVENous PRN    acetaminophen (TYLENOL) tablet 650 mg  650 mg Oral Q6H PRN    Or    acetaminophen (TYLENOL) suppository 650 mg  650 mg Rectal Q6H PRN    polyethylene glycol (MIRALAX) packet 17 g  17 g Oral DAILY PRN    ondansetron (ZOFRAN ODT) tablet 4 mg  4 mg Oral Q8H PRN    Or    ondansetron (ZOFRAN) injection 4 mg  4 mg IntraVENous Q6H PRN    cefTRIAXone (ROCEPHIN) 1 g in sterile water (preservative free) 10 mL IV syringe  1 g IntraVENous Q24H    lactated ringers bolus infusion 1,000 mL  1,000 mL IntraVENous ONCE     Current Outpatient Medications   Medication Sig    rivaroxaban (Xarelto) 15 mg tab tablet Take 15 mg by mouth daily (with dinner).  pregabalin (LYRICA) 75 mg capsule Take 75 mg by mouth.  DULoxetine (CYMBALTA) 30 mg capsule Take 90 mg by mouth daily.  simvastatin (ZOCOR) 20 mg tablet Take 10 mg by mouth nightly.  allopurinol (ZYLOPRIM) 300 mg tablet Take 150 mg by mouth every evening.  zinc sulfate 220 mg tablet Take 1 Tab by mouth daily.  ondansetron (Zofran ODT) 4 mg disintegrating tablet 1 Tab by SubLINGual route every eight (8) hours as needed for Nausea or Vomiting.  atenoloL (TENORMIN) 25 mg tablet Take 1 Tab by mouth daily.  SAVAYSA 60 mg tab tablet TAKE ONE TABLET BY MOUTH EVERY DAY    folic acid (FOLVITE) 1 mg tablet Take  by mouth daily.  sertraline (ZOLOFT) 50 mg tablet Take 50 mg by mouth daily.      No Known Allergies     Review of Systems:  A comprehensive review of systems was negative except for: Respiratory: positive for cough  Musculoskeletal: positive for bone pain    Objective:     Patient Vitals for the past 8 hrs:   BP Temp Pulse Resp SpO2 21 1115 (!) 112/59       21 1045 (!) 113/51       21 1031 108/67       21 1015 (!) 115/54       21 1000 (!) 106/53       21 0938 111/60       21 0937     (!) 74 %   21 0935     93 %   21 0900 103/65    91 %   21 0845 114/62    97 %   21 0800 109/73    94 %   21 0745 133/70    92 %   21 0744 133/70 98 °F (36.7 °C) (!) 109 16 97 %     Temp (24hrs), Av °F (36.7 °C), Min:98 °F (36.7 °C), Max:98 °F (36.7 °C)        EXAM: GEN: Well appearing male in NAD  PSYCH:  AAO x 4  MUSC: Left leg is shortened and externally rotated. There is ecchymosis to his left hand over the 3-5th MCP dorsally. Able to make a fist without malrotation. MCP palpated without significant defect noted. BCR of all digits. No erythema of the hand. +radial and ulnar pulses. Left hip without ecchymosis or erythema. Skin intact left hip. +DP pulse. PT not palpated, but able to doppler. IMAGING:  FINDINGS: AP view of the pelvis and a frogleg lateral view of the left hip and  AP and lateral views of the left femur demonstrate a displaced, oblique  intertrochanteric fracture of the proximal left femur. No other acute fracture  or dislocation is seen. Left total knee arthroplasty hardware appears intact. Prostatic seed implants overlie the pelvis.     IMPRESSION  Displaced intertrochanteric fracture of the proximal left femur.     Data Review   Recent Results (from the past 24 hour(s))   METABOLIC PANEL, COMPREHENSIVE    Collection Time: 21  8:14 AM   Result Value Ref Range    Sodium 141 136 - 145 mmol/L    Potassium 4.0 3.5 - 5.1 mmol/L    Chloride 110 (H) 97 - 108 mmol/L    CO2 26 21 - 32 mmol/L    Anion gap 5 5 - 15 mmol/L    Glucose 141 (H) 65 - 100 mg/dL    BUN 20 6 - 20 MG/DL    Creatinine 0.96 0.70 - 1.30 MG/DL    BUN/Creatinine ratio 21 (H) 12 - 20      GFR est AA >60 >60 ml/min/1.73m2    GFR est non-AA >60 >60 ml/min/1.73m2    Calcium 8.1 (L) 8.5 - 10.1 MG/DL    Bilirubin, total 0.6 0.2 - 1.0 MG/DL    ALT (SGPT) 14 12 - 78 U/L    AST (SGOT) 23 15 - 37 U/L    Alk. phosphatase 75 45 - 117 U/L    Protein, total 6.4 6.4 - 8.2 g/dL    Albumin 2.9 (L) 3.5 - 5.0 g/dL    Globulin 3.5 2.0 - 4.0 g/dL    A-G Ratio 0.8 (L) 1.1 - 2.2     CBC WITH AUTOMATED DIFF    Collection Time: 07/18/21  8:14 AM   Result Value Ref Range    WBC 12.1 (H) 4.1 - 11.1 K/uL    RBC 3.25 (L) 4.10 - 5.70 M/uL    HGB 10.5 (L) 12.1 - 17.0 g/dL    HCT 33.0 (L) 36.6 - 50.3 %    .5 (H) 80.0 - 99.0 FL    MCH 32.3 26.0 - 34.0 PG    MCHC 31.8 30.0 - 36.5 g/dL    RDW 16.7 (H) 11.5 - 14.5 %    PLATELET 793 525 - 398 K/uL    MPV 10.5 8.9 - 12.9 FL    NRBC 0.0 0  WBC    ABSOLUTE NRBC 0.00 0.00 - 0.01 K/uL    NEUTROPHILS 86 (H) 32 - 75 %    LYMPHOCYTES 6 (L) 12 - 49 %    MONOCYTES 6 5 - 13 %    EOSINOPHILS 1 0 - 7 %    BASOPHILS 0 0 - 1 %    IMMATURE GRANULOCYTES 1 (H) 0.0 - 0.5 %    ABS. NEUTROPHILS 10.5 (H) 1.8 - 8.0 K/UL    ABS. LYMPHOCYTES 0.7 (L) 0.8 - 3.5 K/UL    ABS. MONOCYTES 0.7 0.0 - 1.0 K/UL    ABS. EOSINOPHILS 0.1 0.0 - 0.4 K/UL    ABS. BASOPHILS 0.0 0.0 - 0.1 K/UL    ABS. IMM. GRANS. 0.1 (H) 0.00 - 0.04 K/UL    DF SMEAR SCANNED      RBC COMMENTS ANISOCYTOSIS  1+        RBC COMMENTS MACROCYTOSIS  1+       SAMPLES BEING HELD    Collection Time: 07/18/21  8:14 AM   Result Value Ref Range    SAMPLES BEING HELD 1RD     COMMENT        Add-on orders for these samples will be processed based on acceptable specimen integrity and analyte stability, which may vary by analyte. COVID-19 RAPID TEST    Collection Time: 07/18/21 10:48 AM   Result Value Ref Range    Specimen source Nasopharyngeal      COVID-19 rapid test Not detected NOTD           Assessment/Plan:   A: 1. Displaced left intertrochanteric femur fracture  2. Left hand contusion    P: 1. Admit to hospitalist- appreciate medical admission and treatment.   2. Plan for left hip IM nail with Dr. Angeles Lopez. Initially, was likely to proceed today, but OR can not accommodate due to case load. Plan for OR tomorrow around 1630. Will post with nursing supervisor. Diet today. NPO after midnight. 3. Hold anticoagulation  4. SCDs  5. Not a candidate for TXA due to xarelto/afib  6. Bed rest.  7. Will image left hand but not STAT- ecchymosis delineates this is older than 24 hours. 8. Analgesics- avoid narcotics if possible. Discussed case with Dr. Angeles Lopez who agrees with plan.        Constantino Noble [place]   [facility]  [address] [facility]

## 2021-07-19 ENCOUNTER — APPOINTMENT (OUTPATIENT)
Dept: GENERAL RADIOLOGY | Age: 86
DRG: 481 | End: 2021-07-19
Attending: FAMILY MEDICINE
Payer: MEDICARE

## 2021-07-19 ENCOUNTER — ANESTHESIA (OUTPATIENT)
Dept: SURGERY | Age: 86
DRG: 481 | End: 2021-07-19
Payer: MEDICARE

## 2021-07-19 ENCOUNTER — APPOINTMENT (OUTPATIENT)
Dept: GENERAL RADIOLOGY | Age: 86
DRG: 481 | End: 2021-07-19
Attending: PHYSICIAN ASSISTANT
Payer: MEDICARE

## 2021-07-19 ENCOUNTER — ANESTHESIA EVENT (OUTPATIENT)
Dept: SURGERY | Age: 86
DRG: 481 | End: 2021-07-19
Payer: MEDICARE

## 2021-07-19 ENCOUNTER — APPOINTMENT (OUTPATIENT)
Dept: GENERAL RADIOLOGY | Age: 86
DRG: 481 | End: 2021-07-19
Attending: ORTHOPAEDIC SURGERY
Payer: MEDICARE

## 2021-07-19 LAB
ANION GAP SERPL CALC-SCNC: 6 MMOL/L (ref 5–15)
B PERT DNA SPEC QL NAA+PROBE: NOT DETECTED
BORDETELLA PARAPERTUSSIS PCR, BORPAR: NOT DETECTED
BUN SERPL-MCNC: 25 MG/DL (ref 6–20)
BUN/CREAT SERPL: 23 (ref 12–20)
C PNEUM DNA SPEC QL NAA+PROBE: NOT DETECTED
CALCIUM SERPL-MCNC: 7.7 MG/DL (ref 8.5–10.1)
CHLORIDE SERPL-SCNC: 110 MMOL/L (ref 97–108)
CO2 SERPL-SCNC: 25 MMOL/L (ref 21–32)
CREAT SERPL-MCNC: 1.1 MG/DL (ref 0.7–1.3)
ERYTHROCYTE [DISTWIDTH] IN BLOOD BY AUTOMATED COUNT: 16.9 % (ref 11.5–14.5)
FLUAV H1 2009 PAND RNA SPEC QL NAA+PROBE: NOT DETECTED
FLUAV H1 RNA SPEC QL NAA+PROBE: NOT DETECTED
FLUAV H3 RNA SPEC QL NAA+PROBE: NOT DETECTED
FLUAV SUBTYP SPEC NAA+PROBE: NOT DETECTED
FLUBV RNA SPEC QL NAA+PROBE: NOT DETECTED
GLUCOSE SERPL-MCNC: 161 MG/DL (ref 65–100)
HADV DNA SPEC QL NAA+PROBE: NOT DETECTED
HCOV 229E RNA SPEC QL NAA+PROBE: NOT DETECTED
HCOV HKU1 RNA SPEC QL NAA+PROBE: NOT DETECTED
HCOV NL63 RNA SPEC QL NAA+PROBE: NOT DETECTED
HCOV OC43 RNA SPEC QL NAA+PROBE: NOT DETECTED
HCT VFR BLD AUTO: 24.7 % (ref 36.6–50.3)
HGB BLD-MCNC: 7.9 G/DL (ref 12.1–17)
HMPV RNA SPEC QL NAA+PROBE: NOT DETECTED
HPIV1 RNA SPEC QL NAA+PROBE: NOT DETECTED
HPIV2 RNA SPEC QL NAA+PROBE: NOT DETECTED
HPIV3 RNA SPEC QL NAA+PROBE: NOT DETECTED
HPIV4 RNA SPEC QL NAA+PROBE: NOT DETECTED
M PNEUMO DNA SPEC QL NAA+PROBE: NOT DETECTED
MCH RBC QN AUTO: 32.4 PG (ref 26–34)
MCHC RBC AUTO-ENTMCNC: 32 G/DL (ref 30–36.5)
MCV RBC AUTO: 101.2 FL (ref 80–99)
NRBC # BLD: 0 K/UL (ref 0–0.01)
NRBC BLD-RTO: 0 PER 100 WBC
PLATELET # BLD AUTO: 162 K/UL (ref 150–400)
PMV BLD AUTO: 10.9 FL (ref 8.9–12.9)
POTASSIUM SERPL-SCNC: 4.5 MMOL/L (ref 3.5–5.1)
PROCALCITONIN SERPL-MCNC: 0.17 NG/ML
RBC # BLD AUTO: 2.44 M/UL (ref 4.1–5.7)
RSV RNA SPEC QL NAA+PROBE: NOT DETECTED
RV+EV RNA SPEC QL NAA+PROBE: NOT DETECTED
SARS-COV-2 PCR, COVPCR: NOT DETECTED
SODIUM SERPL-SCNC: 141 MMOL/L (ref 136–145)
WBC # BLD AUTO: 11.1 K/UL (ref 4.1–11.1)

## 2021-07-19 PROCEDURE — 86901 BLOOD TYPING SEROLOGIC RH(D): CPT

## 2021-07-19 PROCEDURE — C1713 ANCHOR/SCREW BN/BN,TIS/BN: HCPCS | Performed by: ORTHOPAEDIC SURGERY

## 2021-07-19 PROCEDURE — 74011000258 HC RX REV CODE- 258: Performed by: NURSE ANESTHETIST, CERTIFIED REGISTERED

## 2021-07-19 PROCEDURE — 74011250637 HC RX REV CODE- 250/637: Performed by: INTERNAL MEDICINE

## 2021-07-19 PROCEDURE — 2709999900 HC NON-CHARGEABLE SUPPLY: Performed by: ORTHOPAEDIC SURGERY

## 2021-07-19 PROCEDURE — 76210000017 HC OR PH I REC 1.5 TO 2 HR: Performed by: ORTHOPAEDIC SURGERY

## 2021-07-19 PROCEDURE — 74011250636 HC RX REV CODE- 250/636: Performed by: NURSE ANESTHETIST, CERTIFIED REGISTERED

## 2021-07-19 PROCEDURE — 77030008684 HC TU ET CUF COVD -B: Performed by: ANESTHESIOLOGY

## 2021-07-19 PROCEDURE — 84145 PROCALCITONIN (PCT): CPT

## 2021-07-19 PROCEDURE — 76060000033 HC ANESTHESIA 1 TO 1.5 HR: Performed by: ORTHOPAEDIC SURGERY

## 2021-07-19 PROCEDURE — 77030026438 HC STYL ET INTUB CARD -A: Performed by: ANESTHESIOLOGY

## 2021-07-19 PROCEDURE — 74011000250 HC RX REV CODE- 250: Performed by: NURSE ANESTHETIST, CERTIFIED REGISTERED

## 2021-07-19 PROCEDURE — 73130 X-RAY EXAM OF HAND: CPT

## 2021-07-19 PROCEDURE — 74011250636 HC RX REV CODE- 250/636: Performed by: ANESTHESIOLOGY

## 2021-07-19 PROCEDURE — 86923 COMPATIBILITY TEST ELECTRIC: CPT

## 2021-07-19 PROCEDURE — C1769 GUIDE WIRE: HCPCS | Performed by: ORTHOPAEDIC SURGERY

## 2021-07-19 PROCEDURE — 0QH706Z INSERTION OF INTRAMEDULLARY INTERNAL FIXATION DEVICE INTO LEFT UPPER FEMUR, OPEN APPROACH: ICD-10-PCS | Performed by: ORTHOPAEDIC SURGERY

## 2021-07-19 PROCEDURE — 74011250636 HC RX REV CODE- 250/636: Performed by: ORTHOPAEDIC SURGERY

## 2021-07-19 PROCEDURE — 77030020061 HC IV BLD WRMR ADMIN SET 3M -B: Performed by: ANESTHESIOLOGY

## 2021-07-19 PROCEDURE — 76010000149 HC OR TIME 1 TO 1.5 HR: Performed by: ORTHOPAEDIC SURGERY

## 2021-07-19 PROCEDURE — 77030040922 HC BLNKT HYPOTHRM STRY -A

## 2021-07-19 PROCEDURE — 80048 BASIC METABOLIC PNL TOTAL CA: CPT

## 2021-07-19 PROCEDURE — 77030016452 HC BIT DRL AO4 STRY -C: Performed by: ORTHOPAEDIC SURGERY

## 2021-07-19 PROCEDURE — 85027 COMPLETE CBC AUTOMATED: CPT

## 2021-07-19 PROCEDURE — 77030002933 HC SUT MCRYL J&J -A: Performed by: ORTHOPAEDIC SURGERY

## 2021-07-19 PROCEDURE — 0202U NFCT DS 22 TRGT SARS-COV-2: CPT

## 2021-07-19 PROCEDURE — 77030008846 HC WRE K STRY -C: Performed by: ORTHOPAEDIC SURGERY

## 2021-07-19 PROCEDURE — 77030027467 HC RMR IM BIXCT DISP STRY -F: Performed by: ORTHOPAEDIC SURGERY

## 2021-07-19 PROCEDURE — 65270000029 HC RM PRIVATE

## 2021-07-19 PROCEDURE — 77030003029 HC SUT VCRL J&J -B: Performed by: ORTHOPAEDIC SURGERY

## 2021-07-19 PROCEDURE — 77030040361 HC SLV COMPR DVT MDII -B

## 2021-07-19 PROCEDURE — 77030007866 HC KT SPN ANES BBMI -B: Performed by: NURSE ANESTHETIST, CERTIFIED REGISTERED

## 2021-07-19 PROCEDURE — 73551 X-RAY EXAM OF FEMUR 1: CPT

## 2021-07-19 PROCEDURE — 36415 COLL VENOUS BLD VENIPUNCTURE: CPT

## 2021-07-19 PROCEDURE — 77030013079 HC BLNKT BAIR HGGR 3M -A: Performed by: ANESTHESIOLOGY

## 2021-07-19 PROCEDURE — 74011000250 HC RX REV CODE- 250: Performed by: ORTHOPAEDIC SURGERY

## 2021-07-19 PROCEDURE — 77030010507 HC ADH SKN DERMBND J&J -B: Performed by: ORTHOPAEDIC SURGERY

## 2021-07-19 PROCEDURE — 77030020788: Performed by: ORTHOPAEDIC SURGERY

## 2021-07-19 DEVICE — LONG NAIL KIT R1.5, TI, LEFT
Type: IMPLANTABLE DEVICE | Site: HIP | Status: FUNCTIONAL
Brand: GAMMA

## 2021-07-19 DEVICE — LOCKING SCREW, FULLY THREADED: Type: IMPLANTABLE DEVICE | Site: HIP | Status: FUNCTIONAL

## 2021-07-19 DEVICE — LAG SCREW, TI
Type: IMPLANTABLE DEVICE | Site: HIP | Status: FUNCTIONAL
Brand: GAMMA

## 2021-07-19 RX ORDER — PHENYLEPHRINE HCL IN 0.9% NACL 0.4MG/10ML
SYRINGE (ML) INTRAVENOUS AS NEEDED
Status: DISCONTINUED | OUTPATIENT
Start: 2021-07-19 | End: 2021-07-19 | Stop reason: HOSPADM

## 2021-07-19 RX ORDER — FENTANYL CITRATE 50 UG/ML
INJECTION, SOLUTION INTRAMUSCULAR; INTRAVENOUS AS NEEDED
Status: DISCONTINUED | OUTPATIENT
Start: 2021-07-19 | End: 2021-07-19 | Stop reason: HOSPADM

## 2021-07-19 RX ORDER — SODIUM CHLORIDE 9 MG/ML
75 INJECTION, SOLUTION INTRAVENOUS CONTINUOUS
Status: DISPENSED | OUTPATIENT
Start: 2021-07-19 | End: 2021-07-20

## 2021-07-19 RX ORDER — PROPOFOL 10 MG/ML
INJECTION, EMULSION INTRAVENOUS AS NEEDED
Status: DISCONTINUED | OUTPATIENT
Start: 2021-07-19 | End: 2021-07-19 | Stop reason: HOSPADM

## 2021-07-19 RX ORDER — HYDROMORPHONE HYDROCHLORIDE 1 MG/ML
.5-1 INJECTION, SOLUTION INTRAMUSCULAR; INTRAVENOUS; SUBCUTANEOUS
Status: DISCONTINUED | OUTPATIENT
Start: 2021-07-19 | End: 2021-07-19 | Stop reason: HOSPADM

## 2021-07-19 RX ORDER — LIDOCAINE HYDROCHLORIDE 20 MG/ML
INJECTION, SOLUTION INFILTRATION; PERINEURAL AS NEEDED
Status: DISCONTINUED | OUTPATIENT
Start: 2021-07-19 | End: 2021-07-19 | Stop reason: HOSPADM

## 2021-07-19 RX ORDER — FACIAL-BODY WIPES
10 EACH TOPICAL DAILY PRN
Status: DISCONTINUED | OUTPATIENT
Start: 2021-07-21 | End: 2021-07-26 | Stop reason: HOSPADM

## 2021-07-19 RX ORDER — ROCURONIUM BROMIDE 10 MG/ML
INJECTION, SOLUTION INTRAVENOUS AS NEEDED
Status: DISCONTINUED | OUTPATIENT
Start: 2021-07-19 | End: 2021-07-19 | Stop reason: HOSPADM

## 2021-07-19 RX ORDER — NALOXONE HYDROCHLORIDE 0.4 MG/ML
0.4 INJECTION, SOLUTION INTRAMUSCULAR; INTRAVENOUS; SUBCUTANEOUS AS NEEDED
Status: DISCONTINUED | OUTPATIENT
Start: 2021-07-19 | End: 2021-07-26 | Stop reason: HOSPADM

## 2021-07-19 RX ORDER — LIDOCAINE HYDROCHLORIDE 10 MG/ML
0.1 INJECTION, SOLUTION EPIDURAL; INFILTRATION; INTRACAUDAL; PERINEURAL AS NEEDED
Status: DISCONTINUED | OUTPATIENT
Start: 2021-07-19 | End: 2021-07-19 | Stop reason: SDUPTHER

## 2021-07-19 RX ORDER — SODIUM CHLORIDE 0.9 % (FLUSH) 0.9 %
5-40 SYRINGE (ML) INJECTION EVERY 8 HOURS
Status: DISCONTINUED | OUTPATIENT
Start: 2021-07-19 | End: 2021-07-19 | Stop reason: HOSPADM

## 2021-07-19 RX ORDER — MORPHINE SULFATE 4 MG/ML
1 INJECTION INTRAVENOUS
Status: DISCONTINUED | OUTPATIENT
Start: 2021-07-19 | End: 2021-07-23

## 2021-07-19 RX ORDER — ONDANSETRON 2 MG/ML
4 INJECTION INTRAMUSCULAR; INTRAVENOUS AS NEEDED
Status: DISCONTINUED | OUTPATIENT
Start: 2021-07-19 | End: 2021-07-19 | Stop reason: HOSPADM

## 2021-07-19 RX ORDER — SODIUM CHLORIDE, SODIUM LACTATE, POTASSIUM CHLORIDE, CALCIUM CHLORIDE 600; 310; 30; 20 MG/100ML; MG/100ML; MG/100ML; MG/100ML
125 INJECTION, SOLUTION INTRAVENOUS CONTINUOUS
Status: DISCONTINUED | OUTPATIENT
Start: 2021-07-19 | End: 2021-07-19 | Stop reason: HOSPADM

## 2021-07-19 RX ORDER — SODIUM CHLORIDE 0.9 % (FLUSH) 0.9 %
5-40 SYRINGE (ML) INJECTION AS NEEDED
Status: DISCONTINUED | OUTPATIENT
Start: 2021-07-19 | End: 2021-07-19 | Stop reason: HOSPADM

## 2021-07-19 RX ORDER — POLYETHYLENE GLYCOL 3350 17 G/17G
17 POWDER, FOR SOLUTION ORAL DAILY
Status: DISCONTINUED | OUTPATIENT
Start: 2021-07-20 | End: 2021-07-26 | Stop reason: HOSPADM

## 2021-07-19 RX ORDER — NALOXONE HYDROCHLORIDE 0.4 MG/ML
0.2 INJECTION, SOLUTION INTRAMUSCULAR; INTRAVENOUS; SUBCUTANEOUS
Status: DISCONTINUED | OUTPATIENT
Start: 2021-07-19 | End: 2021-07-19 | Stop reason: HOSPADM

## 2021-07-19 RX ORDER — FERROUS SULFATE, DRIED 160(50) MG
1 TABLET, EXTENDED RELEASE ORAL
Status: DISCONTINUED | OUTPATIENT
Start: 2021-07-20 | End: 2021-07-26 | Stop reason: HOSPADM

## 2021-07-19 RX ORDER — SODIUM CHLORIDE 0.9 % (FLUSH) 0.9 %
5-40 SYRINGE (ML) INJECTION AS NEEDED
Status: DISCONTINUED | OUTPATIENT
Start: 2021-07-19 | End: 2021-07-26 | Stop reason: HOSPADM

## 2021-07-19 RX ORDER — HYDROMORPHONE HYDROCHLORIDE 2 MG/ML
INJECTION, SOLUTION INTRAMUSCULAR; INTRAVENOUS; SUBCUTANEOUS AS NEEDED
Status: DISCONTINUED | OUTPATIENT
Start: 2021-07-19 | End: 2021-07-19 | Stop reason: HOSPADM

## 2021-07-19 RX ORDER — LIDOCAINE HYDROCHLORIDE 10 MG/ML
0.1 INJECTION, SOLUTION EPIDURAL; INFILTRATION; INTRACAUDAL; PERINEURAL AS NEEDED
Status: DISCONTINUED | OUTPATIENT
Start: 2021-07-19 | End: 2021-07-19 | Stop reason: HOSPADM

## 2021-07-19 RX ORDER — SODIUM CHLORIDE, SODIUM LACTATE, POTASSIUM CHLORIDE, CALCIUM CHLORIDE 600; 310; 30; 20 MG/100ML; MG/100ML; MG/100ML; MG/100ML
100 INJECTION, SOLUTION INTRAVENOUS CONTINUOUS
Status: DISCONTINUED | OUTPATIENT
Start: 2021-07-19 | End: 2021-07-19 | Stop reason: HOSPADM

## 2021-07-19 RX ORDER — NEOSTIGMINE METHYLSULFATE 1 MG/ML
INJECTION, SOLUTION INTRAVENOUS AS NEEDED
Status: DISCONTINUED | OUTPATIENT
Start: 2021-07-19 | End: 2021-07-19 | Stop reason: HOSPADM

## 2021-07-19 RX ORDER — SODIUM CHLORIDE 0.9 % (FLUSH) 0.9 %
5-40 SYRINGE (ML) INJECTION EVERY 8 HOURS
Status: DISCONTINUED | OUTPATIENT
Start: 2021-07-19 | End: 2021-07-26 | Stop reason: HOSPADM

## 2021-07-19 RX ORDER — GLYCOPYRROLATE 0.2 MG/ML
INJECTION INTRAMUSCULAR; INTRAVENOUS AS NEEDED
Status: DISCONTINUED | OUTPATIENT
Start: 2021-07-19 | End: 2021-07-19 | Stop reason: HOSPADM

## 2021-07-19 RX ORDER — FLUMAZENIL 0.1 MG/ML
0.2 INJECTION INTRAVENOUS
Status: DISCONTINUED | OUTPATIENT
Start: 2021-07-19 | End: 2021-07-19 | Stop reason: HOSPADM

## 2021-07-19 RX ORDER — AMOXICILLIN 250 MG
1 CAPSULE ORAL 2 TIMES DAILY
Status: DISCONTINUED | OUTPATIENT
Start: 2021-07-19 | End: 2021-07-26 | Stop reason: HOSPADM

## 2021-07-19 RX ADMIN — SODIUM CHLORIDE, POTASSIUM CHLORIDE, SODIUM LACTATE AND CALCIUM CHLORIDE: 600; 310; 30; 20 INJECTION, SOLUTION INTRAVENOUS at 18:39

## 2021-07-19 RX ADMIN — ROCURONIUM BROMIDE 60 MG: 10 INJECTION INTRAVENOUS at 17:48

## 2021-07-19 RX ADMIN — FENTANYL CITRATE 25 MCG: 50 INJECTION, SOLUTION INTRAMUSCULAR; INTRAVENOUS at 17:12

## 2021-07-19 RX ADMIN — FENTANYL CITRATE 25 MCG: 50 INJECTION, SOLUTION INTRAMUSCULAR; INTRAVENOUS at 17:08

## 2021-07-19 RX ADMIN — SODIUM CHLORIDE, POTASSIUM CHLORIDE, SODIUM LACTATE AND CALCIUM CHLORIDE: 600; 310; 30; 20 INJECTION, SOLUTION INTRAVENOUS at 16:45

## 2021-07-19 RX ADMIN — CEFAZOLIN SODIUM 2 G: 1 POWDER, FOR SOLUTION INTRAMUSCULAR; INTRAVENOUS at 18:08

## 2021-07-19 RX ADMIN — ACETAMINOPHEN 650 MG: 325 TABLET ORAL at 09:56

## 2021-07-19 RX ADMIN — HYDROMORPHONE HYDROCHLORIDE 0.2 MG: 2 INJECTION INTRAMUSCULAR; INTRAVENOUS; SUBCUTANEOUS at 18:39

## 2021-07-19 RX ADMIN — FENTANYL CITRATE 25 MCG: 50 INJECTION, SOLUTION INTRAMUSCULAR; INTRAVENOUS at 17:10

## 2021-07-19 RX ADMIN — SODIUM CHLORIDE 125 ML/HR: 9 INJECTION, SOLUTION INTRAVENOUS at 20:50

## 2021-07-19 RX ADMIN — PROPOFOL 100 MG: 10 INJECTION, EMULSION INTRAVENOUS at 17:48

## 2021-07-19 RX ADMIN — Medication 3 MG: at 18:36

## 2021-07-19 RX ADMIN — GLYCOPYRROLATE 0.4 MG: 0.2 INJECTION INTRAMUSCULAR; INTRAVENOUS at 18:36

## 2021-07-19 RX ADMIN — LIDOCAINE HYDROCHLORIDE 60 MG: 20 INJECTION, SOLUTION INFILTRATION; PERINEURAL at 17:48

## 2021-07-19 RX ADMIN — FENTANYL CITRATE 25 MCG: 50 INJECTION, SOLUTION INTRAMUSCULAR; INTRAVENOUS at 17:07

## 2021-07-19 RX ADMIN — Medication 40 MCG: at 18:29

## 2021-07-19 RX ADMIN — Medication 80 MCG: at 17:59

## 2021-07-19 RX ADMIN — FENTANYL CITRATE 25 MCG: 50 INJECTION, SOLUTION INTRAMUSCULAR; INTRAVENOUS at 18:26

## 2021-07-19 RX ADMIN — Medication 80 MCG: at 17:57

## 2021-07-19 RX ADMIN — FENTANYL CITRATE 25 MCG: 50 INJECTION, SOLUTION INTRAMUSCULAR; INTRAVENOUS at 18:25

## 2021-07-19 RX ADMIN — FENTANYL CITRATE 50 MCG: 50 INJECTION, SOLUTION INTRAMUSCULAR; INTRAVENOUS at 17:48

## 2021-07-19 RX ADMIN — PHENYLEPHRINE HYDROCHLORIDE 20 MCG/MIN: 10 INJECTION INTRAVENOUS at 18:03

## 2021-07-19 RX ADMIN — Medication 5 ML: at 14:47

## 2021-07-19 NOTE — PROGRESS NOTES
Physical Therapy Note:  Chart reviewed, orders received and appreciated. Per chart, patient currently with bedrest orders awaiting surgery today for L hip fx following GLF. Will hold evaluation and follow up after procedure.   Thank you,  Jorge Desai, PT, DPT

## 2021-07-19 NOTE — PROGRESS NOTES
Daily Progress Note: 7/19/2021  Alana Tinoco MD    Assessment/Plan:   L displaced intertrochanteric femur fx: Highly functional, independent 89M with minimal comorbidities, no dementia. Low risk for surgery Last xarelto 5/17  - Per ortho     CAP?: CXR  with LLL consolidation vs pna. WBC is 12.1 with left shift but this could quite easily be 2/2 fracture. He has no sx and no hypoxemia. Received abx x 1 in ER  - Check procal  - Hold on further abx     Afib: Recently had atenolol discontinued due orthostasis.  Presently in NSR  - Can resume Xarelto post-op     Neuropathy:   -On pregabalin 75mg bid, duloxetine 90mg     Hx uric acid stones:   -Allopurinol          Problem List:  Problem List as of 7/19/2021 Date Reviewed: 12/20/2018        Codes Class Noted - Resolved    Hip fracture ([facility] ICD-10-CM: K99.966J  ICD-9-CM: 820.8  7/18/2021 - Present        CAP (community acquired pneumonia) ICD-10-CM: J18.9  ICD-9-CM: 486  7/18/2021 - Present        Chronic atrial fibrillation ([facility] ICD-10-CM: I48.20  ICD-9-CM: 427.31  11/10/2017 - Present        Sinus node dysfunction ([facility] ICD-10-CM: I49.5  ICD-9-CM: 427.81  4/12/2016 - Present        Depression ICD-10-CM: F32.9  ICD-9-CM: 402  9/28/2015 - Present        Dyslipidemia ICD-10-CM: E78.5  ICD-9-CM: 272.4  9/23/2014 - Present        Gout ICD-10-CM: M10.9  ICD-9-CM: 274.9  Unknown - Present        Hx of carcinoma in situ of prostate ICD-10-CM: G15.405  ICD-9-CM: V13.89  Unknown - Present        Essential hypertension, benign ICD-10-CM: I10  ICD-9-CM: 401.1  11/16/2013 - Present        Kidney stones ICD-10-CM: N20.0  ICD-9-CM: 592.0  11/16/2013 - Present        Embolic stroke involving right middle cerebral artery ([facility] ICD-10-CM: Z34.938  ICD-9-CM: 434.11  11/1/2013 - Present    Overview Signed 12/7/2013  9:23 AM by Luz Maria Alarcon MD     presented with dysarthria, MRI Several tiny acute infarcts in the right middle cerebral artery RESOLVED: Dizziness ICD-10-CM: R42  ICD-9-CM: 780.4  2016 - 2016        RESOLVED: Typical atrial flutter ([facility] ICD-10-CM: I48.3  ICD-9-CM: 427.32  2015 - 2015        RESOLVED: Paroxysmal atrial fibrillation ([facility] ICD-10-CM: I48.0  ICD-9-CM: 427.31  2014 - 11/10/2017        RESOLVED: Dysarthria ICD-9-CM: 784.5  2013 - 2013        RESOLVED: TIA (transient ischemic attack) ICD-10-CM: G45.9  ICD-9-CM: 435.9  2013 - 2013        RESOLVED: Other and unspecified hyperlipidemia ICD-10-CM: E78.5  ICD-9-CM: 272.4  2013 - 2014        RESOLVED: Hypokalemia ICD-10-CM: E87.6  ICD-9-CM: 276.8  2013 - 2013              HPI:    Zoë Santamaria is a 80 y.o.  M hx Afib presents after mechanical fall. He lives in an independent living facility, but has meals prepared for him. He had gone to the bathroom this morning and was returning to be when he felt like his sock slipped and he went down. He did not have lightheadedness, white out, cp, sob before this. No LOC. He called daughter who called 911.      In ER, he is noted to have L displaced intertrochanteric femur fracture as well as mild leukocytosis on some c/f pneumonia, though he denies cough, fever, chills recently.      He has never had any problems with anesthesia in the past. Last took xarelto last night. PCP stopped atenolol recently. (Dr Jay Santiago)     : Daughter present in room. He has been somewhat confused with the Morphine. He is typically very alert and self sufficient. He knows he is in the hospital. For surgery later today. Review of Systems:   Review of systems not obtained due to patient factors.     Objective:   Physical Exam:     Visit Vitals  BP (!) 105/58 (BP 1 Location: Right upper arm, BP Patient Position: At rest)   Pulse 99   Temp 98.2 °F (36.8 °C)   Resp 18   SpO2 92%      O2 Device: None (Room air)    Temp (24hrs), Av.1 °F (36.7 °C), Min:97.7 °F (36.5 °C), Max:98.4 °F (36.9 °C)    No intake/output data recorded. 07/17 1901 - 07/19 0700  In: -   Out: 350 [Urine:350]    General:  Alert, cooperative, no distress, appears stated age. Head:  Normocephalic, without obvious abnormality, atraumatic. Eyes:  Conjunctivae/corneas clear. Nose: Nares normal. Septum midline. Mucosa normal. No drainage or sinus tenderness. Throat: Lips, mucosa, and tongue normal.    Neck: Supple, symmetrical, trachea midline, no adenopathy, thyroid: no enlargement/tenderness/nodules, no carotid bruit and no JVD. Back:   Symmetric, no curvature. ROM normal. No CVA tenderness. Lungs:   Clear to auscultation bilaterally. Chest wall:  No tenderness or deformity. Heart:  Regular rate and rhythm, S1, S2 normal, no murmur, click, rub or gallop. Abdomen:   Soft, non-tender. Bowel sounds normal. No masses,  No organomegaly. Extremities: Left leg shortened, TTP, no cyanosis or edema. No calf tenderness or cords. Bruising left hand. Pulses: 2+ and symmetric all extremities. Skin: Skin color, texture, turgor normal. No rashes or lesions   Neurologic: CNII-XII intact. Alert and oriented X 2. Fine motor of hands and fingers normal.   equal.  No cogwheeling or rigidity. Gait not tested at this time. Sensation grossly normal to touch. Gross motor of extremities normal.       Data Review:    CT Head 7/18/21   IMPRESSION  No acute intracranial process. Unchanged left sphenoid sinus disease. Xray left hand 7/19/21  IMPRESSION  No acute abnormality. X-ray left femur 7/18/21   IMPRESSION  Displaced intertrochanteric fracture of the proximal left femur.   Recent Days:  Recent Labs     07/19/21  0054 07/18/21  0814   WBC 11.1 12.1*   HGB 7.9* 10.5*   HCT 24.7* 33.0*    190     Recent Labs     07/19/21  0054 07/18/21  0814    141   K 4.5 4.0   * 110*   CO2 25 26   * 141*   BUN 25* 20   CREA 1.10 0.96   CA 7.7* 8.1*   ALB  --  2.9*   TBILI  --  0.6   ALT  --  14     No results for input(s): PH, PCO2, PO2, HCO3, FIO2 in the last 72 hours. 24 Hour Results:  Recent Results (from the past 24 hour(s))   EKG, 12 LEAD, INITIAL    Collection Time: 07/18/21  7:56 AM   Result Value Ref Range    Ventricular Rate 99 BPM    QRS Duration 120 ms    Q-T Interval 414 ms    QTC Calculation (Bezet) 531 ms    Calculated R Axis -26 degrees    Calculated T Axis 121 degrees    Diagnosis       Atrial fibrillation with premature ventricular or aberrantly conducted   complexes  Left ventricular hypertrophy with QRS widening and repolarization abnormality   ( Sokolow-Swain , [place] product , Romhilt-Davis )  Anterior infarct , age undetermined  Abnormal ECG  When compared with ECG of 01-JUL-2021 16:28,  Significant changes have occurred  Confirmed by Milton Mane MD., Khoi ([id number]) on 7/18/2021 17:96:21 PM     METABOLIC PANEL, COMPREHENSIVE    Collection Time: 07/18/21  8:14 AM   Result Value Ref Range    Sodium 141 136 - 145 mmol/L    Potassium 4.0 3.5 - 5.1 mmol/L    Chloride 110 (H) 97 - 108 mmol/L    CO2 26 21 - 32 mmol/L    Anion gap 5 5 - 15 mmol/L    Glucose 141 (H) 65 - 100 mg/dL    BUN 20 6 - 20 MG/DL    Creatinine 0.96 0.70 - 1.30 MG/DL    BUN/Creatinine ratio 21 (H) 12 - 20      GFR est AA >60 >60 ml/min/1.73m2    GFR est non-AA >60 >60 ml/min/1.73m2    Calcium 8.1 (L) 8.5 - 10.1 MG/DL    Bilirubin, total 0.6 0.2 - 1.0 MG/DL    ALT (SGPT) 14 12 - 78 U/L    AST (SGOT) 23 15 - 37 U/L    Alk.  phosphatase 75 45 - 117 U/L    Protein, total 6.4 6.4 - 8.2 g/dL    Albumin 2.9 (L) 3.5 - 5.0 g/dL    Globulin 3.5 2.0 - 4.0 g/dL    A-G Ratio 0.8 (L) 1.1 - 2.2     CBC WITH AUTOMATED DIFF    Collection Time: 07/18/21  8:14 AM   Result Value Ref Range    WBC 12.1 (H) 4.1 - 11.1 K/uL    RBC 3.25 (L) 4.10 - 5.70 M/uL    HGB 10.5 (L) 12.1 - 17.0 g/dL    HCT 33.0 (L) 36.6 - 50.3 %    .5 (H) 80.0 - 99.0 FL    MCH 32.3 26.0 - 34.0 PG    MCHC 31.8 30.0 - 36.5 g/dL    RDW 16.7 (H) 11.5 - 14.5 %    PLATELET 606 416 - 304 K/uL MPV 10.5 8.9 - 12.9 FL    NRBC 0.0 0  WBC    ABSOLUTE NRBC 0.00 0.00 - 0.01 K/uL    NEUTROPHILS 86 (H) 32 - 75 %    LYMPHOCYTES 6 (L) 12 - 49 %    MONOCYTES 6 5 - 13 %    EOSINOPHILS 1 0 - 7 %    BASOPHILS 0 0 - 1 %    IMMATURE GRANULOCYTES 1 (H) 0.0 - 0.5 %    ABS. NEUTROPHILS 10.5 (H) 1.8 - 8.0 K/UL    ABS. LYMPHOCYTES 0.7 (L) 0.8 - 3.5 K/UL    ABS. MONOCYTES 0.7 0.0 - 1.0 K/UL    ABS. EOSINOPHILS 0.1 0.0 - 0.4 K/UL    ABS. BASOPHILS 0.0 0.0 - 0.1 K/UL    ABS. IMM. GRANS. 0.1 (H) 0.00 - 0.04 K/UL    DF SMEAR SCANNED      RBC COMMENTS ANISOCYTOSIS  1+        RBC COMMENTS MACROCYTOSIS  1+       SAMPLES BEING HELD    Collection Time: 07/18/21  8:14 AM   Result Value Ref Range    SAMPLES BEING HELD 1RD     COMMENT        Add-on orders for these samples will be processed based on acceptable specimen integrity and analyte stability, which may vary by analyte.    COVID-19 RAPID TEST    Collection Time: 07/18/21 10:48 AM   Result Value Ref Range    Specimen source Nasopharyngeal      COVID-19 rapid test Not detected NOTD     URINALYSIS W/MICROSCOPIC    Collection Time: 07/18/21 11:59 AM   Result Value Ref Range    Color YELLOW/STRAW      Appearance CLEAR CLEAR      Specific gravity 1.019 1.003 - 1.030      pH (UA) 5.5 5.0 - 8.0      Protein Negative NEG mg/dL    Glucose Negative NEG mg/dL    Ketone Negative NEG mg/dL    Bilirubin Negative NEG      Blood TRACE (A) NEG      Urobilinogen 0.2 0.2 - 1.0 EU/dL    Nitrites Negative NEG      Leukocyte Esterase Negative NEG      WBC 0-4 0 - 4 /hpf    RBC 10-20 0 - 5 /hpf    Epithelial cells FEW FEW /lpf    Bacteria Negative NEG /hpf    Hyaline cast 0-2 0 - 5 /lpf   CULTURE, BLOOD, PAIRED    Collection Time: 07/18/21  1:16 PM    Specimen: Blood   Result Value Ref Range    Special Requests: NO SPECIAL REQUESTS      Culture result: NO GROWTH AFTER 15 HOURS     LACTIC ACID    Collection Time: 07/18/21  1:16 PM   Result Value Ref Range    Lactic acid 2.3 (HH) 0.4 - 2.0 MMOL/L SAMPLES BEING HELD    Collection Time: 07/18/21  8:30 PM   Result Value Ref Range    SAMPLES BEING HELD GRY ON ICE     COMMENT        Add-on orders for these samples will be processed based on acceptable specimen integrity and analyte stability, which may vary by analyte.    PROCALCITONIN    Collection Time: 07/19/21 12:54 AM   Result Value Ref Range    Procalcitonin 8.61 ng/mL   METABOLIC PANEL, BASIC    Collection Time: 07/19/21 12:54 AM   Result Value Ref Range    Sodium 141 136 - 145 mmol/L    Potassium 4.5 3.5 - 5.1 mmol/L    Chloride 110 (H) 97 - 108 mmol/L    CO2 25 21 - 32 mmol/L    Anion gap 6 5 - 15 mmol/L    Glucose 161 (H) 65 - 100 mg/dL    BUN 25 (H) 6 - 20 MG/DL    Creatinine 1.10 0.70 - 1.30 MG/DL    BUN/Creatinine ratio 23 (H) 12 - 20      GFR est AA >60 >60 ml/min/1.73m2    GFR est non-AA >60 >60 ml/min/1.73m2    Calcium 7.7 (L) 8.5 - 10.1 MG/DL   CBC W/O DIFF    Collection Time: 07/19/21 12:54 AM   Result Value Ref Range    WBC 11.1 4.1 - 11.1 K/uL    RBC 2.44 (L) 4.10 - 5.70 M/uL    HGB 7.9 (L) 12.1 - 17.0 g/dL    HCT 24.7 (L) 36.6 - 50.3 %    .2 (H) 80.0 - 99.0 FL    MCH 32.4 26.0 - 34.0 PG    MCHC 32.0 30.0 - 36.5 g/dL    RDW 16.9 (H) 11.5 - 14.5 %    PLATELET 387 825 - 408 K/uL    MPV 10.9 8.9 - 12.9 FL    NRBC 0.0 0  WBC    ABSOLUTE NRBC 0.00 0.00 - 0.01 K/uL       Medications reviewed  Current Facility-Administered Medications   Medication Dose Route Frequency    sodium chloride (NS) flush 5-40 mL  5-40 mL IntraVENous Q8H    sodium chloride (NS) flush 5-40 mL  5-40 mL IntraVENous PRN    acetaminophen (TYLENOL) tablet 650 mg  650 mg Oral Q6H PRN    Or    acetaminophen (TYLENOL) suppository 650 mg  650 mg Rectal Q6H PRN    polyethylene glycol (MIRALAX) packet 17 g  17 g Oral DAILY PRN    ondansetron (ZOFRAN ODT) tablet 4 mg  4 mg Oral Q8H PRN    Or    ondansetron (ZOFRAN) injection 4 mg  4 mg IntraVENous Q6H PRN       Care Plan discussed with: Patient/Family and Nurse    Total time spent with patient and review of records: 40 minutes.     Christian Villagomez MD No

## 2021-07-19 NOTE — PROGRESS NOTES
7/19/2021  10:25 AM  CM completed assessment w/ pt and Dtr Meagan Trujillo (MARIN) [phone]  in person, CM wore mask at all times. Charted demographics verified, pt lives alone at [facility] independent senior living community. At baseline pt is ambulatory w/ cane, iADLs, family assists w/ setting up med box and transport   Reason for Admission:  Emergent for Lt Hip Fracture following GLF  Pt  Is a 79 yo  male who presents to [facility] ED c/o Lt hip pain following GLF at home. Pt found to have PNA on admisison  PMHx: AFib, depression, anxiety, arthritis, falls, gout, Hx of carcinoma in situ of prostate                   RUR Score:   15 % Low Risk of Readmission/Green                  Plan for utilizing home health:  No history, PT, OT evals pending fallowing surery  DME:pt owns cane, RW and Rollator  Rx: [facility], pt uses [facility] Vaccine: Tania , completd 4/2021  PCP: First and Last name:  Rose Major MD     Name of Practice:    Are you a current patient: Yes/No: Yes   Approximate date of last visit: 30 days   Can you participate in a virtual visit with your PCP: Yes                    Current Advanced Directive/Advance Care Plan: DNR  HCDM: daughter Meagan Edwards    Healthcare Decision Maker:   Click here to complete [address] including selection of the Healthcare Decision Maker Relationship (ie \"Primary\")                             Transition of Care Plan:                    RUR 15%  LOS 1 Day  1. Hospital admission for medical and surgical management  2. Ortho consult  3. CM to follow through for treatment/response  4. PT, Ot evals pending surgery  5. DC when stable, likely  To SNF, CM provided Dtr Eilzabeth Anaya w/ list for review, requested 3 choices. 6. Pt will need PCR for placement]   7. Outpatient f/u PCP, ortho  8. Pt will require [facility] at [address] [place] [address] Management Interventions  PCP Verified by CM:  Yes Rosie Sanderson II, MD; last visit 27 days)  Palliative Care Criteria Met (RRAT>21 & CHF Dx)?: No  Mode of Transport at Discharge: BLS (pt will require stretcher at DC)  Physical Therapy Consult: Yes  Occupational Therapy Consult: Yes  Speech Therapy Consult: No  Current Support Network: Family Lives [place], Lives Alone, Own Home (pt lives alone in independent senior community, at baseline pt is ambulatory w/ cane, iADLS, family assists w/ meds and transprot)  Discharge Location  Discharge Placement: Skilled nursing facility  [facility]

## 2021-07-19 NOTE — OP NOTES
OPERATIVE REPORT   Admit Date: 7/18/2021  Admit Diagnosis: Hip fracture ([address] [S72.009A]  CAP (community acquired pneumonia) [J18.9]    Date of Procedure: 7/19/2021   Preoperative Diagnosis: Left Intertroch Femur Fracture  Postoperative Diagnosis: Left Intertroch Femur Fracture    Procedure: Procedure(s): FEMORAL INTERTROCHANTERIC NAIL INSERTION LEFT  Surgeon: Alexander Mccauley MD  Assistant(s): none  Anesthesia: General   Estimated Blood Loss: 20cc  Specimens: * No specimens in log *   Complications: None        INDICATIONS:     The patient is a 80 y.o.,  with an acute fall and was found to have a leftintertrochanteric hip fracture. The patient was evaluated by the hospitalist and cleared for surgery. Informed consent obtained including a discussion of the risks and benefits, which include, but are not limited to, bleeding, infection, neurovascular damage, wound complications, pain and stiffness in the knee, periprosthetic loosening, fracture dislocation and DVT, the patient consented for the procedure. DESCRIPTION OF PROCEDURE:             The patient underwent the appropriate anesthesia and was taken to the operating room. She was positioned on the fracture table and both extremities were well padded with a well padded post for traction. Under C-arm guidance the fracture was reduced and verified in both AP and lateral planes. The left hip was then prepped and drapped in a sterile fashion. Prior to the incision the appropriate time-out was performed. Utilizing fluoroscopic guidance the start point was established on AP and lateral views with the fracture reduced. After the entry was created a bulb tipped guide-wire was inserted and placement verified on AP and lateral at the hip and knee. The nail length was measured off the guide-wire and one-step proximal and distal reaming was performed. The nail was the inserted to the appropriate depth with fluoroscopy guidance.  The position was verified on AP and lateral views and then a separate incision was made laterally for lag screw insertion. The cannula inserted and the guide-wire was placed under fluoro to the sub-chondral bone of the femoral head. This was measured and then the appropriate depth drill was used to create an entry hole. The lag screw was inserted and the fracture compressed as needed. Final films were obtained of the hip and knee. The wounds were copiously irrigated. Closure was performed in layer with 2-Vicryl for the subcutaneous tissue, 2-0 Vicryl for the skin and monocril. A sterile dressing was then applied. The patient was taken to recovery in a stable condition. IMPLANTS :   Implant Name Type Inv. Item Serial No.  Lot No. LRB No. Used Action   NAIL KT LNG [id number] 125D LT -- IM KIT STRL GAMMA 3 - SNA  NAIL KT LNG [id number] 125D LT -- IM KIT STRL GAMMA 3 NA [employer] [id number] Left 1 Implanted   SCR BNE LAG GAMMA 3 [id number] -- TI STRL - SNA  SCR BNE LAG GAMMA 3 [id number] -- TI STRL NA [employer] [id number] Left 1 Implanted   SCR BNE LCK T2 FT 5X42. 5MM TI -- STRL - SNA  SCR BNE LCK T2 FT 5X42. 5MM TI -- STRL NA [employer] [id number] Left 1 Implanted       David Archer MD

## 2021-07-19 NOTE — PROGRESS NOTES
Occupational Therapy:  07/19/21    Orders received and appreciated, chart reviewed. Pt admitted for L hip fx sustained from [facility]. Per ortho, pt on Bedrest with plan for OR today for fx repair. Will hold and follow up after procedure.      Thank you,  David Aponte, OTR/L

## 2021-07-19 NOTE — ANESTHESIA PREPROCEDURE EVALUATION
Relevant Problems   No relevant active problems       Anesthetic History   No history of anesthetic complications            Review of Systems / Medical History  Patient summary reviewed, nursing notes reviewed and pertinent labs reviewed    Pulmonary  Within defined limits                 Neuro/Psych       CVA  Dementia     Cardiovascular  Within defined limits  Hypertension        Dysrhythmias            GI/Hepatic/Renal  Within defined limits              Endo/Other  Within defined limits      Arthritis     Other Findings              Physical Exam    Airway  Mallampati: II  TM Distance: 4 - 6 cm  Neck ROM: normal range of motion   Mouth opening: Normal     Cardiovascular    Rhythm: regular  Rate: normal         Dental  No notable dental hx       Pulmonary  Breath sounds clear to auscultation               Abdominal         Other Findings            Anesthetic Plan    ASA: 3  Anesthesia type: spinal            Anesthetic plan and risks discussed with: Patient and Family

## 2021-07-20 LAB
ANION GAP SERPL CALC-SCNC: 5 MMOL/L (ref 5–15)
BUN SERPL-MCNC: 28 MG/DL (ref 6–20)
BUN/CREAT SERPL: 26 (ref 12–20)
CALCIUM SERPL-MCNC: 7.9 MG/DL (ref 8.5–10.1)
CHLORIDE SERPL-SCNC: 111 MMOL/L (ref 97–108)
CO2 SERPL-SCNC: 25 MMOL/L (ref 21–32)
CREAT SERPL-MCNC: 1.06 MG/DL (ref 0.7–1.3)
GLUCOSE SERPL-MCNC: 170 MG/DL (ref 65–100)
HGB BLD-MCNC: 6.5 G/DL (ref 12.1–17)
HISTORY CHECKED?,CKHIST: NORMAL
POTASSIUM SERPL-SCNC: 4.5 MMOL/L (ref 3.5–5.1)
SODIUM SERPL-SCNC: 141 MMOL/L (ref 136–145)

## 2021-07-20 PROCEDURE — P9016 RBC LEUKOCYTES REDUCED: HCPCS

## 2021-07-20 PROCEDURE — 80048 BASIC METABOLIC PNL TOTAL CA: CPT

## 2021-07-20 PROCEDURE — 74011250637 HC RX REV CODE- 250/637: Performed by: INTERNAL MEDICINE

## 2021-07-20 PROCEDURE — 85018 HEMOGLOBIN: CPT

## 2021-07-20 PROCEDURE — 65270000029 HC RM PRIVATE

## 2021-07-20 PROCEDURE — 74011250636 HC RX REV CODE- 250/636: Performed by: ORTHOPAEDIC SURGERY

## 2021-07-20 PROCEDURE — 36415 COLL VENOUS BLD VENIPUNCTURE: CPT

## 2021-07-20 PROCEDURE — 51798 US URINE CAPACITY MEASURE: CPT

## 2021-07-20 PROCEDURE — 74011250637 HC RX REV CODE- 250/637: Performed by: ORTHOPAEDIC SURGERY

## 2021-07-20 PROCEDURE — 74011250637 HC RX REV CODE- 250/637: Performed by: PHYSICIAN ASSISTANT

## 2021-07-20 PROCEDURE — 36430 TRANSFUSION BLD/BLD COMPNT: CPT

## 2021-07-20 PROCEDURE — 2709999900 HC NON-CHARGEABLE SUPPLY

## 2021-07-20 PROCEDURE — 30233N1 TRANSFUSION OF NONAUTOLOGOUS RED BLOOD CELLS INTO PERIPHERAL VEIN, PERCUTANEOUS APPROACH: ICD-10-PCS | Performed by: INTERNAL MEDICINE

## 2021-07-20 RX ORDER — ACETAMINOPHEN 650 MG/1
650 SUPPOSITORY RECTAL EVERY 6 HOURS
Status: DISCONTINUED | OUTPATIENT
Start: 2021-07-20 | End: 2021-07-26 | Stop reason: HOSPADM

## 2021-07-20 RX ORDER — SODIUM CHLORIDE 9 MG/ML
250 INJECTION, SOLUTION INTRAVENOUS AS NEEDED
Status: DISCONTINUED | OUTPATIENT
Start: 2021-07-20 | End: 2021-07-26 | Stop reason: HOSPADM

## 2021-07-20 RX ORDER — TRAMADOL HYDROCHLORIDE 50 MG/1
50 TABLET ORAL
Status: DISCONTINUED | OUTPATIENT
Start: 2021-07-20 | End: 2021-07-26 | Stop reason: HOSPADM

## 2021-07-20 RX ORDER — ACETAMINOPHEN 325 MG/1
650 TABLET ORAL EVERY 6 HOURS
Status: DISCONTINUED | OUTPATIENT
Start: 2021-07-20 | End: 2021-07-26 | Stop reason: HOSPADM

## 2021-07-20 RX ADMIN — MORPHINE SULFATE 1 MG: 4 INJECTION INTRAVENOUS at 20:18

## 2021-07-20 RX ADMIN — MORPHINE SULFATE 1 MG: 4 INJECTION INTRAVENOUS at 03:10

## 2021-07-20 RX ADMIN — ACETAMINOPHEN 650 MG: 650 SUPPOSITORY RECTAL at 03:04

## 2021-07-20 RX ADMIN — DOCUSATE SODIUM 50MG AND SENNOSIDES 8.6MG 1 TABLET: 8.6; 5 TABLET, FILM COATED ORAL at 18:25

## 2021-07-20 RX ADMIN — ACETAMINOPHEN 650 MG: 325 TABLET ORAL at 18:25

## 2021-07-20 RX ADMIN — Medication 1 TABLET: at 18:25

## 2021-07-20 RX ADMIN — Medication 10 ML: at 20:22

## 2021-07-20 RX ADMIN — Medication 10 ML: at 22:00

## 2021-07-20 RX ADMIN — Medication 1 TABLET: at 12:00

## 2021-07-20 RX ADMIN — POLYETHYLENE GLYCOL 3350 17 G: 17 POWDER, FOR SOLUTION ORAL at 10:47

## 2021-07-20 RX ADMIN — Medication 1 TABLET: at 10:47

## 2021-07-20 RX ADMIN — Medication 5 ML: at 08:09

## 2021-07-20 RX ADMIN — SODIUM CHLORIDE 125 ML/HR: 9 INJECTION, SOLUTION INTRAVENOUS at 04:28

## 2021-07-20 RX ADMIN — DOCUSATE SODIUM 50MG AND SENNOSIDES 8.6MG 1 TABLET: 8.6; 5 TABLET, FILM COATED ORAL at 10:47

## 2021-07-20 RX ADMIN — Medication 10 ML: at 15:40

## 2021-07-20 NOTE — PERIOP NOTES
Bedside shift change report given to [facility] (oncoming nurse) by Doris Castellanos (offgoing nurse). Report included the following information SBAR, Kardex, Intake/Output, MAR and Recent Results.

## 2021-07-20 NOTE — PROGRESS NOTES
Daily Progress Note: 7/20/2021  Pina Ruiz MD    Assessment/Plan:   L displaced intertrochanteric femur fx: Highly functional, independent 89M with minimal comorbidities, no dementia. Low risk for surgery Last xarelto 5/17  - Per ortho  -Surg 7/19/21     CAP?: CXR  with LLL consolidation vs pna. WBC is 12.1 with left shift but this could quite easily be 2/2 fracture. He has no sx and no hypoxemia. Received abx x 1 in ER  - Check procal  - Hold on further abx     Afib: Recently had atenolol discontinued due orthostasis.  Presently in NSR  - Can resume Xarelto post-op     Neuropathy:   -On pregabalin 75mg bid, duloxetine 90mg     Hx uric acid stones:   -Allopurinol     Anemia- blood loss  -Discussed with daughter - will transfuse       Problem List:  Problem List as of 7/20/2021 Date Reviewed: 12/20/2018        Codes Class Noted - Resolved    Hip fracture ([facility] ICD-10-CM: S72.009A  ICD-9-CM: 820.8  7/18/2021 - Present        CAP (community acquired pneumonia) ICD-10-CM: J18.9  ICD-9-CM: 486  7/18/2021 - Present        Chronic atrial fibrillation ([facility] ICD-10-CM: I48.20  ICD-9-CM: 427.31  11/10/2017 - Present        Sinus node dysfunction ([facility] ICD-10-CM: I49.5  ICD-9-CM: 427.81  4/12/2016 - Present        Depression ICD-10-CM: F32.9  ICD-9-CM: 496  9/28/2015 - Present        Dyslipidemia ICD-10-CM: E78.5  ICD-9-CM: 272.4  9/23/2014 - Present        Gout ICD-10-CM: M10.9  ICD-9-CM: 274.9  Unknown - Present        Hx of carcinoma in situ of prostate ICD-10-CM: H21.445  ICD-9-CM: V13.89  Unknown - Present        Essential hypertension, benign ICD-10-CM: I10  ICD-9-CM: 401.1  11/16/2013 - Present        Kidney stones ICD-10-CM: N20.0  ICD-9-CM: 592.0  11/16/2013 - Present        Embolic stroke involving right middle cerebral artery ([facility] ICD-10-CM: M80.722  ICD-9-CM: 434.11  11/1/2013 - Present    Overview Signed 12/7/2013  9:23 AM by Debbie Walker MD     presented with dysarthria, MRI Several tiny acute infarcts in the right middle cerebral artery             RESOLVED: Dizziness ICD-10-CM: R42  ICD-9-CM: 780.4  4/12/2016 - 6/21/2016        RESOLVED: Typical atrial flutter ([facility] ICD-10-CM: I48.3  ICD-9-CM: 427.32  5/6/2015 - 9/28/2015        RESOLVED: Paroxysmal atrial fibrillation ([facility] ICD-10-CM: I48.0  ICD-9-CM: 427.31  9/23/2014 - 11/10/2017        RESOLVED: Dysarthria ICD-9-CM: 784.5  11/17/2013 - 12/7/2013        RESOLVED: TIA (transient ischemic attack) ICD-10-CM: G45.9  ICD-9-CM: 435.9  11/16/2013 - 12/7/2013        RESOLVED: Other and unspecified hyperlipidemia ICD-10-CM: E78.5  ICD-9-CM: 272.4  11/16/2013 - 9/23/2014        RESOLVED: Hypokalemia ICD-10-CM: E87.6  ICD-9-CM: 276.8  11/16/2013 - 12/7/2013              HPI:    Jad Chavarria is a 80 y.o.  M hx Afib presents after mechanical fall. He lives in an independent living facility, but has meals prepared for him. He had gone to the bathroom this morning and was returning to be when he felt like his sock slipped and he went down. He did not have lightheadedness, white out, cp, sob before this. No LOC. He called daughter who called 911.      In ER, he is noted to have L displaced intertrochanteric femur fracture as well as mild leukocytosis on some c/f pneumonia, though he denies cough, fever, chills recently.      He has never had any problems with anesthesia in the past. Last took xarelto last night. PCP stopped atenolol recently. (Dr Jeannie Ford)     7/19: Daughter present in room. He has been somewhat confused with the Morphine. He is typically very alert and self sufficient. He knows he is in the hospital. For surgery later today. 7/20: Surgery 7/19. Hb down to 6.5. Spoke with daughter who has consented to transfusion. Will order 1 unit of blood. He is pleasantly confused this am.   Review of Systems:   Review of systems not obtained due to patient factors.     Objective:   Physical Exam:     Visit Vitals  BP (!) 128/56 (BP 1 Location: Right arm, BP Patient Position: At rest)   Pulse (!) 114   Temp 97.5 °F (36.4 °C)   Resp 21   Ht 5' 7\" (1.702 m)   Wt 151 lb 14.4 oz (68.9 kg)   SpO2 96%   BMI 23.79 kg/m²    O2 Flow Rate (L/min): 2 l/min O2 Device: Nasal cannula    Temp (24hrs), Av.7 °F (37.1 °C), Min:97.5 °F (36.4 °C), Max:100 °F (37.8 °C)    No intake/output data recorded.  1901 -  0700  In: 2167.9 [I.V.:2167.9]  Out: 8139 [Urine:665]    General:  Alert, cooperative, no distress, appears stated age. Head:  Normocephalic, without obvious abnormality, atraumatic. Eyes:  Conjunctivae/corneas clear. Nose: Nares normal. Septum midline. Mucosa normal. No drainage or sinus tenderness. Throat: Lips, mucosa, and tongue normal.    Neck: Supple, symmetrical, trachea midline, no adenopathy, thyroid: no enlargement/tenderness/nodules, no carotid bruit and no JVD. Back:   Symmetric, no curvature. ROM normal. No CVA tenderness. Lungs:   Clear to auscultation bilaterally. Chest wall:  No tenderness or deformity. Heart:  Regular rate and rhythm, S1, S2 normal, no murmur, click, rub or gallop. Abdomen:   Soft, non-tender. Bowel sounds normal. No masses,  No organomegaly. Extremities: Left leg with dressing intact, no cyanosis or edema. No calf tenderness or cords. Bruising left hand. Pulses: 2+ and symmetric all extremities. Skin: Skin color, texture, turgor normal. No rashes or lesions   Neurologic: CNII-XII intact. Alert and oriented X 1. Fine motor of hands and fingers normal.   equal.  No cogwheeling or rigidity. Gait not tested at this time. Sensation grossly normal to touch. Gross motor of extremities normal.       Data Review:    CT Head 21   IMPRESSION  No acute intracranial process. Unchanged left sphenoid sinus disease. Xray left hand 21  IMPRESSION  No acute abnormality. X-ray left femur 21   IMPRESSION  Displaced intertrochanteric fracture of the proximal left femur.   Recent Days:  Recent Labs     07/20/21  0318 07/19/21  0054 07/18/21  0814   WBC  --  11.1 12.1*   HGB 6.5* 7.9* 10.5*   HCT  --  24.7* 33.0*   PLT  --  162 190     Recent Labs     07/20/21  0318 07/19/21  0054 07/18/21  0814    141 141   K 4.5 4.5 4.0   * 110* 110*   CO2 25 25 26   * 161* 141*   BUN 28* 25* 20   CREA 1.06 1.10 0.96   CA 7.9* 7.7* 8.1*   ALB  --   --  2.9*   TBILI  --   --  0.6   ALT  --   --  14     No results for input(s): PH, PCO2, PO2, HCO3, FIO2 in the last 72 hours.     24 Hour Results:  Recent Results (from the past 24 hour(s))   RESPIRATORY VIRUS PANEL W/COVID-19, PCR    Collection Time: 07/19/21 12:26 PM    Specimen: Nasopharyngeal   Result Value Ref Range    Adenovirus Not detected NOTD      Coronavirus 229E Not detected NOTD      Coronavirus HKU1 Not detected NOTD      Coronavirus CVNL63 Not detected NOTD      Coronavirus OC43 Not detected NOTD      SARS-CoV-2, PCR Not detected NOTD      Metapneumovirus Not detected NOTD      Rhinovirus and Enterovirus Not detected NOTD      Influenza A Not detected NOTD      Influenza A, subtype H1 Not detected NOTD      Influenza A, subtype H3 Not detected NOTD      INFLUENZA A H1N1 PCR Not detected NOTD      Influenza B Not detected NOTD      Parainfluenza 1 Not detected NOTD      Parainfluenza 2 Not detected NOTD      Parainfluenza 3 Not detected NOTD      Parainfluenza virus 4 Not detected NOTD      RSV by PCR Not detected NOTD      B. parapertussis, PCR Not detected NOTD      Bordetella pertussis - PCR Not detected NOTD      Chlamydophila pneumoniae DNA, QL, PCR Not detected NOTD      Mycoplasma pneumoniae DNA, QL, PCR Not detected NOTD     TYPE & SCREEN    Collection Time: 07/19/21  4:10 PM   Result Value Ref Range    Crossmatch Expiration 07/22/2021,2359     ABO/Rh(D) O POSITIVE     Antibody screen NEG    METABOLIC PANEL, BASIC    Collection Time: 07/20/21  3:18 AM   Result Value Ref Range    Sodium 141 136 - 145 mmol/L    Potassium 4.5 3.5 - 5.1 mmol/L    Chloride 111 (H) 97 - 108 mmol/L    CO2 25 21 - 32 mmol/L    Anion gap 5 5 - 15 mmol/L    Glucose 170 (H) 65 - 100 mg/dL    BUN 28 (H) 6 - 20 MG/DL    Creatinine 1.06 0.70 - 1.30 MG/DL    BUN/Creatinine ratio 26 (H) 12 - 20      GFR est AA >60 >60 ml/min/1.73m2    GFR est non-AA >60 >60 ml/min/1.73m2    Calcium 7.9 (L) 8.5 - 10.1 MG/DL   HEMOGLOBIN    Collection Time: 07/20/21  3:18 AM   Result Value Ref Range    HGB 6.5 (L) 12.1 - 17.0 g/dL       Medications reviewed  Current Facility-Administered Medications   Medication Dose Route Frequency    traMADoL (ULTRAM) tablet 50 mg  50 mg Oral Q6H PRN    0.9% sodium chloride infusion  75 mL/hr IntraVENous CONTINUOUS    sodium chloride (NS) flush 5-40 mL  5-40 mL IntraVENous Q8H    sodium chloride (NS) flush 5-40 mL  5-40 mL IntraVENous PRN    naloxone (NARCAN) injection 0.4 mg  0.4 mg IntraVENous PRN    calcium-vitamin D (OS-VINCENT +D3) 500 mg-200 unit per tablet 1 Tablet  1 Tablet Oral TID WITH MEALS    senna-docusate (PERICOLACE) 8.6-50 mg per tablet 1 Tablet  1 Tablet Oral BID    polyethylene glycol (MIRALAX) packet 17 g  17 g Oral DAILY    [START ON 7/21/2021] bisacodyL (DULCOLAX) suppository 10 mg  10 mg Rectal DAILY PRN    morphine injection 1 mg  1 mg IntraVENous Q4H PRN    sodium chloride (NS) flush 5-40 mL  5-40 mL IntraVENous Q8H    sodium chloride (NS) flush 5-40 mL  5-40 mL IntraVENous PRN    acetaminophen (TYLENOL) tablet 650 mg  650 mg Oral Q6H PRN    Or    acetaminophen (TYLENOL) suppository 650 mg  650 mg Rectal Q6H PRN    polyethylene glycol (MIRALAX) packet 17 g  17 g Oral DAILY PRN    ondansetron (ZOFRAN ODT) tablet 4 mg  4 mg Oral Q8H PRN    Or    ondansetron (ZOFRAN) injection 4 mg  4 mg IntraVENous Q6H PRN       Care Plan discussed with: Patient/Family and Nurse    Total time spent with patient and review of records: 40 minutes.     Rossana Fernandez MD

## 2021-07-20 NOTE — PERIOP NOTES
Called on call physician for family care, Dr Solange Mallory, notified of hemoglobin of 6.5. He said he will have someone address it on rounds. Pt awake and alert and vss are stable.

## 2021-07-20 NOTE — ANESTHESIA POSTPROCEDURE EVALUATION
Procedure(s): FEMORAL INTERTROCHANTERIC NAIL INSERTION LEFT. spinal    Anesthesia Post Evaluation      Multimodal analgesia: multimodal analgesia not used between 6 hours prior to anesthesia start to PACU discharge  Patient location during evaluation: PACU  Patient participation: complete - patient participated  Level of consciousness: sleepy but conscious and responsive to verbal stimuli  Pain score: 0  Pain management: adequate  Airway patency: patent  Anesthetic complications: no  Cardiovascular status: hemodynamically stable and acceptable  Respiratory status: acceptable  Hydration status: acceptable  Comments: Patient seen and evaluated; unsure of baseline orientation. Post anesthesia nausea and vomiting:  none      INITIAL Post-op Vital signs:   Vitals Value Taken Time   /55 07/19/21 2015   Temp 37.8 °C (100 °F) 07/19/21 1900   Pulse 84 07/19/21 2018   Resp 18 07/19/21 2018   SpO2 100 % 07/19/21 2018   Vitals shown include unvalidated device data.

## 2021-07-20 NOTE — PROGRESS NOTES
Physical therapy note:  Chart reviewed in preparation for physical therapy evaluation. Noted Hgb of 6.5. Spoke with ortho PA with plan to transfuse. Will hold PT evaluation this AM and re-attempt as able pending hgb in therapeutic range.   Thank you,  George Starkey, PT, DPT

## 2021-07-20 NOTE — PROGRESS NOTES
7/20/2021   5:12 PM  [facility] pt for rehab when stable, await response from [id number] [facility] which is family's 1st choice. CM updated pt and Dtr LUIS Hurtado       2:41 PM  Pt discussed in IDR rounds is continuing to require medical management for femur fracture, CAP, AFib, neuropathy,anemia. POD #1 FEMORAL INTERTROCHANTERIC NAIL INSERTION LEFT    Transition of Care Plan:                    RUR 15%  LOS 2 Day  1. Medical management continues   2. Ortho following POD #1 FEMORAL INTERTROCHANTERIC NAIL INSERTION LEFT  3. CM to follow through for treatment/response  4. PT, OT evals pending when stable, Hgb 6.5  5. DC when stable, likely  To SNF,daughter [facility], [address] [place] [address], referrals sent in [employer] and CC link  6. PCR pending for placemen, pt is fully vaccinated    7. Outpatient f/u PCP, ortho  8. Pt will require [facility] at [address] Management Interventions  PCP Verified by CM:  Yes Sonu Mcdaniel II, MD; last visit 30 days)  Palliative Care Criteria Met (RRAT>21 & CHF Dx)?: No  Mode of Transport at Discharge: BLS (pt will require stretcher at DC)  Transition of Care Consult (CM Consult): SNF  Partner SNF: Yes  Physical Therapy Consult: Yes  Occupational Therapy Consult: Yes  Speech Therapy Consult: No  Current Support Network: Family Lives [place], Lives Alone, Own Home (pt lives alone in independent senior community, at baseline pt is ambulatory w/ cane, iADLS, family assists w/ meds and transprot)  The Plan for Transition of Care is Related to the Following Treatment Goals : SNF  The Patient and/or Patient Representative was Provided with a Choice of Provider and Agrees with the Discharge Plan?: Yes  Name of the Patient Representative Who was Provided with a Choice of Provider and Agrees with the Discharge Plan: daughter   Freedom of Choice List was Provided with Basic Dialogue that Supports the Patient's Individualized Plan of Care/Goals, Treatment Preferences and Shares the Quality Data Associated with the Providers?: (S) Yes  Discharge Location  Discharge Placement: Skilled nursing facility  [facility]beca Fierro [facility]

## 2021-07-20 NOTE — PROGRESS NOTES
ORTHO PROGRESS NOTE      SUBJECTIVE:  Ralene Bloch denies pain at rest.  D/W daughter via tele Arvella Hole [phone]). At baseline, lived [facility]. She described chronic intermittent episodes of confusion. OBJECTIVE:  Patient Vitals for the past 24 hrs:   Temp Pulse BP   07/20/21 1036 98.3 °F (36.8 °C) 92 (!) 146/73   07/20/21 0802 97.5 °F (36.4 °C) (!) 114 (!) 128/56   07/20/21 0728   96/69   07/20/21 0601 99.3 °F (37.4 °C) (!) 114 99/72   07/20/21 0429  (!) 106 115/69   07/20/21 0248 99 °F (37.2 °C) (!) 115 120/61   07/20/21 0135  90 (!) 108/50   07/20/21 0125  89 (!) 111/56   07/20/21 0025 99 °F (37.2 °C) 86 (!) 114/51   07/19/21 2328  91 (!) 118/49   07/19/21 2228  88 (!) 111/47   07/19/21 2123  90 (!) 100/38   07/19/21 2117  91 (!) 110/55   07/19/21 2113  90 (!) 155/52   07/19/21 2045  82 (!) 121/58   07/19/21 2040  84 (!) 118/58   07/19/21 2035  86 (!) 117/59   07/19/21 2030  87 113/63   07/19/21 2025  89 (!) 119/57   07/19/21 2020  88 110/64   07/19/21 2015  87 (!) 103/55   07/19/21 2010  87 (!) 107/47   07/19/21 2005  85 115/72   07/19/21 2000  85 106/62   07/19/21 1955  88 (!) 117/57   07/19/21 1950  90 116/60   07/19/21 1945  93 99/66   07/19/21 1940  95 (!) 111/45   07/19/21 1935  (!) 101 108/60   07/19/21 1930  (!) 104 (!) 113/53   07/19/21 1925  93 (!) 101/48   07/19/21 1920  (!) 104 114/60   07/19/21 1915  90 119/61   07/19/21 1910  93 (!) 126/59   07/19/21 1905  96 110/64   07/19/21 1900 100 °F (37.8 °C) 81 (!) 122/43   07/19/21 1855  (!) 106 (!) 122/43   07/19/21 1525 98.3 °F (36.8 °C) 86 (!) 108/58   07/19/21 1445 98.3 °F (36.8 °C) 80 (!) 101/59       [place]. Alert, no distress. Lying in bed eating breakfast. Sitter present. Pleasantly confused. Respirations unlabored. Dressing CDI. Thigh edema but soft. Mod pain with gentle PROM hip. SILT bilat foot. Calves non-tender. Moves ankles OK.      Recent Labs     07/20/21  9903 07/19/21  0054 07/19/21  0054   HGB 6.5*   < > 7.9*   HCT  --   --  24.7*   PLT  --   --  162   BUN 28*   < > 25*   CREA 1.06   < > 1.10   GFRAA >60   < > >60   GFRNA >60   < > >60    < > = values in this interval not displayed. ASSESSMENT:  Procedure: Procedure(s): FEMORAL INTERTROCHANTERIC NAIL INSERTION LEFT  Post Op day: 1 Day Post-Op    PLAN:  Dr. Alia Reyes spoke with daughter about PRBC. Post-transfusion labs ordered. PT/OT: WBAT LLE with device  Analgesics: Tylenol scheduled. Avoid narcotics. DVT proph: Chronic Xarelto, OK to resume 7/21 from Ortho perspective if Hgb stable. Disp planning. Will likely need SNF.       HENRI Hargrove  Orthopedic Trauma Service  [facility]

## 2021-07-20 NOTE — PROGRESS NOTES
Occupational Therapy:defer    Patient with 6.5 hgb, spoke with MD, patient to receive blood transfusion, will follow up afterwards as able for OT evaluation. Jose Manuel Pierce MS OTR/L

## 2021-07-20 NOTE — PERIOP NOTES
SBAR report received from Leoncio Arthur, off-going RN. This RN resuming care at this time. Pt was moved to Pre-op room 3.

## 2021-07-20 NOTE — PERIOP NOTES
Report received from [facility], pt care assumed at this time. Pt resting soundly, RR easy and unlabored, remains on monitor, will monitor. 2210 - This RN reached out to Dr Yaya Millard for pain medication orders PRN. Pt currently resting well, no pain medications noted in MAR. Orders received. 0010 - Pt continues to rest, awakens to voice, no pain cues noted, ice remains in place to left hip, remains on monitor and O2, no distress noted. 0230 - Report to [facility], pt care transferred at this time. BL hearing aids remain in pt's ears and charging dock remains in labeled baggie at bedside. Pt turned and repositioned. Fresh ice applied to left hip.

## 2021-07-21 LAB
ANION GAP SERPL CALC-SCNC: 7 MMOL/L (ref 5–15)
BUN SERPL-MCNC: 28 MG/DL (ref 6–20)
BUN/CREAT SERPL: 31 (ref 12–20)
CALCIUM SERPL-MCNC: 7.5 MG/DL (ref 8.5–10.1)
CHLORIDE SERPL-SCNC: 110 MMOL/L (ref 97–108)
CO2 SERPL-SCNC: 24 MMOL/L (ref 21–32)
CREAT SERPL-MCNC: 0.91 MG/DL (ref 0.7–1.3)
GLUCOSE SERPL-MCNC: 152 MG/DL (ref 65–100)
HGB BLD-MCNC: 6.8 G/DL (ref 12.1–17)
HISTORY CHECKED?,CKHIST: NORMAL
POTASSIUM SERPL-SCNC: 4.1 MMOL/L (ref 3.5–5.1)
SODIUM SERPL-SCNC: 141 MMOL/L (ref 136–145)

## 2021-07-21 PROCEDURE — 36430 TRANSFUSION BLD/BLD COMPNT: CPT

## 2021-07-21 PROCEDURE — 85018 HEMOGLOBIN: CPT

## 2021-07-21 PROCEDURE — P9016 RBC LEUKOCYTES REDUCED: HCPCS

## 2021-07-21 PROCEDURE — 74011250637 HC RX REV CODE- 250/637: Performed by: FAMILY MEDICINE

## 2021-07-21 PROCEDURE — 74011250637 HC RX REV CODE- 250/637: Performed by: ORTHOPAEDIC SURGERY

## 2021-07-21 PROCEDURE — 94760 N-INVAS EAR/PLS OXIMETRY 1: CPT

## 2021-07-21 PROCEDURE — 80048 BASIC METABOLIC PNL TOTAL CA: CPT

## 2021-07-21 PROCEDURE — 65270000029 HC RM PRIVATE

## 2021-07-21 PROCEDURE — 74011250637 HC RX REV CODE- 250/637: Performed by: PHYSICIAN ASSISTANT

## 2021-07-21 PROCEDURE — 36415 COLL VENOUS BLD VENIPUNCTURE: CPT

## 2021-07-21 RX ORDER — SODIUM CHLORIDE 9 MG/ML
250 INJECTION, SOLUTION INTRAVENOUS AS NEEDED
Status: DISCONTINUED | OUTPATIENT
Start: 2021-07-21 | End: 2021-07-26 | Stop reason: HOSPADM

## 2021-07-21 RX ORDER — FOLIC ACID 1 MG/1
1 TABLET ORAL DAILY
Status: DISCONTINUED | OUTPATIENT
Start: 2021-07-21 | End: 2021-07-26 | Stop reason: HOSPADM

## 2021-07-21 RX ORDER — ATENOLOL 25 MG/1
25 TABLET ORAL DAILY
Status: DISCONTINUED | OUTPATIENT
Start: 2021-07-21 | End: 2021-07-26 | Stop reason: HOSPADM

## 2021-07-21 RX ORDER — QUETIAPINE FUMARATE 25 MG/1
12.5 TABLET, FILM COATED ORAL
Status: DISCONTINUED | OUTPATIENT
Start: 2021-07-21 | End: 2021-07-23

## 2021-07-21 RX ORDER — SERTRALINE HYDROCHLORIDE 50 MG/1
50 TABLET, FILM COATED ORAL DAILY
Status: DISCONTINUED | OUTPATIENT
Start: 2021-07-21 | End: 2021-07-26 | Stop reason: HOSPADM

## 2021-07-21 RX ADMIN — QUETIAPINE FUMARATE 12.5 MG: 25 TABLET ORAL at 20:50

## 2021-07-21 RX ADMIN — ACETAMINOPHEN 650 MG: 325 TABLET ORAL at 02:36

## 2021-07-21 RX ADMIN — Medication 10 ML: at 15:10

## 2021-07-21 RX ADMIN — POLYETHYLENE GLYCOL 3350 17 G: 17 POWDER, FOR SOLUTION ORAL at 09:45

## 2021-07-21 RX ADMIN — ACETAMINOPHEN 650 MG: 325 TABLET ORAL at 05:33

## 2021-07-21 RX ADMIN — Medication 10 ML: at 15:09

## 2021-07-21 RX ADMIN — Medication 10 ML: at 05:33

## 2021-07-21 RX ADMIN — ACETAMINOPHEN 650 MG: 325 TABLET ORAL at 18:06

## 2021-07-21 RX ADMIN — Medication 10 ML: at 20:57

## 2021-07-21 RX ADMIN — ATENOLOL 25 MG: 25 TABLET ORAL at 09:45

## 2021-07-21 RX ADMIN — DOCUSATE SODIUM 50MG AND SENNOSIDES 8.6MG 1 TABLET: 8.6; 5 TABLET, FILM COATED ORAL at 18:06

## 2021-07-21 RX ADMIN — SERTRALINE 50 MG: 50 TABLET, FILM COATED ORAL at 09:45

## 2021-07-21 RX ADMIN — Medication 1 TABLET: at 16:30

## 2021-07-21 RX ADMIN — Medication 1 TABLET: at 09:45

## 2021-07-21 RX ADMIN — TRAMADOL HYDROCHLORIDE 50 MG: 50 TABLET ORAL at 02:36

## 2021-07-21 RX ADMIN — ACETAMINOPHEN 650 MG: 325 TABLET ORAL at 13:12

## 2021-07-21 RX ADMIN — TRAMADOL HYDROCHLORIDE 50 MG: 50 TABLET ORAL at 09:45

## 2021-07-21 RX ADMIN — Medication 1 TABLET: at 13:12

## 2021-07-21 RX ADMIN — DOCUSATE SODIUM 50MG AND SENNOSIDES 8.6MG 1 TABLET: 8.6; 5 TABLET, FILM COATED ORAL at 09:45

## 2021-07-21 RX ADMIN — FOLIC ACID 1 MG: 1 TABLET ORAL at 09:45

## 2021-07-21 NOTE — PERIOP NOTES
Patient's primary RN, Fady, requested assistance with establishing IV access. Patient was identified using 2 factor identification and I introduced myself to the patient and his daughter who was at bedside. #20 gauge inserted right forearm with one attempt.

## 2021-07-21 NOTE — PROGRESS NOTES
Daily Progress Note: 7/21/2021  Gill Vail MD    Assessment/Plan:   L displaced intertrochanteric femur fx: Highly functional, independent 89M with minimal comorbidities, no dementia. Low risk for surgery Last xarelto 5/17  - Per ortho  -Surg 7/19/21     CAP?: CXR  with LLL consolidation vs pna. WBC is 12.1 with left shift but this could quite easily be 2/2 fracture. He has no sx and no hypoxemia. Received abx x 1 in ER  - Check procal  - Hold on further abx     Afib: Recently had atenolol discontinued due orthostasis.  Presently in NSR  - Can resume Xarelto post-op depending on Hb     Neuropathy:   -Holding pregabalin 75mg bid, duloxetine 90mg     Hx uric acid stones:   -Allopurinol holding     Anemia- blood loss  - transfused 1 unit 7/20  -Keep Hb >7- will transfuse again today    Urinary Retention  -Avendaño    Disp:  -will need SNF       Problem List:  Problem List as of 7/21/2021 Date Reviewed: 12/20/2018        Codes Class Noted - Resolved    Hip fracture ([facility] ICD-10-CM: A40.106J  ICD-9-CM: 820.8  7/18/2021 - Present        CAP (community acquired pneumonia) ICD-10-CM: J18.9  ICD-9-CM: 486  7/18/2021 - Present        Chronic atrial fibrillation ([facility] ICD-10-CM: I48.20  ICD-9-CM: 427.31  11/10/2017 - Present        Sinus node dysfunction ([facility] ICD-10-CM: I49.5  ICD-9-CM: 427.81  4/12/2016 - Present        Depression ICD-10-CM: F32.9  ICD-9-CM: 423  9/28/2015 - Present        Dyslipidemia ICD-10-CM: E78.5  ICD-9-CM: 272.4  9/23/2014 - Present        Gout ICD-10-CM: M10.9  ICD-9-CM: 274.9  Unknown - Present        Hx of carcinoma in situ of prostate ICD-10-CM: K97.259  ICD-9-CM: V13.89  Unknown - Present        Essential hypertension, benign ICD-10-CM: I10  ICD-9-CM: 401.1  11/16/2013 - Present        Kidney stones ICD-10-CM: N20.0  ICD-9-CM: 592.0  11/16/2013 - Present        Embolic stroke involving right middle cerebral artery ([facility] ICD-10-CM: K34.637  ICD-9-CM: 434.11  11/1/2013 - Present    Overview Signed 12/7/2013  9:23 AM by Violet Becker MD     presented with dysarthria, MRI Several tiny acute infarcts in the right middle cerebral artery             RESOLVED: Dizziness ICD-10-CM: R42  ICD-9-CM: 780.4  4/12/2016 - 6/21/2016        RESOLVED: Typical atrial flutter ([facility] ICD-10-CM: I48.3  ICD-9-CM: 427.32  5/6/2015 - 9/28/2015        RESOLVED: Paroxysmal atrial fibrillation ([facility] ICD-10-CM: I48.0  ICD-9-CM: 427.31  9/23/2014 - 11/10/2017        RESOLVED: Dysarthria ICD-9-CM: 784.5  11/17/2013 - 12/7/2013        RESOLVED: TIA (transient ischemic attack) ICD-10-CM: G45.9  ICD-9-CM: 435.9  11/16/2013 - 12/7/2013        RESOLVED: Other and unspecified hyperlipidemia ICD-10-CM: E78.5  ICD-9-CM: 272.4  11/16/2013 - 9/23/2014        RESOLVED: Hypokalemia ICD-10-CM: E87.6  ICD-9-CM: 276.8  11/16/2013 - 12/7/2013              HPI:    Claudy Mario is a 80 y.o.  M hx Afib presents after mechanical fall. He lives in an independent living facility, but has meals prepared for him. He had gone to the bathroom this morning and was returning to be when he felt like his sock slipped and he went down. He did not have lightheadedness, white out, cp, sob before this. No LOC. He called daughter who called 911.      In ER, he is noted to have L displaced intertrochanteric femur fracture as well as mild leukocytosis on some c/f pneumonia, though he denies cough, fever, chills recently.      He has never had any problems with anesthesia in the past. Last took xarelto last night. PCP stopped atenolol recently. (Dr Chanel Del Valle)     7/19: Daughter present in room. He has been somewhat confused with the Morphine. He is typically very alert and self sufficient. He knows he is in the hospital. For surgery later today. 7/20: Surgery 7/19. Hb down to 6.5. Spoke with daughter who has consented to transfusion. Will order 1 unit of blood. He is pleasantly confused this am.     7/21: Hb 6.8- will transfuse another unit.  Holding Xarelto for now. Restart Metoprolol with parameters. Holding Lyrica and Cymbalta as he has been more confused. Sitter present. Will try a low dose Seroquel tonight. Review of Systems:   Review of systems not obtained due to patient factors. Objective:   Physical Exam:     Visit Vitals  /74 (BP 1 Location: Right upper arm, BP Patient Position: At rest)   Pulse (!) 113   Temp 98.3 °F (36.8 °C)   Resp 18   Ht 5' 7\" (1.702 m)   Wt 151 lb 14.4 oz (68.9 kg)   SpO2 97%   BMI 23.79 kg/m²    O2 Flow Rate (L/min): 2 l/min O2 Device: Nasal cannula    Temp (24hrs), Av.4 °F (36.9 °C), Min:97.5 °F (36.4 °C), Max:99.3 °F (37.4 °C)    1901 -  0700  In: 351.4   Out: 500 [Urine:500]   701 -  190  In: 3441.7 [P.O.:480; I.V.:2961.7]  Out: 865 [Urine:315]    General:  Alert, cooperative, no distress, appears stated age. Head:  Normocephalic, without obvious abnormality, atraumatic. Eyes:  Conjunctivae/corneas clear. Nose: Nares normal. Septum midline. Mucosa normal. No drainage or sinus tenderness. Throat: Lips, mucosa, and tongue normal.    Neck: Supple, symmetrical, trachea midline, no adenopathy, thyroid: no enlargement/tenderness/nodules, no carotid bruit and no JVD. Back:   Symmetric, no curvature. ROM normal. No CVA tenderness. Lungs:   Clear to auscultation bilaterally. Chest wall:  No tenderness or deformity. Heart:  Regular rate and rhythm, S1, S2 normal, no murmur, click, rub or gallop. Abdomen:   Soft, non-tender. Bowel sounds normal. No masses,  No organomegaly. Extremities: Left leg with dressing intact, no cyanosis or edema. No calf tenderness or cords. Bruising left hand. Pulses: 2+ and symmetric all extremities. Skin: Skin color, texture, turgor normal. No rashes or lesions   Neurologic: CNII-XII intact. Alert and oriented X 1. Fine motor of hands and fingers normal.   equal.  No cogwheeling or rigidity. Gait not tested at this time.   Sensation grossly normal to touch. Gross motor of extremities normal.       Data Review:    CT Head 7/18/21   IMPRESSION  No acute intracranial process. Unchanged left sphenoid sinus disease. Xray left hand 7/19/21  IMPRESSION  No acute abnormality. X-ray left femur 7/18/21   IMPRESSION  Displaced intertrochanteric fracture of the proximal left femur. Recent Days:  Recent Labs     07/21/21 0205 07/20/21 0318 07/19/21 0054 07/18/21 0814 07/18/21  0814   WBC  --   --  11.1  --  12.1*   HGB 6.8* 6.5* 7.9*   < > 10.5*   HCT  --   --  24.7*  --  33.0*   PLT  --   --  162  --  190    < > = values in this interval not displayed. Recent Labs     07/21/21 0205 07/20/21 0318 07/19/21 0054 07/18/21 0814 07/18/21  0814    141 141   < > 141   K 4.1 4.5 4.5   < > 4.0   * 111* 110*   < > 110*   CO2 24 25 25   < > 26   * 170* 161*   < > 141*   BUN 28* 28* 25*   < > 20   CREA 0.91 1.06 1.10   < > 0.96   CA 7.5* 7.9* 7.7*   < > 8.1*   ALB  --   --   --   --  2.9*   TBILI  --   --   --   --  0.6   ALT  --   --   --   --  14    < > = values in this interval not displayed. No results for input(s): PH, PCO2, PO2, HCO3, FIO2 in the last 72 hours. 24 Hour Results:  Recent Results (from the past 24 hour(s))   RBC, ALLOCATE    Collection Time: 07/20/21  2:00 PM   Result Value Ref Range    HISTORY CHECKED?  Historical check performed    HEMOGLOBIN    Collection Time: 07/21/21  2:05 AM   Result Value Ref Range    HGB 6.8 (L) 12.1 - 93.5 g/dL   METABOLIC PANEL, BASIC    Collection Time: 07/21/21  2:05 AM   Result Value Ref Range    Sodium 141 136 - 145 mmol/L    Potassium 4.1 3.5 - 5.1 mmol/L    Chloride 110 (H) 97 - 108 mmol/L    CO2 24 21 - 32 mmol/L    Anion gap 7 5 - 15 mmol/L    Glucose 152 (H) 65 - 100 mg/dL    BUN 28 (H) 6 - 20 MG/DL    Creatinine 0.91 0.70 - 1.30 MG/DL    BUN/Creatinine ratio 31 (H) 12 - 20      GFR est AA >60 >60 ml/min/1.73m2    GFR est non-AA >60 >60 ml/min/1.73m2    Calcium 7.5 (L) 8.5 - 10.1 MG/DL       Medications reviewed  Current Facility-Administered Medications   Medication Dose Route Frequency    atenoloL (TENORMIN) tablet 25 mg  25 mg Oral DAILY    folic acid (FOLVITE) tablet 1 mg  1 mg Oral DAILY    sertraline (ZOLOFT) tablet 50 mg  50 mg Oral DAILY    traMADoL (ULTRAM) tablet 50 mg  50 mg Oral Q6H PRN    0.9% sodium chloride infusion 250 mL  250 mL IntraVENous PRN    acetaminophen (TYLENOL) tablet 650 mg  650 mg Oral Q6H    Or    acetaminophen (TYLENOL) suppository 650 mg  650 mg Rectal Q6H    0.9% sodium chloride infusion 250 mL  250 mL IntraVENous PRN    sodium chloride (NS) flush 5-40 mL  5-40 mL IntraVENous Q8H    sodium chloride (NS) flush 5-40 mL  5-40 mL IntraVENous PRN    naloxone (NARCAN) injection 0.4 mg  0.4 mg IntraVENous PRN    calcium-vitamin D (OS-VINCENT +D3) 500 mg-200 unit per tablet 1 Tablet  1 Tablet Oral TID WITH MEALS    senna-docusate (PERICOLACE) 8.6-50 mg per tablet 1 Tablet  1 Tablet Oral BID    polyethylene glycol (MIRALAX) packet 17 g  17 g Oral DAILY    bisacodyL (DULCOLAX) suppository 10 mg  10 mg Rectal DAILY PRN    morphine injection 1 mg  1 mg IntraVENous Q4H PRN    sodium chloride (NS) flush 5-40 mL  5-40 mL IntraVENous Q8H    sodium chloride (NS) flush 5-40 mL  5-40 mL IntraVENous PRN    polyethylene glycol (MIRALAX) packet 17 g  17 g Oral DAILY PRN    ondansetron (ZOFRAN ODT) tablet 4 mg  4 mg Oral Q8H PRN    Or    ondansetron (ZOFRAN) injection 4 mg  4 mg IntraVENous Q6H PRN       Care Plan discussed with: Patient/Family and Nurse    Total time spent with patient and review of records: 40 minutes.     Jason Wright MD

## 2021-07-21 NOTE — PROGRESS NOTES
7/21/2021  1:15 PM  Pt discussed in IDR rounds is continuing to require medical management for femur fracture, CAP, AFib, neuropathy,anemia. POD #2 FEMORAL INTERTROCHANTERIC NAIL INSERTION LEFT     Transition of Care Plan:                    RUR 22%  LOS 3 Day  1. Medical management continues   2. Ortho following POD #2 FEMORAL INTERTROCHANTERIC NAIL INSERTION LEFT  3. CM to follow through for treatment/response  4. PT, OT evals pending when stable, Hgb 6.8, plan to transfuse today  5. DC when stable, to  SNF, pt accepted [facility] and [facility], await response from [address] [place] [address] pending PT/OT notes    6 RVP/COVID negative 7/19 pt is fully vaccinated    7. Outpatient f/u PCP, ortho  8.  [facility] at [address] [place] [address], BS

## 2021-07-21 NOTE — PROGRESS NOTES
Occupational Therapy:  07/21/21    Chart reviewed in prep for OT eval. Noted pt POD 2 s/p L hip fx repair with hgb of 6.8, currently receiving blood transfusion. Per ortho, cleared to resume therapy after transfusion due to low hgb. RN reporting likely another 1.5-2 hours for transfusion. Will continue to hold and follow up later this afternoon vs tomorrow AM for OT eval.     1545: Pt still receiving transfusion.  Will follow up tomorrow AM for OT eval.     Thank you,  Nasrin Aguayo, OTR/L

## 2021-07-21 NOTE — PROGRESS NOTES
Orthopaedic Progress Note  Post Op day: 2 Days Post-Op    2021 10:56 AM     Patient: Chico Morgan MRN: [id number]  SSN: [id number]    YOB: 1927  Age: 80 y.o. Sex: male      Admit date:  2021  Date of Surgery:  2021   Procedures:  Procedure(s): FEMORAL INTERTROCHANTERIC NAIL INSERTION LEFT  Admitting Physician:  Zayra Mccann MD   Surgeon:  Madhav Prieto) and Role:     * Farrukh Warren MD - Primary    Consulting Physician(s): Treatment Team: Attending Provider: Carolin Weiss MD; Utilization Review: Олег Grider RN; Consulting Provider: Manish Jacob; Physician: Lizbet Oliva MD; Physician: Carolin Weiss MD; Care Manager: Jimmy Middleton; Utilization Review: Rj Brown; Utilization Review: Violet Avalos Primary Nurse: Jumana Way RN    SUBJECTIVE:     Chico Morgan is a 80 y.o. male is 2 Days Post-Op s/p Procedure(s): FEMORAL INTERTROCHANTERIC NAIL INSERTION LEFT with an appropriate level of post-operative pain. His daughter is in the room. Patient only has one functioning hearing aid this morning. He reports no increased pain. He remains confused. OBJECTIVE:       Physical Exam:  General: Alert, cooperative, no distress. Respiratory: Respirations unlabored  Neurological:  Neurovascular exam within normal limits. Motor: + DF/PF. Musculoskeletal: Calves soft, supple, non-tender upon palpation. Dressing/Wound:  Clean, dry and intact. No significant erythema or swelling.       Vital Signs:        Patient Vitals for the past 8 hrs:   BP Temp Pulse Resp SpO2   21 0559 118/66 97.3 °F (36.3 °C) (!) 113 18 97 %                                          Temp (24hrs), Av.3 °F (36.8 °C), Min:97.3 °F (36.3 °C), Max:99.3 °F (37.4 °C)      Labs:        Recent Labs     21  0205 21  0318 21  0054   HCT  --   --  24.7*   HGB 6.8*   < > 7.9*    < > = values in this interval not displayed. Lab Results   Component Value Date/Time    Sodium 141 07/21/2021 02:05 AM    Potassium 4.1 07/21/2021 02:05 AM    Chloride 110 (H) 07/21/2021 02:05 AM    CO2 24 07/21/2021 02:05 AM    Glucose 152 (H) 07/21/2021 02:05 AM    BUN 28 (H) 07/21/2021 02:05 AM    Creatinine 0.91 07/21/2021 02:05 AM    Calcium 7.5 (L) 07/21/2021 02:05 AM       PT/OT:                Patient mobility                         ASSESSMENT / PLAN:   Active Problems:    Hip fracture ([address] (7/18/2021)      CAP (community acquired pneumonia) (7/18/2021)         A: 1. S/P left hip IM nail POD 2  2. Chronic anemia (POA) now with acute blood loss from fracture/surgery  3. Confusion    P: 1. PT/OT: WBAT. Wait for transfusion this morning to resume- maybe this afternoon, but would like to mobilize today if possible. 2. DVT ppx: Xarelto daily- held due to anemia and need for BT. Would recommend resuming as soon as able in this setting. 3. Analgesics: Tylenol. Avoid benzos and narcotics  4. [place]: This plays a roll in confusion- ensure hearing aids are in room and able to be used for patient to increase alertness. 5. DISPO: SNF.        Signed By:  Gómez Bran, [address] [facility]

## 2021-07-21 NOTE — PROGRESS NOTES
[phone]:  On for for family medicine (MD Hannah Shea) paged regarding MEWS of 3 x3 occurrences   Provided patient's name , room number; brief SBAR and current patient condition   Orders received:  Continue to monitor     0510:  MD Hannah Shea paged to notify of Hgb of 6.8   Orders received:  Transfuse additional 1 unit of PRBCs     0524:  Blood bank called unit stating order to be re-submitted due unable to process order   Order re-submitted after reviewing previous orders placed   Clarified only 1 unit ordered   Blood bank confirmed order     0600:  IV flushed and line no longer patent/leaking excessively     0615:  IV attempts x2 made; no success   ICU contacted   ED contacted   No available staff to assist with difficulty stick   Blood bank notified of no IV access at this time

## 2021-07-21 NOTE — PROGRESS NOTES
Physical therapy note  Chart reviewed in preparation for PT evaluation. Pt with continued low Hgb of 6.8 post 1 unit transfusion yesterday. Spoke with nursing early this afternoon with patient beginning second transfusion shortly thereafter. Spoke with patient at 15:45 and observed blood still transfusing. Will attempt PT evaluation early tomorrow morning.    Thank you,  Lyndsey Mccray, PT, DPT

## 2021-07-22 LAB
ABO + RH BLD: NORMAL
ANION GAP SERPL CALC-SCNC: 4 MMOL/L (ref 5–15)
BASOPHILS # BLD: 0 K/UL (ref 0–0.1)
BASOPHILS NFR BLD: 0 % (ref 0–1)
BLD PROD TYP BPU: NORMAL
BLD PROD TYP BPU: NORMAL
BLOOD GROUP ANTIBODIES SERPL: NORMAL
BPU ID: NORMAL
BPU ID: NORMAL
BUN SERPL-MCNC: 33 MG/DL (ref 6–20)
BUN/CREAT SERPL: 34 (ref 12–20)
CALCIUM SERPL-MCNC: 8 MG/DL (ref 8.5–10.1)
CHLORIDE SERPL-SCNC: 109 MMOL/L (ref 97–108)
CO2 SERPL-SCNC: 26 MMOL/L (ref 21–32)
COVID-19 RAPID TEST, COVR: NOT DETECTED
CREAT SERPL-MCNC: 0.97 MG/DL (ref 0.7–1.3)
CROSSMATCH RESULT,%XM: NORMAL
CROSSMATCH RESULT,%XM: NORMAL
DIFFERENTIAL METHOD BLD: ABNORMAL
EOSINOPHIL # BLD: 0.1 K/UL (ref 0–0.4)
EOSINOPHIL NFR BLD: 1 % (ref 0–7)
ERYTHROCYTE [DISTWIDTH] IN BLOOD BY AUTOMATED COUNT: 17.2 % (ref 11.5–14.5)
GLUCOSE SERPL-MCNC: 134 MG/DL (ref 65–100)
HCT VFR BLD AUTO: 24 % (ref 36.6–50.3)
HGB BLD-MCNC: 7.7 G/DL (ref 12.1–17)
IMM GRANULOCYTES # BLD AUTO: 0.1 K/UL (ref 0–0.04)
IMM GRANULOCYTES NFR BLD AUTO: 1 % (ref 0–0.5)
LYMPHOCYTES # BLD: 0.9 K/UL (ref 0.8–3.5)
LYMPHOCYTES NFR BLD: 9 % (ref 12–49)
MCH RBC QN AUTO: 32.2 PG (ref 26–34)
MCHC RBC AUTO-ENTMCNC: 32.1 G/DL (ref 30–36.5)
MCV RBC AUTO: 100.4 FL (ref 80–99)
MONOCYTES # BLD: 1 K/UL (ref 0–1)
MONOCYTES NFR BLD: 10 % (ref 5–13)
NEUTS SEG # BLD: 7.9 K/UL (ref 1.8–8)
NEUTS SEG NFR BLD: 79 % (ref 32–75)
NRBC # BLD: 0.02 K/UL (ref 0–0.01)
NRBC BLD-RTO: 0.2 PER 100 WBC
PLATELET # BLD AUTO: 154 K/UL (ref 150–400)
PMV BLD AUTO: 11.3 FL (ref 8.9–12.9)
POTASSIUM SERPL-SCNC: 4.2 MMOL/L (ref 3.5–5.1)
RBC # BLD AUTO: 2.39 M/UL (ref 4.1–5.7)
SODIUM SERPL-SCNC: 139 MMOL/L (ref 136–145)
SOURCE, COVRS: NORMAL
SPECIMEN EXP DATE BLD: NORMAL
STATUS OF UNIT,%ST: NORMAL
STATUS OF UNIT,%ST: NORMAL
UNIT DIVISION, %UDIV: 0
UNIT DIVISION, %UDIV: 0
WBC # BLD AUTO: 9.9 K/UL (ref 4.1–11.1)

## 2021-07-22 PROCEDURE — 74011250637 HC RX REV CODE- 250/637: Performed by: PHYSICIAN ASSISTANT

## 2021-07-22 PROCEDURE — 2709999900 HC NON-CHARGEABLE SUPPLY

## 2021-07-22 PROCEDURE — 74011250637 HC RX REV CODE- 250/637: Performed by: ORTHOPAEDIC SURGERY

## 2021-07-22 PROCEDURE — 97162 PT EVAL MOD COMPLEX 30 MIN: CPT

## 2021-07-22 PROCEDURE — 74011250637 HC RX REV CODE- 250/637: Performed by: FAMILY MEDICINE

## 2021-07-22 PROCEDURE — 97530 THERAPEUTIC ACTIVITIES: CPT

## 2021-07-22 PROCEDURE — 74011250636 HC RX REV CODE- 250/636: Performed by: ORTHOPAEDIC SURGERY

## 2021-07-22 PROCEDURE — 85025 COMPLETE CBC W/AUTO DIFF WBC: CPT

## 2021-07-22 PROCEDURE — 97116 GAIT TRAINING THERAPY: CPT

## 2021-07-22 PROCEDURE — 36415 COLL VENOUS BLD VENIPUNCTURE: CPT

## 2021-07-22 PROCEDURE — 65270000029 HC RM PRIVATE

## 2021-07-22 PROCEDURE — 97165 OT EVAL LOW COMPLEX 30 MIN: CPT

## 2021-07-22 PROCEDURE — 87635 SARS-COV-2 COVID-19 AMP PRB: CPT

## 2021-07-22 PROCEDURE — 80048 BASIC METABOLIC PNL TOTAL CA: CPT

## 2021-07-22 RX ORDER — TRAMADOL HYDROCHLORIDE 50 MG/1
50 TABLET ORAL
Qty: 28 TABLET | Refills: 0 | Status: SHIPPED | OUTPATIENT
Start: 2021-07-22 | End: 2021-07-22

## 2021-07-22 RX ADMIN — DOCUSATE SODIUM 50MG AND SENNOSIDES 8.6MG 1 TABLET: 8.6; 5 TABLET, FILM COATED ORAL at 16:36

## 2021-07-22 RX ADMIN — Medication 10 ML: at 21:25

## 2021-07-22 RX ADMIN — Medication 10 ML: at 05:53

## 2021-07-22 RX ADMIN — ATENOLOL 25 MG: 25 TABLET ORAL at 09:39

## 2021-07-22 RX ADMIN — ACETAMINOPHEN 650 MG: 325 TABLET ORAL at 00:00

## 2021-07-22 RX ADMIN — POLYETHYLENE GLYCOL 3350 17 G: 17 POWDER, FOR SOLUTION ORAL at 09:39

## 2021-07-22 RX ADMIN — TRAMADOL HYDROCHLORIDE 50 MG: 50 TABLET ORAL at 18:10

## 2021-07-22 RX ADMIN — ACETAMINOPHEN 650 MG: 325 TABLET ORAL at 11:49

## 2021-07-22 RX ADMIN — TRAMADOL HYDROCHLORIDE 50 MG: 50 TABLET ORAL at 05:53

## 2021-07-22 RX ADMIN — ACETAMINOPHEN 650 MG: 325 TABLET ORAL at 05:53

## 2021-07-22 RX ADMIN — Medication 1 TABLET: at 09:39

## 2021-07-22 RX ADMIN — QUETIAPINE FUMARATE 12.5 MG: 25 TABLET ORAL at 21:22

## 2021-07-22 RX ADMIN — FOLIC ACID 1 MG: 1 TABLET ORAL at 09:39

## 2021-07-22 RX ADMIN — DOCUSATE SODIUM 50MG AND SENNOSIDES 8.6MG 1 TABLET: 8.6; 5 TABLET, FILM COATED ORAL at 09:39

## 2021-07-22 RX ADMIN — MORPHINE SULFATE 1 MG: 4 INJECTION INTRAVENOUS at 23:02

## 2021-07-22 RX ADMIN — RIVAROXABAN 15 MG: 15 TABLET, FILM COATED ORAL at 16:36

## 2021-07-22 RX ADMIN — SERTRALINE 50 MG: 50 TABLET, FILM COATED ORAL at 09:39

## 2021-07-22 RX ADMIN — ACETAMINOPHEN 650 MG: 325 TABLET ORAL at 16:36

## 2021-07-22 RX ADMIN — Medication 1 TABLET: at 11:49

## 2021-07-22 RX ADMIN — Medication 1 TABLET: at 16:36

## 2021-07-22 NOTE — PROGRESS NOTES
Physician Progress Note      PATIENT:               Janine Rosales  CSN #:                  [id number]  :                       1927  ADMIT DATE:       2021 7:36 AM  DISCH DATE:  RESPONDING  PROVIDER #:        Luke Acevedo MD          QUERY TEXTYale Carmen Afternoon    This patient admitted for Left intertrochanteric femur fracture and is s/p Femoral intertrochanteric nail insertion. The medical records that the patient is having increased confusion since admission. If possible, please document in the progress notes and discharge summary if you are evaluating and / or treating any of the following: The medical record reflects the following:  Risk Factors: Age, 80, post op hip fracture repair, requiring pain meds,  Clinical Indicators: No history of Dementia, The medical records that the patient is having increased confusion since admission. No prior history of Dementia.--Progress notes, \"Confused with the morphine, he is typically very alert and self sufficient .  ---Continues with confusion. Sitter present. Holding lyrica and Cymbalta as he has been more confused. Will try low dose Seroquel tonight., hgb is dropping and pt is requiring blood transfusion  Treatment: Sitter, Holding lyrica and cymbalata as he has been more confused, WIll start dose of Seroquel tonight., plan for transfusion    Thank you,  Shelly Robles, [address] [facility], [address] [place] [address], [facility]  [phone]  Options provided:  -- Drug-induced encephalopathy due to *** (name of drug/drugs)  -- Drug-induced encephalopathy due to, Please specify substance.   -- Metabolic encephalopathy  -- Toxic encephalopathy  -- Toxic metabolic encephalopathy  -- Other - I will add my own diagnosis  -- Disagree - Not applicable / Not valid  -- Disagree - Clinically unable to determine / Unknown  -- Refer to Clinical Documentation Reviewer    PROVIDER RESPONSE TEXT:    Confusion most likely from pain meds and anesthesia    Query created by: Josefina Shabazz on 7/21/2021 1:47 PM      QUERY TEXT:    Zulma Cari Afternoon    This patient admitted for Left hip fracture. The H&P and progress notes,  ? ? ?CAP???, LLL consolidation vs. Pneumonia. If possible, in your medical opinion, can you please clarify regarding the diagnosis of Pneumonia and please document in the progress notes and discharge summary    If possible, please document in the progress notes and discharge summary if  after study, Pneumonia was: The medical record reflects the following:  Risk Factors: 80 yr old male, Fall at home, A-fib, left hip fracture  Clinical Indicators: Progress notes since admission, notes WBC 12.1 but this could be easily due to the fracture. WBC since admission @ 11. 0. Progress notes on 07/21---\"No symptoms and no hypoxemia Procalcitonin is 0.17. . the patient is not receiving any antibiotics. No resp. symptoms, and is maintaining o2 sats on RA 95-97%. Treatment: On Admission CXR x1, no current abx after x1 dose in the [place], Vitals, including o2 checks    Thank you,  LUIS Nyen,RN, [address] [place] [address], [address] [place] [address], [facility]  [phone],  Options provided:  -- Pneumonia confirmed after study POA  -- Pneumonia has now been  ruled out after study  -- Other - I will add my own diagnosis  -- Disagree - Not applicable / Not valid  -- Disagree - Clinically unable to determine / Unknown  -- Refer to Clinical Documentation Reviewer    PROVIDER RESPONSE TEXT:    Pneumonia has now been ruled out after study.     Query created by: Josefina Shabazz on 7/21/2021 1:58 PM      Electronically signed by:  Elsa Kim MD 7/22/2021 5:51 AM

## 2021-07-22 NOTE — PROGRESS NOTES
7/22/2021  3:04 PM  Update via chart review, pt is continuing to require medical management for femur fracture, CAP, AFib, neuropathy,anemia.  POD #3 FEMORAL INTERTROCHANTERIC NAIL INSERTION LEFT     Transition of Care Plan:                    RUR 20%  LOS 4 Day  1. Medical management continues   2. Ortho following POD #3 FEMORAL INTERTROCHANTERIC NAIL INSERTION LEFT  3. CM to follow through for treatment/response  4. PT, OT evals completed, recommending SNF at DC 5. DC when stable, to  SNF, pt accepted [facility] and [facility], await response from [address] [place] [address] pending, updates sent in [employer]    6 PCR for placement today 7/22 ,RVP/COVID negative 7/19 pt is fully vaccinated,  7. CM updated family contacts to include son Nadja Ewing [phone] and Dtr Shakeel Fitting [phone], per pt's Dtr Deven Rios  8. Outpatient f/u PCP, ortho  9.  [facility] [address], [place]

## 2021-07-22 NOTE — PROGRESS NOTES
Problem: Self Care Deficits Care Plan (Adult)  Goal: *Acute Goals and Plan of Care (Insert Text)  Description:   FUNCTIONAL STATUS PRIOR TO ADMISSION: Patient was independent and active without use of DME. Patient was independent for basic and instrumental ADLs. HOME SUPPORT: The patient lived alone at [facility] with family locally to provide assistance. Occupational Therapy Goals  Initiated 7/22/2021  1. Patient will perform grooming, seated EOB or in chair, with supervision/set-up within 7 day(s). 2.  Patient will perform upper body dressing and bathing, seated, with minimal assistance/contact guard assist within 7 day(s). 3.  Patient will perform lower body dressing with moderate assistance  within 7 day(s). 4.  Patient will perform toilet transfers to [facility] with minimal assistance/contact guard assist within 7 day(s). 5.  Patient will perform all aspects of toileting with moderate assistance  within 7 day(s). 6.  Patient will participate in upper extremity therapeutic exercise/activities with supervision/set-up for 5 minutes within 7 day(s). 7.  Patient will utilize energy conservation techniques during functional activities with verbal cues within 7 day(s). Outcome: Progressing Towards Goal     OCCUPATIONAL THERAPY EVALUATION  Patient: Fina Etienne (36 y.o. male)  Date: 7/22/2021  Primary Diagnosis: Hip fracture ([address] [S72.009A]  CAP (community acquired pneumonia) [J18.9]  Procedure(s) (LRB):  FEMORAL INTERTROCHANTERIC NAIL INSERTION LEFT (Left) 3 Days Post-Op   Precautions:  Fall, WBAT    ASSESSMENT  Based on the objective data described below, the patient presents with generally decreased strength, decreased activity tolerance and endurance, impaired balance, increased pain, and decreased independence with ADLs and mobility following admission for L hip fx sustained from [facility].  Pt is now POD 3 s/p hip fx repair, requiring multiple units of blood post procedure due to low hgb. Pt today received in bed, A&O x 2, and following simple commands. He requires increased time for processing and responding to questions and is intermittently confused and tangential throughout session. He requires A x 2 to mobilize to EOB and demonstrates fair to good sitting balance for ADL participation. He requires up to max/total A for LB ADLs and is able to progress to standing with side steps with mod A x 2. Pt with increased SOB/WOB with standing activity, but SpO2 >90% on RA throughout. For safety, pt returned to supine at end of session. At this time, pt is functioning well below his baseline and is not safe to return home alone. Recommend SNF rehab at discharge. 07/22/21 0957 07/22/21 0959 07/22/21 1009 07/22/21 1016   BP: 125/77 117/68 (!) 104/55 124/70   BP Patient Position: At rest Sitting EOB Sitting/Standing Supine   Pulse: 90  (!) 131 (!) 114      Current Level of Function Impacting Discharge (ADLs/self-care): up to max/total A for LB ADLs; max/total A for toileting; set up to mod A for UB ADLs; A x 2 for ADL transfers and OOB mobility    Functional Outcome Measure: The patient scored 20/100 on the Barthel outcome measure which is indicative of severe functional impairment. Other factors to consider for discharge: high fall risk; confusion/disorientation; lives alone; advanced age; high PLOF     Patient will benefit from skilled therapy intervention to address the above noted impairments. PLAN :  Recommendations and Planned Interventions: self care training, functional mobility training, therapeutic exercise, balance training, therapeutic activities, endurance activities, patient education, home safety training and family training/education    Frequency/Duration: Patient will be followed by occupational therapy 5 times a week to address goals.     Recommendation for discharge: (in order for the patient to meet his/her long term goals)  Therapy up to 5 days/week in SNF setting    This discharge recommendation:  Has been made in collaboration with the attending provider and/or case management    IF patient discharges home will need the following DME: TBD       SUBJECTIVE:   Patient stated I was born in [place].     OBJECTIVE DATA SUMMARY:   HISTORY:   Past Medical History:   Diagnosis Date    Anxiety     Arthritis     right shoulder    Atrial fibrillation, chronic ([facility] 09/23/2014    Colon cancer ([facility] 2002    colon resection    Depression 9/28/2015    DJD (degenerative joint disease) of knee     left TKR    Dyslipidemia     Embolic stroke involving right middle cerebral artery [facility]) Nov 2013    presented with dysarthria, MRI Several tiny acute infarcts in the right middle cerebral artery    Falls     Gout     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    Hypertension     Memory loss     Peripheral neuropathy     Vertigo      Past Surgical History:   Procedure Laterality Date    HX CATARACT REMOVAL      HX GI      colon resection    HX ORTHOPAEDIC      left knee, left shoulder    HI ABDOMEN SURGERY PROC UNLISTED      colorectal    HI CARDIAC SURG PROCEDURE UNLIST      cardio version       Expanded or extensive additional review of patient history:     Home Situation  Home Environment: Independent living  One/Two Story Residence: One story  Living Alone: Yes  Support Systems: Assisted living (assist for meals per chart)  Patient Expects to be Discharged to[de-identified] Independent living facility  Current DME Used/Available at Home: Walker, rolling    Hand dominance: Right    EXAMINATION OF PERFORMANCE DEFICITS:  Cognitive/Behavioral Status:  Neurologic State: Confused; Alert  Orientation Level: Disoriented to situation;Oriented to place;Oriented to person  Cognition: Decreased attention/concentration; Appropriate for age attention/concentration;Decreased command following  Perception: Appears intact  Perseveration: No perseveration noted  Safety/Judgement: Awareness of environment; Fall prevention;Good awareness of safety precautions; Home safety; Insight into deficits    Hearing: Auditory  Auditory Impairment: Hard of hearing, bilateral  Hearing Aids/Status: Bilateral, With patient    Range of Motion:  AROM: Generally decreased, functional    Strength:  Strength: Generally decreased, functional    Coordination:  Coordination: Generally decreased, functional  Fine Motor Skills-Upper: Left Impaired;Right Impaired    Gross Motor Skills-Upper: Left Intact; Right Intact    Tone & Sensation:  Tone: Normal  Sensation: Intact    Balance:  Sitting: Intact; With support  Sitting - Static: Fair (occasional)  Sitting - Dynamic: Fair (occasional)  Standing: Impaired; With support  Standing - Static: Fair  Standing - Dynamic : Not tested    Functional Mobility and Transfers for ADLs:  Bed Mobility:  Supine to Sit: Minimum assistance;Assist x2; Additional time  Sit to Supine: Maximum assistance;Assist x2  Scooting: Minimum assistance    Transfers:  Sit to Stand: Minimum assistance;Assist x2; Additional time  Stand to Sit: Minimum assistance;Assist x2  Toilet Transfer : Moderate assistance;Maximum assistance;Assist x2 (infer based on observations)    ADL Assessment:  Patient recalled and demonstrated avoiding extreme planes of movement with Left LE during ADLs and functional mobility with verbal cues. Feeding: Minimum assistance; Moderate assistance    Oral Facial Hygiene/Grooming: Minimum assistance; Moderate assistance    Bathing: Maximum assistance    Upper Body Dressing: Minimum assistance;Maximum assistance    Lower Body Dressing: Maximum assistance; Total assistance    Toileting: Maximum assistance; Total assistance    ADL Intervention and task modifications:    Lower Body Dressing Assistance  Socks:  Total assistance (dependent)  Leg Crossed Method Used: No  Position Performed: Supine  Cues: Don;Physical assistance    Cognitive Retraining  Safety/Judgement: Awareness of environment; Fall prevention;Good awareness of safety precautions; Home safety; Insight into deficits    Functional Measure:  Barthel Index:    Bathin  Bladder: 0 (mojica)  Bowels: 5  Groomin  Dressin  Feedin  Mobility: 0  Stairs: 0  Toilet Use: 5  Transfer (Bed to Chair and Back): 5  Total: 20/100        The Barthel ADL Index: Guidelines  1. The index should be used as a record of what a patient does, not as a record of what a patient could do. 2. The main aim is to establish degree of independence from any help, physical or verbal, however minor and for whatever reason. 3. The need for supervision renders the patient not independent. 4. A patient's performance should be established using the best available evidence. Asking the patient, friends/relatives and nurses are the usual sources, but direct observation and common sense are also important. However direct testing is not needed. 5. Usually the patient's performance over the preceding 24-48 hours is important, but occasionally longer periods will be relevant. 6. Middle categories imply that the patient supplies over 50 per cent of the effort. 7. Use of aids to be independent is allowed. Sb Henry., Barthel, DOscarW. (3941). Functional evaluation: the Barthel Index. [address] [facility] (14)2. JOSHUA Burgos, Lyndsey Boogie., Siddhartha Omer., [place], [address] [place] [address] (). Measuring the change indisability after inpatient rehabilitation; comparison of the responsiveness of the Barthel Index and Functional [place] Measure. Journal of Neurology, Neurosurgery, and Psychiatry, 66(4), 939-327. APRIL Ruiz.NAEL, BENNETT Diaz, & Sesar Hawley MJACKSON. (2004.) Assessment of post-stroke quality of life in cost-effectiveness studies: The usefulness of the Barthel Index and the EuroQoL-5D.  Quality of Life Research, 15, 907-94     Occupational Therapy Evaluation Charge Determination   History Examination Decision-Making   LOW Complexity : Brief history review  HIGH Complexity : 5 or more performance deficits relating to physical, cognitive , or psychosocial skils that result in activity limitations and / or participation restrictions MEDIUM Complexity : Patient may present with comorbidities that affect occupational performnce. Miniml to moderate modification of tasks or assistance (eg, physical or verbal ) with assesment(s) is necessary to enable patient to complete evaluation       Based on the above components, the patient evaluation is determined to be of the following complexity level: LOW   Pain Rating:  Pt reporting increased pain with activity    Activity Tolerance:   Fair, Poor, SpO2 stable on RA, requires rest breaks and observed SOB with activity    After treatment patient left in no apparent distress:    Supine in bed, Call bell within reach, Bed / chair alarm activated and Side rails x 3    COMMUNICATION/EDUCATION:   The patients plan of care was discussed with: Physical therapist, Registered nurse, Case management and ortho PA. Patient/family have participated as able in goal setting and plan of care. This patients plan of care is appropriate for delegation to [facility].     Thank you for this referral.  Helen Samson OT  Time Calculation: 34 mins

## 2021-07-22 NOTE — PROGRESS NOTES
ORTHO PROGRESS NOTE      SUBJECTIVE:  Alpesh Tipton denies pain. OBJECTIVE:  Patient Vitals for the past 24 hrs:   Temp Pulse BP   07/22/21 1132 (!) 96.6 °F (35.9 °C) (!) 110 110/63   07/22/21 1016  (!) 114 124/70   07/22/21 1009  (!) 131 (!) 104/55   07/22/21 0959   117/68   07/22/21 0957  90 125/77   07/22/21 0805 97.6 °F (36.4 °C) (!) 111 (!) 146/78   07/22/21 0344 98.6 °F (37 °C) (!) 113 120/78   07/21/21 2337 97.6 °F (36.4 °C) (!) 110 104/66   07/21/21 1930 98.8 °F (37.1 °C) 64 116/73   07/21/21 1807 98.2 °F (36.8 °C) 78 105/65   07/21/21 1730 98.1 °F (36.7 °C) 65 101/65   07/21/21 1630 98 °F (36.7 °C) 70 99/70   07/21/21 1512 97.5 °F (36.4 °C) 65 99/61   07/21/21 1430 98 °F (36.7 °C) 70 (!) 97/56   07/21/21 1403 97.8 °F (36.6 °C) 60 (!) 92/55   07/21/21 1348 97.5 °F (36.4 °C) 66 (!) 94/56   07/21/21 1333 97.5 °F (36.4 °C) 67 (!) 100/54       Alert, no distress. Lying in bed. Pleasantly confused. Oriented only to person. Family present (daughter). Respirations unlabored. Dressing CDI. Thigh edema but soft. No pain with gentle PROM hip. SILT bilat foot. Calves non-tender. Moves ankles OK. Recent Labs     07/22/21  0116   HGB 7.7*   HCT 24.0*      BUN 33*   CREA 0.97   GFRAA >60   GFRNA >60       PT/OT:   Gait:  Gait  Base of Support: Narrowed, Shift to right  Speed/Chantel: Pace decreased (<100 feet/min)  Step Length: Right shortened, Left shortened  Swing Pattern:  (no swing)  Gait Abnormalities: Decreased step clearance  Ambulation - Level of Assistance: Moderate assistance, Assist x2  Distance (ft): 2 Feet (ft)                 ASSESSMENT:  Procedure: Procedure(s): FEMORAL INTERTROCHANTERIC NAIL INSERTION LEFT  Post Op day: 3 Days Post-Op  Anemia improved with PRBC. PLAN:  PT/OT: WBAT LLE with walker  Analgesics: Tylenol. Avoid narcotics. DVT proph: Resuming chronic Xarelto today  Disp planning. Anticipate SNF. F/U: Dr. Nithin Mathew 2-3 weeks.  [address] office    Kathleen Valle HENRI Block  Orthopedic Trauma Service  [facility]

## 2021-07-22 NOTE — PROGRESS NOTES
Daily Progress Note: 7/22/2021  Nia Levi MD    Assessment/Plan:   L displaced intertrochanteric femur fx: Highly functional, independent 89M with minimal comorbidities, no dementia. Low risk for surgery Last xarelto 5/17  - Per ortho  -Surg 7/19/21     CAP?: CXR  with LLL consolidation vs pna. WBC is 12.1 with left shift but this could quite easily be 2/2 fracture. He has no sx and no hypoxemia. Received abx x 1 in ER  - Check procal  - Hold on further abx     Afib: Recently had atenolol discontinued due orthostasis.  Presently in NSR  - Can resume Xarelto post-op depending on Hb     Neuropathy:   -Holding pregabalin 75mg bid, duloxetine 90mg     Hx uric acid stones:   -Allopurinol holding     Anemia- blood loss  - transfused 1 unit 7/20  -Keep Hb >7- will transfuse again today    Urinary Retention  -Avendaño    Disp:  -will need SNF       Problem List:  Problem List as of 7/22/2021 Date Reviewed: 12/20/2018        Codes Class Noted - Resolved    Hip fracture ([facility] ICD-10-CM: C53.703E  ICD-9-CM: 820.8  7/18/2021 - Present        CAP (community acquired pneumonia) ICD-10-CM: J18.9  ICD-9-CM: 486  7/18/2021 - Present        Chronic atrial fibrillation ([facility] ICD-10-CM: I48.20  ICD-9-CM: 427.31  11/10/2017 - Present        Sinus node dysfunction ([facility] ICD-10-CM: I49.5  ICD-9-CM: 427.81  4/12/2016 - Present        Depression ICD-10-CM: F32.9  ICD-9-CM: 724  9/28/2015 - Present        Dyslipidemia ICD-10-CM: E78.5  ICD-9-CM: 272.4  9/23/2014 - Present        Gout ICD-10-CM: M10.9  ICD-9-CM: 274.9  Unknown - Present        Hx of carcinoma in situ of prostate ICD-10-CM: I75.973  ICD-9-CM: V13.89  Unknown - Present        Essential hypertension, benign ICD-10-CM: I10  ICD-9-CM: 401.1  11/16/2013 - Present        Kidney stones ICD-10-CM: N20.0  ICD-9-CM: 592.0  11/16/2013 - Present        Embolic stroke involving right middle cerebral artery ([facility] ICD-10-CM: O57.826  ICD-9-CM: 434.11  11/1/2013 - Present    Overview Signed 12/7/2013  9:23 AM by Wally Kumar MD     presented with dysarthria, MRI Several tiny acute infarcts in the right middle cerebral artery             RESOLVED: Dizziness ICD-10-CM: R42  ICD-9-CM: 780.4  4/12/2016 - 6/21/2016        RESOLVED: Typical atrial flutter ([facility] ICD-10-CM: I48.3  ICD-9-CM: 427.32  5/6/2015 - 9/28/2015        RESOLVED: Paroxysmal atrial fibrillation ([facility] [facility] ICD-10-CM: I48.0  ICD-9-CM: 427.31  9/23/2014 - 11/10/2017        RESOLVED: Dysarthria ICD-9-CM: 784.5  11/17/2013 - 12/7/2013        RESOLVED: TIA (transient ischemic attack) ICD-10-CM: G45.9  ICD-9-CM: 435.9  11/16/2013 - 12/7/2013        RESOLVED: Other and unspecified hyperlipidemia ICD-10-CM: E78.5  ICD-9-CM: 272.4  11/16/2013 - 9/23/2014        RESOLVED: Hypokalemia ICD-10-CM: E87.6  ICD-9-CM: 276.8  11/16/2013 - 12/7/2013              HPI:    Shelley Villdea is a 80 y.o.  M hx Afib presents after mechanical fall. He lives in an independent living facility, but has meals prepared for him. He had gone to the bathroom this morning and was returning to be when he felt like his sock slipped and he went down. He did not have lightheadedness, white out, cp, sob before this. No LOC. He called daughter who called 911.      In ER, he is noted to have L displaced intertrochanteric femur fracture as well as mild leukocytosis on some c/f pneumonia, though he denies cough, fever, chills recently.      He has never had any problems with anesthesia in the past. Last took xarelto last night. PCP stopped atenolol recently. (Dr Stefania Chairez)     7/19: Daughter present in room. He has been somewhat confused with the Morphine. He is typically very alert and self sufficient. He knows he is in the hospital. For surgery later today. 7/20: Surgery 7/19. Hb down to 6.5. Spoke with daughter who has consented to transfusion. Will order 1 unit of blood. He is pleasantly confused this am.     7/21: Hb 6.8- will transfuse another unit.  Holding Xarelto for now. Restart Metoprolol with parameters. Holding Lyrica and Cymbalta as he has been more confused. Sitter present. Will try a low dose Seroquel tonight.     : Hb up. Will resume Xarelto. Slept better with Seroquel. A little less confused this am. Knows he is in the hospital.   Review of Systems:   Review of systems not obtained due to patient factors. Objective:   Physical Exam:     Visit Vitals  /78 (BP 1 Location: Right upper arm, BP Patient Position: At rest)   Pulse (!) 113   Temp 98.6 °F (37 °C)   Resp 18   Ht 5' 7\" (1.702 m)   Wt 151 lb 14.4 oz (68.9 kg)   SpO2 93%   BMI 23.79 kg/m²    O2 Flow Rate (L/min): 2 l/min O2 Device: None (Room air)    Temp (24hrs), Av.9 °F (36.6 °C), Min:97.3 °F (36.3 °C), Max:98.8 °F (37.1 °C)    1901 -  0700  In: -   Out: 350 [Urine:350]   701 - 1900  In: 1935.2 [P.O.:480; I.V.:793.8]  Out: 875 [Urine:875]    General:  Alert, cooperative, no distress, appears stated age. Head:  Normocephalic, without obvious abnormality, atraumatic. Eyes:  Conjunctivae/corneas clear. Nose: Nares normal. Septum midline. Mucosa normal. No drainage or sinus tenderness. Throat: Lips, mucosa, and tongue normal.    Neck: Supple, symmetrical, trachea midline, no adenopathy, thyroid: no enlargement/tenderness/nodules, no carotid bruit and no JVD. Back:   Symmetric, no curvature. ROM normal. No CVA tenderness. Lungs:   Clear to auscultation bilaterally. Chest wall:  No tenderness or deformity. Heart:  Regular rate and rhythm, S1, S2 normal, no murmur, click, rub or gallop. Abdomen:   Soft, non-tender. Bowel sounds normal. No masses,  No organomegaly. Extremities: Left leg with dressing intact, no cyanosis or edema. No calf tenderness or cords. Bruising left hand. Pulses: 2+ and symmetric all extremities. Skin: Skin color, texture, turgor normal. No rashes or lesions   Neurologic: CNII-XII intact. Alert and oriented X 2.   Fine motor of hands and fingers normal.   equal.  No cogwheeling or rigidity. Gait not tested at this time. Sensation grossly normal to touch. Gross motor of extremities normal.       Data Review:    CT Head 7/18/21   IMPRESSION  No acute intracranial process. Unchanged left sphenoid sinus disease. Xray left hand 7/19/21  IMPRESSION  No acute abnormality. X-ray left femur 7/18/21   IMPRESSION  Displaced intertrochanteric fracture of the proximal left femur. Recent Days:  Recent Labs     07/22/21  0116 07/21/21  0205 07/20/21 0318   WBC 9.9  --   --    HGB 7.7* 6.8* 6.5*   HCT 24.0*  --   --      --   --      Recent Labs     07/22/21  0116 07/21/21 0205 07/20/21 0318    141 141   K 4.2 4.1 4.5   * 110* 111*   CO2 26 24 25   * 152* 170*   BUN 33* 28* 28*   CREA 0.97 0.91 1.06   CA 8.0* 7.5* 7.9*     No results for input(s): PH, PCO2, PO2, HCO3, FIO2 in the last 72 hours.     24 Hour Results:  Recent Results (from the past 24 hour(s))   METABOLIC PANEL, BASIC    Collection Time: 07/22/21  1:16 AM   Result Value Ref Range    Sodium 139 136 - 145 mmol/L    Potassium 4.2 3.5 - 5.1 mmol/L    Chloride 109 (H) 97 - 108 mmol/L    CO2 26 21 - 32 mmol/L    Anion gap 4 (L) 5 - 15 mmol/L    Glucose 134 (H) 65 - 100 mg/dL    BUN 33 (H) 6 - 20 MG/DL    Creatinine 0.97 0.70 - 1.30 MG/DL    BUN/Creatinine ratio 34 (H) 12 - 20      GFR est AA >60 >60 ml/min/1.73m2    GFR est non-AA >60 >60 ml/min/1.73m2    Calcium 8.0 (L) 8.5 - 10.1 MG/DL   CBC WITH AUTOMATED DIFF    Collection Time: 07/22/21  1:16 AM   Result Value Ref Range    WBC 9.9 4.1 - 11.1 K/uL    RBC 2.39 (L) 4.10 - 5.70 M/uL    HGB 7.7 (L) 12.1 - 17.0 g/dL    HCT 24.0 (L) 36.6 - 50.3 %    .4 (H) 80.0 - 99.0 FL    MCH 32.2 26.0 - 34.0 PG    MCHC 32.1 30.0 - 36.5 g/dL    RDW 17.2 (H) 11.5 - 14.5 %    PLATELET 539 412 - 364 K/uL    MPV 11.3 8.9 - 12.9 FL    NRBC 0.2 (H) 0  WBC    ABSOLUTE NRBC 0.02 (H) 0.00 - 0.01 K/uL NEUTROPHILS 79 (H) 32 - 75 %    LYMPHOCYTES 9 (L) 12 - 49 %    MONOCYTES 10 5 - 13 %    EOSINOPHILS 1 0 - 7 %    BASOPHILS 0 0 - 1 %    IMMATURE GRANULOCYTES 1 (H) 0.0 - 0.5 %    ABS. NEUTROPHILS 7.9 1.8 - 8.0 K/UL    ABS. LYMPHOCYTES 0.9 0.8 - 3.5 K/UL    ABS. MONOCYTES 1.0 0.0 - 1.0 K/UL    ABS. EOSINOPHILS 0.1 0.0 - 0.4 K/UL    ABS. BASOPHILS 0.0 0.0 - 0.1 K/UL    ABS. IMM.  GRANS. 0.1 (H) 0.00 - 0.04 K/UL    DF AUTOMATED         Medications reviewed  Current Facility-Administered Medications   Medication Dose Route Frequency    atenoloL (TENORMIN) tablet 25 mg  25 mg Oral DAILY    folic acid (FOLVITE) tablet 1 mg  1 mg Oral DAILY    sertraline (ZOLOFT) tablet 50 mg  50 mg Oral DAILY    0.9% sodium chloride infusion 250 mL  250 mL IntraVENous PRN    0.9% sodium chloride infusion 250 mL  250 mL IntraVENous PRN    0.9% sodium chloride infusion 250 mL  250 mL IntraVENous PRN    QUEtiapine (SEROquel) tablet 12.5 mg  12.5 mg Oral QHS    traMADoL (ULTRAM) tablet 50 mg  50 mg Oral Q6H PRN    0.9% sodium chloride infusion 250 mL  250 mL IntraVENous PRN    acetaminophen (TYLENOL) tablet 650 mg  650 mg Oral Q6H    Or    acetaminophen (TYLENOL) suppository 650 mg  650 mg Rectal Q6H    0.9% sodium chloride infusion 250 mL  250 mL IntraVENous PRN    sodium chloride (NS) flush 5-40 mL  5-40 mL IntraVENous Q8H    sodium chloride (NS) flush 5-40 mL  5-40 mL IntraVENous PRN    naloxone (NARCAN) injection 0.4 mg  0.4 mg IntraVENous PRN    calcium-vitamin D (OS-VINCENT +D3) 500 mg-200 unit per tablet 1 Tablet  1 Tablet Oral TID WITH MEALS    senna-docusate (PERICOLACE) 8.6-50 mg per tablet 1 Tablet  1 Tablet Oral BID    polyethylene glycol (MIRALAX) packet 17 g  17 g Oral DAILY    bisacodyL (DULCOLAX) suppository 10 mg  10 mg Rectal DAILY PRN    morphine injection 1 mg  1 mg IntraVENous Q4H PRN    sodium chloride (NS) flush 5-40 mL  5-40 mL IntraVENous Q8H    sodium chloride (NS) flush 5-40 mL  5-40 mL IntraVENous PRN    polyethylene glycol (MIRALAX) packet 17 g  17 g Oral DAILY PRN    ondansetron (ZOFRAN ODT) tablet 4 mg  4 mg Oral Q8H PRN    Or    ondansetron (ZOFRAN) injection 4 mg  4 mg IntraVENous Q6H PRN       Care Plan discussed with: Patient/Family and Nurse    Total time spent with patient and review of records: 30 minutes.     Rossana Fernandez MD

## 2021-07-22 NOTE — PROGRESS NOTES
Problem: Mobility Impaired (Adult and Pediatric)  Goal: *Acute Goals and Plan of Care (Insert Text)  Description: FUNCTIONAL STATUS PRIOR TO ADMISSION: Patient was modified independent using a rolling walker for functional mobility. Although patient poor historian at initial encounter. HOME SUPPORT PRIOR TO ADMISSION: The patient lives alone in an independent living facility. Per chart, at [facility]. Physical Therapy Goals  Initiated 7/22/2021  1. Patient will move from supine to sit and sit to supine  in bed with minimal assistance/contact guard assist within 7 day(s). 2.  Patient will transfer from bed to chair and chair to bed with moderate assistance  using the least restrictive device within 7 day(s). 3.  Patient will perform sit to stand with minimal assistance/contact guard assist within 7 day(s). 4.  Patient will ambulate with minimal assistance/contact guard assist for 15 feet with the least restrictive device within 7 day(s). Outcome: Progressing Towards Goal   PHYSICAL THERAPY EVALUATION  Patient: Carissa Ellison (44 y.o. male)  Date: 7/22/2021  Primary Diagnosis: Hip fracture ([facility] [address] [S72.009A]  CAP (community acquired pneumonia) [J18.9]  Procedure(s) (LRB):  FEMORAL INTERTROCHANTERIC NAIL INSERTION LEFT (Left) 3 Days Post-Op   Precautions:   Fall, WBAT      ASSESSMENT  Based on the objective data described below, the 80year old patient presents with decreased strength, endurance, balance, weight bearing tolerance and functional mobility s/p POD3 for hip fx repair following GLF. Patient is pleasantly confused at initial evaluation, A&O x 2; knows he is at a hospital but does not recall situation. PTA patient lives in an independent living facility, per chart patient was independent without AD, but patient today saying he does use a RW at baseline. Patient transferring supine > sit minAx 2 to edge of bed, modAx2 sit > stand and tolerates pivoting his feet laterally.  Upon attempting to lift foot to step, patient with increased pain and does not tolerate. With standing, patient with moderate audible SOB, but reports feeling okay. O2sats difficult to attain good waveform secondary to patient's very cold hands. With warming, O2 sats > 90% on RA. Returned to bed sit > supine maxA x 2. Patient will require SNF level rehab upon d/c. Current Level of Function Impacting Discharge (mobility/balance): modA x 2 sit > stand; lateral pivoting x 2ft modA x 2 (not true stepping)    Functional Outcome Measure: The patient scored Total: 20/100 on the Barthel Index which is indicative of 80% impaired ability to care for basic self needs/dependency on others. Other factors to consider for discharge: Patient living independently at [address] [place] [address] prior to admission     Patient will benefit from skilled therapy intervention to address the above noted impairments. PLAN :  Recommendations and Planned Interventions: bed mobility training, transfer training, gait training, therapeutic exercises, patient and family training/education, and therapeutic activities      Frequency/Duration: Patient will be followed by physical therapy:  twice daily to address goals. Recommendation for discharge: (in order for the patient to meet his/her long term goals)  Therapy up to 5 days/week in SNF setting    This discharge recommendation:  Has been made in collaboration with the attending provider and/or case management    IF patient discharges home will need the following DME: to be determined (TBD)         SUBJECTIVE:   Patient stated Ranjith Pierre was born in [place]. \" re: when asking patient his birthday    Zachariah Ana:   HISTORY:    Past Medical History:   Diagnosis Date    Anxiety     Arthritis     right shoulder    Atrial fibrillation, chronic ([facility] [address] 09/23/2014    Colon cancer ([facility] [address] 2002    colon resection    Depression 9/28/2015    DJD (degenerative joint disease) of knee     left TKR    Dyslipidemia Embolic stroke involving right middle cerebral artery [facility]) Nov 2013    presented with dysarthria, MRI Several tiny acute infarcts in the right middle cerebral artery    Falls     Gout     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    Hypertension     Memory loss     Peripheral neuropathy     Vertigo      Past Surgical History:   Procedure Laterality Date    HX CATARACT REMOVAL      HX GI      colon resection    HX ORTHOPAEDIC      left knee, left shoulder    [place] ABDOMEN SURGERY PROC UNLISTED      colorectal    [place] CARDIAC SURG PROCEDURE UNLIST      cardio version       Personal factors and/or comorbidities impacting plan of care: none additional    Home Situation  Home Environment: Independent living  One/Two Story Residence: One story  Living Alone: Yes  Support Systems: Assisted living (assist for meals per chart)  Patient Expects to be Discharged to[de-identified] Independent living facility  Current DME Used/Available at Home: Walker, rolling    EXAMINATION/PRESENTATION/DECISION MAKING:   Patient received supine in bed and was agreeable to participate in PT session. Patient was cleared by nursing to participate in PT session. Vitals:    07/22/21 0957 07/22/21 0959 07/22/21 1009 07/22/21 1016   BP: 125/77 117/68 (!) 104/55 124/70   BP 1 Location:       BP Patient Position: At rest  Sitting Supine   Pulse: 90  (!) 131 (!) 114   Temp:       Resp:       Height:       Weight:       SpO2:    93%           Critical Behavior:  Neurologic State: Confused, Alert  Orientation Level: Disoriented to situation, Oriented to place, Oriented to person  Cognition: Decreased attention/concentration, Appropriate for age attention/concentration, Decreased command following     Hearing:   Auditory  Auditory Impairment: Hard of hearing, bilateral  Hearing Aids/Status: Bilateral, With patient  Skin:  all observed intact  Edema: none observed  Range Of Motion:  AROM: Generally decreased, functional                       Strength: Strength: Generally decreased, functional                    Tone & Sensation:   Tone: Normal              Sensation: Intact               Coordination:  Coordination: Generally decreased, functional  Vision:    Patient wears corrective lenses  Functional Mobility:  Bed Mobility:     Supine to Sit: Minimum assistance;Assist x2; Additional time  Sit to Supine: Maximum assistance;Assist x2  Scooting: Minimum assistance  Transfers:  Sit to Stand: Minimum assistance;Assist x2; Additional time  Stand to Sit: Minimum assistance;Assist x2                       Balance:   Sitting: Intact; With support  Sitting - Static: Fair (occasional)  Sitting - Dynamic: Fair (occasional)  Standing: Impaired; With support  Standing - Static: Fair  Standing - Dynamic : Not tested  Ambulation/Gait Training:  Distance (ft): 2 Feet (ft)     Ambulation - Level of Assistance: Moderate assistance;Assist x2     Gait Description (WDL): Exceptions to WDL  Gait Abnormalities: Decreased step clearance     Left Side Weight Bearing: As tolerated  Base of Support: Narrowed; Shift to right     Speed/Chantel: Pace decreased (<100 feet/min)  Step Length: Right shortened;Left shortened  Swing Pattern:  (no swing)               Therapeutic Exercises: Ankle pumps x 10    Functional Measure:  Barthel Index:    Bathin  Bladder: 0  Bowels: 5  Groomin  Dressin  Feedin  Mobility: 0  Stairs: 0  Toilet Use: 5  Transfer (Bed to Chair and Back): 5  Total: 20/100       The Barthel ADL Index: Guidelines  1. The index should be used as a record of what a patient does, not as a record of what a patient could do. 2. The main aim is to establish degree of independence from any help, physical or verbal, however minor and for whatever reason. 3. The need for supervision renders the patient not independent. 4. A patient's performance should be established using the best available evidence.  Asking the patient, friends/relatives and nurses are the usual sources, but direct observation and common sense are also important. However direct testing is not needed. 5. Usually the patient's performance over the preceding 24-48 hours is important, but occasionally longer periods will be relevant. 6. Middle categories imply that the patient supplies over 50 per cent of the effort. 7. Use of aids to be independent is allowed. Mary Kate Solomon., Barthel, D.W. ([id number]). Functional evaluation: the Barthel Index. [address] [facility] (14)2. JOSHUA Gonzalez, Ligia Reyes., Yan Pelaez., Genn[place], [address] [facility] (1999). Measuring the change indisability after inpatient rehabilitation; comparison of the responsiveness of the Barthel Index and Functional Broussard Measure. Journal of Neurology, Neurosurgery, and Psychiatry, 66(4), 516-574. APRIL Meredith.NAEL, BENNETT Diaz, & Mandy Braswell M.A. (2004.) Assessment of post-stroke quality of life in cost-effectiveness studies: The usefulness of the Barthel Index and the EuroQoL-5D. Quality of Life Research, 15, 539-49        Physical Therapy Evaluation Charge Determination   History Examination Presentation Decision-Making   MEDIUM  Complexity : 1-2 comorbidities / personal factors will impact the outcome/ POC  MEDIUM Complexity : 3 Standardized tests and measures addressing body structure, function, activity limitation and / or participation in recreation  MEDIUM Complexity : Evolving with changing characteristics  MEDIUM Complexity : FOTO score of 26-74      Based on the above components, the patient evaluation is determined to be of the following complexity level: MEDIUM    Pain Rating:  Patient with minimal complaints of pain during therapy, some wincing and verbalizing of pain when attempting to step.      Activity Tolerance:   Fair, requires rest breaks, and observed SOB with activity    After treatment patient left in no apparent distress:   Supine in bed, Call bell within reach, Bed / chair alarm activated, and Side rails x 3    COMMUNICATION/EDUCATION:   The patients plan of care was discussed with: Occupational therapist, Registered nurse, and PA . Fall prevention education was provided and the patient/caregiver indicated understanding. and Patient/family have participated as able in goal setting and plan of care.     Thank you for this referral.  Edil Negrete PT, DPT   Time Calculation: 34 mins

## 2021-07-22 NOTE — PROGRESS NOTES
Problem: Mobility Impaired (Adult and Pediatric)  Goal: *Acute Goals and Plan of Care (Insert Text)  Description: FUNCTIONAL STATUS PRIOR TO ADMISSION: Patient was modified independent using a rolling walker for functional mobility. Although patient poor historian at initial encounter. HOME SUPPORT PRIOR TO ADMISSION: The patient lives alone in an independent living facility. Per chart, at [facility]. Physical Therapy Goals  Initiated 7/22/2021  1. Patient will move from supine to sit and sit to supine  in bed with minimal assistance/contact guard assist within 7 day(s). 2.  Patient will transfer from bed to chair and chair to bed with moderate assistance  using the least restrictive device within 7 day(s). 3.  Patient will perform sit to stand with minimal assistance/contact guard assist within 7 day(s). 4.  Patient will ambulate with minimal assistance/contact guard assist for 15 feet with the least restrictive device within 7 day(s).             7/22/2021 1655 by Annmarie Goldstein, PT  Outcome: Progressing Towards Goal  7/22/2021 1043 by Annmarie Goldstein, PT  Outcome: Progressing Towards Goal   PHYSICAL THERAPY TREATMENT  Patient: Bk Sahu (46 y.o. male)  Date: 7/22/2021  Diagnosis: Hip fracture ([facility] [address] [S72.009A]  CAP (community acquired pneumonia) [J18.9] <principal problem not specified>  Procedure(s) (LRB):  FEMORAL INTERTROCHANTERIC NAIL INSERTION LEFT (Left) 3 Days Post-Op  Precautions: Fall, WBAT  Chart, physical therapy assessment, plan of care and goals were reviewed. ASSESSMENT  Patient continues with skilled PT services and is progressing towards goals. Patient this afternoon highly motivated to transfer to bedside commode. Transferred with MaxAx1 to commode. Dependent for hygiene. On return, with increased time available for sequencing & cueing, patient requiring mod-Felisha x 2.  Patient primarily sliding feet across floor with ambulation, but has demonstrated occasional, intermittent steps. Patient will require rehab upon D/c. Current Level of Function Impacting Discharge (mobility/balance): ambulation modA x 2 with RW up to 3ft at a time; dep hygiene ; poor dynamic standing balance    Other factors to consider for discharge: none additional         PLAN :  Patient continues to benefit from skilled intervention to address the above impairments. Continue treatment per established plan of care. to address goals. Recommendation for discharge: (in order for the patient to meet his/her long term goals)  Therapy up to 5 days/week in SNF setting    This discharge recommendation:  Has not yet been discussed the attending provider and/or case management    IF patient discharges home will need the following DME: to be determined (TBD)       SUBJECTIVE:   Patient stated Tameka gandhi so much.     OBJECTIVE DATA SUMMARY:   Critical Behavior:  Neurologic State: Confused, Alert  Orientation Level: Disoriented to situation, Oriented to place, Oriented to person  Cognition: Decreased attention/concentration, Appropriate for age attention/concentration, Decreased command following  Safety/Judgement: Awareness of environment, Fall prevention, Good awareness of safety precautions, Home safety, Insight into deficits  Functional Mobility Training:  Bed Mobility:     Supine to Sit: Minimum assistance;Assist x1  Sit to Supine: Maximum assistance;Assist x2  Scooting: Minimum assistance (at edge of bed)        Transfers:  Sit to Stand: Minimum assistance;Assist x2  Stand to Sit: Minimum assistance;Assist x2                             Balance:  Sitting: Intact; With support  Sitting - Static: Fair (occasional)  Sitting - Dynamic: Fair (occasional)  Standing: Impaired; With support  Standing - Static: Fair  Standing - Dynamic : Poor  Ambulation/Gait Training:  Distance (ft): 5 Feet (ft)     Ambulation - Level of Assistance:  Moderate assistance;Assist x2     Gait Description (WDL): Exceptions to [facility]  Gait Abnormalities: Decreased step clearance     Left Side Weight Bearing: As tolerated  Base of Support: Narrowed; Shift to right     Speed/Chantel: Pace decreased (<100 feet/min); Shuffled  Step Length: Right shortened;Left shortened  Swing Pattern:  (no swing)             Pain Rating:  Patient with minimal complaints of pain during PT session    Activity Tolerance:   Fair, requires rest breaks, and observed SOB with activity    After treatment patient left in no apparent distress:   Supine in bed, Call bell within reach, Bed / chair alarm activated, Caregiver / family present, and Side rails x 3    COMMUNICATION/COLLABORATION:   The patients plan of care was discussed with: Registered nurse, Rehabilitation technician, and PA .      Ciara Mg PT, DPT   Time Calculation: 24 mins

## 2021-07-22 NOTE — PROGRESS NOTES
Spiritual Care Assessment/Progress Note  [address]      NAME: Chico Morgan      MRN: [id number]  AGE: 80 y.o. SEX: male  [employer]ybeterian Affiliation: Yazidism   Language: English     7/22/2021     Total Time (in minutes): 5     Spiritual Assessment begun in [facility] POST SURG ORT 1 through conversation with:         []Patient        [] Family    [] Friend(s)        Reason for Consult: Initial/Spiritual assessment, patient floor     Spiritual beliefs: (Please include comment if needed)     [] Identifies with a sia tradition:         [] Supported by a sia community:            [] Claims no spiritual orientation:           [] Seeking spiritual identity:                [] Adheres to an individual form of spirituality:           [x] Not able to assess:                           Identified resources for coping:      [] Prayer                               [] Music                  [] Guided Imagery     [] Family/friends                 [] Pet visits     [] Devotional reading                         [x] Unknown     [] Other:                                     Interventions offered during this visit: (See comments for more details)    Patient Interventions: Initial visit (IV)           Plan of Care:     [] Support spiritual and/or cultural needs    [] Support AMD and/or advance care planning process      [] Support grieving process   [] Coordinate Rites and/or Rituals    [] Coordination with community clergy   [] No spiritual needs identified at this time   [] Detailed Plan of Care below (See Comments)  [] Make referral to Music Therapy  [] Make referral to Pet Therapy     [] Make referral to Addiction services  [] Make referral to [facility]  [] Make referral to Spiritual Care Partner  [] No future visits requested        [x] Follow up upon further referrals     Comments: Attempted initial spiritual care assessment. Staff was working with Mr. Ella Cabrera, unable to assess at this time.   Contact Spiritual Care for any further referrals.   Visited by: Quita Flores, MS., [address] [facility] [address] ([id number])

## 2021-07-23 LAB
ANION GAP SERPL CALC-SCNC: 8 MMOL/L (ref 5–15)
BACTERIA SPEC CULT: NORMAL
BUN SERPL-MCNC: 34 MG/DL (ref 6–20)
BUN/CREAT SERPL: 33 (ref 12–20)
CALCIUM SERPL-MCNC: 8.3 MG/DL (ref 8.5–10.1)
CHLORIDE SERPL-SCNC: 112 MMOL/L (ref 97–108)
CO2 SERPL-SCNC: 21 MMOL/L (ref 21–32)
CREAT SERPL-MCNC: 1.04 MG/DL (ref 0.7–1.3)
GLUCOSE SERPL-MCNC: 148 MG/DL (ref 65–100)
POTASSIUM SERPL-SCNC: 4.7 MMOL/L (ref 3.5–5.1)
SERVICE CMNT-IMP: NORMAL
SODIUM SERPL-SCNC: 141 MMOL/L (ref 136–145)

## 2021-07-23 PROCEDURE — 94760 N-INVAS EAR/PLS OXIMETRY 1: CPT

## 2021-07-23 PROCEDURE — 74011250637 HC RX REV CODE- 250/637: Performed by: ORTHOPAEDIC SURGERY

## 2021-07-23 PROCEDURE — 36415 COLL VENOUS BLD VENIPUNCTURE: CPT

## 2021-07-23 PROCEDURE — 97116 GAIT TRAINING THERAPY: CPT

## 2021-07-23 PROCEDURE — 65270000029 HC RM PRIVATE

## 2021-07-23 PROCEDURE — 97530 THERAPEUTIC ACTIVITIES: CPT

## 2021-07-23 PROCEDURE — 74011250637 HC RX REV CODE- 250/637: Performed by: PHYSICIAN ASSISTANT

## 2021-07-23 PROCEDURE — 74011250637 HC RX REV CODE- 250/637: Performed by: INTERNAL MEDICINE

## 2021-07-23 PROCEDURE — 74011250637 HC RX REV CODE- 250/637: Performed by: FAMILY MEDICINE

## 2021-07-23 PROCEDURE — 80048 BASIC METABOLIC PNL TOTAL CA: CPT

## 2021-07-23 RX ORDER — QUETIAPINE FUMARATE 25 MG/1
25 TABLET, FILM COATED ORAL
Status: DISCONTINUED | OUTPATIENT
Start: 2021-07-23 | End: 2021-07-26 | Stop reason: HOSPADM

## 2021-07-23 RX ADMIN — SERTRALINE 50 MG: 50 TABLET, FILM COATED ORAL at 09:49

## 2021-07-23 RX ADMIN — ACETAMINOPHEN 650 MG: 325 TABLET ORAL at 00:00

## 2021-07-23 RX ADMIN — RIVAROXABAN 15 MG: 15 TABLET, FILM COATED ORAL at 18:02

## 2021-07-23 RX ADMIN — ONDANSETRON 4 MG: 4 TABLET, ORALLY DISINTEGRATING ORAL at 15:25

## 2021-07-23 RX ADMIN — TRAMADOL HYDROCHLORIDE 50 MG: 50 TABLET ORAL at 09:59

## 2021-07-23 RX ADMIN — Medication 1 TABLET: at 09:51

## 2021-07-23 RX ADMIN — ACETAMINOPHEN 650 MG: 325 TABLET ORAL at 18:02

## 2021-07-23 RX ADMIN — QUETIAPINE FUMARATE 12.5 MG: 25 TABLET ORAL at 00:50

## 2021-07-23 RX ADMIN — TRAMADOL HYDROCHLORIDE 50 MG: 50 TABLET ORAL at 04:09

## 2021-07-23 RX ADMIN — ACETAMINOPHEN 650 MG: 325 TABLET ORAL at 04:09

## 2021-07-23 RX ADMIN — QUETIAPINE FUMARATE 25 MG: 25 TABLET ORAL at 22:25

## 2021-07-23 RX ADMIN — Medication 1 TABLET: at 18:02

## 2021-07-23 RX ADMIN — ATENOLOL 25 MG: 25 TABLET ORAL at 09:51

## 2021-07-23 NOTE — PROGRESS NOTES
0000:  Patient unable to rest; increased confusion noted in at evening assessment   Patient trying to get out of bed   PCT in room with patient to ensure patient safety   Cathryn contacted   Provided patient name, , room number, brief SBAR and current patient condition   Orders received:   Additional 12.5 mg Seroquel to be given     0050:  Medication administered   Patient's IV in the bed   Attempts x2 to place new IV; unsuccessful   Metabolic panel obtained per order     0400:  Patient spilt water in bed   Patient re-adjusted in bed; sheets and gown changed   Patient resting in bed   PCT with patient to ensure patient safety

## 2021-07-23 NOTE — PROGRESS NOTES
Problem: Self Care Deficits Care Plan (Adult)  Goal: *Acute Goals and Plan of Care (Insert Text)  Description:   FUNCTIONAL STATUS PRIOR TO ADMISSION: Patient was independent and active without use of DME. Patient was independent for basic and instrumental ADLs. HOME SUPPORT: The patient lived alone at [facility] with family locally to provide assistance. Occupational Therapy Goals  Initiated 7/22/2021  1. Patient will perform grooming, seated EOB or in chair, with supervision/set-up within 7 day(s). 2.  Patient will perform upper body dressing and bathing, seated, with minimal assistance/contact guard assist within 7 day(s). 3.  Patient will perform lower body dressing with moderate assistance  within 7 day(s). 4.  Patient will perform toilet transfers to [facility] with minimal assistance/contact guard assist within 7 day(s). 5.  Patient will perform all aspects of toileting with moderate assistance  within 7 day(s). 6.  Patient will participate in upper extremity therapeutic exercise/activities with supervision/set-up for 5 minutes within 7 day(s). 7.  Patient will utilize energy conservation techniques during functional activities with verbal cues within 7 day(s). Outcome: Progressing Towards Goal   OCCUPATIONAL THERAPY TREATMENT  Patient: [place] (40 y.o. male)  Date: 7/23/2021  Diagnosis: Hip fracture ([facility] [address] [S72.009A]  CAP (community acquired pneumonia) [J18.9] <principal problem not specified>  Procedure(s) (LRB):  FEMORAL INTERTROCHANTERIC NAIL INSERTION LEFT (Left) 4 Days Post-Op  Precautions: Fall, WBAT  Chart, occupational therapy assessment, plan of care, and goals were reviewed. ASSESSMENT  Patient continues with skilled OT services and is not progressing towards goals. Pt confused, agitated, assist x 2 to sit edge of bed which pt wanted however impulsive, not following commands, daughter stating his hearing aides probably were not charged.  Pt needing assist x 2 to stand and sit on [facility] however unable to void. Not safe to leave pt up in chair and assisted back to bed. Current Level of Function Impacting Discharge (ADLs):dep LB bathe, dress, toileting    Other factors to consider for discharge:          PLAN :  Patient continues to benefit from skilled intervention to address the above impairments. Continue treatment per established plan of care to address goals. Recommend with staff: bed in chair position for ADl's, there ex, there act,meals    Recommend next OT session: cont towards goals    Recommendation for discharge: (in order for the patient to meet his/her long term goals)  Therapy up to 5 days/week in SNF setting    This discharge recommendation:  Has not yet been discussed the attending provider and/or case management    IF patient discharges home will need the following DME:       SUBJECTIVE:   Patient stated     OBJECTIVE DATA SUMMARY:   Cognitive/Behavioral Status:  Neurologic State: Confused;Drowsy  Orientation Level: Disoriented to place; Disoriented to situation;Disoriented to time  Cognition: Decreased attention/concentration; No command following             Functional Mobility and Transfers for ADLs:  Bed Mobility:  Supine to Sit: Moderate assistance;Assist x2  Sit to Supine: Maximum assistance;Assist x2  Scooting: Moderate assistance;Assist x1    Transfers:  Sit to Stand: Moderate assistance;Assist x2          Balance:  Sitting: Intact; With support  Sitting - Static: Fair (occasional)  Sitting - Dynamic: Fair (occasional)  Standing: Impaired; With support;Pull to stand  Standing - Static: Poor    ADL Intervention:   Dep LB bathe, dress, toileting       Activity Tolerance:   Poor    After treatment patient left in no apparent distress:   Supine in bed    COMMUNICATION/COLLABORATION:   The patients plan of care was discussed with: Physical therapist, Occupational therapist, and Registered nurse.      RAYRAY Thapa  Time Calculation: 25 mins

## 2021-07-23 NOTE — PROGRESS NOTES
Daily Progress Note: 7/23/2021  Elvira Birmingham MD    Assessment/Plan:   L displaced intertrochanteric femur fx: Highly functional, independent 89M with minimal comorbidities, no dementia. Low risk for surgery Last xarelto 5/17  - Per ortho  -Surg 7/19/21     CAP?: CXR  with LLL consolidation vs pna. WBC is 12.1 with left shift but this could quite easily be 2/2 fracture. He has no sx and no hypoxemia. Received abx x 1 in ER  - Check procal  - Hold on further abx     Afib: Recently had atenolol discontinued due orthostasis.  Presently in NSR  - Can resume Xarelto post-op depending on Hb     Neuropathy:   -Holding pregabalin 75mg bid, duloxetine 90mg     Hx uric acid stones:   -Allopurinol holding     Anemia- blood loss  - transfused 1 unit 7/20  -Keep Hb >7- will transfuse again today    Urinary Retention  -Avendaño    Disp:  -will need SNF       Problem List:  Problem List as of 7/23/2021 Date Reviewed: 12/20/2018        Codes Class Noted - Resolved    Hip fracture ([facility] ICD-10-CM: Z45.394P  ICD-9-CM: 820.8  7/18/2021 - Present        CAP (community acquired pneumonia) ICD-10-CM: J18.9  ICD-9-CM: 486  7/18/2021 - Present        Chronic atrial fibrillation ([facility] ICD-10-CM: I48.20  ICD-9-CM: 427.31  11/10/2017 - Present        Sinus node dysfunction ([facility] ICD-10-CM: I49.5  ICD-9-CM: 427.81  4/12/2016 - Present        Depression ICD-10-CM: F32.9  ICD-9-CM: 930  9/28/2015 - Present        Dyslipidemia ICD-10-CM: E78.5  ICD-9-CM: 272.4  9/23/2014 - Present        Gout ICD-10-CM: M10.9  ICD-9-CM: 274.9  Unknown - Present        Hx of carcinoma in situ of prostate ICD-10-CM: K85.340  ICD-9-CM: V13.89  Unknown - Present        Essential hypertension, benign ICD-10-CM: I10  ICD-9-CM: 401.1  11/16/2013 - Present        Kidney stones ICD-10-CM: N20.0  ICD-9-CM: 592.0  11/16/2013 - Present        Embolic stroke involving right middle cerebral artery ([facility] ICD-10-CM: F76.563  ICD-9-CM: 434.11  11/1/2013 - Present    Overview Signed 12/7/2013  9:23 AM by Maribel Erwin MD     presented with dysarthria, MRI Several tiny acute infarcts in the right middle cerebral artery             RESOLVED: Dizziness ICD-10-CM: R42  ICD-9-CM: 780.4  4/12/2016 - 6/21/2016        RESOLVED: Typical atrial flutter ([facility] ICD-10-CM: I48.3  ICD-9-CM: 427.32  5/6/2015 - 9/28/2015        RESOLVED: Paroxysmal atrial fibrillation ([facility] ICD-10-CM: I48.0  ICD-9-CM: 427.31  9/23/2014 - 11/10/2017        RESOLVED: Dysarthria ICD-9-CM: 784.5  11/17/2013 - 12/7/2013        RESOLVED: TIA (transient ischemic attack) ICD-10-CM: G45.9  ICD-9-CM: 435.9  11/16/2013 - 12/7/2013        RESOLVED: Other and unspecified hyperlipidemia ICD-10-CM: E78.5  ICD-9-CM: 272.4  11/16/2013 - 9/23/2014        RESOLVED: Hypokalemia ICD-10-CM: E87.6  ICD-9-CM: 276.8  11/16/2013 - 12/7/2013              HPI:    Donte Sportsman is a 80 y.o.  M hx Afib presents after mechanical fall. He lives in an independent living facility, but has meals prepared for him. He had gone to the bathroom this morning and was returning to be when he felt like his sock slipped and he went down. He did not have lightheadedness, white out, cp, sob before this. No LOC. He called daughter who called 911.      In ER, he is noted to have L displaced intertrochanteric femur fracture as well as mild leukocytosis on some c/f pneumonia, though he denies cough, fever, chills recently.      He has never had any problems with anesthesia in the past. Last took xarelto last night. PCP stopped atenolol recently. (Dr Lynda Yoo)     7/19: Daughter present in room. He has been somewhat confused with the Morphine. He is typically very alert and self sufficient. He knows he is in the hospital. For surgery later today. 7/20: Surgery 7/19. Hb down to 6.5. Spoke with daughter who has consented to transfusion. Will order 1 unit of blood. He is pleasantly confused this am.     7/21: Hb 6.8- will transfuse another unit.  Holding Xarelto for now. Restart Metoprolol with parameters. Holding Lyrica and Cymbalta as he has been more confused. Sitter present. Will try a low dose Seroquel tonight.     : Hb up. Will resume Xarelto. Slept better with Seroquel. A little less confused this am. Knows he is in the hospital.     : More confusion during the night. Sleeping now after a bath and Tramadol. Will try to keep up today and will increase Seroquel at bedtime. Review of Systems:   Review of systems not obtained due to patient factors. Objective:   Physical Exam:     Visit Vitals  /86 (BP 1 Location: Left upper arm, BP Patient Position: At rest)   Pulse (!) 112   Temp 97.4 °F (36.3 °C)   Resp 18   Ht 5' 7\" (1.702 m)   Wt 151 lb 14.4 oz (68.9 kg)   SpO2 95%   BMI 23.79 kg/m²    O2 Flow Rate (L/min): 2 l/min O2 Device: None (Room air)    Temp (24hrs), Av.7 °F (36.5 °C), Min:96.6 °F (35.9 °C), Max:98.5 °F (36.9 °C)    1901 -  0700  In: -   Out: 200 [Urine:200]   701 - 1900  In: 1030 [P.O.:720]  Out: 850 [Urine:850]    General:  Sleeping but arouses,  no distress, appears stated age. Head:  Normocephalic, without obvious abnormality, atraumatic. Eyes:  Conjunctivae/corneas clear. Nose: Nares normal. Septum midline. Mucosa normal. No drainage or sinus tenderness. Throat: Lips, mucosa, and tongue normal.    Neck: Supple, symmetrical, trachea midline, no adenopathy, thyroid: no enlargement/tenderness/nodules, no carotid bruit and no JVD. Back:   Symmetric, no curvature. ROM normal. No CVA tenderness. Lungs:   Clear to auscultation bilaterally. Chest wall:  No tenderness or deformity. Heart:  Regular rate and rhythm, S1, S2 normal, no murmur, click, rub or gallop. Abdomen:   Soft, non-tender. Bowel sounds normal. No masses,  No organomegaly. Extremities: Left leg with dressing intact, no cyanosis or edema. No calf tenderness or cords. Bruising left hand.     Pulses: 2+ and symmetric all extremities. Skin: Skin color, texture, turgor normal. No rashes or lesions   Neurologic: CNII-XII intact. Alert and oriented X 2. Fine motor of hands and fingers normal.   equal.  No cogwheeling or rigidity. Gait not tested at this time. Sensation grossly normal to touch. Gross motor of extremities normal.       Data Review:    CT Head 7/18/21   IMPRESSION  No acute intracranial process. Unchanged left sphenoid sinus disease. Xray left hand 7/19/21  IMPRESSION  No acute abnormality. X-ray left femur 7/18/21   IMPRESSION  Displaced intertrochanteric fracture of the proximal left femur. Recent Days:  Recent Labs     07/22/21  0116 07/21/21  0205   WBC 9.9  --    HGB 7.7* 6.8*   HCT 24.0*  --      --      Recent Labs     07/23/21  0103 07/22/21  0116 07/21/21  0205    139 141   K 4.7 4.2 4.1   * 109* 110*   CO2 21 26 24   * 134* 152*   BUN 34* 33* 28*   CREA 1.04 0.97 0.91   CA 8.3* 8.0* 7.5*     No results for input(s): PH, PCO2, PO2, HCO3, FIO2 in the last 72 hours.     24 Hour Results:  Recent Results (from the past 24 hour(s))   COVID-19 RAPID TEST    Collection Time: 07/22/21 12:00 PM   Result Value Ref Range    Specimen source Nasopharyngeal      COVID-19 rapid test Not detected NOTD     METABOLIC PANEL, BASIC    Collection Time: 07/23/21  1:03 AM   Result Value Ref Range    Sodium 141 136 - 145 mmol/L    Potassium 4.7 3.5 - 5.1 mmol/L    Chloride 112 (H) 97 - 108 mmol/L    CO2 21 21 - 32 mmol/L    Anion gap 8 5 - 15 mmol/L    Glucose 148 (H) 65 - 100 mg/dL    BUN 34 (H) 6 - 20 MG/DL    Creatinine 1.04 0.70 - 1.30 MG/DL    BUN/Creatinine ratio 33 (H) 12 - 20      GFR est AA >60 >60 ml/min/1.73m2    GFR est non-AA >60 >60 ml/min/1.73m2    Calcium 8.3 (L) 8.5 - 10.1 MG/DL       Medications reviewed  Current Facility-Administered Medications   Medication Dose Route Frequency    rivaroxaban (XARELTO) tablet 15 mg  15 mg Oral DAILY WITH DINNER    atenoloL (TENORMIN) tablet 25 mg  25 mg Oral DAILY    folic acid (FOLVITE) tablet 1 mg  1 mg Oral DAILY    sertraline (ZOLOFT) tablet 50 mg  50 mg Oral DAILY    0.9% sodium chloride infusion 250 mL  250 mL IntraVENous PRN    0.9% sodium chloride infusion 250 mL  250 mL IntraVENous PRN    0.9% sodium chloride infusion 250 mL  250 mL IntraVENous PRN    QUEtiapine (SEROquel) tablet 12.5 mg  12.5 mg Oral QHS    traMADoL (ULTRAM) tablet 50 mg  50 mg Oral Q6H PRN    0.9% sodium chloride infusion 250 mL  250 mL IntraVENous PRN    acetaminophen (TYLENOL) tablet 650 mg  650 mg Oral Q6H    Or    acetaminophen (TYLENOL) suppository 650 mg  650 mg Rectal Q6H    0.9% sodium chloride infusion 250 mL  250 mL IntraVENous PRN    sodium chloride (NS) flush 5-40 mL  5-40 mL IntraVENous Q8H    sodium chloride (NS) flush 5-40 mL  5-40 mL IntraVENous PRN    naloxone (NARCAN) injection 0.4 mg  0.4 mg IntraVENous PRN    calcium-vitamin D (OS-VINCENT +D3) 500 mg-200 unit per tablet 1 Tablet  1 Tablet Oral TID WITH MEALS    senna-docusate (PERICOLACE) 8.6-50 mg per tablet 1 Tablet  1 Tablet Oral BID    polyethylene glycol (MIRALAX) packet 17 g  17 g Oral DAILY    bisacodyL (DULCOLAX) suppository 10 mg  10 mg Rectal DAILY PRN    morphine injection 1 mg  1 mg IntraVENous Q4H PRN    sodium chloride (NS) flush 5-40 mL  5-40 mL IntraVENous Q8H    sodium chloride (NS) flush 5-40 mL  5-40 mL IntraVENous PRN    polyethylene glycol (MIRALAX) packet 17 g  17 g Oral DAILY PRN    ondansetron (ZOFRAN ODT) tablet 4 mg  4 mg Oral Q8H PRN    Or    ondansetron (ZOFRAN) injection 4 mg  4 mg IntraVENous Q6H PRN       Care Plan discussed with: Patient/Family and Nurse    Total time spent with patient and review of records: 30 minutes.     Harley Diallo MD

## 2021-07-23 NOTE — PROGRESS NOTES
Problem: Mobility Impaired (Adult and Pediatric)  Goal: *Acute Goals and Plan of Care (Insert Text)  Description: FUNCTIONAL STATUS PRIOR TO ADMISSION: Patient was modified independent using a rolling walker for functional mobility. Although patient poor historian at initial encounter. HOME SUPPORT PRIOR TO ADMISSION: The patient lives alone in an independent living facility. Per chart, at [facility]. Physical Therapy Goals  Initiated 7/22/2021  1. Patient will move from supine to sit and sit to supine  in bed with minimal assistance/contact guard assist within 7 day(s). 2.  Patient will transfer from bed to chair and chair to bed with moderate assistance  using the least restrictive device within 7 day(s). 3.  Patient will perform sit to stand with minimal assistance/contact guard assist within 7 day(s). 4.  Patient will ambulate with minimal assistance/contact guard assist for 15 feet with the least restrictive device within 7 day(s). Outcome: Progressing Towards Goal   PHYSICAL THERAPY TREATMENT  Patient: Gabbi Tran (59 y.o. male)  Date: 7/23/2021  Diagnosis: Hip fracture ([facility] [S72.009A]  CAP (community acquired pneumonia) [J18.9] <principal problem not specified>  Procedure(s) (LRB):  FEMORAL INTERTROCHANTERIC NAIL INSERTION LEFT (Left) 4 Days Post-Op  Precautions: Fall, WBAT  Chart, physical therapy assessment, plan of care and goals were reviewed. ASSESSMENT  Patient continues with skilled PT services and is progressing towards goals. Patient this morning with increased confusion, agitation and decreased/poor command following. Upon inspection and conversation with daughter, likely reduced command following due to uncharged hearing aides. Patient repeatedly insistent to get up out of bed, even after mobilizing with PT and [place]. With mobilization today, patient with increased assistance required, moda x2; again, likely due to impaired communication abilities. Patient was taking improved steps today with minimal clearance off of the floor, which is an improvement from shuffled gait yesterday. He will require rehab upon d/c. Current Level of Function Impacting Discharge (mobility/balance): modax2 for ambulation; patient with significant confusion and impaired hearing today; poor command following    Other factors to consider for discharge: none additional         PLAN :  Patient continues to benefit from skilled intervention to address the above impairments. Continue treatment per established plan of care. to address goals. 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     OBJECTIVE DATA SUMMARY:   Patient received supine in bed and was agreeable to participate in PT session. Patient was cleared by nursing to participate in PT session. Patient's daughter was present and helpful for entirety of session. Critical Behavior:  Neurologic State: Confused, Drowsy  Orientation Level: Disoriented to place, Disoriented to situation, Disoriented to time  Cognition: Decreased attention/concentration, No command following  Safety/Judgement: Awareness of environment, Fall prevention, Good awareness of safety precautions, Home safety, Insight into deficits  Functional Mobility Training:  Bed Mobility:     Supine to Sit: Minimum assistance;Assist x2  Sit to Supine: Maximum assistance;Assist x2  Scooting: Moderate assistance;Assist x2        Transfers:  Sit to Stand: Moderate assistance;Assist x2  Stand to Sit: Minimum assistance;Assist x2                             Balance:  Sitting: Intact; With support  Sitting - Static: Fair (occasional)  Standing: Impaired; With support;Pull to stand  Standing - Static: Poor  Ambulation/Gait Training:  Distance (ft): 5 Feet (ft)  Assistive Device: Walker, rolling  Ambulation - Level of Assistance: Moderate assistance;Assist x2        Gait Abnormalities: Decreased step clearance; Path deviations     Left Side Weight Bearing: As tolerated  Base of Support: Narrowed     Speed/Chantel: Pace decreased (<100 feet/min)  Step Length: Right shortened;Left shortened            Pain Rating:  Patient with painful expressions with sit > supine    Activity Tolerance:   Fair and requires rest breaks    After treatment patient left in no apparent distress:   Supine in bed, Call bell within reach, Bed / chair alarm activated, Caregiver / family present, and Side rails x 3    COMMUNICATION/COLLABORATION:   The patients plan of care was discussed with: Occupational therapy assistant, Registered nurse, Case management, and Rehabilitation technician.      Bhakti Luna PT, DPT   Time Calculation: 23 mins

## 2021-07-23 NOTE — PROGRESS NOTES
Comprehensive Nutrition Assessment      Type and Reason for Visit: Initial, RD nutrition re-screen/LOS    Nutrition Recommendations/Plan:   1. Continue Regular diet  2. Add Ensure HP TID for ease of PO intake while intakes <50% for added protein  3. Document PO intake, check BG, daily wts      Nutrition Assessment:       Pt admitted for Hip fracture ([facility] [S72.009A]  CAP (community acquired pneumonia) [J18.9]. Pt  has a past medical history of Anxiety, Arthritis, Atrial fibrillation, chronic, Colon cancer, DJD (degenerative joint disease) of knee, Dyslipidemia, Gout, carcinoma in situ of prostate, Hypertension, Memory loss, Peripheral neuropathy, and Vertigo. .    Pt screened for LOS. Pt sleeping on visit. Chart reviewed. Pt POD 4 Left femur fx repair. PO intakes appear to be lower as length of stay continues. Wt appears stable over last 2+ yrs. NKFA. No reported n/v, chew/swallow difficulties or c/d. Last BM 7/22. Pt with increased protein needs for wound healing and prevent muscle/fat wasting. Pt at lower end of normal for BMI for ht/age. Wt Readings from Last 10 Encounters:   07/19/21 68.9 kg (151 lb 14.4 oz)   07/01/21 68.9 kg (152 lb)   04/08/21 68.9 kg (152 lb)   01/19/21 68.9 kg (152 lb)   02/20/20 68.9 kg (152 lb)   10/08/19 69.4 kg (153 lb)   05/31/19 69.4 kg (153 lb)   11/26/18 74.6 kg (164 lb 6.4 oz)   11/12/18 74.1 kg (163 lb 4.8 oz)   07/16/18 75.6 kg (166 lb 9.6 oz)       Malnutrition Assessment:  Malnutrition Status:  Insufficient data    Context:    Decreasing PO, no wt loss. Estimated Daily Nutrient Needs:  Energy (kcal): 1674 (BMRx1.3); Weight Used for Energy Requirements: Current  Protein (g): 69-83 (1.0-1.2g/kg);  Weight Used for Protein Requirements: Current  Fluid (ml/day): 1675; Method Used for Fluid Requirements: 1 ml/kcal    Documented meal intake:   Patient Vitals for the past 168 hrs:   % Diet Eaten   07/22/21 1900 1 - 25%   07/22/21 1330 1 - 25%   07/22/21 1030 1 - 25% 07/20/21 1830 26 - 50%   07/20/21 1029 26 - 50%       Documented Supplement intake:  No data found. Nutrition Related Findings:     Last BM 7/22, no edema reported     Nutritionally Significant Medications:   Vernon/Vit D, Folvite, Miralax, Seroquel, Pericolace.        Wounds:    Surgical incision       Current Nutrition Therapies:  ADULT DIET Regular  ADULT ORAL NUTRITION SUPPLEMENT Dinner, Lunch, Breakfast; Low Calorie/High Protein    Anthropometric Measures:  · Height:  5' 7.01\" (170.2 cm)  · Current Body Wt:  68.9 kg (151 lb 14.4 oz)   · Admission Body Wt:   151 lbs    · Usual Body Wt:    152 lbs    · Ideal Body Wt:  148 lbs:  102.6 %   · BMI Category:  Normal weight (BMI 22.0-24.9) age over 72       Nutrition Diagnosis:   · Inadequate energy intake related to increased demand for energy/nutrients as evidenced by wounds, intake 0-25%      Nutrition Interventions:   Food and/or Nutrient Delivery: Continue current diet, Start oral nutrition supplement  Nutrition Education and Counseling: No recommendations at this time  Coordination of Nutrition Care: Continue to monitor while inpatient, Interdisciplinary rounds    Goals:  PO >50% of meals and 2-3 ONS per day within 5-7 days       Nutrition Monitoring and Evaluation:   Behavioral-Environmental Outcomes: None identified  Food/Nutrient Intake Outcomes: Food and nutrient intake, Supplement intake  Physical Signs/Symptoms Outcomes: Biochemical data, Skin, Weight    Discharge Planning:    Continue oral nutrition supplement, Continue current diet     Electronically signed by Kalee Laird RD     Contact: [phone]

## 2021-07-23 NOTE — PROGRESS NOTES
Orthopedic NP Progress Note  Post Op day: 4 Days Post-Op    July 23, 2021 10:01 AM     Gabbi Tran    Attending Physician: Treatment Team: Attending Provider: Dixie Dunbar MD; Utilization Review: Brenda Keita RN; Consulting Provider: Manish Long; Physician: Roman Ivory MD; Physician: Dixie Dunbar MD; Care Manager: Kevin Halie; Utilization Review: Michael Birmingham; Utilization Review: Sam Stein     Vital Signs:    Patient Vitals for the past 8 hrs:   BP Temp Pulse Resp SpO2   07/23/21 0824 103/68 97.6 °F (36.4 °C) (!) 110 18 96 %     BMI (calculated): 23.8 (07/19/21 1525)    Intake/Output:  No intake/output data recorded. 07/21 1901 - 07/23 0700  In: 720 [P.O.:720]  Out: 1000 [Urine:1000]    Pain Control:   Pain Assessment  Pain Scale 1: Adult Nonverbal Pain Scale  Pain Intensity 1: 4  Pain Onset 1: acute  Pain Location 1: Hip  Pain Orientation 1: Left  Pain Description 1: Sore  Pain Intervention(s) 1: Medication (see MAR)    LAB:    Recent Labs     07/22/21  0116   HCT 24.0*   HGB 7.7*     Lab Results   Component Value Date/Time    Sodium 141 07/23/2021 01:03 AM    Potassium 4.7 07/23/2021 01:03 AM    Chloride 112 (H) 07/23/2021 01:03 AM    CO2 21 07/23/2021 01:03 AM    Glucose 148 (H) 07/23/2021 01:03 AM    BUN 34 (H) 07/23/2021 01:03 AM    Creatinine 1.04 07/23/2021 01:03 AM    Calcium 8.3 (L) 07/23/2021 01:03 AM       Subjective: Gabbi Tran is a 80 y.o. male s/p a  Procedure(s): FEMORAL INTERTROCHANTERIC NAIL INSERTION LEFT   Procedure(s): FEMORAL INTERTROCHANTERIC NAIL INSERTION LEFT. Tolerating diet. Has been up with PT and back in bed resting at this time. Daughter has noted increased confusion during this admission. Objective: General: alert, cooperative, no distress. Neuro/Vascular: CNS Intact. Sensation stable. Brisk cap refill, + pulses UE/LE  Musculoskeletal:  +ROM UE/LE. Skin: Incision - clean, dry and intact.  No significant erythema or swelling. Dressing: clean, dry, and intact    Avendaño - n  Drain - n       PT/OT:   Gait:  Gait  Base of Support: Narrowed, Shift to right  Speed/Chantel: Pace decreased (<100 feet/min), Shuffled  Step Length: Right shortened, Left shortened  Swing Pattern:  (no swing)  Gait Abnormalities: Decreased step clearance  Ambulation - Level of Assistance: Moderate assistance, Assist x2  Distance (ft): 5 Feet (ft)                   Assessment:    s/p Procedure(s): FEMORAL INTERTROCHANTERIC NAIL INSERTION LEFT    Active Problems:    Hip fracture ([address] (7/18/2021)      CAP (community acquired pneumonia) (7/18/2021)         Plan:   -  Continue PT/OT - WBAT LLE with walker   -  Continue established methods of pain control  -  VTE Prophylaxes - TEDS &/or SCDs with Xarelto as per preop   -  Pt may follow up in 2-3 weeks with Dr. Ron Rangel.  Ortho will sign off at this time, please call with any questions or concerns     Discharge To:  SNF, cleared for DC per Ortho       Signed By: Rodolfo Aguayo NP    Orthopedic Nurse Practitioner

## 2021-07-23 NOTE — PROGRESS NOTES
Attempted to medicate patient but he is too drowsy at this time. Will attempt when patient is more awake and can safely swallow.

## 2021-07-23 NOTE — PROGRESS NOTES
Problem: Mobility Impaired (Adult and Pediatric)  Goal: *Acute Goals and Plan of Care (Insert Text)  Description: FUNCTIONAL STATUS PRIOR TO ADMISSION: Patient was modified independent using a rolling walker for functional mobility. Although patient poor historian at initial encounter. HOME SUPPORT PRIOR TO ADMISSION: The patient lives alone in an independent living facility. Per chart, at [facility]. Physical Therapy Goals  Initiated 7/22/2021  1. Patient will move from supine to sit and sit to supine  in bed with minimal assistance/contact guard assist within 7 day(s). 2.  Patient will transfer from bed to chair and chair to bed with moderate assistance  using the least restrictive device within 7 day(s). 3.  Patient will perform sit to stand with minimal assistance/contact guard assist within 7 day(s). 4.  Patient will ambulate with minimal assistance/contact guard assist for 15 feet with the least restrictive device within 7 day(s).             7/23/2021 1541 by Florida Kraus, PT  Outcome: Progressing Towards Goal  7/23/2021 1040 by [place] Kraus, PT  Outcome: Progressing Towards Goal   PHYSICAL THERAPY TREATMENT  Patient: Libby Bridges (53 y.o. male)  Date: 7/23/2021  Diagnosis: Hip fracture ([facility] [S72.009A]  CAP (community acquired pneumonia) [J18.9] <principal problem not specified>  Procedure(s) (LRB):  FEMORAL INTERTROCHANTERIC NAIL INSERTION LEFT (Left) 4 Days Post-Op  Precautions: Fall, WBAT  Chart, physical therapy assessment, plan of care and goals were reviewed. ASSESSMENT  Patient continues with skilled PT services and is slowly progressing towards goals. Patient again this afternoon with decreased command following, slightly improved from AM when patient could not hear therapists. Patient also presents with significant agitation with fluctuating drowsiness.  Requires frequent verbal cues to open eyes, and have to ask questions multiple times, often without response from patient. Daughter present and helpful during this session. Mobility wise, patient continuing to require modAx 2 for sit >stand, stepping limited at this visit secondary to patient's agitation and lack of command following. Upon second trial of standing, patient reporting nausea; vitals stable. Alerted nurse who came to administer medication to address. At this time, patient would benefit from skilled PT daily, rather than bi daily secondary to reduced command following, significant agitation and lack of orientation. Continue to recommend rehab for this patient. Current Level of Function Impacting Discharge (mobility/balance): modA x 2 sit > stand with RW; patient confused and not oriented to situation, place or time. Other factors to consider for discharge: None additional         PLAN :  Patient continues to benefit from skilled intervention to address the above impairments. Continue treatment per established plan of care. to address goals. Recommendation for discharge: (in order for the patient to meet his/her long term goals)  Therapy up to 5 days/week in SNF setting    This discharge recommendation:  Has been made in collaboration with the attending provider and/or case management    IF patient discharges home will need the following DME: to be determined (TBD)       SUBJECTIVE:   Patient stated Kevin Hightower are you doing this to me?  Patient also with occasional expletives with mobility. OBJECTIVE DATA SUMMARY:   Patient received supine in bed and was agreeable to participate in PT session. Patient was cleared by nursing to participate in PT session. Patient's daughter was present and active in assisting with session.      Critical Behavior:  Neurologic State: Confused, Drowsy  Orientation Level: Disoriented to place, Disoriented to situation, Disoriented to time  Cognition: Decreased attention/concentration, No command following  Safety/Judgement: Awareness of environment, Fall prevention, Good awareness of safety precautions, Home safety, Insight into deficits  Functional Mobility Training:  Bed Mobility:     Supine to Sit: Moderate assistance;Assist x2  Sit to Supine: Maximum assistance;Assist x2  Scooting: Moderate assistance;Assist x1        Transfers:  Sit to Stand: Moderate assistance;Assist x2  Stand to Sit: Minimum assistance;Assist x2                             Balance:  Sitting: Intact; With support  Sitting - Static: Fair (occasional)  Sitting - Dynamic: Fair (occasional)  Standing: Impaired; With support;Pull to stand  Standing - Static: Poor  Ambulation/Gait Training:  Distance (ft): 1 Feet (ft)  Assistive Device: Walker, rolling;Gait belt  Ambulation - Level of Assistance: Moderate assistance;Assist x2        Gait Abnormalities: Decreased step clearance;Lurching     Left Side Weight Bearing: As tolerated  Base of Support: Narrowed; Shift to right     Speed/Chantel: Pace decreased (<100 feet/min)  Step Length: Left shortened;Right shortened          Therapeutic Exercises:   PT offering therapeutic exercises at edge of bed, patient declined. Pain Rating:  Patient during session verbalizing that he does not have pain, however, with functional mobility making facial expressions of pain and verbal expressions of pain. Activity Tolerance:   Poor, requires rest breaks, and observed SOB with activity    After treatment patient left in no apparent distress:   Supine in bed, Call bell within reach, Bed / chair alarm activated, Caregiver / family present, and Side rails x 3    COMMUNICATION/COLLABORATION:   The patients plan of care was discussed with: Physical therapy assistant, Occupational therapy assistant, Registered nurse, and Rehabilitation technician.      Bouchra Hoover PT, DPT   Time Calculation: 33 mins

## 2021-07-23 NOTE — PROGRESS NOTES
7/23/2021  3:14 PM  Update via chart review, pt is continuing to require medical management for femur fracture, CAP, AFib, neuropathy,anemia.  POD #4 FEMORAL INTERTROCHANTERIC NAIL INSERTION LEFT     Transition of Care Plan:                    RUR 21%  LOS 5 Day  1. Medical management continues   2. Ortho following POD #4 FEMORAL INTERTROCHANTERIC NAIL INSERTION LEFT  3. CM to follow through for treatment/response  4. PT, OT following recommending SNF at DC   5. DC when stable, to  SNF, pt accepted [facility] pending, updates sent in [employer], pt must be Sitter Free 24 H prior to DC, pt will need rapid COVID  Day of DC   6. Rapid COVID negative 7/22  pt is fully vaccinated,  7. Outpatient f/u PCP, ortho  8.  Pt will require [facility] at [address] [place] [address], BS

## 2021-07-24 LAB
ANION GAP SERPL CALC-SCNC: 5 MMOL/L (ref 5–15)
BASOPHILS # BLD: 0 K/UL (ref 0–0.1)
BASOPHILS NFR BLD: 0 % (ref 0–1)
BUN SERPL-MCNC: 48 MG/DL (ref 6–20)
BUN/CREAT SERPL: 36 (ref 12–20)
CALCIUM SERPL-MCNC: 8.7 MG/DL (ref 8.5–10.1)
CHLORIDE SERPL-SCNC: 111 MMOL/L (ref 97–108)
CO2 SERPL-SCNC: 27 MMOL/L (ref 21–32)
COMMENT, HOLDF: NORMAL
CREAT SERPL-MCNC: 1.34 MG/DL (ref 0.7–1.3)
DIFFERENTIAL METHOD BLD: ABNORMAL
EOSINOPHIL # BLD: 0.1 K/UL (ref 0–0.4)
EOSINOPHIL NFR BLD: 1 % (ref 0–7)
ERYTHROCYTE [DISTWIDTH] IN BLOOD BY AUTOMATED COUNT: 17.2 % (ref 11.5–14.5)
GLUCOSE SERPL-MCNC: 132 MG/DL (ref 65–100)
HCT VFR BLD AUTO: 28.1 % (ref 36.6–50.3)
HGB BLD-MCNC: 8.7 G/DL (ref 12.1–17)
IMM GRANULOCYTES # BLD AUTO: 0.1 K/UL (ref 0–0.04)
IMM GRANULOCYTES NFR BLD AUTO: 1 % (ref 0–0.5)
LYMPHOCYTES # BLD: 1.1 K/UL (ref 0.8–3.5)
LYMPHOCYTES NFR BLD: 11 % (ref 12–49)
MCH RBC QN AUTO: 32.3 PG (ref 26–34)
MCHC RBC AUTO-ENTMCNC: 31 G/DL (ref 30–36.5)
MCV RBC AUTO: 104.5 FL (ref 80–99)
MONOCYTES # BLD: 1 K/UL (ref 0–1)
MONOCYTES NFR BLD: 10 % (ref 5–13)
NEUTS SEG # BLD: 7.5 K/UL (ref 1.8–8)
NEUTS SEG NFR BLD: 77 % (ref 32–75)
NRBC # BLD: 0 K/UL (ref 0–0.01)
NRBC BLD-RTO: 0 PER 100 WBC
PLATELET # BLD AUTO: 227 K/UL (ref 150–400)
PMV BLD AUTO: 10.7 FL (ref 8.9–12.9)
POTASSIUM SERPL-SCNC: 4.5 MMOL/L (ref 3.5–5.1)
RBC # BLD AUTO: 2.69 M/UL (ref 4.1–5.7)
SAMPLES BEING HELD,HOLD: NORMAL
SODIUM SERPL-SCNC: 143 MMOL/L (ref 136–145)
WBC # BLD AUTO: 9.8 K/UL (ref 4.1–11.1)

## 2021-07-24 PROCEDURE — 74011250637 HC RX REV CODE- 250/637: Performed by: PHYSICIAN ASSISTANT

## 2021-07-24 PROCEDURE — 80048 BASIC METABOLIC PNL TOTAL CA: CPT

## 2021-07-24 PROCEDURE — 74011250637 HC RX REV CODE- 250/637: Performed by: FAMILY MEDICINE

## 2021-07-24 PROCEDURE — 85025 COMPLETE CBC W/AUTO DIFF WBC: CPT

## 2021-07-24 PROCEDURE — 74011250637 HC RX REV CODE- 250/637: Performed by: ORTHOPAEDIC SURGERY

## 2021-07-24 PROCEDURE — 65270000029 HC RM PRIVATE

## 2021-07-24 PROCEDURE — 97530 THERAPEUTIC ACTIVITIES: CPT

## 2021-07-24 RX ADMIN — Medication 1 TABLET: at 09:40

## 2021-07-24 RX ADMIN — Medication 1 TABLET: at 17:46

## 2021-07-24 RX ADMIN — SERTRALINE 50 MG: 50 TABLET, FILM COATED ORAL at 09:40

## 2021-07-24 RX ADMIN — ACETAMINOPHEN 650 MG: 325 TABLET ORAL at 12:37

## 2021-07-24 RX ADMIN — DOCUSATE SODIUM 50MG AND SENNOSIDES 8.6MG 1 TABLET: 8.6; 5 TABLET, FILM COATED ORAL at 09:40

## 2021-07-24 RX ADMIN — QUETIAPINE FUMARATE 25 MG: 25 TABLET ORAL at 21:49

## 2021-07-24 RX ADMIN — FOLIC ACID 1 MG: 1 TABLET ORAL at 09:40

## 2021-07-24 RX ADMIN — DOCUSATE SODIUM 50MG AND SENNOSIDES 8.6MG 1 TABLET: 8.6; 5 TABLET, FILM COATED ORAL at 17:46

## 2021-07-24 RX ADMIN — ATENOLOL 25 MG: 25 TABLET ORAL at 09:40

## 2021-07-24 RX ADMIN — ACETAMINOPHEN 650 MG: 325 TABLET ORAL at 05:47

## 2021-07-24 RX ADMIN — Medication 1 TABLET: at 12:37

## 2021-07-24 RX ADMIN — POLYETHYLENE GLYCOL 3350 17 G: 17 POWDER, FOR SOLUTION ORAL at 09:40

## 2021-07-24 RX ADMIN — ACETAMINOPHEN 650 MG: 325 TABLET ORAL at 17:46

## 2021-07-24 RX ADMIN — RIVAROXABAN 15 MG: 15 TABLET, FILM COATED ORAL at 17:46

## 2021-07-24 NOTE — PROGRESS NOTES
Bedside shift change report given to Ava Anderson (oncoming nurse) by Yeyo Land (offgoing nurse). Report included the following information SBAR, Kardex, Intake/Output, MAR and Recent Results.

## 2021-07-24 NOTE — PROGRESS NOTES
Physical Therapy Note:    PT treatment deferred. Author notified by RN that pt's daughter is present in facility and requesting PT treatment while she is here to observe pt status. On PT entry daughter reports pt's hearing aids are charging and advises PT see pt later when hearing aids are charged in order to optimize pt performance and cooperation. Will continue to follow per POC.     Liyah Bailey, PT, DPT, Aris Thompson

## 2021-07-24 NOTE — PROGRESS NOTES
Problem: Mobility Impaired (Adult and Pediatric)  Goal: *Acute Goals and Plan of Care (Insert Text)  Description: FUNCTIONAL STATUS PRIOR TO ADMISSION: Patient was modified independent using a rolling walker for functional mobility. Although patient poor historian at initial encounter. HOME SUPPORT PRIOR TO ADMISSION: The patient lives alone in an independent living facility. Per chart, at [facility]. Physical Therapy Goals  Initiated 7/22/2021  1. Patient will move from supine to sit and sit to supine  in bed with minimal assistance/contact guard assist within 7 day(s). 2.  Patient will transfer from bed to chair and chair to bed with moderate assistance  using the least restrictive device within 7 day(s). 3.  Patient will perform sit to stand with minimal assistance/contact guard assist within 7 day(s). 4.  Patient will ambulate with minimal assistance/contact guard assist for 15 feet with the least restrictive device within 7 day(s). Outcome: Progressing Towards Goal      PHYSICAL THERAPY TREATMENT  Patient: Yifan Bajwa (19 y.o. male)  Date: 7/24/2021  Diagnosis: Hip fracture ([facility] [address] [S72.009A]  CAP (community acquired pneumonia) [J18.9] <principal problem not specified>  Procedure(s) (LRB):  FEMORAL INTERTROCHANTERIC NAIL INSERTION LEFT (Left) 5 Days Post-Op  Precautions: Fall, WBAT, low hearing +hearing aids  Chart, physical therapy assessment, plan of care and goals were reviewed. ASSESSMENT  Patient continues with skilled PT services and is progressing towards goals. He requires decreased assistance for bed mobility and transfers, ambulates slightly increased distance, and denies pain throughout encounter. Pt is disoriented x 3 but appears to follow all commands that he is able to hear. Attempted to provide update to daughter; daughter not present in hospital and does not answer call placed to cell phone number listed on board in pt's room.       Current Level of Function Impacting Discharge (mobility/balance): min assist x 2    Other factors to consider for discharge: none additional         PLAN :  Patient continues to benefit from skilled intervention to address the above impairments. Continue treatment per established plan of care. to address goals. Recommendation for discharge: (in order for the patient to meet his/her long term goals)  Therapy up to 5 days/week in SNF setting    This discharge recommendation:  Has been made in collaboration with the attending provider and/or case management    IF patient discharges home will need the following DME: to be determined (TBD)       SUBJECTIVE:   Patient stated Where do you want me to go?    Pt received supine, agreeable to PT and cleared by RN. OBJECTIVE DATA SUMMARY:   Critical Behavior:  Neurologic State: Confused - asks if he is [place] a [place]. Orientation Level: Oriented to person  Cognition: Appropriate decision making  Safety/Judgement: Awareness of environment, Fall prevention, Good awareness of safety precautions, Home safety, Insight into deficits  Functional Mobility Training:  Bed Mobility:     Supine to Sit: Additional time;Minimum assistance; Adaptive equipment     Scooting: Additional time;Minimum assistance        Transfers:  Sit to Stand: Assist x2; Additional time;Minimum assistance  Stand to Sit: Assist x2; Additional time;Minimum assistance                             Balance:  Sitting: Without support  Sitting - Static: Good (unsupported)  Sitting - Dynamic: Fair (occasional)  Standing: With support  Standing - Static: Fair  Standing - Dynamic : Poor  Ambulation/Gait Training:  Distance (ft): 3 Feet (ft)  Assistive Device: Walker, rolling;Gait belt  Ambulation - Level of Assistance: Minimal assistance        Gait Abnormalities: Antalgic;Decreased step clearance        Base of Support: Narrowed  Stance: Left decreased  Speed/Chantel: Pace decreased (<100 feet/min)  Step Length: Left shortened Assist for RW management; refuses further ambulation               Pain Rating:  Denies pain    Activity Tolerance:   Good    After treatment patient left in no apparent distress:   Sitting in chair, Call bell within reach, and Bed / chair alarm activated    COMMUNICATION/COLLABORATION:   The patients plan of care was discussed with: Registered nurse and Rehabilitation technician.      Silviano Arroyo PT, DPT   Time Calculation: 15 mins

## 2021-07-24 NOTE — PROGRESS NOTES
Daily Progress Note: 7/24/2021  Rj Magallanes MD    Assessment/Plan:   L displaced intertrochanteric femur fx: Highly functional, independent 89M with minimal comorbidities, no dementia. Low risk for surgery Last xarelto 5/17  - Per ortho  -Surg 7/19/21     CAP?: CXR  with LLL consolidation vs pna. WBC is 12.1 with left shift but this could quite easily be 2/2 fracture. He has no sx and no hypoxemia. Received abx x 1 in ER  - Check procal  - Hold on further abx     Afib: Recently had atenolol discontinued due orthostasis.  Presently in NSR  - Can resume Xarelto post-op depending on Hb     Neuropathy:   -Holding pregabalin 75mg bid, duloxetine 90mg     Hx uric acid stones:   -Allopurinol holding     Anemia- blood loss  - transfused 1 unit 7/20  -Keep Hb >7- will transfuse as needed    Urinary Retention  -Avendaño placed 7/20- will give voiding trial today    Disp:  -will need SNF       Problem List:  Problem List as of 7/24/2021 Date Reviewed: 12/20/2018        Codes Class Noted - Resolved    Hip fracture ([facility] ICD-10-CM: O62.317B  ICD-9-CM: 820.8  7/18/2021 - Present        CAP (community acquired pneumonia) ICD-10-CM: J18.9  ICD-9-CM: 486  7/18/2021 - Present        Chronic atrial fibrillation ([facility] ICD-10-CM: I48.20  ICD-9-CM: 427.31  11/10/2017 - Present        Sinus node dysfunction ([facility] ICD-10-CM: I49.5  ICD-9-CM: 427.81  4/12/2016 - Present        Depression ICD-10-CM: F32.9  ICD-9-CM: 297  9/28/2015 - Present        Dyslipidemia ICD-10-CM: E78.5  ICD-9-CM: 272.4  9/23/2014 - Present        Gout ICD-10-CM: M10.9  ICD-9-CM: 274.9  Unknown - Present        Hx of carcinoma in situ of prostate ICD-10-CM: Q65.553  ICD-9-CM: V13.89  Unknown - Present        Essential hypertension, benign ICD-10-CM: I10  ICD-9-CM: 401.1  11/16/2013 - Present        Kidney stones ICD-10-CM: N20.0  ICD-9-CM: 592.0  11/16/2013 - Present        Embolic stroke involving right middle cerebral artery ([facility] [address] ICD-10-CM: B43.257  ICD-9-CM: 434.11  11/1/2013 - Present    Overview Signed 12/7/2013  9:23 AM by Kimberly Acosta MD     presented with dysarthria, MRI Several tiny acute infarcts in the right middle cerebral artery             RESOLVED: Dizziness ICD-10-CM: R42  ICD-9-CM: 780.4  4/12/2016 - 6/21/2016        RESOLVED: Typical atrial flutter ([facility] [address] ICD-10-CM: I48.3  ICD-9-CM: 427.32  5/6/2015 - 9/28/2015        RESOLVED: Paroxysmal atrial fibrillation ([facility] ICD-10-CM: I48.0  ICD-9-CM: 427.31  9/23/2014 - 11/10/2017        RESOLVED: Dysarthria ICD-9-CM: 784.5  11/17/2013 - 12/7/2013        RESOLVED: TIA (transient ischemic attack) ICD-10-CM: G45.9  ICD-9-CM: 435.9  11/16/2013 - 12/7/2013        RESOLVED: Other and unspecified hyperlipidemia ICD-10-CM: E78.5  ICD-9-CM: 272.4  11/16/2013 - 9/23/2014        RESOLVED: Hypokalemia ICD-10-CM: E87.6  ICD-9-CM: 276.8  11/16/2013 - 12/7/2013              HPI:    Shannan Lorenzana is a 80 y.o.  M hx Afib presents after mechanical fall. He lives in an independent living facility, but has meals prepared for him. He had gone to the bathroom this morning and was returning to be when he felt like his sock slipped and he went down. He did not have lightheadedness, white out, cp, sob before this. No LOC. He called daughter who called 911.      In ER, he is noted to have L displaced intertrochanteric femur fracture as well as mild leukocytosis on some c/f pneumonia, though he denies cough, fever, chills recently.      He has never had any problems with anesthesia in the past. Last took xarelto last night. PCP stopped atenolol recently. (Dr Finas Peabody)     7/19: Daughter present in room. He has been somewhat confused with the Morphine. He is typically very alert and self sufficient. He knows he is in the hospital. For surgery later today. 7/20: Surgery 7/19. Hb down to 6.5. Spoke with daughter who has consented to transfusion. Will order 1 unit of blood.  He is pleasantly confused this am.     7/21: Hb 6.8- will transfuse another unit. Holding Xarelto for now. Restart Metoprolol with parameters. Holding Lyrica and Cymbalta as he has been more confused. Sitter present. Will try a low dose Seroquel tonight.     : Hb up. Will resume Xarelto. Slept better with Seroquel. A little less confused this am. Knows he is in the hospital.     : More confusion during the night. Sleeping now after a bath and Tramadol. Will try to keep up today and will increase Seroquel at bedtime. :  Creat slowly rising. Hb stable. Will give voiding trial today. No sitter last night. Has been accepted to [address] Review of Systems:   Review of systems not obtained due to patient factors. Objective:   Physical Exam:     Visit Vitals  /75 (BP 1 Location: Right upper arm, BP Patient Position: At rest)   Pulse (!) 107   Temp 97.8 °F (36.6 °C)   Resp 18   Ht 5' 7.01\" (1.702 m)   Wt 70 kg (154 lb 5.2 oz)   SpO2 96%   BMI 24.16 kg/m²    O2 Flow Rate (L/min): 2 l/min O2 Device: None (Room air)    Temp (24hrs), Av.5 °F (36.4 °C), Min:97 °F (36.1 °C), Max:97.8 °F (36.6 °C)    1901 -  0700  In: 120 [P.O.:120]  Out: -    701 -  1900  In: 720 [P.O.:720]  Out: 650 [Urine:650]    General:  no distress, appears stated age. Head:  Normocephalic, without obvious abnormality, atraumatic. Eyes:  Conjunctivae/corneas clear. Nose: Nares normal. Septum midline. Mucosa normal. No drainage or sinus tenderness. Throat: Lips, mucosa, and tongue normal.    Neck: Supple, symmetrical, trachea midline, no adenopathy, thyroid: no enlargement/tenderness/nodules, no carotid bruit and no JVD. Lungs:   Clear to auscultation bilaterally. Chest wall:  No tenderness or deformity. Heart:  Regular rate and rhythm, S1, S2 normal, no murmur, click, rub or gallop. Abdomen:   Soft, non-tender. Bowel sounds normal. No masses,  No organomegaly.    Extremities: Left leg with dressing intact, no cyanosis or edema. No calf tenderness or cords. Bruising left hand. Pulses: 2+ and symmetric all extremities. Skin:  turgor normal. No rashes    Neurologic: CNII-XII intact. Alert and oriented X 2. Fine motor of hands and fingers normal.   equal.  No cogwheeling or rigidity. Gait not tested at this time. Sensation grossly normal to touch. Gross motor of extremities normal.       Data Review:    CT Head 7/18/21   IMPRESSION  No acute intracranial process. Unchanged left sphenoid sinus disease. Xray left hand 7/19/21  IMPRESSION  No acute abnormality. X-ray left femur 7/18/21   IMPRESSION  Displaced intertrochanteric fracture of the proximal left femur. Recent Days:  Recent Labs     07/24/21  0154 07/22/21  0116   WBC 9.8 9.9   HGB 8.7* 7.7*   HCT 28.1* 24.0*    154     Recent Labs     07/24/21  0154 07/23/21  0103 07/22/21  0116    141 139   K 4.5 4.7 4.2   * 112* 109*   CO2 27 21 26   * 148* 134*   BUN 48* 34* 33*   CREA 1.34* 1.04 0.97   CA 8.7 8.3* 8.0*     No results for input(s): PH, PCO2, PO2, HCO3, FIO2 in the last 72 hours.     24 Hour Results:  Recent Results (from the past 24 hour(s))   METABOLIC PANEL, BASIC    Collection Time: 07/24/21  1:54 AM   Result Value Ref Range    Sodium 143 136 - 145 mmol/L    Potassium 4.5 3.5 - 5.1 mmol/L    Chloride 111 (H) 97 - 108 mmol/L    CO2 27 21 - 32 mmol/L    Anion gap 5 5 - 15 mmol/L    Glucose 132 (H) 65 - 100 mg/dL    BUN 48 (H) 6 - 20 MG/DL    Creatinine 1.34 (H) 0.70 - 1.30 MG/DL    BUN/Creatinine ratio 36 (H) 12 - 20      GFR est AA >60 >60 ml/min/1.73m2    GFR est non-AA 50 (L) >60 ml/min/1.73m2    Calcium 8.7 8.5 - 10.1 MG/DL   CBC WITH AUTOMATED DIFF    Collection Time: 07/24/21  1:54 AM   Result Value Ref Range    WBC 9.8 4.1 - 11.1 K/uL    RBC 2.69 (L) 4.10 - 5.70 M/uL    HGB 8.7 (L) 12.1 - 17.0 g/dL    HCT 28.1 (L) 36.6 - 50.3 %    .5 (H) 80.0 - 99.0 FL    MCH 32.3 26.0 - 34.0 PG    MCHC 31.0 30.0 - 36.5 g/dL RDW 17.2 (H) 11.5 - 14.5 %    PLATELET 868 346 - 265 K/uL    MPV 10.7 8.9 - 12.9 FL    NRBC 0.0 0  WBC    ABSOLUTE NRBC 0.00 0.00 - 0.01 K/uL    NEUTROPHILS 77 (H) 32 - 75 %    LYMPHOCYTES 11 (L) 12 - 49 %    MONOCYTES 10 5 - 13 %    EOSINOPHILS 1 0 - 7 %    BASOPHILS 0 0 - 1 %    IMMATURE GRANULOCYTES 1 (H) 0.0 - 0.5 %    ABS. NEUTROPHILS 7.5 1.8 - 8.0 K/UL    ABS. LYMPHOCYTES 1.1 0.8 - 3.5 K/UL    ABS. MONOCYTES 1.0 0.0 - 1.0 K/UL    ABS. EOSINOPHILS 0.1 0.0 - 0.4 K/UL    ABS. BASOPHILS 0.0 0.0 - 0.1 K/UL    ABS. IMM. GRANS. 0.1 (H) 0.00 - 0.04 K/UL    DF AUTOMATED     SAMPLES BEING HELD    Collection Time: 07/24/21  1:54 AM   Result Value Ref Range    SAMPLES BEING HELD 1SST     COMMENT        Add-on orders for these samples will be processed based on acceptable specimen integrity and analyte stability, which may vary by analyte.        Medications reviewed  Current Facility-Administered Medications   Medication Dose Route Frequency    QUEtiapine (SEROquel) tablet 25 mg  25 mg Oral QHS    rivaroxaban (XARELTO) tablet 15 mg  15 mg Oral DAILY WITH DINNER    atenoloL (TENORMIN) tablet 25 mg  25 mg Oral DAILY    folic acid (FOLVITE) tablet 1 mg  1 mg Oral DAILY    sertraline (ZOLOFT) tablet 50 mg  50 mg Oral DAILY    0.9% sodium chloride infusion 250 mL  250 mL IntraVENous PRN    0.9% sodium chloride infusion 250 mL  250 mL IntraVENous PRN    0.9% sodium chloride infusion 250 mL  250 mL IntraVENous PRN    traMADoL (ULTRAM) tablet 50 mg  50 mg Oral Q6H PRN    0.9% sodium chloride infusion 250 mL  250 mL IntraVENous PRN    acetaminophen (TYLENOL) tablet 650 mg  650 mg Oral Q6H    Or    acetaminophen (TYLENOL) suppository 650 mg  650 mg Rectal Q6H    0.9% sodium chloride infusion 250 mL  250 mL IntraVENous PRN    sodium chloride (NS) flush 5-40 mL  5-40 mL IntraVENous Q8H    sodium chloride (NS) flush 5-40 mL  5-40 mL IntraVENous PRN    naloxone (NARCAN) injection 0.4 mg  0.4 mg IntraVENous PRN    calcium-vitamin D (OS-VINCENT +D3) 500 mg-200 unit per tablet 1 Tablet  1 Tablet Oral TID WITH MEALS    senna-docusate (PERICOLACE) 8.6-50 mg per tablet 1 Tablet  1 Tablet Oral BID    polyethylene glycol (MIRALAX) packet 17 g  17 g Oral DAILY    bisacodyL (DULCOLAX) suppository 10 mg  10 mg Rectal DAILY PRN    sodium chloride (NS) flush 5-40 mL  5-40 mL IntraVENous Q8H    sodium chloride (NS) flush 5-40 mL  5-40 mL IntraVENous PRN    polyethylene glycol (MIRALAX) packet 17 g  17 g Oral DAILY PRN    ondansetron (ZOFRAN ODT) tablet 4 mg  4 mg Oral Q8H PRN    Or    ondansetron (ZOFRAN) injection 4 mg  4 mg IntraVENous Q6H PRN       Care Plan discussed with: Patient/Family and Nurse    Total time spent with patient and review of records: 30 minutes.     Cyrus Tobin MD

## 2021-07-24 NOTE — PROGRESS NOTES
0715 Report given to Lucy Friday who was notified that sitter form was sent down to Supervisor but no sitter in room at this time. The sitter who sat with patient during the night stated that she had to leave and could not stay until someone else arrived. This Nurse and Nurse Bharati Ramirez saw that patient is asleep and resting comfortably and the bed alarm is on.

## 2021-07-25 LAB
ANION GAP SERPL CALC-SCNC: 6 MMOL/L (ref 5–15)
BASOPHILS # BLD: 0 K/UL (ref 0–0.1)
BASOPHILS NFR BLD: 0 % (ref 0–1)
BUN SERPL-MCNC: 46 MG/DL (ref 6–20)
BUN/CREAT SERPL: 41 (ref 12–20)
CALCIUM SERPL-MCNC: 8.7 MG/DL (ref 8.5–10.1)
CHLORIDE SERPL-SCNC: 112 MMOL/L (ref 97–108)
CO2 SERPL-SCNC: 24 MMOL/L (ref 21–32)
COVID-19 RAPID TEST, COVR: NOT DETECTED
CREAT SERPL-MCNC: 1.12 MG/DL (ref 0.7–1.3)
DIFFERENTIAL METHOD BLD: ABNORMAL
EOSINOPHIL # BLD: 0.1 K/UL (ref 0–0.4)
EOSINOPHIL NFR BLD: 1 % (ref 0–7)
ERYTHROCYTE [DISTWIDTH] IN BLOOD BY AUTOMATED COUNT: 17.8 % (ref 11.5–14.5)
GLUCOSE SERPL-MCNC: 134 MG/DL (ref 65–100)
HCT VFR BLD AUTO: 27.1 % (ref 36.6–50.3)
HGB BLD-MCNC: 8.3 G/DL (ref 12.1–17)
IMM GRANULOCYTES # BLD AUTO: 0.1 K/UL (ref 0–0.04)
IMM GRANULOCYTES NFR BLD AUTO: 1 % (ref 0–0.5)
LYMPHOCYTES # BLD: 0.9 K/UL (ref 0.8–3.5)
LYMPHOCYTES NFR BLD: 10 % (ref 12–49)
MCH RBC QN AUTO: 32 PG (ref 26–34)
MCHC RBC AUTO-ENTMCNC: 30.6 G/DL (ref 30–36.5)
MCV RBC AUTO: 104.6 FL (ref 80–99)
MONOCYTES # BLD: 1 K/UL (ref 0–1)
MONOCYTES NFR BLD: 10 % (ref 5–13)
NEUTS SEG # BLD: 7.4 K/UL (ref 1.8–8)
NEUTS SEG NFR BLD: 78 % (ref 32–75)
NRBC # BLD: 0 K/UL (ref 0–0.01)
NRBC BLD-RTO: 0 PER 100 WBC
PLATELET # BLD AUTO: 261 K/UL (ref 150–400)
PMV BLD AUTO: 10.6 FL (ref 8.9–12.9)
POTASSIUM SERPL-SCNC: 4.2 MMOL/L (ref 3.5–5.1)
RBC # BLD AUTO: 2.59 M/UL (ref 4.1–5.7)
SODIUM SERPL-SCNC: 142 MMOL/L (ref 136–145)
SOURCE, COVRS: NORMAL
WBC # BLD AUTO: 9.5 K/UL (ref 4.1–11.1)

## 2021-07-25 PROCEDURE — 87635 SARS-COV-2 COVID-19 AMP PRB: CPT

## 2021-07-25 PROCEDURE — 80048 BASIC METABOLIC PNL TOTAL CA: CPT

## 2021-07-25 PROCEDURE — 74011250637 HC RX REV CODE- 250/637: Performed by: PHYSICIAN ASSISTANT

## 2021-07-25 PROCEDURE — 97530 THERAPEUTIC ACTIVITIES: CPT

## 2021-07-25 PROCEDURE — 85025 COMPLETE CBC W/AUTO DIFF WBC: CPT

## 2021-07-25 PROCEDURE — 36415 COLL VENOUS BLD VENIPUNCTURE: CPT

## 2021-07-25 PROCEDURE — 74011250637 HC RX REV CODE- 250/637: Performed by: ORTHOPAEDIC SURGERY

## 2021-07-25 PROCEDURE — 97116 GAIT TRAINING THERAPY: CPT

## 2021-07-25 PROCEDURE — 65270000029 HC RM PRIVATE

## 2021-07-25 PROCEDURE — 74011250637 HC RX REV CODE- 250/637: Performed by: FAMILY MEDICINE

## 2021-07-25 RX ORDER — FERROUS SULFATE, DRIED 160(50) MG
1 TABLET, EXTENDED RELEASE ORAL
Qty: 90 TABLET | Refills: 0 | Status: SHIPPED
Start: 2021-07-25

## 2021-07-25 RX ORDER — QUETIAPINE FUMARATE 25 MG/1
25 TABLET, FILM COATED ORAL
Qty: 30 TABLET | Refills: 0 | Status: SHIPPED
Start: 2021-07-25 | End: 2021-08-03

## 2021-07-25 RX ADMIN — DOCUSATE SODIUM 50MG AND SENNOSIDES 8.6MG 1 TABLET: 8.6; 5 TABLET, FILM COATED ORAL at 08:44

## 2021-07-25 RX ADMIN — DOCUSATE SODIUM 50MG AND SENNOSIDES 8.6MG 1 TABLET: 8.6; 5 TABLET, FILM COATED ORAL at 17:34

## 2021-07-25 RX ADMIN — Medication 1 TABLET: at 12:13

## 2021-07-25 RX ADMIN — ACETAMINOPHEN 650 MG: 325 TABLET ORAL at 06:11

## 2021-07-25 RX ADMIN — SERTRALINE 50 MG: 50 TABLET, FILM COATED ORAL at 08:44

## 2021-07-25 RX ADMIN — TRAMADOL HYDROCHLORIDE 50 MG: 50 TABLET ORAL at 12:13

## 2021-07-25 RX ADMIN — POLYETHYLENE GLYCOL 3350 17 G: 17 POWDER, FOR SOLUTION ORAL at 08:44

## 2021-07-25 RX ADMIN — QUETIAPINE FUMARATE 25 MG: 25 TABLET ORAL at 21:55

## 2021-07-25 RX ADMIN — ACETAMINOPHEN 650 MG: 325 TABLET ORAL at 12:13

## 2021-07-25 RX ADMIN — ATENOLOL 25 MG: 25 TABLET ORAL at 08:44

## 2021-07-25 RX ADMIN — FOLIC ACID 1 MG: 1 TABLET ORAL at 08:44

## 2021-07-25 RX ADMIN — ACETAMINOPHEN 650 MG: 325 TABLET ORAL at 17:34

## 2021-07-25 RX ADMIN — Medication 1 TABLET: at 08:44

## 2021-07-25 RX ADMIN — Medication 1 TABLET: at 17:34

## 2021-07-25 RX ADMIN — ACETAMINOPHEN 650 MG: 325 TABLET ORAL at 00:20

## 2021-07-25 RX ADMIN — RIVAROXABAN 15 MG: 15 TABLET, FILM COATED ORAL at 17:34

## 2021-07-25 NOTE — PROGRESS NOTES
7/25/2021   12:07 PM  CM notified nursing of Rapid COVID test needed. 2nd IMM letter provided to pt's daughter. Copy of signed IMM letter on pt's chart. 10:24 AM  Case Management note:     CM noted discharge for pt. Pt is medically cleared to discharge to SNF. Rapid COVID test needed for discharge  CM spoke with NOK-Daughter and would like pt evaluated for SNF by [facility], they will evaluate on Monday. Pt has now been sitter free for 2 days. Updated progress notes along with PT/OT evals sent to [facility] via [employer]. CM will continue to follow.     Antonieta Haile

## 2021-07-25 NOTE — PROGRESS NOTES
Problem: Mobility Impaired (Adult and Pediatric)  Goal: *Acute Goals and Plan of Care (Insert Text)  Description: FUNCTIONAL STATUS PRIOR TO ADMISSION: Patient was modified independent using a rolling walker for functional mobility. Although patient poor historian at initial encounter. HOME SUPPORT PRIOR TO ADMISSION: The patient lives alone in an independent living facility. Per chart, at [facility]. Physical Therapy Goals  Initiated 7/22/2021  1. Patient will move from supine to sit and sit to supine  in bed with minimal assistance/contact guard assist within 7 day(s). 2.  Patient will transfer from bed to chair and chair to bed with moderate assistance  using the least restrictive device within 7 day(s). 3.  Patient will perform sit to stand with minimal assistance/contact guard assist within 7 day(s). 4.  Patient will ambulate with minimal assistance/contact guard assist for 15 feet with the least restrictive device within 7 day(s). Outcome: Not Met    PHYSICAL THERAPY TREATMENT  Patient: Fina Etienne (18 y.o. male)  Date: 7/25/2021  Diagnosis: Hip fracture ([facility] [address] [S72.009A]  CAP (community acquired pneumonia) [J18.9] <principal problem not specified>  Procedure(s) (LRB):  FEMORAL INTERTROCHANTERIC NAIL INSERTION LEFT (Left) 6 Days Post-Op  Precautions: Fall, WBAT  Chart, physical therapy assessment, plan of care and goals were reviewed. ASSESSMENT  Patient continues with skilled PT services and is progressing towards goals slowly. Pt appears a bit less confused today but remains only partially oriented. He presents with increased pain today compared with yesterday and corresponding decline in transfer and gait abilities. He initiates ambulation in room but trial aborted after 3' due to complaints of severe pain and dizziness. BP stable and symptoms improve for pt to remain out of bed in chair. Pt's daughter present throughout encounter.      Current Level of Function Impacting Discharge (mobility/balance): mod assist x2    Other factors to consider for discharge: none additional         PLAN :  Patient continues to benefit from skilled intervention to address the above impairments. Continue treatment per established plan of care. to address goals. Recommendation for discharge: (in order for the patient to meet his/her long term goals)  Therapy up to 5 days/week in SNF setting    This discharge recommendation:  Has been made in collaboration with the attending provider and/or case management    IF patient discharges home will need the following DME: to be determined (TBD)       SUBJECTIVE:   Patient stated Do I know you from [facility]?   Pt repeating question x 6 over course of encounter. Pt received supine, agreeable to PT and cleared by RN. Per daughter pt requesting to mobilize out of bed just prior to PT arrival.      OBJECTIVE DATA SUMMARY:   Critical Behavior:  Neurologic State: Confused  Orientation Level: Oriented to situation (disoriented to own birthday)  Cognition: Follows commands  Safety/Judgement: Awareness of environment, Fall prevention, Good awareness of safety precautions, Home safety, Insight into deficits  Functional Mobility Training:  Bed Mobility:     Supine to Sit: Additional time; Moderate assistance     Scooting: Additional time; Moderate assistance        Transfers:  Sit to Stand: Assist x2; Additional time; Moderate assistance  Stand to Sit: Additional time;Assist x2;Minimum assistance                             Balance:  Sitting: Without support  Sitting - Static: Good (unsupported)  Sitting - Dynamic: Fair (occasional)  Standing: With support  Standing - Static: Fair  Standing - Dynamic : Poor  Ambulation/Gait Training:  Distance (ft): 3 Feet (ft)  Assistive Device: Walker, rolling;Gait belt  Ambulation - Level of Assistance: Moderate assistance subsequent steps; Minimal assistance initial 2 steps; Assist x2;        Gait Abnormalities: Antalgic;Decreased step clearance        Base of Support: Narrowed  Stance: Left decreased  Speed/Chantel: Pace decreased (<100 feet/min)  Step Length: Left shortened;Right shortened                               Pain Rating:  States pain \"all over\" at rest, localizes L hip pain with activity; does not rate. Discussed with RN. Activity Tolerance:   Poor    After treatment patient left in no apparent distress:   Sitting in chair, Call bell within reach, Bed / chair alarm activated, and Caregiver / family present    COMMUNICATION/COLLABORATION:   The patients plan of care was discussed with: Registered nurse and Rehabilitation technician.      Momo Mariano, PT, DPT   Time Calculation: 26 mins

## 2021-07-25 NOTE — PROGRESS NOTES
0720 Report given to RAKESH Phan which included MAR, SBAR, Care, Lab Results and patient status during the night. During report Dr. Can Owens was on the floor and stated to dayshift Nurse that she wants patient mojica catheter removed so that patient can be bladder trained.

## 2021-07-25 NOTE — PROGRESS NOTES
5147 Patient's daughter stated that she helped him get on the bedside commode and then assisted him back into bed. She stated that he did have a BM. Patient noted to be calm and very thankful to this Nurse for giving him his medication. No needs voiced. No acute distress noted. Patient's daughter stated that she was leaving for the night. Will  Continue to monitor patient.

## 2021-07-25 NOTE — PROGRESS NOTES
Pressure Injury Interventions:  Sensory Interventions: Assess changes in LOC     Moisture Interventions: Absorbent underpads     Activity Interventions: Increase time out of bed     Mobility Interventions: HOB 30 degrees or less     Nutrition Interventions: Document food/fluid/supplement intake     Friction and Shear Interventions: HOB 30 degrees or less

## 2021-07-25 NOTE — PROGRESS NOTES
Daily Progress Note: 7/25/2021  Noemy Reid MD    Assessment/Plan:   L displaced intertrochanteric femur fx: Highly functional, independent 89M with minimal comorbidities, no dementia. Low risk for surgery Last xarelto 5/17  - Per ortho  -Surg 7/19/21     CAP?: CXR  with LLL consolidation vs pna. WBC is 12.1 with left shift but this could quite easily be 2/2 fracture. He has no sx and no hypoxemia. Received abx x 1 in ER  - Check procal  - Hold on further abx     Afib: Recently had atenolol discontinued due orthostasis.  Presently in NSR  - Can resume Xarelto post-op depending on Hb     Neuropathy:   -Holding pregabalin 75mg bid, duloxetine 90mg     Hx uric acid stones:   -Allopurinol holding     Anemia- blood loss  - transfused 1 unit 7/20  -Keep Hb >7- will transfuse as needed    Urinary Retention  -Avendaño placed 7/20- will give voiding trial today    Disp:  -will need SNF       Problem List:  Problem List as of 7/25/2021 Date Reviewed: 12/20/2018        Codes Class Noted - Resolved    Hip fracture ([facility] ICD-10-CM: E51.881E  ICD-9-CM: 820.8  7/18/2021 - Present        CAP (community acquired pneumonia) ICD-10-CM: J18.9  ICD-9-CM: 486  7/18/2021 - Present        Chronic atrial fibrillation ([facility] ICD-10-CM: I48.20  ICD-9-CM: 427.31  11/10/2017 - Present        Sinus node dysfunction ([facility] ICD-10-CM: I49.5  ICD-9-CM: 427.81  4/12/2016 - Present        Depression ICD-10-CM: F32.9  ICD-9-CM: 099  9/28/2015 - Present        Dyslipidemia ICD-10-CM: E78.5  ICD-9-CM: 272.4  9/23/2014 - Present        Gout ICD-10-CM: M10.9  ICD-9-CM: 274.9  Unknown - Present        Hx of carcinoma in situ of prostate ICD-10-CM: K77.204  ICD-9-CM: V13.89  Unknown - Present        Essential hypertension, benign ICD-10-CM: I10  ICD-9-CM: 401.1  11/16/2013 - Present        Kidney stones ICD-10-CM: N20.0  ICD-9-CM: 592.0  11/16/2013 - Present        Embolic stroke involving right middle cerebral artery ([facility] [address] ICD-10-CM: Z79.040  ICD-9-CM: 434.11  11/1/2013 - Present    Overview Signed 12/7/2013  9:23 AM by Wally Kumar MD     presented with dysarthria, MRI Several tiny acute infarcts in the right middle cerebral artery             RESOLVED: Dizziness ICD-10-CM: R42  ICD-9-CM: 780.4  4/12/2016 - 6/21/2016        RESOLVED: Typical atrial flutter ([facility] [address] ICD-10-CM: I48.3  ICD-9-CM: 427.32  5/6/2015 - 9/28/2015        RESOLVED: Paroxysmal atrial fibrillation ([address] ICD-10-CM: I48.0  ICD-9-CM: 427.31  9/23/2014 - 11/10/2017        RESOLVED: Dysarthria ICD-9-CM: 784.5  11/17/2013 - 12/7/2013        RESOLVED: TIA (transient ischemic attack) ICD-10-CM: G45.9  ICD-9-CM: 435.9  11/16/2013 - 12/7/2013        RESOLVED: Other and unspecified hyperlipidemia ICD-10-CM: E78.5  ICD-9-CM: 272.4  11/16/2013 - 9/23/2014        RESOLVED: Hypokalemia ICD-10-CM: E87.6  ICD-9-CM: 276.8  11/16/2013 - 12/7/2013              HPI:    Shelley Villdea is a 80 y.o.  M hx Afib presents after mechanical fall. He lives in an independent living facility, but has meals prepared for him. He had gone to the bathroom this morning and was returning to be when he felt like his sock slipped and he went down. He did not have lightheadedness, white out, cp, sob before this. No LOC. He called daughter who called 911.      In ER, he is noted to have L displaced intertrochanteric femur fracture as well as mild leukocytosis on some c/f pneumonia, though he denies cough, fever, chills recently.      He has never had any problems with anesthesia in the past. Last took xarelto last night. PCP stopped atenolol recently. (Dr Stefania Chairez)     7/19: Daughter present in room. He has been somewhat confused with the Morphine. He is typically very alert and self sufficient. He knows he is in the hospital. For surgery later today. 7/20: Surgery 7/19. Hb down to 6.5. Spoke with daughter who has consented to transfusion. Will order 1 unit of blood.  He is pleasantly confused this am.     7/21: Hb 6.8- will transfuse another unit. Holding Xarelto for now. Restart Metoprolol with parameters. Holding Lyrica and Cymbalta as he has been more confused. Sitter present. Will try a low dose Seroquel tonight.     : Hb up. Will resume Xarelto. Slept better with Seroquel. A little less confused this am. Knows he is in the hospital.     : More confusion during the night. Sleeping now after a bath and Tramadol. Will try to keep up today and will increase Seroquel at bedtime. :  Creat slowly rising. Hb stable. Will give voiding trial today. No sitter last night. Has been accepted to [facility].      : Avendaño not pulled yesterday so will do this today. Is medically ready for d/c to rehab. No sitter for the last 2 nights. Sitting up on BSC. Review of Systems:   Review of systems not obtained due to patient factors. Objective:   Physical Exam:     Visit Vitals  /76 (BP 1 Location: Right upper arm, BP Patient Position: At rest)   Pulse 80   Temp 97.6 °F (36.4 °C)   Resp 18   Ht 5' 7.01\" (1.702 m)   Wt 154 lb 12.2 oz (70.2 kg)   SpO2 97%   BMI 24.23 kg/m²    O2 Flow Rate (L/min): 2 l/min O2 Device: None (Room air)    Temp (24hrs), Av.7 °F (36.5 °C), Min:97.3 °F (36.3 °C), Max:98 °F (36.7 °C)    No intake/output data recorded. 701 -  1900  In: 120 [P.O.:120]  Out: 350 [Urine:350]    General:  no distress, appears stated age. Head:  Normocephalic, without obvious abnormality, atraumatic. Eyes:  Conjunctivae/corneas clear. Nose: Nares normal. Septum midline. Mucosa normal. No drainage or sinus tenderness. Throat: Lips, mucosa, and tongue normal.    Neck: Supple, symmetrical, trachea midline, no adenopathy, thyroid: no enlargement/tenderness/nodules, no carotid bruit and no JVD. Lungs:   Clear to auscultation bilaterally. Chest wall:  No tenderness or deformity. Heart:  Regular rate and rhythm, S1, S2 normal, no murmur, click, rub or gallop.    Abdomen:   Soft, non-tender. Bowel sounds normal. No masses,  No organomegaly. Extremities: Left leg with dressing intact, no cyanosis or edema. No calf tenderness or cords. Bruising left hand. Pulses: 2+ and symmetric all extremities. Skin:  turgor normal. No rashes    Neurologic: CNII-XII intact. Alert and oriented X 2. Fine motor of hands and fingers normal.   equal.  No cogwheeling or rigidity. Gait not tested at this time. Sensation grossly normal to touch. Gross motor of extremities normal.       Data Review:    CT Head 7/18/21   IMPRESSION  No acute intracranial process. Unchanged left sphenoid sinus disease. Xray left hand 7/19/21  IMPRESSION  No acute abnormality. X-ray left femur 7/18/21   IMPRESSION  Displaced intertrochanteric fracture of the proximal left femur. Recent Days:  Recent Labs     07/25/21  0408 07/24/21  0154   WBC 9.5 9.8   HGB 8.3* 8.7*   HCT 27.1* 28.1*    227     Recent Labs     07/25/21  0408 07/24/21  0154 07/23/21  0103    143 141   K 4.2 4.5 4.7   * 111* 112*   CO2 24 27 21   * 132* 148*   BUN 46* 48* 34*   CREA 1.12 1.34* 1.04   CA 8.7 8.7 8.3*     No results for input(s): PH, PCO2, PO2, HCO3, FIO2 in the last 72 hours. 24 Hour Results:  Recent Results (from the past 24 hour(s))   CBC WITH AUTOMATED DIFF    Collection Time: 07/25/21  4:08 AM   Result Value Ref Range    WBC 9.5 4.1 - 11.1 K/uL    RBC 2.59 (L) 4.10 - 5.70 M/uL    HGB 8.3 (L) 12.1 - 17.0 g/dL    HCT 27.1 (L) 36.6 - 50.3 %    .6 (H) 80.0 - 99.0 FL    MCH 32.0 26.0 - 34.0 PG    MCHC 30.6 30.0 - 36.5 g/dL    RDW 17.8 (H) 11.5 - 14.5 %    PLATELET 777 303 - 495 K/uL    MPV 10.6 8.9 - 12.9 FL    NRBC 0.0 0  WBC    ABSOLUTE NRBC 0.00 0.00 - 0.01 K/uL    NEUTROPHILS 78 (H) 32 - 75 %    LYMPHOCYTES 10 (L) 12 - 49 %    MONOCYTES 10 5 - 13 %    EOSINOPHILS 1 0 - 7 %    BASOPHILS 0 0 - 1 %    IMMATURE GRANULOCYTES 1 (H) 0.0 - 0.5 %    ABS. NEUTROPHILS 7.4 1.8 - 8.0 K/UL    ABS. LYMPHOCYTES 0.9 0.8 - 3.5 K/UL    ABS. MONOCYTES 1.0 0.0 - 1.0 K/UL    ABS. EOSINOPHILS 0.1 0.0 - 0.4 K/UL    ABS. BASOPHILS 0.0 0.0 - 0.1 K/UL    ABS. IMM.  GRANS. 0.1 (H) 0.00 - 0.04 K/UL    DF AUTOMATED     METABOLIC PANEL, BASIC    Collection Time: 07/25/21  4:08 AM   Result Value Ref Range    Sodium 142 136 - 145 mmol/L    Potassium 4.2 3.5 - 5.1 mmol/L    Chloride 112 (H) 97 - 108 mmol/L    CO2 24 21 - 32 mmol/L    Anion gap 6 5 - 15 mmol/L    Glucose 134 (H) 65 - 100 mg/dL    BUN 46 (H) 6 - 20 MG/DL    Creatinine 1.12 0.70 - 1.30 MG/DL    BUN/Creatinine ratio 41 (H) 12 - 20      GFR est AA >60 >60 ml/min/1.73m2    GFR est non-AA >60 >60 ml/min/1.73m2    Calcium 8.7 8.5 - 10.1 MG/DL       Medications reviewed  Current Facility-Administered Medications   Medication Dose Route Frequency    QUEtiapine (SEROquel) tablet 25 mg  25 mg Oral QHS    rivaroxaban (XARELTO) tablet 15 mg  15 mg Oral DAILY WITH DINNER    atenoloL (TENORMIN) tablet 25 mg  25 mg Oral DAILY    folic acid (FOLVITE) tablet 1 mg  1 mg Oral DAILY    sertraline (ZOLOFT) tablet 50 mg  50 mg Oral DAILY    0.9% sodium chloride infusion 250 mL  250 mL IntraVENous PRN    0.9% sodium chloride infusion 250 mL  250 mL IntraVENous PRN    0.9% sodium chloride infusion 250 mL  250 mL IntraVENous PRN    traMADoL (ULTRAM) tablet 50 mg  50 mg Oral Q6H PRN    0.9% sodium chloride infusion 250 mL  250 mL IntraVENous PRN    acetaminophen (TYLENOL) tablet 650 mg  650 mg Oral Q6H    Or    acetaminophen (TYLENOL) suppository 650 mg  650 mg Rectal Q6H    0.9% sodium chloride infusion 250 mL  250 mL IntraVENous PRN    sodium chloride (NS) flush 5-40 mL  5-40 mL IntraVENous Q8H    sodium chloride (NS) flush 5-40 mL  5-40 mL IntraVENous PRN    naloxone (NARCAN) injection 0.4 mg  0.4 mg IntraVENous PRN    calcium-vitamin D (OS-VINCENT +D3) 500 mg-200 unit per tablet 1 Tablet  1 Tablet Oral TID WITH MEALS    senna-docusate (PERICOLACE) 8.6-50 mg per tablet 1 Tablet  1 Tablet Oral BID    polyethylene glycol (MIRALAX) packet 17 g  17 g Oral DAILY    bisacodyL (DULCOLAX) suppository 10 mg  10 mg Rectal DAILY PRN    sodium chloride (NS) flush 5-40 mL  5-40 mL IntraVENous Q8H    sodium chloride (NS) flush 5-40 mL  5-40 mL IntraVENous PRN    polyethylene glycol (MIRALAX) packet 17 g  17 g Oral DAILY PRN    ondansetron (ZOFRAN ODT) tablet 4 mg  4 mg Oral Q8H PRN    Or    ondansetron (ZOFRAN) injection 4 mg  4 mg IntraVENous Q6H PRN       Care Plan discussed with: Patient/Family and Nurse    Total time spent with patient and review of records: 30 minutes.     Saba Truong MD

## 2021-07-26 VITALS
HEIGHT: 67 IN | OXYGEN SATURATION: 92 % | TEMPERATURE: 98.3 F | BODY MASS INDEX: 24.29 KG/M2 | SYSTOLIC BLOOD PRESSURE: 124 MMHG | HEART RATE: 65 BPM | RESPIRATION RATE: 18 BRPM | DIASTOLIC BLOOD PRESSURE: 72 MMHG | WEIGHT: 154.76 LBS

## 2021-07-26 LAB
ANION GAP SERPL CALC-SCNC: 5 MMOL/L (ref 5–15)
BASOPHILS # BLD: 0 K/UL (ref 0–0.1)
BASOPHILS NFR BLD: 0 % (ref 0–1)
BUN SERPL-MCNC: 42 MG/DL (ref 6–20)
BUN/CREAT SERPL: 40 (ref 12–20)
CALCIUM SERPL-MCNC: 8.4 MG/DL (ref 8.5–10.1)
CHLORIDE SERPL-SCNC: 111 MMOL/L (ref 97–108)
CO2 SERPL-SCNC: 26 MMOL/L (ref 21–32)
CREAT SERPL-MCNC: 1.05 MG/DL (ref 0.7–1.3)
DIFFERENTIAL METHOD BLD: ABNORMAL
EOSINOPHIL # BLD: 0.2 K/UL (ref 0–0.4)
EOSINOPHIL NFR BLD: 2 % (ref 0–7)
ERYTHROCYTE [DISTWIDTH] IN BLOOD BY AUTOMATED COUNT: 17.9 % (ref 11.5–14.5)
GLUCOSE SERPL-MCNC: 132 MG/DL (ref 65–100)
HCT VFR BLD AUTO: 27.2 % (ref 36.6–50.3)
HGB BLD-MCNC: 8.3 G/DL (ref 12.1–17)
IMM GRANULOCYTES # BLD AUTO: 0.1 K/UL (ref 0–0.04)
IMM GRANULOCYTES NFR BLD AUTO: 1 % (ref 0–0.5)
LYMPHOCYTES # BLD: 0.9 K/UL (ref 0.8–3.5)
LYMPHOCYTES NFR BLD: 9 % (ref 12–49)
MCH RBC QN AUTO: 32.4 PG (ref 26–34)
MCHC RBC AUTO-ENTMCNC: 30.5 G/DL (ref 30–36.5)
MCV RBC AUTO: 106.3 FL (ref 80–99)
MONOCYTES # BLD: 0.9 K/UL (ref 0–1)
MONOCYTES NFR BLD: 9 % (ref 5–13)
NEUTS SEG # BLD: 7.6 K/UL (ref 1.8–8)
NEUTS SEG NFR BLD: 79 % (ref 32–75)
NRBC # BLD: 0 K/UL (ref 0–0.01)
NRBC BLD-RTO: 0 PER 100 WBC
PLATELET # BLD AUTO: 278 K/UL (ref 150–400)
PMV BLD AUTO: 10.4 FL (ref 8.9–12.9)
POTASSIUM SERPL-SCNC: 4.2 MMOL/L (ref 3.5–5.1)
RBC # BLD AUTO: 2.56 M/UL (ref 4.1–5.7)
SODIUM SERPL-SCNC: 142 MMOL/L (ref 136–145)
WBC # BLD AUTO: 9.6 K/UL (ref 4.1–11.1)

## 2021-07-26 PROCEDURE — 74011250637 HC RX REV CODE- 250/637: Performed by: PHYSICIAN ASSISTANT

## 2021-07-26 PROCEDURE — 97116 GAIT TRAINING THERAPY: CPT

## 2021-07-26 PROCEDURE — 85025 COMPLETE CBC W/AUTO DIFF WBC: CPT

## 2021-07-26 PROCEDURE — 74011250637 HC RX REV CODE- 250/637: Performed by: ORTHOPAEDIC SURGERY

## 2021-07-26 PROCEDURE — 74011250637 HC RX REV CODE- 250/637: Performed by: FAMILY MEDICINE

## 2021-07-26 PROCEDURE — 36415 COLL VENOUS BLD VENIPUNCTURE: CPT

## 2021-07-26 PROCEDURE — 80048 BASIC METABOLIC PNL TOTAL CA: CPT

## 2021-07-26 PROCEDURE — 97110 THERAPEUTIC EXERCISES: CPT

## 2021-07-26 RX ORDER — TRAMADOL HYDROCHLORIDE 50 MG/1
50 TABLET ORAL
Qty: 20 TABLET | Refills: 0 | Status: SHIPPED | OUTPATIENT
Start: 2021-07-26 | End: 2021-07-29

## 2021-07-26 RX ADMIN — TRAMADOL HYDROCHLORIDE 50 MG: 50 TABLET ORAL at 10:11

## 2021-07-26 RX ADMIN — ACETAMINOPHEN 650 MG: 325 TABLET ORAL at 06:07

## 2021-07-26 RX ADMIN — SERTRALINE 50 MG: 50 TABLET, FILM COATED ORAL at 09:14

## 2021-07-26 RX ADMIN — Medication 1 TABLET: at 09:14

## 2021-07-26 NOTE — DISCHARGE INSTRUCTIONS
Patient Discharge Instructions    Chico Morgan / [id number] : 1927    Admitted 2021 Discharged: 2021 4:16 PM     ACUTE DIAGNOSES:  Hip fracture ([facility] [S72.009A]  CAP (community acquired pneumonia) [J18.9]    CHRONIC MEDICAL DIAGNOSES:  Problem List as of 2021 Date Reviewed: 2018        Codes Class Noted - Resolved    Hip fracture ([facility] ICD-10-CM: S72.009A  ICD-9-CM: 820.8  2021 - Present        CAP (community acquired pneumonia) ICD-10-CM: J18.9  ICD-9-CM: 486  2021 - Present        Chronic atrial fibrillation ([facility] ICD-10-CM: I48.20  ICD-9-CM: 427.31  11/10/2017 - Present        Sinus node dysfunction ([facility] ICD-10-CM: I49.5  ICD-9-CM: 427.81  2016 - Present        Depression ICD-10-CM: F32.9  ICD-9-CM: 139  2015 - Present        Dyslipidemia ICD-10-CM: E78.5  ICD-9-CM: 272.4  2014 - Present        Gout ICD-10-CM: M10.9  ICD-9-CM: 274.9  Unknown - Present        Hx of carcinoma in situ of prostate ICD-10-CM: O52.295  ICD-9-CM: V13.89  Unknown - Present        Essential hypertension, benign ICD-10-CM: I10  ICD-9-CM: 401.1  2013 - Present        Kidney stones ICD-10-CM: N20.0  ICD-9-CM: 592.0  2013 - Present        Embolic stroke involving right middle cerebral artery ([facility] ICD-10-CM: I63.411  ICD-9-CM: 434.11  2013 - Present    Overview Signed 2013  9:23 AM by Yuan Bran MD     presented with dysarthria, MRI Several tiny acute infarcts in the right middle cerebral artery             RESOLVED: Dizziness ICD-10-CM: R42  ICD-9-CM: 780.4  2016 - 2016        RESOLVED: Typical atrial flutter ([facility] ICD-10-CM: I48.3  ICD-9-CM: 427.32  2015 - 2015        RESOLVED: Paroxysmal atrial fibrillation ([facility] ICD-10-CM: I48.0  ICD-9-CM: 427.31  2014 - 11/10/2017        RESOLVED: Dysarthria ICD-9-CM: 784.5  2013 - 2013        RESOLVED: TIA (transient ischemic attack) ICD-10-CM: G45.9  ICD-9-CM: 435.9 11/16/2013 - 12/7/2013        RESOLVED: Other and unspecified hyperlipidemia ICD-10-CM: E78.5  ICD-9-CM: 272.4  11/16/2013 - 9/23/2014        RESOLVED: Hypokalemia ICD-10-CM: E87.6  ICD-9-CM: 276.8  11/16/2013 - 12/7/2013              DISCHARGE MEDICATIONS:         · It is important that you take the medication exactly as they are prescribed. · Keep your medication in the bottles provided by the pharmacist and keep a list of the medication names, dosages, and times to be taken in your wallet. · Do not take other medications without consulting your doctor. DIET:  Regular Diet  ACTIVITY: Activity as tolerated    ADDITIONAL INFORMATION: If you experience any of the following symptoms then please call your primary care physician or return to the emergency room if you cannot get hold of your doctor: Fever, chills, nausea, vomiting, diarrhea, change in mentation, falling, bleeding, shortness of breath. FOLLOW UP CARE:  Dr. Yasmany Koenig MD  you are to call and set up an appointment to see them with in 1 week. Follow-up with specialists at directed by them      Information obtained by :  I understand that if any problems occur once I am at home I am to contact my physician. I understand and acknowledge receipt of the instructions indicated above. Physician's or R.N.'s Signature                                                                  Date/Time                                                                                                                                              Patient or Representative Signature                                                          Date/Time                   TRAUMATIC HIP DISCHARGE INSTRUCTIONS      Patient Name:Angelo Whitehead   Date of procedure:7/19/2021   Procedure:Procedure(s):   FEMORAL INTERTROCHANTERIC NAIL INSERTION LEFT   Surgeon:Surgeon(s) and Role:     * Consuelo Henriquez MD - Primary   PCP: Santos Gamino MD   Date of discharge: No discharge date for patient encounter. For nursing staff, please review the special instructions below. If you have any questions you may contact my office at [phone]. Follow up appointment:  Please call our office at [phone] for your follow up appointment. This should be scheduled 2-3 weeks following the date of surgery. Physical Therapy / Nursing:  Physical Therapy following surgery will be arranged at your care facility or though Home Health. You may bear weight as tolerated on your operative leg while using a walker. Avoid positions of extreme with your hip/leg. Wound Care/ Dressing Changes:  Surgical dressings may be removed 7 days from surgery. Then cover your incisions with a porous dressing such as a Primapore. This can be changed every 7 days or more frequently based on drainage. Showering/ Bathing:  Please keep your dressings/incisions dry until cleared by your surgeon(s). Diet:  You may advance to your regular diet as tolerated. Increase your clear liquid intake for the next 2-3 days. Medication:  1. You should take over-the-counter Tylenol 650 mg every 6 hours for pain control for up to 14 days. Then reduce Tylenol to 500 mg every 6 hours thereafter. You should avoid over-the-counter anti-inflammatories (Advil, Aleve, ibuprofen, Motrin, naprosyn) while taking blood thinner medicines such as Xarelto. 2. Please take narcotic medicine as prescribed, as needed for pain uncontrolled with Tylenol. 3. Refills of pain medication are authorized during office hours only (8 AM- 5 PM  Monday thru Friday). Many of these medication will require you or a family member to pick-up a physical prescription at the office.   4. Continue the chronic blood thinner per instructions from medical team.   5. If you have constipation which is not improved by oral stool softeners then a Ducolax suppository should be purchased over the counter. DRIVING  You should not return to driving until you are off all opioid pain medications and able to safely and quickly apply the brakes. Please discuss further with your surgeon at the office appointment. Important Signs and Symptoms:  If any of the following signs or symptoms occur, you should contact your surgeons office. Please be advised if a problem arises which you feel requires immediate medical attention, you should seek immediate medical attention at the ER or other health care facility you have access to.    1. A sudden increase in swelling and/or redness or warmth at the area your surgery was performed which isnt relieved by rest, ice, and elevation. 2. Oral temperature greater than 101 degrees for 12 hours or more which isnt relieved by an increase in fluid intake and over-the-counter medicines. 3. Excessive drainage from your incisions, or drainage which hasnt stopped by 72 hours after your surgery. 4. Fever, chills, shortness of breath, chest pain, nausea, vomiting or other signs and symptoms which are of concern to you.         Walt Rivera MD  [facility]  Office [phone]

## 2021-07-26 NOTE — DISCHARGE SUMMARY
Physician Discharge Summary     Patient ID:    Henry Mead  [id number]  6 y.o.  5/25/1927  Lola Marquis MD    Admit date: 7/18/2021  Discharge date and time: 7/26/2021  Admission Diagnoses: Hip fracture ([facility] [S72.009A];CAP (community acquired pneumonia) [J18.9]  Discharge Medications:   Current Discharge Medication List      START taking these medications    Details   traMADoL (ULTRAM) 50 mg tablet Take 1 Tablet by mouth every six (6) hours as needed for Pain for up to 3 days. Max Daily Amount: 200 mg. Qty: 20 Tablet, Refills: 0    Associated Diagnoses: Closed fracture of left hip, initial encounter ([facility])      calcium-vitamin D (OS-VINCENT +D3) 500 mg-200 unit per tablet Take 1 Tablet by mouth three (3) times daily (with meals). Qty: 90 Tablet, Refills: 0      QUEtiapine (SEROquel) 25 mg tablet Take 1 Tablet by mouth nightly. Qty: 30 Tablet, Refills: 0         CONTINUE these medications which have NOT CHANGED    Details   rivaroxaban (Xarelto) 15 mg tab tablet Take 15 mg by mouth daily (with dinner). pregabalin (LYRICA) 75 mg capsule Take 75 mg by mouth. DULoxetine (CYMBALTA) 30 mg capsule Take 90 mg by mouth daily. simvastatin (ZOCOR) 20 mg tablet Take 10 mg by mouth nightly. allopurinol (ZYLOPRIM) 300 mg tablet Take 150 mg by mouth every evening. zinc sulfate 220 mg tablet Take 1 Tab by mouth daily. Qty: 30 Tab, Refills: 0      ondansetron (Zofran ODT) 4 mg disintegrating tablet 1 Tab by SubLINGual route every eight (8) hours as needed for Nausea or Vomiting. Qty: 20 Tab, Refills: 0      atenoloL (TENORMIN) 25 mg tablet Take 1 Tab by mouth daily. 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       sertraline (ZOLOFT) 50 mg tablet Take 50 mg by mouth daily. Follow up Care:    1. Shiv Ochoa MD with in 1 weeks out of rehab  2. specialists as directed. Diet:  Regular Diet  Disposition:  SNF. Advanced Directive:  Discharge Exam:  [See today's progress note.]  CONSULTATIONS: Orthopedic Surgery    Significant Diagnostic Studies:   Recent Labs     07/26/21  0206 07/25/21  0408   WBC 9.6 9.5   HGB 8.3* 8.3*   HCT 27.2* 27.1*    261     Recent Labs     07/26/21  0206 07/25/21  0408 07/24/21  0154    142 143   K 4.2 4.2 4.5   * 112* 111*   CO2 26 24 27   BUN 42* 46* 48*   CREA 1.05 1.12 1.34*   * 134* 132*   CA 8.4* 8.7 8.7     Lab Results   Component Value Date/Time    Glucose (POC) 166 (H) 05/20/2014 08:30 PM    Glucose (POC) 153 (H) 11/16/2013 04:26 PM    Glucose (POC) 134 (H) 11/16/2013 12:25 PM       HOSPITAL COURSE & TREATMENT RENDERED:   1. See today's progress note:  Daily Progress Note and Discharge Note: 7/26/2021  Carissa Ochoa MD         Assessment/Plan:   L displaced intertrochanteric femur fx: Highly functional, independent 89M with minimal comorbidities, no dementia. - Per ortho  -Surg 7/19/21     CAP?: CXR  with LLL consolidation vs pna. WBC is 12.1 with left shift but this could quite easily be 2/2 fracture. He has no sx and no hypoxemia. Received abx x 1 in ER  - Hold on further abx  - repeat CXR 4-6 wks or sooner if any symptoms     Afib: Recently had atenolol discontinued due orthostasis.  Presently in NSR  - Can resume Xarelto      Neuropathy:   -can resume pregabalin 75mg bid, duloxetine 90mg     Hx uric acid stones:   -resume Allopurinol     Anemia- blood loss  - transfused 1 unit 7/20  -monitor at rehab; Keep Hb >7- will transfuse as needed     Urinary Retention  -treat as needed     Disp:  - SNF rehab         Problem List:            Problem List as of 7/26/2021 Date Reviewed: 12/20/2018         Codes Class Noted - Resolved     Hip fracture ([facility] ICD-10-CM: S72.009A  ICD-9-CM: 185. 8   7/18/2021 - Present           CAP (community acquired pneumonia) ICD-10-CM: J18.9  ICD-9-CM: 885   7/18/2021 - Present           Chronic atrial fibrillation ([facility] ICD-10-CM: I48.20  ICD-9-CM: 427.31   11/10/2017 - Present           Sinus node dysfunction (HCC) ICD-10-CM: I49.5  ICD-9-CM: 427.81   4/12/2016 - Present           Depression ICD-10-CM: F32.9  ICD-9-CM: 177   9/28/2015 - Present           Dyslipidemia ICD-10-CM: E78.5  ICD-9-CM: 272.4   9/23/2014 - Present           Gout ICD-10-CM: M10.9  ICD-9-CM: 274.9   Unknown - Present           Hx of carcinoma in situ of prostate ICD-10-CM: Z86.002  ICD-9-CM: V13.89   Unknown - Present           Essential hypertension, benign ICD-10-CM: I10  ICD-9-CM: 238. 1   11/16/2013 - Present           Kidney stones ICD-10-CM: N20.0  ICD-9-CM: 592.0   11/16/2013 - Present           Embolic stroke involving right middle cerebral artery [facility]) ICD-10-CM: I63.411  ICD-9-CM: 434.11   11/1/2013 - Present     Overview Signed 12/7/2013  9:23 AM by Erin Michaud MD       presented with dysarthria, MRI Several tiny acute infarcts in the right middle cerebral artery                 RESOLVED: Dizziness ICD-10-CM: R42  ICD-9-CM: 780. 4   4/12/2016 - 6/21/2016           RESOLVED: Typical atrial flutter ([facility] ICD-10-CM: I48.3  ICD-9-CM: 427.32   5/6/2015 - 9/28/2015           RESOLVED: Paroxysmal atrial fibrillation ([facility] ICD-10-CM: I48.0  ICD-9-CM: 427.31   9/23/2014 - 11/10/2017           RESOLVED: Dysarthria ICD-9-CM: 182. 5   11/17/2013 - 12/7/2013           RESOLVED: TIA (transient ischemic attack) ICD-10-CM: G45.9  ICD-9-CM: 435. 9   11/16/2013 - 12/7/2013           RESOLVED: Other and unspecified hyperlipidemia ICD-10-CM: E78.5  ICD-9-CM: 272.4   11/16/2013 - 9/23/2014           RESOLVED: Hypokalemia ICD-10-CM: E87.6  ICD-9-CM: 276.8   11/16/2013 - 12/7/2013                HPI:    Angelo is a 80 y. o.  M hx Afib presents after mechanical fall.  He lives in an independent living facility, but has meals prepared for him. He had gone to the bathroom this morning and was returning to be when he felt like his sock slipped and he went down. He did not have lightheadedness, white out, cp, sob before this. No LOC. He called daughter who called 911.      In ER, he is noted to have L displaced intertrochanteric femur fracture as well as mild leukocytosis on some c/f pneumonia, though he denies cough, fever, chills recently.      He has never had any problems with anesthesia in the past. Last took xarelto last night. PCP stopped atenolol recently.  (Dr Jeremy Goode)      7/19: Daughter present in room. He has been somewhat confused with the Morphine. He is typically very alert and self sufficient. He knows he is in the hospital. For surgery later today.      7/20: Surgery 7/19. Hb down to 6.5. Spoke with daughter who has consented to transfusion. Will order 1 unit of blood. He is pleasantly confused this am.      7/21: Hb 6.8- will transfuse another unit. Holding Xarelto for now. Restart Metoprolol with parameters. Holding Lyrica and Cymbalta as he has been more confused. Sitter present. Will try a low dose Seroquel tonight.      7/22: Hb up. Will resume Xarelto. Slept better with Seroquel. A little less confused this am. Knows he is in the hospital.      7/23: More confusion during the night. Sleeping now after a bath and Tramadol. Will try to keep up today and will increase Seroquel at bedtime.      7/24:  Creat slowly rising. Hb stable. Will give voiding trial today. No sitter last night. Has been accepted to [facility].       7/25: Avendaño not pulled yesterday so will do this today. Is medically ready for d/c to rehab. No sitter for the last 2 nights. Sitting up on [facility].      7/26: No complaints this AM.  Daughter would like eval for Aiyana Sieving which is supposed to be done today. 330PM:  Accepted at [facility] and stable for SNF rehab.   Rehab MD to follow Hb and transfuse as needed. Follow up with PCP when out of rehab. Follow up with Ortho as directed.      Review of Systems:   Review of systems not obtained due to patient factors.     Objective:   Physical Exam:   Visit Vitals  BP (!) 108/58 (BP 1 Location: Left upper arm, BP Patient Position: At rest)   Pulse (!) 55   Temp 98.5 °F (36.9 °C)   Resp 18   Ht 5' 7.01\" (1.702 m)   Wt 154 lb 12.2 oz (70.2 kg)   SpO2 96%   BMI 24.23 kg/m²    O2 Flow Rate (L/min): 2 l/min O2 Device: None (Room air)  Temp (24hrs), Av °F (36.7 °C), Min:97.5 °F (36.4 °C), Max:98.5 °F (36.9 °C)    1901 -  0700  In: 120 [P.O.:120]  Out: 201 [Urine:200]   701 -  1900  In: 240 [P.O.:240]  Out: 500 [Urine:500]  General:  no distress, appears stated age. Head:  Normocephalic, without obvious abnormality, atraumatic. Eyes:  Conjunctivae/corneas clear. Nose: Nares normal. Septum midline. Mucosa normal. No drainage or sinus tenderness. Throat: Lips, mucosa, and tongue normal.    Neck: Supple, symmetrical, trachea midline, no adenopathy, thyroid: no enlargement/tenderness/nodules, no carotid bruit and no JVD. Lungs:   Clear to auscultation bilaterally. Chest wall:  No tenderness or deformity. Heart:  Regular rate and rhythm, S1, S2 normal, no murmur, click, rub or gallop. Abdomen:   Soft, non-tender. Bowel sounds normal. No masses,  No organomegaly. Extremities: Left leg with dressing intact, no cyanosis or edema. No calf tenderness or cords. Bruising left hand. Pulses: 2+ and symmetric all extremities. Skin:  turgor normal. No rashes    Neurologic: CNII-XII intact. Alert and oriented X 2. Fine motor of hands and fingers normal.   equal.  No cogwheeling or rigidity. Gait not tested at this time. Sensation grossly normal to touch. Gross motor of extremities normal.        Data Review:    CT Head 21   IMPRESSION  No acute intracranial process.  Unchanged left sphenoid sinus disease.     Xray left hand 7/19/21  IMPRESSION  No acute abnormality.     X-ray left femur 7/18/21   IMPRESSION  Displaced intertrochanteric fracture of the proximal left femur.       Signed:  Avelino Powers MD  7/26/2021  4:16 PM

## 2021-07-26 NOTE — PROGRESS NOTES
Called report to [employer]. [facility] transported patient with all belongings and packet from case management.

## 2021-07-26 NOTE — PROGRESS NOTES
2:33 PM  CM received acceptance from [facility], and they are able to accept pt today. Attending notified and he requested Ortho PA print paper scripts for controlled medications as he is off site. CM sent request via [employer] to Howard Merritt. López reported she sent scripts electronically to [facility], and will not print paper scripts. [facility] admissions notified, and they reported they prefer to have paper prescriptions for controlled medications. [facility] admissions notified CM that they cannot guarantee the pt will receive their pain meds in a timely manner or at all without the paper scripts. CM notified Howard ÁLVAREZ of Lexis Moya's concerns, but she reported they would have access to the scripts through [facility]. 2:33 PM    Transition of Care Plan to SNF/Rehab    SNF/Rehab Transition:  Patient has been accepted to [facility] and meets criteria for admission. Patient will transported by [facility] ambulance transport and expected to leave at 4:00 PM.    Communication to Patient/Family:  Met with patient and pt's [facility], and they are agreeable to the transition plan. Communication to SNF/Rehab:  Bedside RN, Taye, has been notified to update the transition plan to the facility and call report (phone number [phone]). Discharge information has been updated on the AVS.     Discharge instructions to be fax'd to facility via [employer]. [] BCPI-A  Patient has not been identified as part of the BCPI-A Program.     Nursing Please include all hard scripts for controlled substances, med rec and dc summary, and AVS in packet.      Nursing, please discuss the following applicable information with Lexis Moya's nursing during report:     Vanna Fuller with (X) only those applicable:    Medication:  []  Medications will be available at the facility  []  IV Antibiotics   []  Controlled Substance - hard copy to be sent with patient   []  Weekly Labs   Documents:  [] Hard RX  [] MAR  [] Kardex  [] AVS  []Transfer Summary  []Discharge   Equipment:  []  CPAP/BiPAP  []  Wound Vacuum  []  Avendaño or Urinary Device  []  PICC/Central Line  []  Nebulizer  []  Ventilator   Treatment:  []Isolation (for MRSA, VRE, etc.)  []Surgical Drain Management  []Tracheostomy Care  []Dressing Changes  []Dialysis with transportation and chair time. []PEG Care  []Oxygen  []Daily Weights for Heart Failure   Dietary:  []Any diet limitations  []Tube Feedings   []Total Parenteral Management (TPN)   Eligible for Medicaid Long Term Services and Supports  Yes:  [] Eligible for medical assistance or will become eligible within 180 days and UAI completed. [] Provider/Patient and/or support system has requested screening. [] UAI copy provided to patient or responsible party. [] UAI unavailable at discharge will send once processed to SNF provider. [] UAI unavailable at discharged mailed to patient  No:   [] Private pay and is not financially eligible for Medicaid within the next 180 days. [] Reside out-of-state. [] A residents of a state owned/operated facility that is licensed  by [facility] and Developmental Services or [facility]  [] Enrollment in [facility] hospice services  [] [address]  [x] Patient /Family declines to have screening completed or provide financial information for screening. Financial Resources:  Medicaid    [] Initiated and application pending   [] Full coverage     Advanced Care Plan:  []Surrogate Decision Maker of Care  []POA  []Communicated Code Status (DDNR\", \"Full\")    Other  Care Management Interventions  PCP Verified by CM:  Yes Prisca Fraser II, MD; last visit 30 days)  Palliative Care Criteria Met (RRAT>21 & CHF Dx)?: No  Mode of Transport at Discharge: BLS (pt will require stretcher at DC)  Transition of Care Consult (CM Consult): SNF  Partner SNF: Yes  Physical Therapy Consult: Yes  Occupational Therapy Consult: Yes  Speech Therapy Consult: No  Current Support Network: Family Lives Nearby, Lives Alone, Own Home (pt lives alone in independent senior community, at baseline pt is ambulatory w/ cane, iADLS, family assists w/ meds and transprot)  The Plan for Transition of Care is Related to the Following Treatment Goals : SNF  The Patient and/or Patient Representative was Provided with a Choice of Provider and Agrees with the Discharge Plan?: Yes  Name of the Patient Representative Who was Provided with a Choice of Provider and Agrees with the Discharge Plan: daughter   Freedom of Choice List was Provided with Basic Dialogue that Supports the Patient's Individualized Plan of Care/Goals, Treatment Preferences and Shares the Quality Data Associated with the Providers?: (S) Yes  Discharge Location  Discharge Placement: Skilled nursing facility

## 2021-07-26 NOTE — PROGRESS NOTES
Daily Progress Note and Discharge Note: 7/26/2021  Elvira Birmingham MD    Assessment/Plan:   L displaced intertrochanteric femur fx: Highly functional, independent 89M with minimal comorbidities, no dementia. - Per ortho  -Surg 7/19/21     CAP?: CXR  with LLL consolidation vs pna. WBC is 12.1 with left shift but this could quite easily be 2/2 fracture. He has no sx and no hypoxemia. Received abx x 1 in ER  - Hold on further abx  - repeat CXR 4-6 wks or sooner if any symptoms     Afib: Recently had atenolol discontinued due orthostasis.  Presently in NSR  - Can resume Xarelto      Neuropathy:   -can resume pregabalin 75mg bid, duloxetine 90mg     Hx uric acid stones:   -resume Allopurinol     Anemia- blood loss  - transfused 1 unit 7/20  -monitor at rehab; Keep Hb >7- will transfuse as needed    Urinary Retention  -treat as needed    Disp:  - SNF rehab       Problem List:  Problem List as of 7/26/2021 Date Reviewed: 12/20/2018        Codes Class Noted - Resolved    Hip fracture ([facility] ICD-10-CM: R33.181T  ICD-9-CM: 820.8  7/18/2021 - Present        CAP (community acquired pneumonia) ICD-10-CM: J18.9  ICD-9-CM: 486  7/18/2021 - Present        Chronic atrial fibrillation ([facility] ICD-10-CM: I48.20  ICD-9-CM: 427.31  11/10/2017 - Present        Sinus node dysfunction ([facility] ICD-10-CM: I49.5  ICD-9-CM: 427.81  4/12/2016 - Present        Depression ICD-10-CM: F32.9  ICD-9-CM: 754  9/28/2015 - Present        Dyslipidemia ICD-10-CM: E78.5  ICD-9-CM: 272.4  9/23/2014 - Present        Gout ICD-10-CM: M10.9  ICD-9-CM: 274.9  Unknown - Present        Hx of carcinoma in situ of prostate ICD-10-CM: Z86.002  ICD-9-CM: V13.89  Unknown - Present        Essential hypertension, benign ICD-10-CM: I10  ICD-9-CM: 401.1  11/16/2013 - Present        Kidney stones ICD-10-CM: N20.0  ICD-9-CM: 592.0  11/16/2013 - Present        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) ICD-10-CM: I63.411  ICD-9-CM: 434.11 11/1/2013 - Present    Overview Signed 12/7/2013  9:23 AM by Vanessa Michelle MD     presented with dysarthria, MRI Several tiny acute infarcts in the right middle cerebral artery             RESOLVED: Dizziness ICD-10-CM: R42  ICD-9-CM: 780.4  4/12/2016 - 6/21/2016        RESOLVED: Typical atrial flutter ([facility] ICD-10-CM: I48.3  ICD-9-CM: 427.32  5/6/2015 - 9/28/2015        RESOLVED: Paroxysmal atrial fibrillation ([address] ICD-10-CM: I48.0  ICD-9-CM: 427.31  9/23/2014 - 11/10/2017        RESOLVED: Dysarthria ICD-9-CM: 784.5  11/17/2013 - 12/7/2013        RESOLVED: TIA (transient ischemic attack) ICD-10-CM: G45.9  ICD-9-CM: 435.9  11/16/2013 - 12/7/2013        RESOLVED: Other and unspecified hyperlipidemia ICD-10-CM: E78.5  ICD-9-CM: 272.4  11/16/2013 - 9/23/2014        RESOLVED: Hypokalemia ICD-10-CM: E87.6  ICD-9-CM: 276.8  11/16/2013 - 12/7/2013              HPI:    Jennifer York is a 80 y.o.  M hx Afib presents after mechanical fall. He lives in an independent living facility, but has meals prepared for him. He had gone to the bathroom this morning and was returning to be when he felt like his sock slipped and he went down. He did not have lightheadedness, white out, cp, sob before this. No LOC. He called daughter who called 911.      In ER, he is noted to have L displaced intertrochanteric femur fracture as well as mild leukocytosis on some c/f pneumonia, though he denies cough, fever, chills recently.      He has never had any problems with anesthesia in the past. Last took xarelto last night. PCP stopped atenolol recently. (Dr Zully Patel)     7/19: Daughter present in room. He has been somewhat confused with the Morphine. He is typically very alert and self sufficient. He knows he is in the hospital. For surgery later today. 7/20: Surgery 7/19. Hb down to 6.5. Spoke with daughter who has consented to transfusion. Will order 1 unit of blood.  He is pleasantly confused this am.     7/21: Hb 6.8- will transfuse another unit. Holding Xarelto for now. Restart Metoprolol with parameters. Holding Lyrica and Cymbalta as he has been more confused. Sitter present. Will try a low dose Seroquel tonight.     : Hb up. Will resume Xarelto. Slept better with Seroquel. A little less confused this am. Knows he is in the hospital.     : More confusion during the night. Sleeping now after a bath and Tramadol. Will try to keep up today and will increase Seroquel at bedtime. :  Creat slowly rising. Hb stable. Will give voiding trial today. No sitter last night. Has been accepted to [facility].      : Avendaño not pulled yesterday so will do this today. Is medically ready for d/c to rehab. No sitter for the last 2 nights. Sitting up on [facility].     : No complaints this AM.  Daughter would like eval for Mariela Carter which is supposed to be done today. 330PM:  Accepted at [facility] and stable for SNF rehab. Rehab MD to follow Hb and transfuse as needed. Follow up with PCP when out of rehab. Follow up with Ortho as directed. Review of Systems:   Review of systems not obtained due to patient factors. Objective:   Physical Exam:   Visit Vitals  BP (!) 108/58 (BP 1 Location: Left upper arm, BP Patient Position: At rest)   Pulse (!) 55   Temp 98.5 °F (36.9 °C)   Resp 18   Ht 5' 7.01\" (1.702 m)   Wt 154 lb 12.2 oz (70.2 kg)   SpO2 96%   BMI 24.23 kg/m²    O2 Flow Rate (L/min): 2 l/min O2 Device: None (Room air)  Temp (24hrs), Av °F (36.7 °C), Min:97.5 °F (36.4 °C), Max:98.5 °F (36.9 °C)    1901 -  0700  In: 120 [P.O.:120]  Out: 201 [Urine:200]   701 - 1900  In: 240 [P.O.:240]  Out: 500 [Urine:500]  General:  no distress, appears stated age. Head:  Normocephalic, without obvious abnormality, atraumatic. Eyes:  Conjunctivae/corneas clear. Nose: Nares normal. Septum midline. Mucosa normal. No drainage or sinus tenderness.    Throat: Lips, mucosa, and tongue normal.    Neck: Supple, symmetrical, trachea midline, no adenopathy, thyroid: no enlargement/tenderness/nodules, no carotid bruit and no JVD. Lungs:   Clear to auscultation bilaterally. Chest wall:  No tenderness or deformity. Heart:  Regular rate and rhythm, S1, S2 normal, no murmur, click, rub or gallop. Abdomen:   Soft, non-tender. Bowel sounds normal. No masses,  No organomegaly. Extremities: Left leg with dressing intact, no cyanosis or edema. No calf tenderness or cords. Bruising left hand. Pulses: 2+ and symmetric all extremities. Skin:  turgor normal. No rashes    Neurologic: CNII-XII intact. Alert and oriented X 2. Fine motor of hands and fingers normal.   equal.  No cogwheeling or rigidity. Gait not tested at this time. Sensation grossly normal to touch. Gross motor of extremities normal.       Data Review:    CT Head 7/18/21   IMPRESSION  No acute intracranial process. Unchanged left sphenoid sinus disease. Xray left hand 7/19/21  IMPRESSION  No acute abnormality. X-ray left femur 7/18/21   IMPRESSION  Displaced intertrochanteric fracture of the proximal left femur. Recent Days:  Recent Labs     07/26/21  0206 07/25/21  0408 07/24/21  0154   WBC 9.6 9.5 9.8   HGB 8.3* 8.3* 8.7*   HCT 27.2* 27.1* 28.1*    261 227     Recent Labs     07/26/21  0206 07/25/21  0408 07/24/21  0154    142 143   K 4.2 4.2 4.5   * 112* 111*   CO2 26 24 27   * 134* 132*   BUN 42* 46* 48*   CREA 1.05 1.12 1.34*   CA 8.4* 8.7 8.7     No results for input(s): PH, PCO2, PO2, HCO3, FIO2 in the last 72 hours.     24 Hour Results:  Recent Results (from the past 24 hour(s))   COVID-19 RAPID TEST    Collection Time: 07/25/21  3:37 PM   Result Value Ref Range    Specimen source Nasopharyngeal      COVID-19 rapid test Not detected NOTD     CBC WITH AUTOMATED DIFF    Collection Time: 07/26/21  2:06 AM   Result Value Ref Range    WBC 9.6 4.1 - 11.1 K/uL    RBC 2.56 (L) 4.10 - 5.70 M/uL    HGB 8.3 (L) 12.1 - 17.0 g/dL    HCT 27.2 (L) 36.6 - 50.3 %    .3 (H) 80.0 - 99.0 FL    MCH 32.4 26.0 - 34.0 PG    MCHC 30.5 30.0 - 36.5 g/dL    RDW 17.9 (H) 11.5 - 14.5 %    PLATELET 436 459 - 502 K/uL    MPV 10.4 8.9 - 12.9 FL    NRBC 0.0 0  WBC    ABSOLUTE NRBC 0.00 0.00 - 0.01 K/uL    NEUTROPHILS 79 (H) 32 - 75 %    LYMPHOCYTES 9 (L) 12 - 49 %    MONOCYTES 9 5 - 13 %    EOSINOPHILS 2 0 - 7 %    BASOPHILS 0 0 - 1 %    IMMATURE GRANULOCYTES 1 (H) 0.0 - 0.5 %    ABS. NEUTROPHILS 7.6 1.8 - 8.0 K/UL    ABS. LYMPHOCYTES 0.9 0.8 - 3.5 K/UL    ABS. MONOCYTES 0.9 0.0 - 1.0 K/UL    ABS. EOSINOPHILS 0.2 0.0 - 0.4 K/UL    ABS. BASOPHILS 0.0 0.0 - 0.1 K/UL    ABS. IMM.  GRANS. 0.1 (H) 0.00 - 0.04 K/UL    DF AUTOMATED     METABOLIC PANEL, BASIC    Collection Time: 07/26/21  2:06 AM   Result Value Ref Range    Sodium 142 136 - 145 mmol/L    Potassium 4.2 3.5 - 5.1 mmol/L    Chloride 111 (H) 97 - 108 mmol/L    CO2 26 21 - 32 mmol/L    Anion gap 5 5 - 15 mmol/L    Glucose 132 (H) 65 - 100 mg/dL    BUN 42 (H) 6 - 20 MG/DL    Creatinine 1.05 0.70 - 1.30 MG/DL    BUN/Creatinine ratio 40 (H) 12 - 20      GFR est AA >60 >60 ml/min/1.73m2    GFR est non-AA >60 >60 ml/min/1.73m2    Calcium 8.4 (L) 8.5 - 10.1 MG/DL       Medications reviewed  Current Facility-Administered Medications   Medication Dose Route Frequency    QUEtiapine (SEROquel) tablet 25 mg  25 mg Oral QHS    rivaroxaban (XARELTO) tablet 15 mg  15 mg Oral DAILY WITH DINNER    atenoloL (TENORMIN) tablet 25 mg  25 mg Oral DAILY    folic acid (FOLVITE) tablet 1 mg  1 mg Oral DAILY    sertraline (ZOLOFT) tablet 50 mg  50 mg Oral DAILY    0.9% sodium chloride infusion 250 mL  250 mL IntraVENous PRN    0.9% sodium chloride infusion 250 mL  250 mL IntraVENous PRN    0.9% sodium chloride infusion 250 mL  250 mL IntraVENous PRN    traMADoL (ULTRAM) tablet 50 mg  50 mg Oral Q6H PRN    0.9% sodium chloride infusion 250 mL  250 mL IntraVENous PRN    acetaminophen (TYLENOL) tablet 650 mg  650 mg Oral Q6H    Or    acetaminophen (TYLENOL) suppository 650 mg  650 mg Rectal Q6H    0.9% sodium chloride infusion 250 mL  250 mL IntraVENous PRN    sodium chloride (NS) flush 5-40 mL  5-40 mL IntraVENous Q8H    sodium chloride (NS) flush 5-40 mL  5-40 mL IntraVENous PRN    naloxone (NARCAN) injection 0.4 mg  0.4 mg IntraVENous PRN    calcium-vitamin D (OS-VINCENT +D3) 500 mg-200 unit per tablet 1 Tablet  1 Tablet Oral TID WITH MEALS    senna-docusate (PERICOLACE) 8.6-50 mg per tablet 1 Tablet  1 Tablet Oral BID    polyethylene glycol (MIRALAX) packet 17 g  17 g Oral DAILY    bisacodyL (DULCOLAX) suppository 10 mg  10 mg Rectal DAILY PRN    sodium chloride (NS) flush 5-40 mL  5-40 mL IntraVENous Q8H    sodium chloride (NS) flush 5-40 mL  5-40 mL IntraVENous PRN    polyethylene glycol (MIRALAX) packet 17 g  17 g Oral DAILY PRN    ondansetron (ZOFRAN ODT) tablet 4 mg  4 mg Oral Q8H PRN    Or    ondansetron (ZOFRAN) injection 4 mg  4 mg IntraVENous Q6H PRN       Care Plan discussed with: Patient/Family and Nurse    Total time spent with patient and review of records: 30 minutes.     Aparna Richardson MD

## 2021-07-26 NOTE — PROGRESS NOTES
Pressure Injury Interventions:  Sensory Interventions: Assess changes in LOC     Moisture Interventions: Absorbent underpads     Activity Interventions: Increase time out of bed     Mobility Interventions: HOB 30 degrees or less     Nutrition Interventions: Document food/fluid/supplement intake     Friction and Shear Interventions: HOB 30 degrees or less      Pressure Injury Interventions:  Sensory Interventions: Assess changes in LOC     Moisture Interventions: Absorbent underpads     Activity Interventions: Increase time out of bed     Mobility Interventions: HOB 30 degrees or less     Nutrition Interventions: Document food/fluid/supplement intake     Friction and Shear Interventions: HOB 30 degrees or less

## 2021-07-26 NOTE — PROGRESS NOTES
Problem: Mobility Impaired (Adult and Pediatric)  Goal: *Acute Goals and Plan of Care (Insert Text)  Description: FUNCTIONAL STATUS PRIOR TO ADMISSION: Patient was modified independent using a rolling walker for functional mobility. Although patient poor historian at initial encounter. HOME SUPPORT PRIOR TO ADMISSION: The patient lives alone in an independent living facility. Per chart, at [facility]. Physical Therapy Goals  Initiated 7/22/2021  1. Patient will move from supine to sit and sit to supine  in bed with minimal assistance/contact guard assist within 7 day(s). 2.  Patient will transfer from bed to chair and chair to bed with moderate assistance  using the least restrictive device within 7 day(s). 3.  Patient will perform sit to stand with minimal assistance/contact guard assist within 7 day(s). 4.  Patient will ambulate with minimal assistance/contact guard assist for 15 feet with the least restrictive device within 7 day(s). Outcome: Progressing Towards Goal   PHYSICAL THERAPY TREATMENT  Patient: Bk Sahu (4 y.o. male)  Date: 7/26/2021  Diagnosis: Hip fracture ([facility] [S72.009A]  CAP (community acquired pneumonia) [J18.9] <principal problem not specified>  Procedure(s) (LRB):  FEMORAL INTERTROCHANTERIC NAIL INSERTION LEFT (Left) 7 Days Post-Op  Precautions: Fall, WBAT  Chart, physical therapy assessment, plan of care and goals were reviewed. ASSESSMENT  Patient continues with skilled PT services and is progressing towards goals. Patient this morning with much improved command following, improved affect and increased participation in physical therapy treatment. Patient participated in bed > chair transfer with assist x 2 and improved step clearance of R LE. He requires mod-max verbal cueing for sequencing with ambulation and transfers with RW.  Patient was reporting some lightheadedness with sit > stand and ambulation which improved with static standing for approximately 1 minute. Vitals stable. Once seated in bedside chair, patient participated in therapeutic exercises to strengthen and improve ROM of moira lower extremities. Upon d/c he will benefit from rehab as he is below his functional baseline of Michele. Current Level of Function Impacting Discharge (mobility/balance): sit > stand Felisha x 2; ambulation up to 5 ft in room with min-modA x 2 with RW; sequencing deficits requiring verbal cueing    Other factors to consider for discharge: none additional         PLAN :  Patient continues to benefit from skilled intervention to address the above impairments. Continue treatment per established plan of care. to address goals. Recommendation for discharge: (in order for the patient to meet his/her long term goals)  Therapy up to 5 days/week in SNF setting    This discharge recommendation:  Has been made in collaboration with the attending provider and/or case management    IF patient discharges home will need the following DME: patient owns DME required for discharge       SUBJECTIVE:   Patient stated I remember you.     OBJECTIVE DATA SUMMARY:   Patient received supine in bed and was agreeable to participate in PT session. Patient's daughter was present and active in PT treatment. Patient was cleared by nursing to participate in PT session.      Vitals:    07/26/21 0930 07/26/21 0935   BP: 123/63 121/74   BP 1 Location: Left upper arm Left upper arm   BP Patient Position: Sitting Sitting  Comment: post ambulation   Pulse:     Temp:     Resp:     Height:     Weight:     SpO2:         Critical Behavior:  Neurologic State: Alert  Orientation Level: Oriented to person, Disoriented to place, Disoriented to situation, Disoriented to time  Cognition: Impulsive, Decreased command following, Decreased attention/concentration  Safety/Judgement: Awareness of environment, Fall prevention, Good awareness of safety precautions, Home safety, Insight into deficits  Functional Mobility Training:  Bed Mobility:     Supine to Sit: Moderate assistance;Assist x1;Additional time     Scooting: Assist x1;Minimum assistance        Transfers:  Sit to Stand: Minimum assistance;Assist x2; Additional time  Stand to Sit: Minimum assistance;Assist x2; Additional time                             Balance:  Sitting: Intact; With support  Sitting - Static: Good (unsupported)  Sitting - Dynamic: Fair (occasional)  Standing: Impaired; With support  Standing - Static: Fair  Standing - Dynamic : Poor  Ambulation/Gait Training:  Distance (ft): 5 Feet (ft)  Assistive Device: Gait belt;Walker, rolling  Ambulation - Level of Assistance: Minimal assistance; Moderate assistance;Assist x2        Gait Abnormalities: Antalgic;Decreased step clearance; Step to gait     Left Side Weight Bearing: As tolerated  Base of Support: Narrowed  Stance: Left decreased  Speed/Chantel: Pace decreased (<100 feet/min)  Step Length: Left shortened;Right shortened                  Therapeutic Exercises:    Heel slides parker x 5   Hamstring sets x10 parker   Ankle pumps x 10   Parker hip ER & IR AROM x 10    Pain Rating:  Patient with complaints of pain upon stand > sit transfer. Improved once sitting. Activity Tolerance:   Fair, requires rest breaks, and observed SOB with activity    After treatment patient left in no apparent distress:   Sitting in chair, Heels elevated for pressure relief, Call bell within reach, Bed / chair alarm activated, and Caregiver / family present    COMMUNICATION/COLLABORATION:   The patients plan of care was discussed with: Occupational therapy assistant, Registered nurse, and Case management.      Bouchra Hoover PT, DPT   Time Calculation: 24 mins

## 2021-07-27 ENCOUNTER — PATIENT OUTREACH (OUTPATIENT)
Dept: CASE MANAGEMENT | Age: 86
End: 2021-07-27

## 2021-07-27 NOTE — PROGRESS NOTES
Transition of care outreach postponed for 14 days due to patient's discharge to SNF. Per EMR patient discharged to [facility] unit. Will continue to monitor patient progress.

## 2021-07-31 ENCOUNTER — HOSPITAL ENCOUNTER (INPATIENT)
Age: 86
LOS: 3 days | Discharge: NURSING, REHAB OR LONG-TERM CARE | DRG: 291 | End: 2021-08-03
Attending: EMERGENCY MEDICINE | Admitting: INTERNAL MEDICINE
Payer: MEDICARE

## 2021-07-31 ENCOUNTER — APPOINTMENT (OUTPATIENT)
Dept: ULTRASOUND IMAGING | Age: 86
DRG: 291 | End: 2021-07-31
Attending: INTERNAL MEDICINE
Payer: MEDICARE

## 2021-07-31 ENCOUNTER — APPOINTMENT (OUTPATIENT)
Dept: CT IMAGING | Age: 86
DRG: 291 | End: 2021-07-31
Attending: EMERGENCY MEDICINE
Payer: MEDICARE

## 2021-07-31 ENCOUNTER — APPOINTMENT (OUTPATIENT)
Dept: GENERAL RADIOLOGY | Age: 86
DRG: 291 | End: 2021-07-31
Attending: EMERGENCY MEDICINE
Payer: MEDICARE

## 2021-07-31 ENCOUNTER — APPOINTMENT (OUTPATIENT)
Dept: NON INVASIVE DIAGNOSTICS | Age: 86
DRG: 291 | End: 2021-07-31
Attending: INTERNAL MEDICINE
Payer: MEDICARE

## 2021-07-31 DIAGNOSIS — R77.8 ELEVATED TROPONIN: ICD-10-CM

## 2021-07-31 DIAGNOSIS — G93.40 ACUTE ENCEPHALOPATHY: Primary | ICD-10-CM

## 2021-07-31 PROBLEM — I21.4 NSTEMI (NON-ST ELEVATED MYOCARDIAL INFARCTION) (HCC): Status: ACTIVE | Noted: 2021-07-31

## 2021-07-31 LAB
ALBUMIN SERPL-MCNC: 2.8 G/DL (ref 3.5–5)
ALBUMIN/GLOB SERPL: 0.8 {RATIO} (ref 1.1–2.2)
ALP SERPL-CCNC: 174 U/L (ref 45–117)
ALT SERPL-CCNC: 18 U/L (ref 12–78)
AMMONIA PLAS-SCNC: <10 UMOL/L
ANION GAP SERPL CALC-SCNC: ABNORMAL MMOL/L (ref 5–15)
APPEARANCE UR: CLEAR
AST SERPL-CCNC: 27 U/L (ref 15–37)
BASE EXCESS BLDV CALC-SCNC: 3.1 MMOL/L
BASOPHILS # BLD: 0 K/UL (ref 0–0.1)
BASOPHILS NFR BLD: 0 % (ref 0–1)
BILIRUB SERPL-MCNC: 1 MG/DL (ref 0.2–1)
BILIRUB UR QL: NEGATIVE
BNP SERPL-MCNC: 3880 PG/ML
BUN SERPL-MCNC: 19 MG/DL (ref 6–20)
BUN/CREAT SERPL: 22 (ref 12–20)
CALCIUM SERPL-MCNC: 8.1 MG/DL (ref 8.5–10.1)
CHLORIDE SERPL-SCNC: 110 MMOL/L (ref 97–108)
CHOLEST SERPL-MCNC: 90 MG/DL
CO2 SERPL-SCNC: 31 MMOL/L (ref 21–32)
COLOR UR: NORMAL
COMMENT, HOLDF: NORMAL
COMMENT, HOLDF: NORMAL
CREAT SERPL-MCNC: 0.88 MG/DL (ref 0.7–1.3)
DIFFERENTIAL METHOD BLD: ABNORMAL
ECHO AO ASC DIAM: 3.11 CM
ECHO AO ROOT DIAM: 2.51 CM
ECHO AV AREA PEAK VELOCITY: 1.64 CM2
ECHO AV AREA/BSA PEAK VELOCITY: 0.9 CM2/M2
ECHO AV PEAK GRADIENT: 21.03 MMHG
ECHO AV PEAK VELOCITY: 229.27 CM/S
ECHO EST RA PRESSURE: 8 MMHG
ECHO IVC PROX: 2.38 CM
ECHO LA AREA 4C: 19.71 CM2
ECHO LA MAJOR AXIS: 3.46 CM
ECHO LA MINOR AXIS: 1.91 CM
ECHO LA VOL 2C: 41.21 ML (ref 18–58)
ECHO LA VOL 4C: 49.18 ML (ref 18–58)
ECHO LA VOL BP: 54.5 ML (ref 18–58)
ECHO LA VOL/BSA BIPLANE: 30.11 ML/M2 (ref 16–28)
ECHO LA VOLUME INDEX A2C: 22.77 ML/M2 (ref 16–28)
ECHO LA VOLUME INDEX A4C: 27.17 ML/M2 (ref 16–28)
ECHO LV E' LATERAL VELOCITY: 8.45 CENTIMETER/SECOND
ECHO LV E' SEPTAL VELOCITY: 3.87 CENTIMETER/SECOND
ECHO LV INTERNAL DIMENSION DIASTOLIC: 4.15 CM (ref 4.2–5.9)
ECHO LV INTERNAL DIMENSION SYSTOLIC: 2.9 CM
ECHO LV IVSD: 0.86 CM (ref 0.6–1)
ECHO LV MASS 2D: 110.3 G (ref 88–224)
ECHO LV MASS INDEX 2D: 60.9 G/M2 (ref 49–115)
ECHO LV POSTERIOR WALL DIASTOLIC: 0.87 CM (ref 0.6–1)
ECHO LVOT DIAM: 2.04 CM
ECHO LVOT PEAK GRADIENT: 5.32 MMHG
ECHO LVOT PEAK VELOCITY: 115.31 CM/S
ECHO MV AREA PHT: 4.24 CM2
ECHO MV E DECELERATION TIME (DT): 179.05 MS
ECHO MV E VELOCITY: 99.91 CENTIMETER/SECOND
ECHO MV PRESSURE HALF TIME (PHT): 51.93 MS
ECHO PV MAX VELOCITY: 85.84 CM/S
ECHO PV PEAK INSTANTANEOUS GRADIENT SYSTOLIC: 3.02 MMHG
ECHO RIGHT VENTRICULAR SYSTOLIC PRESSURE (RVSP): 76.34 MMHG
ECHO RV INTERNAL DIMENSION: 3.44 CM
ECHO RV TAPSE: 1.23 CM (ref 1.5–2)
ECHO TV REGURGITANT MAX VELOCITY: 413.35 CM/S
ECHO TV REGURGITANT PEAK GRADIENT: 68.34 MMHG
EOSINOPHIL # BLD: 0.2 K/UL (ref 0–0.4)
EOSINOPHIL NFR BLD: 2 % (ref 0–7)
ERYTHROCYTE [DISTWIDTH] IN BLOOD BY AUTOMATED COUNT: 19.5 % (ref 11.5–14.5)
ETHANOL SERPL-MCNC: <10 MG/DL
FOLATE SERPL-MCNC: 44.2 NG/ML (ref 5–21)
GLOBULIN SER CALC-MCNC: 3.3 G/DL (ref 2–4)
GLUCOSE BLD STRIP.AUTO-MCNC: 107 MG/DL (ref 65–117)
GLUCOSE SERPL-MCNC: 106 MG/DL (ref 65–100)
GLUCOSE UR STRIP.AUTO-MCNC: NEGATIVE MG/DL
HCO3 BLDV-SCNC: 27.8 MMOL/L (ref 23–28)
HCT VFR BLD AUTO: 30.1 % (ref 36.6–50.3)
HDLC SERPL-MCNC: 41 MG/DL
HDLC SERPL: 2.2 {RATIO} (ref 0–5)
HGB BLD-MCNC: 9 G/DL (ref 12.1–17)
HGB UR QL STRIP: NEGATIVE
IMM GRANULOCYTES # BLD AUTO: 0.1 K/UL (ref 0–0.04)
IMM GRANULOCYTES NFR BLD AUTO: 1 % (ref 0–0.5)
KETONES UR QL STRIP.AUTO: NEGATIVE MG/DL
LA VOL DISK BP: 49.75 ML (ref 18–58)
LDLC SERPL CALC-MCNC: 31.4 MG/DL (ref 0–100)
LEUKOCYTE ESTERASE UR QL STRIP.AUTO: NEGATIVE
LYMPHOCYTES # BLD: 0.8 K/UL (ref 0.8–3.5)
LYMPHOCYTES NFR BLD: 9 % (ref 12–49)
MAGNESIUM SERPL-MCNC: 2.3 MG/DL (ref 1.6–2.4)
MCH RBC QN AUTO: 32 PG (ref 26–34)
MCHC RBC AUTO-ENTMCNC: 29.9 G/DL (ref 30–36.5)
MCV RBC AUTO: 107.1 FL (ref 80–99)
MONOCYTES # BLD: 0.8 K/UL (ref 0–1)
MONOCYTES NFR BLD: 9 % (ref 5–13)
NEUTS SEG # BLD: 7.2 K/UL (ref 1.8–8)
NEUTS SEG NFR BLD: 79 % (ref 32–75)
NITRITE UR QL STRIP.AUTO: NEGATIVE
NRBC # BLD: 0 K/UL (ref 0–0.01)
NRBC BLD-RTO: 0 PER 100 WBC
PCO2 BLDV: 42.1 MMHG (ref 41–51)
PH BLDV: 7.43 [PH] (ref 7.32–7.42)
PH UR STRIP: 7 [PH] (ref 5–8)
PLATELET # BLD AUTO: 305 K/UL (ref 150–400)
PMV BLD AUTO: 9.7 FL (ref 8.9–12.9)
PO2 BLDV: 31 MMHG (ref 25–40)
POTASSIUM SERPL-SCNC: 4.1 MMOL/L (ref 3.5–5.1)
PROT SERPL-MCNC: 6.1 G/DL (ref 6.4–8.2)
PROT UR STRIP-MCNC: NEGATIVE MG/DL
RBC # BLD AUTO: 2.81 M/UL (ref 4.1–5.7)
SAMPLES BEING HELD,HOLD: NORMAL
SAMPLES BEING HELD,HOLD: NORMAL
SAO2 % BLDV: 60.1 % (ref 65–88)
SERVICE CMNT-IMP: ABNORMAL
SERVICE CMNT-IMP: NORMAL
SODIUM SERPL-SCNC: 140 MMOL/L (ref 136–145)
SP GR UR REFRACTOMETRY: 1.01 (ref 1–1.03)
SPECIMEN TYPE: ABNORMAL
TRIGL SERPL-MCNC: 88 MG/DL (ref ?–150)
TROPONIN I SERPL-MCNC: 0.07 NG/ML
TROPONIN I SERPL-MCNC: 0.08 NG/ML
TROPONIN I SERPL-MCNC: 0.08 NG/ML
TSH SERPL DL<=0.05 MIU/L-ACNC: 2.66 UIU/ML (ref 0.36–3.74)
UR CULT HOLD, URHOLD: NORMAL
UROBILINOGEN UR QL STRIP.AUTO: 0.2 EU/DL (ref 0.2–1)
VIT B12 SERPL-MCNC: 863 PG/ML (ref 193–986)
VLDLC SERPL CALC-MCNC: 17.6 MG/DL
WBC # BLD AUTO: 9 K/UL (ref 4.1–11.1)

## 2021-07-31 PROCEDURE — 82746 ASSAY OF FOLIC ACID SERUM: CPT

## 2021-07-31 PROCEDURE — 74011000250 HC RX REV CODE- 250: Performed by: INTERNAL MEDICINE

## 2021-07-31 PROCEDURE — 87086 URINE CULTURE/COLONY COUNT: CPT

## 2021-07-31 PROCEDURE — 82077 ASSAY SPEC XCP UR&BREATH IA: CPT

## 2021-07-31 PROCEDURE — 81003 URINALYSIS AUTO W/O SCOPE: CPT

## 2021-07-31 PROCEDURE — 84443 ASSAY THYROID STIM HORMONE: CPT

## 2021-07-31 PROCEDURE — 93306 TTE W/DOPPLER COMPLETE: CPT

## 2021-07-31 PROCEDURE — 36415 COLL VENOUS BLD VENIPUNCTURE: CPT

## 2021-07-31 PROCEDURE — 82803 BLOOD GASES ANY COMBINATION: CPT

## 2021-07-31 PROCEDURE — 82962 GLUCOSE BLOOD TEST: CPT

## 2021-07-31 PROCEDURE — 99285 EMERGENCY DEPT VISIT HI MDM: CPT

## 2021-07-31 PROCEDURE — 82607 VITAMIN B-12: CPT

## 2021-07-31 PROCEDURE — 93005 ELECTROCARDIOGRAM TRACING: CPT

## 2021-07-31 PROCEDURE — 83735 ASSAY OF MAGNESIUM: CPT

## 2021-07-31 PROCEDURE — 65660000000 HC RM CCU STEPDOWN

## 2021-07-31 PROCEDURE — 85025 COMPLETE CBC W/AUTO DIFF WBC: CPT

## 2021-07-31 PROCEDURE — 74011250637 HC RX REV CODE- 250/637: Performed by: INTERNAL MEDICINE

## 2021-07-31 PROCEDURE — 84484 ASSAY OF TROPONIN QUANT: CPT

## 2021-07-31 PROCEDURE — 83880 ASSAY OF NATRIURETIC PEPTIDE: CPT

## 2021-07-31 PROCEDURE — 80061 LIPID PANEL: CPT

## 2021-07-31 PROCEDURE — 93306 TTE W/DOPPLER COMPLETE: CPT | Performed by: SPECIALIST

## 2021-07-31 PROCEDURE — 71045 X-RAY EXAM CHEST 1 VIEW: CPT

## 2021-07-31 PROCEDURE — 70450 CT HEAD/BRAIN W/O DYE: CPT

## 2021-07-31 PROCEDURE — 80053 COMPREHEN METABOLIC PANEL: CPT

## 2021-07-31 PROCEDURE — 76700 US EXAM ABDOM COMPLETE: CPT

## 2021-07-31 PROCEDURE — 82140 ASSAY OF AMMONIA: CPT

## 2021-07-31 RX ORDER — GUAIFENESIN 100 MG/5ML
81 LIQUID (ML) ORAL DAILY
Status: DISCONTINUED | OUTPATIENT
Start: 2021-08-01 | End: 2021-08-01

## 2021-07-31 RX ORDER — SODIUM CHLORIDE 0.9 % (FLUSH) 0.9 %
5-40 SYRINGE (ML) INJECTION EVERY 8 HOURS
Status: DISCONTINUED | OUTPATIENT
Start: 2021-07-31 | End: 2021-08-03 | Stop reason: HOSPADM

## 2021-07-31 RX ORDER — ASCORBIC ACID 500 MG
500 TABLET ORAL DAILY
COMMUNITY
Start: 2021-07-27 | End: 2021-08-05

## 2021-07-31 RX ORDER — POLYETHYLENE GLYCOL 3350 17 G/17G
17 POWDER, FOR SOLUTION ORAL
COMMUNITY

## 2021-07-31 RX ORDER — ALLOPURINOL 300 MG/1
150 TABLET ORAL
Status: DISCONTINUED | OUTPATIENT
Start: 2021-07-31 | End: 2021-08-03 | Stop reason: HOSPADM

## 2021-07-31 RX ORDER — ATORVASTATIN CALCIUM 10 MG/1
10 TABLET, FILM COATED ORAL
Status: DISCONTINUED | OUTPATIENT
Start: 2021-07-31 | End: 2021-08-01

## 2021-07-31 RX ORDER — VITAMIN A 3000 MCG
10000 CAPSULE ORAL DAILY
COMMUNITY
Start: 2021-07-27 | End: 2021-08-05

## 2021-07-31 RX ORDER — BISMUTH SUBSALICYLATE 262 MG
1 TABLET,CHEWABLE ORAL DAILY
COMMUNITY

## 2021-07-31 RX ORDER — ENOXAPARIN SODIUM 100 MG/ML
40 INJECTION SUBCUTANEOUS EVERY 24 HOURS
Status: DISCONTINUED | OUTPATIENT
Start: 2021-07-31 | End: 2021-07-31

## 2021-07-31 RX ORDER — ATORVASTATIN CALCIUM 20 MG/1
80 TABLET, FILM COATED ORAL
Status: DISCONTINUED | OUTPATIENT
Start: 2021-07-31 | End: 2021-07-31

## 2021-07-31 RX ORDER — ACETAMINOPHEN 325 MG/1
650 TABLET ORAL 3 TIMES DAILY
COMMUNITY

## 2021-07-31 RX ORDER — DEXTROSE MONOHYDRATE AND SODIUM CHLORIDE 5; .45 G/100ML; G/100ML
50 INJECTION, SOLUTION INTRAVENOUS CONTINUOUS
Status: DISCONTINUED | OUTPATIENT
Start: 2021-07-31 | End: 2021-08-03 | Stop reason: HOSPADM

## 2021-07-31 RX ORDER — SODIUM CHLORIDE 0.9 % (FLUSH) 0.9 %
5-40 SYRINGE (ML) INJECTION AS NEEDED
Status: DISCONTINUED | OUTPATIENT
Start: 2021-07-31 | End: 2021-08-03 | Stop reason: HOSPADM

## 2021-07-31 RX ORDER — ASPIRIN 600 MG/1
300 SUPPOSITORY RECTAL DAILY
Status: DISCONTINUED | OUTPATIENT
Start: 2021-07-31 | End: 2021-07-31

## 2021-07-31 RX ORDER — TRAMADOL HYDROCHLORIDE 50 MG/1
50 TABLET ORAL
COMMUNITY
End: 2021-08-03

## 2021-07-31 RX ADMIN — Medication 20 ML: at 22:31

## 2021-07-31 RX ADMIN — ALLOPURINOL 150 MG: 300 TABLET ORAL at 22:30

## 2021-07-31 RX ADMIN — ASPIRIN 300 MG: 300 SUPPOSITORY RECTAL at 13:16

## 2021-07-31 RX ADMIN — ATORVASTATIN CALCIUM 10 MG: 10 TABLET, FILM COATED ORAL at 22:30

## 2021-07-31 RX ADMIN — DEXTROSE AND SODIUM CHLORIDE 50 ML/HR: 5; 450 INJECTION, SOLUTION INTRAVENOUS at 13:32

## 2021-07-31 RX ADMIN — RIVAROXABAN 15 MG: 15 TABLET, FILM COATED ORAL at 22:30

## 2021-07-31 NOTE — ED NOTES
Verbal shift change report given to Denis Martin RN (oncoming nurse) by Hemanth Jorge (offgoing nurse). Report included the following information SBAR, ED Summary, Intake/Output, MAR and Recent Results.

## 2021-07-31 NOTE — H&P
[facility]  [facility], [address] [place] [address]  [phone]    Admission History and Physical      NAME:  Clara Fuentes   :   1927   MRN:  [id number]     PCP:  Abbey Robles MD     Date/Time of service:  2021  2:01 PM        Subjective:     CHIEF COMPLAINT: Unresponsiveness    HISTORY OF PRESENT ILLNESS:     Mr. Aurelio Yousif is a 80 y.o.  male with a past medical history of A. fib on Xarelto, neuropathy and uric acid stones who is admitted with encephalopathy and NSTEMI. Mr. Aurelio Yousif is very hard of hearing and so history is obtained from both him and his son at bedside. Per son, this morning staff at [facility] were unable to awaken patient. At that time they placed a Avendaño in him and called EMS, and then patient was brought to the emergency room. According to son, he is oriented x4 at baseline. Last month he suffered a hip fracture and was hospitalized at [facility] and then discharged on  to [facility] for rehab. Per son, on discharge she was prescribed Seroquel which she believes may be contributing to his father's current symptoms. Patient has no complaints other than difficulty moving his left lower extremity following his recent fracture and surgery. No Known Allergies    Prior to Admission medications    Medication Sig Start Date End Date Taking? Authorizing Provider   traMADoL (ULTRAM) 50 mg tablet Take 50 mg by mouth every six (6) hours as needed for Pain. Yes Provider, Historical   multivitamin (ONE A DAY) tablet Take 1 Tablet by mouth daily. Yes Provider, Historical   vitamin A (AQUASOL A) 10,000 unit capsule Take 10,000 Units by mouth daily. 21 Yes Provider, Historical   ascorbic acid, vitamin C, (Vitamin C) 500 mg tablet Take 500 mg by mouth daily. 21 Yes Provider, Historical   acetaminophen (TYLENOL) 325 mg tablet Take 650 mg by mouth three (3) times daily.    Yes Provider, Historical   polyethylene glycol (Miralax) 17 gram/dose powder Take 17 g by mouth daily as needed for Constipation. Yes Provider, Historical   calcium-vitamin D (OS-VINCENT +D3) 500 mg-200 unit per tablet Take 1 Tablet by mouth three (3) times daily (with meals). 7/25/21  Yes Purvi Lockett MD   QUEtiapine (SEROquel) 25 mg tablet Take 1 Tablet by mouth nightly. 7/25/21  Yes Purvi Lockett MD   rivaroxaban (Xarelto) 15 mg tab tablet Take 15 mg by mouth daily (with dinner). Indications: prevention of thromboembolism in paroxysmal atrial fibrillation   Yes Other, MD Yanelis   pregabalin (LYRICA) 75 mg capsule Take 75 mg by mouth two (2) times a day. Yes Other, MD Yanelis   DULoxetine (CYMBALTA) 30 mg capsule Take 90 mg by mouth daily. Yes Other, MD Yanelis   zinc sulfate 220 mg tablet Take 1 Tab by mouth daily. 1/19/21  Yes Madeline NAYAK MD   ondansetron (Zofran ODT) 4 mg disintegrating tablet 1 Tab by SubLINGual route every eight (8) hours as needed for Nausea or Vomiting. 1/19/21  Yes Madeline NAYAK MD   simvastatin (Zocor) 10 mg tablet Take 10 mg by mouth nightly. Yes Provider, Historical   folic acid (FOLVITE) 1 mg tablet Take 1 mg by mouth daily. Yes Provider, Historical   allopurinol (ZYLOPRIM) 300 mg tablet Take 150 mg by mouth nightly.    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    Hypertension     Memory loss     Peripheral neuropathy     Vertigo         Past Surgical History:   Procedure Laterality Date    HX CATARACT REMOVAL      HX GI      colon resection    HX ORTHOPAEDIC      left knee, left shoulder    DC ABDOMEN SURGERY PROC UNLISTED      colorectal    DC CARDIAC SURG PROCEDURE UNLIST      cardio version       Social History     Tobacco Use    Smoking status: Never Smoker    Smokeless tobacco: Never Used   Substance Use Topics    Alcohol use:  Yes     Alcohol/week: 1.0 standard drinks     Types: 1 Standard drinks or equivalent per week     Comment: rarely        Family History   Problem Relation Age of Onset    Stroke Father     Heart Disease Father     Stroke Sister         Review of Systems:  (bold if positive, if negative)    Gen:  Eyes:  ENT:  CVS:  Pulm:  GI:  :  MS: Left lower extremity painSkin:  Psych:  Endo:  Hem:  Renal:  Neuro:            Objective:      VITALS:    Vital signs reviewed; most recent are:    Visit Vitals  /76   Pulse (!) 105   Temp 97.5 °F (36.4 °C)   Resp 16   Ht 5' 7\" (1.702 m)   Wt 70 kg (154 lb 5.2 oz)   SpO2 100%   BMI 24.17 kg/m²     SpO2 Readings from Last 6 Encounters:   07/31/21 100%   07/26/21 92%   07/01/21 96%   01/19/21 94%   02/20/20 99%   10/08/19 93%        No intake or output data in the 24 hours ending 07/31/21 1401     Exam:     Physical Exam:    Gen: Elderly, in no acute distress  HEENT:  Pink conjunctivae, PERRL, very hard of hearing  Resp:  No accessory muscle use, clear breath sounds without wheezes rales or rhonchi  Card:  RRR, No murmurs, normal S1, S2, bilateral peripheral edema  Abd:  Soft, non-tender, non-distended, normoactive bowel sounds are present  Musc: Dressing on left hip clean dry and intact  Skin: Ecchymosis around left hip  Neuro:  Cranial nerves 3-12 are grossly intact, follows commands appropriately, limited motion of left lower extremity  Psych: Poor insight      Labs:    Recent Labs     07/31/21  0751   WBC 9.0   HGB 9.0*   HCT 30.1*        Recent Labs     07/31/21  0751      K 4.1   *   CO2 31   * BUN 19   CREA 0.88   CA 8.1*   MG 2.3   ALB 2.8*   TBILI 1.0   ALT 18     Lab Results   Component Value Date/Time    Glucose (POC) 107 07/31/2021 07:49 AM    Glucose (POC) 166 (H) 05/20/2014 08:30 PM    Glucose (POC) 153 (H) 11/16/2013 04:26 PM    Glucose (POC) 134 (H) 11/16/2013 12:25 PM     No results for input(s): PH, PCO2, PO2, HCO3, FIO2 in the last 72 hours. No results for input(s): INR, INREXT in the last 72 hours. Radiology and EKG reviewed: CT head with no acute concerns. EKG with A. fib with PVCs and nonspecific T wave changes. Old Records reviewed in [facility]       Assessment/Plan:         NSTEMI (non-ST elevated myocardial infarction) ([facility] (7/31/2021): Denies any current chest pain. Possibly related to volume overload. Continue to trend troponins. Obtain echo. Start aspirin. Continue statin. Consider cardiology consult pending echo results. Encephalopathy/ Unresponsiveness: Unclear etiology. Possibly related to recently initiated Seroquel and other medications or cardiac etiology. CT head with no acute concerns. Check UA, TSH and ammonia level. Hold any sedating medications. May consider outpatient pulmonary testing for sleep apnea. Monitor. Pulmonary edema/left pleural effusion: Check echo. Consider diuretics as blood pressure able to tolerate.     Status post nail insertion for L displaced intertrochanteric femur fx: Status post Surg on 7/19/21. Monitor. Consider orthopedics consult as needed. Macrocytic anemia anemia: Appears around baseline. Check folate and B12 levels. History of urinary retention: Avendaño currently in place from [facility]. Start Flomax and consider voiding trial.    Atrial fibrillation: Currently rate controlled. Continue home Xarelto.     Neuropathy: Hold pregabalin and duloxetine due to encephalopathy.     Hx uric acid stones: Continue allopurinol.      Risk of deterioration: high      Total time spent with patient: 60 Minutes **I personally saw and examined the patient during this time period**                 Care Plan discussed with: Patient, Family and Nursing Staff    Discussed:  Code Status and Care Plan    Prophylaxis:  Xarelto    Probable Disposition:  [facility]/Rehab           ___________________________________________________    Attending Physician: Ysabel Pham DO

## 2021-07-31 NOTE — PROGRESS NOTES
7/31/2021  3:34 PM  Case management note    Reason for Readmission:     NSTEMI  Patient was discharged on 7/26 to [address] [place] [address]. They were unable to arouse patient this am  7/18/2021 - 7/26/2021 hip fracture  Patient has history of afib, neuropathy. Patient is a little confused today. Family wishes patient to return to [address] [place] [address] for completion of rehab. Family is going to transition him to [address] [place] [address] on discharge from SNF  Patient had started to walk with assistance with rehab  He also had bladder retention this am.         RUR Score/Risk Level:     17%    PCP: First and Last name:  Santiago Heredia   Name of Practice:    Are you a current patient: Yes/No: yes   Approximate date of last visit: 6 weeks   Can you participate in a virtual visit with your PCP:     Is a Care Conference indicated:       Did you attend your follow up appointment (s): If not, why not:did not have was in SNF         Resources/supports as identified by patient/family:  Patient discharged to SNF, family states he will be going to [place] from [facility] facing patient (as identified by patient/family and CM): Finances/Medication cost?     Medicare, [facility] system or lack thereof? Supportive family  Living arrangements? snf to [place]   Self-care/ADLs/Cognition? Unable to do self care, fair health cognition       Current Advanced Directive/Advance Care Plan:  DNR           Plan for utilizing home health:   Patient in [facility]             Transition of Care Plan:    Based on readmission, the patient's previous Plan of Care   has been evaluated and/or modified. The current Transition of Care Plan is:           1. Return to SNF to finish rehab  2. PCP follow up  3. Ortho follow up  4. Card follow up  5. Long term planning  6. CM to follow for discharge needs    Care Management Interventions  PCP Verified by CM:  Yes (Dr. Santiago Heredia)  Mode of Transport at Discharge: Self  Transition of Care Consult (CM Consult): SNF  Current Support Network: Lives Alone  Confirm Follow Up Transport: Other (see comment)  The Plan for Transition of Care is Related to the Following Treatment Goals : NStemi  Discharge Location  Discharge Placement: Skilled nursing facility   Readmission Assessment  Number of days since last admission?: 1-7 days  Previous disposition: SNF  What was the patient's/caregiver's perception as to why they think they needed to return back to the hospital?: Other (Comment) (snf had trouble waking patient this am.)  Did you visit your Primary Care Physician after you left the hospital, before you returned this time? :  (patient in snf, saw snf md)  Did you see a specialist, such as Cardiac, Pulmonary, Orthopedic Physician, etc. after you left the hospital?:  (no went to snf)  Who advised the patient to return to the hospital?: Skilled Unit  Does the patient report anything that got in the way of taking their medications?: No    Tea Sanchez

## 2021-07-31 NOTE — ED NOTES
Verbal/Bedside report was given to [facility] RN who will assume care of patient at this time. SBAR report consisted of ED summary, MAR, recent results, and additional pertinent information. Receiving nurse given opportunity to ask questions and seek clarifications. Receiving nurse aware of pending lab results and orders that are to be completed.

## 2021-07-31 NOTE — ED NOTES
Verbal shift change report given to Caroline RN (oncoming nurse) by Kennedy Avelar RN (offgoing nurse). Report included the following information SBAR, ED Summary, Intake/Output, MAR and Recent Results.

## 2021-07-31 NOTE — ED TRIAGE NOTES
Patient arrives to ED with c/o AMS, pt lives at [facility] for left hip fracture. Staff called EMS for AMS and increased lethargy.  Pt is [place] and less verbal.

## 2021-07-31 NOTE — PROGRESS NOTES
Admission Medication Reconciliation:     Information obtained from:    Paperwork from [address] [place] [address]. Pharmacist gave a copy of this paperwork to the ER unit sec to scan into the EMR. Quetiapine is a new medication started on the patient's last admission to [facility]. The family feels this medication is too sedating and is causing the patient to feel confused. Family would like for this medication to be stopped. Prior to Admission Medications   Prescriptions Last Dose Informant Taking? DULoxetine (CYMBALTA) 30 mg capsule 2021 at Unknown time  Yes   Sig: Take 90 mg by mouth daily. QUEtiapine (SEROquel) 25 mg tablet 2021 at Unknown time  Yes   Sig: Take 1 Tablet by mouth nightly. acetaminophen (TYLENOL) 325 mg tablet 2021 at Unknown time  Yes   Sig: Take 650 mg by mouth three (3) times daily. allopurinol (ZYLOPRIM) 300 mg tablet 2021 at Unknown time Self Yes   Sig: Take 150 mg by mouth nightly. ascorbic acid, vitamin C, (Vitamin C) 500 mg tablet 2021 at Unknown time  Yes   Sig: Take 500 mg by mouth daily. calcium-vitamin D (OS-VINCENT +D3) 500 mg-200 unit per tablet 2021 at Unknown time  Yes   Sig: Take 1 Tablet by mouth three (3) times daily (with meals). folic acid (FOLVITE) 1 mg tablet 2021 at Unknown time  Yes   Sig: Take 1 mg by mouth daily. multivitamin (ONE A DAY) tablet 2021 at Unknown time  Yes   Sig: Take 1 Tablet by mouth daily. ondansetron (Zofran ODT) 4 mg disintegrating tablet   Yes   Si Tab by SubLINGual route every eight (8) hours as needed for Nausea or Vomiting. polyethylene glycol (Miralax) 17 gram/dose powder   Yes   Sig: Take 17 g by mouth daily as needed for Constipation. pregabalin (LYRICA) 75 mg capsule 2021 at Unknown time  Yes   Sig: Take 75 mg by mouth two (2) times a day. rivaroxaban (Xarelto) 15 mg tab tablet 2021 at Unknown time  Yes   Sig: Take 15 mg by mouth daily (with dinner).  Indications: prevention of thromboembolism in paroxysmal atrial fibrillation   simvastatin (Zocor) 10 mg tablet 7/30/2021 at Unknown time  Yes   Sig: Take 10 mg by mouth nightly. traMADoL (ULTRAM) 50 mg tablet   Yes   Sig: Take 50 mg by mouth every six (6) hours as needed for Pain.   vitamin A (AQUASOL A) 10,000 unit capsule 7/30/2021 at Unknown time  Yes   Sig: Take 10,000 Units by mouth daily. zinc sulfate 220 mg tablet 7/30/2021 at Unknown time  Yes   Sig: Take 1 Tab by mouth daily. Facility-Administered Medications: None         Please contact the main inpatient pharmacy with any questions or concerns at [phone] and we will direct you to the clinical pharmacist covering this patient's care while in-house.    Ruma Hung, Zoya, BCPS

## 2021-07-31 NOTE — ED PROVIDER NOTES
History is limited due to patient being a poor historian. Per EMS, he recently broke his hip and has been in nursing facility for rehab. Apparently he went to bed last night in his normal state of health and mental status but was found this morning to be more sleepy than normal.  EMS reports normal glucose greater than 100 mg/dL. They stated that there were no focal neurologic deficits. No falls or trauma. EMS does not know if there have been any recent medication changes. They said that he normally is awake and oriented to person place and time. No interventions made by EMS.            Past Medical History:   Diagnosis Date    Anxiety     Arthritis     right shoulder    Atrial fibrillation, chronic ([facility] [address] 09/23/2014    Colon cancer ([facility] [address] 2002    colon resection    Depression 9/28/2015    DJD (degenerative joint disease) of knee     left TKR    Dyslipidemia     Embolic stroke involving right middle cerebral artery [facility]) Nov 2013    presented with dysarthria, MRI Several tiny acute infarcts in the right middle cerebral artery    Falls     Gout     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    Hypertension     Memory loss     Peripheral neuropathy     Vertigo        Past Surgical History:   Procedure Laterality Date    HX CATARACT REMOVAL      HX GI      colon resection    HX ORTHOPAEDIC      left knee, left shoulder    MI ABDOMEN SURGERY PROC UNLISTED      colorectal    MI CARDIAC SURG PROCEDURE UNLIST      cardio version         Family History:   Problem Relation Age of Onset    Stroke Father     Heart Disease Father     Stroke Sister        Social History     Socioeconomic History    Marital status:      Spouse name: Not on file    Number of children: Not on file    Years of education: Not on file    Highest education level: Not on file   Occupational History    Not on file   Tobacco Use    Smoking status: Never Smoker    Smokeless tobacco: Never Used Substance and Sexual Activity    Alcohol use: Yes     Alcohol/week: 1.0 standard drinks     Types: 1 Standard drinks or equivalent per week     Comment: rarely    Drug use: Not on file    Sexual activity: Not on file   Other Topics Concern    Not on file   Social History Narrative    Not on file     Social Determinants of Health     Financial Resource Strain:     Difficulty of Paying Living Expenses:    Food Insecurity:     Worried About Running Out of Food in the Last Year:     [address] in the Last Year:    Transportation Needs:     Lack of Transportation (Medical):  Lack of Transportation (Non-Medical):    Physical Activity:     Days of Exercise per Week:     Minutes of Exercise per Session:    Stress:     Feeling of Stress :    Social Connections:     Frequency of Communication with Friends and Family:     Frequency of Social Gatherings with Friends and Family:     Attends Synagogue Services:     Active Member of Clubs or Organizations:     Attends Club or Organization Meetings:     Marital Status:    Intimate Partner Violence:     Fear of Current or Ex-Partner:     Emotionally Abused:     Physically Abused:     Sexually Abused: ALLERGIES: Patient has no known allergies. Review of Systems   Unable to perform ROS: Mental status change       Vitals:    07/31/21 0724   BP: 119/69   Pulse: 72   Resp: 16   Temp: 97.5 °F (36.4 °C)   SpO2: 95%            Physical Exam  Vitals and nursing note reviewed. Constitutional:       General: He is not in acute distress. Appearance: He is well-developed and normal weight. He is not ill-appearing, toxic-appearing or diaphoretic. HENT:      Head: Normocephalic and atraumatic. Mouth/Throat:      Mouth: Mucous membranes are moist.      Pharynx: Oropharynx is clear. Eyes:      General: No scleral icterus. Extraocular Movements: Extraocular movements intact. Right eye: Normal extraocular motion and no nystagmus. Left eye: Normal extraocular motion and no nystagmus. Pupils: Pupils are equal, round, and reactive to light. Pupils are equal.      Right eye: Pupil is round and reactive. Left eye: Pupil is round and reactive. Cardiovascular:      Rate and Rhythm: Normal rate. Rhythm irregular. Heart sounds: Murmur heard. Pulmonary:      Effort: Pulmonary effort is normal.      Breath sounds: Normal breath sounds. Abdominal:      General: Bowel sounds are normal. There is no distension. Palpations: Abdomen is soft. Tenderness: There is no abdominal tenderness. There is no guarding. Comments: Bruising in suprapubic area as well as left and right lower abdominal/hip area. Musculoskeletal:         General: No swelling or tenderness. Normal range of motion. Cervical back: Normal range of motion and neck supple. No rigidity. Lymphadenopathy:      Cervical: No cervical adenopathy. Skin:     General: Skin is warm and dry. Capillary Refill: Capillary refill takes less than 2 seconds. Neurological:      Cranial Nerves: No cranial nerve deficit or facial asymmetry. Sensory: No sensory deficit. Deep Tendon Reflexes: Reflexes normal.      Comments: Somnolent but arousable to voice. Tells me his name. Does not know the year or the month. Follows commands. Denies any complaints. Sensation to light touch intact in all dermatomes of the upper extremities bilaterally and the L4 L5-S1 dermatomes bilaterally. 1+ and equal biceps patella reflexes bilaterally. No inducible ankle clonus. [facility]  ED Course as of Jul 31 0910   Sat Jul 31, 2021   0719 Patient examined. Unclear last time seen well. No focal neurologic deficits. Is on Xarelto. Is currently had a nursing facility for rehab from a hip fracture. Does not appear obviously active at this time but I am concerned for urinary tract infection. Other considerations include intracranial process. Will get CT head. Will check labs for electrolyte status. Glucose within normal limits per EMS. Will recheck here. No obvious toxidrome on exam..    [AF]   0745 EKG with A. fib, rate 70, normal QRS, nonspecific ST changes. [AF]   2248 [facility] with nursing home staff. Was last seen well around 1:00 this morning and then when they went to check on around 6 this morning he was difficult to arouse. Did not appreciate any focal neurologic deficits. The nurse I spoke with states that he last received tramadol around 1230 this morning. Has been taking Tylenol. No other analgesics. No new changes to his medication regimen that she is aware of. No history of falls. [AF]   0814 CBC WITH AUTOMATED DIFF(!):    WBC 9.0   RBC 2.81(!)   HGB 9.0(!)   HCT 30.1(!)   . 1(!)   MCH 32.0   MCHC 29.9(!)   RDW 19.5(!)   PLATELET 866   MPV 9.7   NRBC 0.0   ABSOLUTE NRBC 0.00   NEUTROPHILS 79(!)   LYMPHOCYTES 9(!)   MONOCYTES 9   EOSINOPHILS 2   BASOPHILS 0   IMMATURE GRANULOCYTES 1(!)   ABS. NEUTROPHILS 7.2   ABS. LYMPHOCYTES 0.8   ABS. MONOCYTES 0.8   ABS. EOSINOPHILS 0.2   ABS. BASOPHILS 0.0   ABS. IMM. GRANS. 0.1(!)   DF AUTOMATED [AF]   [id number] CBC with microcytic anemia improved from previous. [AF]   3468 ETHYL ALCOHOL:    ALCOHOL(ETHYL),SERUM <10 [AF]   0845 MAGNESIUM:    Magnesium 2.3 [AF]   0845 COMPREHENSIVE METABOLIC PANEL(!):    Sodium 140   Potassium 4.1   Chloride 110(!)   CO2 31   Anion gap NEG 1   Glucose 106(!)   BUN 19   Creatinine 0.88   BUN/Creatinine ratio 22(!)   GFR est AA >60   GFR est non-AA >60   Calcium 8.1(!)   Bilirubin, total 1.0   ALT 18   AST 27   Alk. phosphatase 174(!)   Protein, total 6.1(!)   Albumin 2.8(!)   Globulin 3.3   A-G Ratio 0.8(!) [AF]   0845 TROPONIN I(!):    Troponin-I, Qt. 0.07(!) [AF]   [id number] Troponin is elevated but it was elevated about 1 month ago at the same level. Will repeat in 3 hours and repeat EKG. [AF]   [id number] Cxr with increased pulm edema. Will get bnp.   Spo2 wnl here on room air.     [AF]   [id number] Patient rechecked. No change in physical exam status. Family is at the bedside states this is new for him. Waiting to get urine. Will call for admission. Will get VBG to assess for CO2 retention.    [AF]   0909 CT HEAD WO CONT [AF]      ED Course User Index  [AF] DO Jasmin Dennis Serve Consult for Admission  9:09 AM    ED Room Number: GY74/17  Patient Name and age: Shannon Medley 80 y.o.  male  Working Diagnosis:   1. Acute encephalopathy    2.  Elevated troponin        COVID-19 Suspicion:  no  Sepsis present:  no  Reassessment needed: yes  Code Status:  Family to clarify  Readmission: no  Isolation Requirements:  no  Recommended Level of Care:  telemetry  Department:[facility] ED - [phone]  Other:  n/a      Procedures

## 2021-08-01 LAB
ALBUMIN SERPL-MCNC: 2.4 G/DL (ref 3.5–5)
ALBUMIN/GLOB SERPL: 0.7 {RATIO} (ref 1.1–2.2)
ALP SERPL-CCNC: 171 U/L (ref 45–117)
ALT SERPL-CCNC: 19 U/L (ref 12–78)
ANION GAP SERPL CALC-SCNC: 3 MMOL/L (ref 5–15)
AST SERPL-CCNC: 26 U/L (ref 15–37)
ATRIAL RATE: 51 BPM
BASOPHILS # BLD: 0 K/UL (ref 0–0.1)
BASOPHILS NFR BLD: 0 % (ref 0–1)
BILIRUB SERPL-MCNC: 0.8 MG/DL (ref 0.2–1)
BUN SERPL-MCNC: 16 MG/DL (ref 6–20)
BUN/CREAT SERPL: 20 (ref 12–20)
CALCIUM SERPL-MCNC: 7.7 MG/DL (ref 8.5–10.1)
CALCULATED R AXIS, ECG10: 0 DEGREES
CALCULATED T AXIS, ECG11: 10 DEGREES
CHLORIDE SERPL-SCNC: 110 MMOL/L (ref 97–108)
CO2 SERPL-SCNC: 27 MMOL/L (ref 21–32)
CREAT SERPL-MCNC: 0.82 MG/DL (ref 0.7–1.3)
DIAGNOSIS, 93000: NORMAL
DIFFERENTIAL METHOD BLD: ABNORMAL
EOSINOPHIL # BLD: 0.2 K/UL (ref 0–0.4)
EOSINOPHIL NFR BLD: 2 % (ref 0–7)
ERYTHROCYTE [DISTWIDTH] IN BLOOD BY AUTOMATED COUNT: 19.1 % (ref 11.5–14.5)
GLOBULIN SER CALC-MCNC: 3.4 G/DL (ref 2–4)
GLUCOSE SERPL-MCNC: 131 MG/DL (ref 65–100)
HCT VFR BLD AUTO: 29.7 % (ref 36.6–50.3)
HGB BLD-MCNC: 8.9 G/DL (ref 12.1–17)
IMM GRANULOCYTES # BLD AUTO: 0.1 K/UL (ref 0–0.04)
IMM GRANULOCYTES NFR BLD AUTO: 1 % (ref 0–0.5)
LYMPHOCYTES # BLD: 1 K/UL (ref 0.8–3.5)
LYMPHOCYTES NFR BLD: 11 % (ref 12–49)
MCH RBC QN AUTO: 31.9 PG (ref 26–34)
MCHC RBC AUTO-ENTMCNC: 30 G/DL (ref 30–36.5)
MCV RBC AUTO: 106.5 FL (ref 80–99)
MONOCYTES # BLD: 0.8 K/UL (ref 0–1)
MONOCYTES NFR BLD: 9 % (ref 5–13)
NEUTS SEG # BLD: 6.7 K/UL (ref 1.8–8)
NEUTS SEG NFR BLD: 77 % (ref 32–75)
NRBC # BLD: 0 K/UL (ref 0–0.01)
NRBC BLD-RTO: 0 PER 100 WBC
PLATELET # BLD AUTO: 288 K/UL (ref 150–400)
PMV BLD AUTO: 10.1 FL (ref 8.9–12.9)
POTASSIUM SERPL-SCNC: 4.4 MMOL/L (ref 3.5–5.1)
PROT SERPL-MCNC: 5.8 G/DL (ref 6.4–8.2)
Q-T INTERVAL, ECG07: 430 MS
QRS DURATION, ECG06: 102 MS
QTC CALCULATION (BEZET), ECG08: 464 MS
RBC # BLD AUTO: 2.79 M/UL (ref 4.1–5.7)
SODIUM SERPL-SCNC: 140 MMOL/L (ref 136–145)
TROPONIN I SERPL-MCNC: 0.07 NG/ML
VENTRICULAR RATE, ECG03: 70 BPM
WBC # BLD AUTO: 8.7 K/UL (ref 4.1–11.1)

## 2021-08-01 PROCEDURE — 74011250637 HC RX REV CODE- 250/637: Performed by: SPECIALIST

## 2021-08-01 PROCEDURE — 74011250637 HC RX REV CODE- 250/637: Performed by: FAMILY MEDICINE

## 2021-08-01 PROCEDURE — 85025 COMPLETE CBC W/AUTO DIFF WBC: CPT

## 2021-08-01 PROCEDURE — 36415 COLL VENOUS BLD VENIPUNCTURE: CPT

## 2021-08-01 PROCEDURE — 84484 ASSAY OF TROPONIN QUANT: CPT

## 2021-08-01 PROCEDURE — 74011250637 HC RX REV CODE- 250/637: Performed by: INTERNAL MEDICINE

## 2021-08-01 PROCEDURE — 74011000250 HC RX REV CODE- 250: Performed by: INTERNAL MEDICINE

## 2021-08-01 PROCEDURE — 99223 1ST HOSP IP/OBS HIGH 75: CPT | Performed by: SPECIALIST

## 2021-08-01 PROCEDURE — 80053 COMPREHEN METABOLIC PANEL: CPT

## 2021-08-01 PROCEDURE — 65660000000 HC RM CCU STEPDOWN

## 2021-08-01 RX ORDER — VITAMIN A 3000 MCG
10000 CAPSULE ORAL DAILY
Status: DISCONTINUED | OUTPATIENT
Start: 2021-08-01 | End: 2021-08-03 | Stop reason: HOSPADM

## 2021-08-01 RX ORDER — BUMETANIDE 1 MG/1
1 TABLET ORAL DAILY
Status: DISCONTINUED | OUTPATIENT
Start: 2021-08-02 | End: 2021-08-01

## 2021-08-01 RX ORDER — ASCORBIC ACID 500 MG
500 TABLET ORAL DAILY
Status: DISCONTINUED | OUTPATIENT
Start: 2021-08-01 | End: 2021-08-03 | Stop reason: HOSPADM

## 2021-08-01 RX ORDER — BUMETANIDE 1 MG/1
1 TABLET ORAL DAILY
Status: DISCONTINUED | OUTPATIENT
Start: 2021-08-01 | End: 2021-08-03 | Stop reason: HOSPADM

## 2021-08-01 RX ORDER — DULOXETIN HYDROCHLORIDE 30 MG/1
90 CAPSULE, DELAYED RELEASE ORAL DAILY
Status: DISCONTINUED | OUTPATIENT
Start: 2021-08-01 | End: 2021-08-03 | Stop reason: HOSPADM

## 2021-08-01 RX ORDER — FERROUS SULFATE, DRIED 160(50) MG
1 TABLET, EXTENDED RELEASE ORAL
Status: DISCONTINUED | OUTPATIENT
Start: 2021-08-01 | End: 2021-08-03 | Stop reason: HOSPADM

## 2021-08-01 RX ADMIN — Medication 1 TABLET: at 16:21

## 2021-08-01 RX ADMIN — BUMETANIDE 1 MG: 1 TABLET ORAL at 16:20

## 2021-08-01 RX ADMIN — Medication 10 ML: at 23:14

## 2021-08-01 RX ADMIN — Medication 1 TABLET: at 09:01

## 2021-08-01 RX ADMIN — ASPIRIN 81 MG: 81 TABLET, CHEWABLE ORAL at 09:01

## 2021-08-01 RX ADMIN — OXYCODONE HYDROCHLORIDE AND ACETAMINOPHEN 500 MG: 500 TABLET ORAL at 09:01

## 2021-08-01 RX ADMIN — RIVAROXABAN 15 MG: 15 TABLET, FILM COATED ORAL at 16:21

## 2021-08-01 RX ADMIN — Medication 1 TABLET: at 11:49

## 2021-08-01 RX ADMIN — ALLOPURINOL 150 MG: 300 TABLET ORAL at 23:14

## 2021-08-01 RX ADMIN — Medication 10 ML: at 14:44

## 2021-08-01 RX ADMIN — DULOXETINE HYDROCHLORIDE 90 MG: 30 CAPSULE, DELAYED RELEASE ORAL at 09:01

## 2021-08-01 RX ADMIN — DEXTROSE AND SODIUM CHLORIDE 50 ML/HR: 5; 450 INJECTION, SOLUTION INTRAVENOUS at 09:01

## 2021-08-01 NOTE — PROGRESS NOTES
Daily Progress Note: 8/1/2021  Kajal Rick MD    Assessment/Plan:   NSTEMI (non-ST elevated myocardial infarction) [facility]) (7/31/2021): Denies any current chest pain. Possibly related to volume overload. -Continue to trend troponins.    -Obtain echo.   -Start aspirin.    -Continue statin.   -Consult Cardiology.       Encephalopathy/ Unresponsiveness: Unclear etiology. Possibly related to recently initiated Seroquel and other medications or cardiac etiology. CT head with no acute concerns. -Check UA, TSH and ammonia level.    -Hold any sedating medications. -May consider outpatient pulmonary testing for sleep apnea. -Monitor.     Pulmonary edema/left pleural effusion:   -Check echo.    -Consider diuretics as blood pressure able to tolerate.     Status post nail insertion for L displaced intertrochanteric femur fx: Status post Surg on 7/19/21. Monitor.    -Consider orthopedics consult as needed.     Macrocytic anemia: Appears around baseline.    -Check folate and B12 levels.     History of urinary retention:   -Avendaño currently in place from [facility].    -Start Flomax and consider voiding trial.     Atrial fibrillation: Currently rate controlled.     -Continue home Xarelto.     Neuropathy:   -Hold pregabalin   -Restart duloxetine      Hx uric acid stones:   -Continue allopurinol.      Risk of deterioration: high              Problem List:  Problem List as of 8/1/2021 Date Reviewed: 12/20/2018        Codes Class Noted - Resolved    NSTEMI (non-ST elevated myocardial infarction) ([facility] ICD-10-CM: I21.4  ICD-9-CM: 410.70  7/31/2021 - Present        Hip fracture ([facility] ICD-10-CM: S72.009A  ICD-9-CM: 820.8  7/18/2021 - Present        CAP (community acquired pneumonia) ICD-10-CM: J18.9  ICD-9-CM: 486  7/18/2021 - Present        Chronic atrial fibrillation ([facility] ICD-10-CM: I48.20  ICD-9-CM: 427.31  11/10/2017 - Present        Sinus node dysfunction ([facility] ICD-10-CM: I49.5  ICD-9-CM: 427.81 4/12/2016 - Present        Depression ICD-10-CM: F32.9  ICD-9-CM: 016  9/28/2015 - Present        Dyslipidemia ICD-10-CM: E78.5  ICD-9-CM: 272.4  9/23/2014 - Present        Gout ICD-10-CM: M10.9  ICD-9-CM: 274.9  Unknown - Present        Hx of carcinoma in situ of prostate ICD-10-CM: I51.811  ICD-9-CM: V13.89  Unknown - Present        Essential hypertension, benign ICD-10-CM: I10  ICD-9-CM: 401.1  11/16/2013 - Present        Kidney stones ICD-10-CM: N20.0  ICD-9-CM: 592.0  11/16/2013 - Present        Embolic stroke involving right middle cerebral artery ([facility] ICD-10-CM: I63.411  ICD-9-CM: 434.11  11/1/2013 - Present    Overview Signed 12/7/2013  9:23 AM by Tegan Lerner MD     presented with dysarthria, MRI Several tiny acute infarcts in the right middle cerebral artery             RESOLVED: Dizziness ICD-10-CM: R42  ICD-9-CM: 780.4  4/12/2016 - 6/21/2016        RESOLVED: Typical atrial flutter ([facility] ICD-10-CM: I48.3  ICD-9-CM: 427.32  5/6/2015 - 9/28/2015        RESOLVED: Paroxysmal atrial fibrillation ([facility] ICD-10-CM: I48.0  ICD-9-CM: 427.31  9/23/2014 - 11/10/2017        RESOLVED: Dysarthria ICD-9-CM: 784.5  11/17/2013 - 12/7/2013        RESOLVED: TIA (transient ischemic attack) ICD-10-CM: G45.9  ICD-9-CM: 435.9  11/16/2013 - 12/7/2013        RESOLVED: Other and unspecified hyperlipidemia ICD-10-CM: E78.5  ICD-9-CM: 272.4  11/16/2013 - 9/23/2014        RESOLVED: Hypokalemia ICD-10-CM: E87.6  ICD-9-CM: 276.8  11/16/2013 - 12/7/2013              HPI:   Mr. Conrad Mendiola is a 80 y.o.  male with a past medical history of A. fib on Xarelto, neuropathy and uric acid stones who is admitted with encephalopathy and NSTEMI. Mr. Conrad Mendiola is very hard of hearing and so history is obtained from both him and his son at bedside. Per son, this morning staff at [facility] were unable to awaken patient. At that time they placed a Avendaño in him and called EMS, and then patient was brought to the emergency room.   According to son, he is oriented x4 at baseline. Last month he suffered a hip fracture and was hospitalized at [address] [place] [address] and then discharged on  to [facility] for rehab. Per son, on discharge she was prescribed Seroquel which she believes may be contributing to his father's current symptoms. Patient has no complaints other than difficulty moving his left lower extremity following his recent fracture and surgery. (Dr Cari Maki)    : Feeling better. More alert and oriented. Knows he is in the hospital.     Review of Systems:   Review of systems not obtained due to patient factors. Objective:   Physical Exam:     Visit Vitals  /75 (BP 1 Location: Left upper arm, BP Patient Position: At rest)   Pulse (!) 106   Temp 98.4 °F (36.9 °C)   Resp 16   Ht 5' 7\" (1.702 m)   Wt 162 lb 1.6 oz (73.5 kg)   SpO2 96%   BMI 25.39 kg/m²    O2 Flow Rate (L/min): 2 l/min O2 Device: Nasal cannula    Temp (24hrs), Av.3 °F (36.8 °C), Min:97.5 °F (36.4 °C), Max:98.8 °F (37.1 °C)    No intake/output data recorded.  1901 -  0700  In: 475 [P.O.:120; I.V.:355]  Out: -     General:  Alert, cooperative, no distress, appears stated age. Thin and frail   Head:  Normocephalic, without obvious abnormality, atraumatic. Eyes:  Conjunctivae/corneas clear. Nose: Nares normal. Septum midline. Mucosa normal. No drainage or sinus tenderness. Throat: Lips, mucosa, and tongue normal.    Neck: Supple, symmetrical, trachea midline, no adenopathy, thyroid: no enlargement/tenderness/nodules, no carotid bruit and no JVD. Back:   Symmetric, no curvature. ROM normal. No CVA tenderness. Lungs:   Clear to auscultation bilaterally. Chest wall:  No tenderness or deformity. Heart:  Regular rate and rhythm, S1, S2 normal, no murmur, click, rub or gallop. Abdomen:   Soft, non-tender. Bowel sounds normal. No masses,  No organomegaly. Extremities: Surgical site healing well, no cyanosis or edema. No calf tenderness or cords.      Pulses: 2+ and symmetric all extremities. Skin: Skin color, texture, turgor normal.    Neurologic: CNII-XII intact. Alert and oriented X 2. Fine motor of hands and fingers normal.   equal.  No cogwheeling or rigidity. Gait not tested at this time. Sensation grossly normal to touch. Gross motor of extremities normal.       Data Review:     Interpretation Summary-ECHO 7/31/21    · Image quality for this study was technically difficult. · LV: Estimated LVEF is 55 - 60%. Normal cavity size and systolic function (ejection fraction normal). Mildly increased wall thickness. Wall motion: normal.  · Pericardium: There is a moderate left pleural effusion. · MV: Mild mitral annular calcification. · TV: Mild tricuspid valve regurgitation is present. Pulmonary hypertension found to be severe. · AV: Aortic valve leaflet calcification present. Mild aortic valve stenosis is present. · LA: Dilated left atrium. · RA: Dilated right atrium. Recent Days:  Recent Labs     08/01/21  0028 07/31/21  0751   WBC 8.7 9.0   HGB 8.9* 9.0*   HCT 29.7* 30.1*    305     Recent Labs     08/01/21  0028 07/31/21  0751    140   K 4.4 4.1   * 110*   CO2 27 31   * 106*   BUN 16 19   CREA 0.82 0.88   CA 7.7* 8.1*   MG  --  2.3   ALB 2.4* 2.8*   TBILI 0.8 1.0   ALT 19 18     No results for input(s): PH, PCO2, PO2, HCO3, FIO2 in the last 72 hours.     24 Hour Results:  Recent Results (from the past 24 hour(s))   EKG, 12 LEAD, INITIAL    Collection Time: 07/31/21  7:35 AM   Result Value Ref Range    Ventricular Rate 70 BPM    Atrial Rate 51 BPM    QRS Duration 102 ms    Q-T Interval 430 ms    QTC Calculation (Bezet) 464 ms    Calculated R Axis 0 degrees    Calculated T Axis 10 degrees    Diagnosis       Atrial fibrillation with premature ventricular or aberrantly conducted   complexes  T wave abnormality, consider inferior ischemia  Prolonged QT  Abnormal ECG  When compared with ECG of 18-JUL-2021 07:56,  QRS duration has decreased  ST elevation has replaced ST depression in Lateral leads  T wave inversion now evident in Inferior leads  T wave inversion no longer evident in Lateral leads  QT has shortened     GLUCOSE, POC    Collection Time: 07/31/21  7:49 AM   Result Value Ref Range    Glucose (POC) 107 65 - 117 mg/dL    Performed by Mike Mejias    CBC WITH AUTOMATED DIFF    Collection Time: 07/31/21  7:51 AM   Result Value Ref Range    WBC 9.0 4.1 - 11.1 K/uL    RBC 2.81 (L) 4.10 - 5.70 M/uL    HGB 9.0 (L) 12.1 - 17.0 g/dL    HCT 30.1 (L) 36.6 - 50.3 %    .1 (H) 80.0 - 99.0 FL    MCH 32.0 26.0 - 34.0 PG    MCHC 29.9 (L) 30.0 - 36.5 g/dL    RDW 19.5 (H) 11.5 - 14.5 %    PLATELET 556 986 - 017 K/uL    MPV 9.7 8.9 - 12.9 FL    NRBC 0.0 0  WBC    ABSOLUTE NRBC 0.00 0.00 - 0.01 K/uL    NEUTROPHILS 79 (H) 32 - 75 %    LYMPHOCYTES 9 (L) 12 - 49 %    MONOCYTES 9 5 - 13 %    EOSINOPHILS 2 0 - 7 %    BASOPHILS 0 0 - 1 %    IMMATURE GRANULOCYTES 1 (H) 0.0 - 0.5 %    ABS. NEUTROPHILS 7.2 1.8 - 8.0 K/UL    ABS. LYMPHOCYTES 0.8 0.8 - 3.5 K/UL    ABS. MONOCYTES 0.8 0.0 - 1.0 K/UL    ABS. EOSINOPHILS 0.2 0.0 - 0.4 K/UL    ABS. BASOPHILS 0.0 0.0 - 0.1 K/UL    ABS. IMM. GRANS. 0.1 (H) 0.00 - 0.04 K/UL    DF AUTOMATED     METABOLIC PANEL, COMPREHENSIVE    Collection Time: 07/31/21  7:51 AM   Result Value Ref Range    Sodium 140 136 - 145 mmol/L    Potassium 4.1 3.5 - 5.1 mmol/L    Chloride 110 (H) 97 - 108 mmol/L    CO2 31 21 - 32 mmol/L    Anion gap NEG 1 5 - 15 mmol/L    Glucose 106 (H) 65 - 100 mg/dL    BUN 19 6 - 20 MG/DL    Creatinine 0.88 0.70 - 1.30 MG/DL    BUN/Creatinine ratio 22 (H) 12 - 20      GFR est AA >60 >60 ml/min/1.73m2    GFR est non-AA >60 >60 ml/min/1.73m2    Calcium 8.1 (L) 8.5 - 10.1 MG/DL    Bilirubin, total 1.0 0.2 - 1.0 MG/DL    ALT (SGPT) 18 12 - 78 U/L    AST (SGOT) 27 15 - 37 U/L    Alk.  phosphatase 174 (H) 45 - 117 U/L    Protein, total 6.1 (L) 6.4 - 8.2 g/dL    Albumin 2.8 (L) 3.5 - 5.0 g/dL    Globulin 3.3 2.0 - 4.0 g/dL    A-G Ratio 0.8 (L) 1.1 - 2.2     TROPONIN I    Collection Time: 07/31/21  7:51 AM   Result Value Ref Range    Troponin-I, Qt. 0.07 (H) <0.05 ng/mL   ETHYL ALCOHOL    Collection Time: 07/31/21  7:51 AM   Result Value Ref Range    ALCOHOL(ETHYL),SERUM <10 <10 MG/DL   MAGNESIUM    Collection Time: 07/31/21  7:51 AM   Result Value Ref Range    Magnesium 2.3 1.6 - 2.4 mg/dL   SAMPLES BEING HELD    Collection Time: 07/31/21  7:51 AM   Result Value Ref Range    SAMPLES BEING HELD 1sst     COMMENT        Add-on orders for these samples will be processed based on acceptable specimen integrity and analyte stability, which may vary by analyte.    FOLATE    Collection Time: 07/31/21  7:51 AM   Result Value Ref Range    Folate 44.2 (H) 5.0 - 21.0 ng/mL   LIPID PANEL    Collection Time: 07/31/21  7:51 AM   Result Value Ref Range    Cholesterol, total 90 <200 MG/DL    Triglyceride 88 <150 MG/DL    HDL Cholesterol 41 MG/DL    LDL, calculated 31.4 0 - 100 MG/DL    VLDL, calculated 17.6 MG/DL    CHOL/HDL Ratio 2.2 0.0 - 5.0     NT-PRO BNP    Collection Time: 07/31/21  7:51 AM   Result Value Ref Range    NT pro-BNP 3,880 (H) <450 PG/ML   VITAMIN B12    Collection Time: 07/31/21  7:51 AM   Result Value Ref Range    Vitamin B12 863 193 - 986 pg/mL   TSH 3RD GENERATION    Collection Time: 07/31/21  7:51 AM   Result Value Ref Range    TSH 2.66 0.36 - 3.74 uIU/mL   POC VENOUS BLOOD GAS    Collection Time: 07/31/21  9:19 AM   Result Value Ref Range    pH, venous (POC) 7.43 (H) 7.32 - 7.42      pCO2, venous (POC) 42.1 41 - 51 MMHG    pO2, venous (POC) 31 25 - 40 mmHg    HCO3, venous (POC) 27.8 23.0 - 28.0 MMOL/L    sO2, venous (POC) 60.1 (L) 65 - 88 %    Base excess, venous (POC) 3.1 mmol/L    Specimen type (POC) VENOUS BLOOD      Performed by [facility])    URINALYSIS W/ RFLX MICROSCOPIC    Collection Time: 07/31/21  9:47 AM   Result Value Ref Range    Color YELLOW/STRAW      Appearance CLEAR CLEAR      Specific gravity 1.015 1.003 - 1.030      pH (UA) 7.0 5.0 - 8.0      Protein Negative NEG mg/dL    Glucose Negative NEG mg/dL    Ketone Negative NEG mg/dL    Bilirubin Negative NEG      Blood Negative NEG      Urobilinogen 0.2 0.2 - 1.0 EU/dL    Nitrites Negative NEG      Leukocyte Esterase Negative NEG     TROPONIN I    Collection Time: 07/31/21  9:47 AM   Result Value Ref Range    Troponin-I, Qt. 0.08 (H) <0.05 ng/mL   AMMONIA    Collection Time: 07/31/21  9:47 AM   Result Value Ref Range    Ammonia <10 <32 UMOL/L   SAMPLES BEING HELD    Collection Time: 07/31/21  9:47 AM   Result Value Ref Range    SAMPLES BEING HELD 1SST     COMMENT        Add-on orders for these samples will be processed based on acceptable specimen integrity and analyte stability, which may vary by analyte. URINE CULTURE HOLD SAMPLE    Collection Time: 07/31/21  9:47 AM    Specimen: Serum   Result Value Ref Range    Urine culture hold        Urine on hold in Microbiology dept for 2 days. If unpreserved urine is submitted, it cannot be used for addtional testing after 24 hours, recollection will be required.    ECHO ADULT COMPLETE    Collection Time: 07/31/21 11:05 AM   Result Value Ref Range    IVSd 0.86 0.60 - 1.00 cm    LVIDd 4.15 (A) 4.20 - 5.90 cm    LVIDs 2.90 cm    LVOT d 2.04 cm    LVPWd 0.87 0.60 - 1.00 cm    LVOT Peak Gradient 5.32 mmHg    LVOT Peak Velocity 115.31 cm/s    RVIDd 3.44 cm    RVSP 76.34 mmHg    Left Atrium Major Axis 3.46 cm    LA Volume 54.50 18.0 - 58.0 mL    LA Area 4C 19.71 cm2    LA Vol 2C 41.21 18.00 - 58.00 mL    LA Vol 4C 49.18 18.00 - 58.00 mL    LA Volume DISK BP 49.75 18.0 - 58.0 mL    Est. RA Pressure 8.00 mmHg    Aortic Valve Area by Continuity of Peak Velocity 1.64 cm2    AoV PG 21.03 mmHg    Aortic Valve Systolic Peak Velocity 261.70 cm/s    Mitral Valve E Wave Deceleration Time 179.05 ms    MV E Marquise 99.91 centimeter/second    LV E' Lateral Velocity 8.45 centimeter/second    LV E' Septal Velocity 3.87 centimeter/second    Mitral Valve Pressure Half-time 51.93 ms    MVA (PHT) 4.24 cm2    Pulmonic Valve Systolic Peak Instantaneous Gradient 3.02 mmHg    Pulmonic Valve Max Velocity 85.84 cm/s    Tapse 1.23 (A) 1.50 - 2.00 cm    Triscuspid Valve Regurgitation Peak Gradient 68.34 mmHg    TR Max Velocity 413.35 cm/s    AO ASC D 3.11 cm    Ao Root D 2.51 cm    IVC proximal 2.38 cm    LV Mass .3 88.0 - 224.0 g    LV Mass AL Index 60.9 49.0 - 115.0 g/m2    Left Atrium Minor Axis 1.91 cm    LA Vol Index 30.11 16.00 - 28.00 ml/m2    LA Vol Index 22.77 16.00 - 28.00 ml/m2    LA Vol Index 27.17 16.00 - 28.00 ml/m2    AMINATA/BSA Pk Marquise 0.9 cm2/m2   TROPONIN I    Collection Time: 07/31/21  1:29 PM   Result Value Ref Range    Troponin-I, Qt. 0.08 (H) <0.05 ng/mL   TROPONIN I    Collection Time: 08/01/21 12:28 AM   Result Value Ref Range    Troponin-I, Qt. 0.07 (H) <0.05 ng/mL   CBC WITH AUTOMATED DIFF    Collection Time: 08/01/21 12:28 AM   Result Value Ref Range    WBC 8.7 4.1 - 11.1 K/uL    RBC 2.79 (L) 4.10 - 5.70 M/uL    HGB 8.9 (L) 12.1 - 17.0 g/dL    HCT 29.7 (L) 36.6 - 50.3 %    .5 (H) 80.0 - 99.0 FL    MCH 31.9 26.0 - 34.0 PG    MCHC 30.0 30.0 - 36.5 g/dL    RDW 19.1 (H) 11.5 - 14.5 %    PLATELET 609 313 - 349 K/uL    MPV 10.1 8.9 - 12.9 FL    NRBC 0.0 0  WBC    ABSOLUTE NRBC 0.00 0.00 - 0.01 K/uL    NEUTROPHILS 77 (H) 32 - 75 %    LYMPHOCYTES 11 (L) 12 - 49 %    MONOCYTES 9 5 - 13 %    EOSINOPHILS 2 0 - 7 %    BASOPHILS 0 0 - 1 %    IMMATURE GRANULOCYTES 1 (H) 0.0 - 0.5 %    ABS. NEUTROPHILS 6.7 1.8 - 8.0 K/UL    ABS. LYMPHOCYTES 1.0 0.8 - 3.5 K/UL    ABS. MONOCYTES 0.8 0.0 - 1.0 K/UL    ABS. EOSINOPHILS 0.2 0.0 - 0.4 K/UL    ABS. BASOPHILS 0.0 0.0 - 0.1 K/UL    ABS. IMM.  GRANS. 0.1 (H) 0.00 - 0.04 K/UL    DF AUTOMATED     METABOLIC PANEL, COMPREHENSIVE    Collection Time: 08/01/21 12:28 AM   Result Value Ref Range    Sodium 140 136 - 145 mmol/L    Potassium 4.4 3.5 - 5.1 mmol/L    Chloride 110 (H) 97 - 108 mmol/L    CO2 27 21 - 32 mmol/L    Anion gap 3 (L) 5 - 15 mmol/L    Glucose 131 (H) 65 - 100 mg/dL    BUN 16 6 - 20 MG/DL    Creatinine 0.82 0.70 - 1.30 MG/DL    BUN/Creatinine ratio 20 12 - 20      GFR est AA >60 >60 ml/min/1.73m2    GFR est non-AA >60 >60 ml/min/1.73m2    Calcium 7.7 (L) 8.5 - 10.1 MG/DL    Bilirubin, total 0.8 0.2 - 1.0 MG/DL    ALT (SGPT) 19 12 - 78 U/L    AST (SGOT) 26 15 - 37 U/L    Alk. phosphatase 171 (H) 45 - 117 U/L    Protein, total 5.8 (L) 6.4 - 8.2 g/dL    Albumin 2.4 (L) 3.5 - 5.0 g/dL    Globulin 3.4 2.0 - 4.0 g/dL    A-G Ratio 0.7 (L) 1.1 - 2.2         Medications reviewed  Current Facility-Administered Medications   Medication Dose Route Frequency    calcium-vitamin D (OS-VINCENT +D3) 500 mg-200 unit per tablet 1 Tablet  1 Tablet Oral TID WITH MEALS    ascorbic acid (vitamin C) (VITAMIN C) tablet 500 mg  500 mg Oral DAILY    DULoxetine (CYMBALTA) capsule 90 mg  90 mg Oral DAILY    . PHARMACY TO SUBSTITUTE PER PROTOCOL (Reordered from: vitamin A (AQUASOL A) 10,000 unit capsule)    Per Protocol    sodium chloride (NS) flush 5-40 mL  5-40 mL IntraVENous Q8H    sodium chloride (NS) flush 5-40 mL  5-40 mL IntraVENous PRN    dextrose 5 % - 0.45% NaCl infusion  50 mL/hr IntraVENous CONTINUOUS    aspirin chewable tablet 81 mg  81 mg Oral DAILY    allopurinoL (ZYLOPRIM) tablet 150 mg  150 mg Oral QHS    rivaroxaban (XARELTO) tablet 15 mg  15 mg Oral DAILY WITH DINNER    atorvastatin (LIPITOR) tablet 10 mg  10 mg Oral QHS       Care Plan discussed with: Patient/Family    Total time spent with patient and review of records: 30 minutes.     Michelle Robles MD

## 2021-08-01 NOTE — PROGRESS NOTES
Assumed care from Catrachita lomeli. Patient has daughter at bedside. 1145  Large formed and soft bowel movement. Spiritual services performed prayer with patient and he really appreciated it. Family will bring in home vitamin A if they can.

## 2021-08-01 NOTE — ED NOTES
TRANSFER - OUT REPORT:    Verbal report given to [facility] RN (name) on Isaura Jeong  being transferred to [facility] (unit) for routine progression of care       Report consisted of patients Situation, Background, Assessment and   Recommendations(SBAR). Information from the following report(s) SBAR, ED Summary, Intake/Output, MAR and Recent Results was reviewed with the receiving nurse. Lines:   Peripheral IV 07/31/21 Right Antecubital (Active)        Opportunity for questions and clarification was provided.       Patient transported with:   [employer]

## 2021-08-01 NOTE — PROGRESS NOTES
0700  Bedside and Verbal shift change report given to [facility] (oncoming nurse) by [facility] RN (offgoing nurse). Report included the following information SBAR, Kardex, Procedure Summary, Intake/Output, MAR, Accordion, Recent Results and Cardiac Rhythm Sinus Tachy. Initial assessment done. AOX4. No complaints of pain. Son at the bedside. 1100  No order for Mojica. Called Dr. Jeni Castaneda, ordered to keep mojica for now and will probably dc alicia. Bedside and Verbal shift change report given to [facility] (oncoming nurse) by Em Avery RN (offgoing nurse). Report included the following information SBAR, Kardex, Procedure Summary, Intake/Output and MAR.

## 2021-08-01 NOTE — PROGRESS NOTES
Problem: Pressure Injury - Risk of  Goal: *Prevention of pressure injury  Description: Document Isidro Scale and appropriate interventions in the flowsheet.   Outcome: Progressing Towards Goal  Note: Pressure Injury Interventions:  Sensory Interventions: Avoid rigorous massage over bony prominences, Check visual cues for pain, Keep linens dry and wrinkle-free, Maintain/enhance activity level, Minimize linen layers, Monitor skin under medical devices         Activity Interventions: Increase time out of bed, Pressure redistribution bed/mattress(bed type), PT/OT evaluation    Mobility Interventions: Float heels, HOB 30 degrees or less, Pressure redistribution bed/mattress (bed type)    Nutrition Interventions: Document food/fluid/supplement intake, Discuss nutritional consult with provider

## 2021-08-01 NOTE — CONSULTS
Berta Sofia MD. [facility]              Patient: Isaura Jeong  : 1927      Today's Date: 2021        HISTORY OF PRESENT ILLNESS:     History of Present Illness:  Reason for consult:  NSTEMI    Mr. Garland Gross is a 80 y.o.  male with a past medical history of A. fib on Xarelto, neuropathy who is admitted with encephalopathy. Chart reviewed and history obtained from daughter. He has been confused since his hip fracture a week ago - currently at [facility]. Yesterday was more confused and very lethargic. He is doing much better now. Family thinks it is due to Seroquel as symptoms start a couple of hours after Seroquel. Cardiology asked to see for elevated but flat troponin elevation of 0.8. He does not have a history of heart disease. He denies any CP or SOB. EKG non-ischemic   He has difficulty moving his left lower extremity following his recent fracture and surgery but otherwise OK. PAST MEDICAL HISTORY:     Past Medical History:   Diagnosis Date    Anxiety     Arthritis     right shoulder    Atrial fibrillation, chronic ([facility] [address] 2014    Colon cancer ([facility] [address] 2002    colon resection    Depression 2015    DJD (degenerative joint disease) of knee     left TKR    Dyslipidemia     Embolic stroke involving right middle cerebral artery [facility]) 2013    presented with dysarthria, MRI Several tiny acute infarcts in the right middle cerebral artery    Falls     Gout     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    Hypertension     Memory loss     Peripheral neuropathy     Vertigo          Past Surgical History:   Procedure Laterality Date    HX CATARACT REMOVAL      HX GI      colon resection    HX ORTHOPAEDIC      left knee, left shoulder    SC ABDOMEN SURGERY PROC UNLISTED      colorectal    SC CARDIAC SURG PROCEDURE UNLIST      cardio version         MEDICATIONS:     Prior to Admission medications    Medication Sig Start Date End Date Taking? Authorizing Provider   traMADoL (ULTRAM) 50 mg tablet Take 50 mg by mouth every six (6) hours as needed for Pain.     Yes Provider, Historical   multivitamin (ONE A DAY) tablet Take 1 Tablet by mouth daily.     Yes Provider, Historical   vitamin A (AQUASOL A) 10,000 unit capsule Take 10,000 Units by mouth daily. 7/27/21 8/5/21 Yes Provider, Historical   ascorbic acid, vitamin C, (Vitamin C) 500 mg tablet Take 500 mg by mouth daily. 7/27/21 8/5/21 Yes Provider, Historical   acetaminophen (TYLENOL) 325 mg tablet Take 650 mg by mouth three (3) times daily.     Yes Provider, Historical   polyethylene glycol (Miralax) 17 gram/dose powder Take 17 g by mouth daily as needed for Constipation.     Yes Provider, Historical   calcium-vitamin D (OS-VINCENT +D3) 500 mg-200 unit per tablet Take 1 Tablet by mouth three (3) times daily (with meals). 7/25/21   Yes Yamile Lockett MD   QUEtiapine (SEROquel) 25 mg tablet Take 1 Tablet by mouth nightly. 7/25/21   Yes Yamile Lockett MD   rivaroxaban (Xarelto) 15 mg tab tablet Take 15 mg by mouth daily (with dinner). Indications: prevention of thromboembolism in paroxysmal atrial fibrillation     Yes Sameer, MD Yanelis   pregabalin (LYRICA) 75 mg capsule Take 75 mg by mouth two (2) times a day.     Yes Yanelis Estrella MD   DULoxetine (CYMBALTA) 30 mg capsule Take 90 mg by mouth daily.     Yes Yanelis Estrella MD   zinc sulfate 220 mg tablet Take 1 Tab by mouth daily. 1/19/21   Yes Zohreh Garza MD   ondansetron (Zofran ODT) 4 mg disintegrating tablet 1 Tab by SubLINGual route every eight (8) hours as needed for Nausea or Vomiting.  1/19/21   Yes Ron Blum MD   simvastatin (Zocor) 10 mg tablet Take 10 mg by mouth nightly.     Yes Provider, Historical   folic acid (FOLVITE) 1 mg tablet Take 1 mg by mouth daily.     Yes Provider, Historical   allopurinol (ZYLOPRIM) 300 mg tablet Take 150 mg by mouth nightly.     Yes Other, MD Yanelis          Current Facility-Administered Medications Medication Dose Route Frequency Provider Last Rate Last Admin    calcium-vitamin D (OS-VINCENT +D3) 500 mg-200 unit per tablet 1 Tablet  1 Tablet Oral TID WITH MEALS Zoltan Lockett MD   1 Tablet at 08/01/21 0901    ascorbic acid (vitamin C) (VITAMIN C) tablet 500 mg  500 mg Oral DAILY Zoltan Lockett MD   500 mg at 08/01/21 0901    DULoxetine (CYMBALTA) capsule 90 mg  90 mg Oral DAILY Zoltan Lockett MD   90 mg at 08/01/21 0901    vitamin A (AQUASOL A) capsule 10,000 Units (Patient Supplied)  10,000 Units Oral DAILY Zoltan Lockett MD        sodium chloride (NS) flush 5-40 mL  5-40 mL IntraVENous Q8H Francisco Hernandez, DO   20 mL at 07/31/21 2231    sodium chloride (NS) flush 5-40 mL  5-40 mL IntraVENous PRN Adams Hof, DO        dextrose 5 % - 0.45% NaCl infusion  50 mL/hr IntraVENous CONTINUOUS Adams Hof, DO 50 mL/hr at 08/01/21 0901 50 mL/hr at 08/01/21 0901    aspirin chewable tablet 81 mg  81 mg Oral DAILY Adams Hof, DO   81 mg at 08/01/21 0901    allopurinoL (ZYLOPRIM) tablet 150 mg  150 mg Oral QHS Adams Hof, DO   150 mg at 07/31/21 2230    rivaroxaban (XARELTO) tablet 15 mg  15 mg Oral DAILY WITH Francisco Coffey, DO   15 mg at 07/31/21 2230    atorvastatin (LIPITOR) tablet 10 mg  10 mg Oral QHS Zhanna Hernandez, DO   10 mg at 07/31/21 2230       No Known Allergies          SOCIAL HISTORY:     Social History     Tobacco Use    Smoking status: Never Smoker    Smokeless tobacco: Never Used   Substance Use Topics    Alcohol use: Yes     Alcohol/week: 1.0 standard drinks     Types: 1 Standard drinks or equivalent per week     Comment: rarely    Drug use: Not on file         FAMILY HISTORY:     Family History   Problem Relation Age of Onset    Stroke Father     Heart Disease Father     Stroke Sister              REVIEW OF SYMPTOMS:     Review of Symptoms:  Constitutional: Negative for fever, chills  HEENT: Negative for nosebleeds, tinnitus, and vision changes.    Respiratory: Negative for cough, wheezing  Cardiovascular: Negative for orthopnea, claudication, syncope, and PND. Gastrointestinal: Negative for abdominal pain, diarrhea, melena. Genitourinary: Negative for dysuria  Musculoskeletal: Negative for myalgias. Skin: Negative for rash  Heme: No problems bleeding. Neurological: Negative for speech change            PHYSICAL EXAM:     Physical Exam:  Visit Vitals  BP (!) 113/58   Pulse (!) 107   Temp 98.2 °F (36.8 °C)   Resp 20   Ht 5' 7\" (1.702 m)   Wt 162 lb 1.6 oz (73.5 kg)   SpO2 99%   BMI 25.39 kg/m²     Patient appears generally well, mood and affect are appropriate and pleasant. Stated age  [de-identified]:  Hearing intact, non-icteric, normocephalic, atraumatic. Neck Exam: Supple,   Lung Exam: Clear to auscultation, even breath sounds. Cardiac Exam: irreg rreg without murmur   Abdomen: Soft, non-tender  Extremities: Moves all ext well. No lower extremity edema.   MSKTL: Overall good ROM ext  Skin: No significant rashes  Psych: Appropriate affect  Neuro - Grossly intact        LABS / OTHER STUDIES reviewed:       Recent Results (from the past 24 hour(s))   ECHO ADULT COMPLETE    Collection Time: 07/31/21 11:05 AM   Result Value Ref Range    IVSd 0.86 0.60 - 1.00 cm    LVIDd 4.15 (A) 4.20 - 5.90 cm    LVIDs 2.90 cm    LVOT d 2.04 cm    LVPWd 0.87 0.60 - 1.00 cm    LVOT Peak Gradient 5.32 mmHg    LVOT Peak Velocity 115.31 cm/s    RVIDd 3.44 cm    RVSP 76.34 mmHg    Left Atrium Major Axis 3.46 cm    LA Volume 54.50 18.0 - 58.0 mL    LA Area 4C 19.71 cm2    LA Vol 2C 41.21 18.00 - 58.00 mL    LA Vol 4C 49.18 18.00 - 58.00 mL    LA Volume DISK BP 49.75 18.0 - 58.0 mL    Est. RA Pressure 8.00 mmHg    Aortic Valve Area by Continuity of Peak Velocity 1.64 cm2    AoV PG 21.03 mmHg    Aortic Valve Systolic Peak Velocity 917.81 cm/s    Mitral Valve E Wave Deceleration Time 179.05 ms    MV E Marquise 99.91 centimeter/second    LV E' Lateral Velocity 8.45 centimeter/second    LV E' Septal Velocity 3.87 centimeter/second    Mitral Valve Pressure Half-time 51.93 ms    MVA (PHT) 4.24 cm2    Pulmonic Valve Systolic Peak Instantaneous Gradient 3.02 mmHg    Pulmonic Valve Max Velocity 85.84 cm/s    Tapse 1.23 (A) 1.50 - 2.00 cm    Triscuspid Valve Regurgitation Peak Gradient 68.34 mmHg    TR Max Velocity 413.35 cm/s    AO ASC D 3.11 cm    Ao Root D 2.51 cm    IVC proximal 2.38 cm    LV Mass .3 88.0 - 224.0 g    LV Mass AL Index 60.9 49.0 - 115.0 g/m2    Left Atrium Minor Axis 1.91 cm    LA Vol Index 30.11 16.00 - 28.00 ml/m2    LA Vol Index 22.77 16.00 - 28.00 ml/m2    LA Vol Index 27.17 16.00 - 28.00 ml/m2    AMINATA/BSA Pk Marquise 0.9 cm2/m2   TROPONIN I    Collection Time: 07/31/21  1:29 PM   Result Value Ref Range    Troponin-I, Qt. 0.08 (H) <0.05 ng/mL   TROPONIN I    Collection Time: 08/01/21 12:28 AM   Result Value Ref Range    Troponin-I, Qt. 0.07 (H) <0.05 ng/mL   CBC WITH AUTOMATED DIFF    Collection Time: 08/01/21 12:28 AM   Result Value Ref Range    WBC 8.7 4.1 - 11.1 K/uL    RBC 2.79 (L) 4.10 - 5.70 M/uL    HGB 8.9 (L) 12.1 - 17.0 g/dL    HCT 29.7 (L) 36.6 - 50.3 %    .5 (H) 80.0 - 99.0 FL    MCH 31.9 26.0 - 34.0 PG    MCHC 30.0 30.0 - 36.5 g/dL    RDW 19.1 (H) 11.5 - 14.5 %    PLATELET 218 136 - 224 K/uL    MPV 10.1 8.9 - 12.9 FL    NRBC 0.0 0  WBC    ABSOLUTE NRBC 0.00 0.00 - 0.01 K/uL    NEUTROPHILS 77 (H) 32 - 75 %    LYMPHOCYTES 11 (L) 12 - 49 %    MONOCYTES 9 5 - 13 %    EOSINOPHILS 2 0 - 7 %    BASOPHILS 0 0 - 1 %    IMMATURE GRANULOCYTES 1 (H) 0.0 - 0.5 %    ABS. NEUTROPHILS 6.7 1.8 - 8.0 K/UL    ABS. LYMPHOCYTES 1.0 0.8 - 3.5 K/UL    ABS. MONOCYTES 0.8 0.0 - 1.0 K/UL    ABS. EOSINOPHILS 0.2 0.0 - 0.4 K/UL    ABS. BASOPHILS 0.0 0.0 - 0.1 K/UL    ABS. IMM.  GRANS. 0.1 (H) 0.00 - 0.04 K/UL    DF AUTOMATED     METABOLIC PANEL, COMPREHENSIVE    Collection Time: 08/01/21 12:28 AM   Result Value Ref Range    Sodium 140 136 - 145 mmol/L    Potassium 4.4 3.5 - 5.1 mmol/L    Chloride 110 (H) 97 - 108 mmol/L    CO2 27 21 - 32 mmol/L    Anion gap 3 (L) 5 - 15 mmol/L    Glucose 131 (H) 65 - 100 mg/dL    BUN 16 6 - 20 MG/DL    Creatinine 0.82 0.70 - 1.30 MG/DL    BUN/Creatinine ratio 20 12 - 20      GFR est AA >60 >60 ml/min/1.73m2    GFR est non-AA >60 >60 ml/min/1.73m2    Calcium 7.7 (L) 8.5 - 10.1 MG/DL    Bilirubin, total 0.8 0.2 - 1.0 MG/DL    ALT (SGPT) 19 12 - 78 U/L    AST (SGOT) 26 15 - 37 U/L    Alk. phosphatase 171 (H) 45 - 117 U/L    Protein, total 5.8 (L) 6.4 - 8.2 g/dL    Albumin 2.4 (L) 3.5 - 5.0 g/dL    Globulin 3.4 2.0 - 4.0 g/dL    A-G Ratio 0.7 (L) 1.1 - 2.2           CARDIAC DIAGNOSTICS:     Cardiac Evaluation Includes:  I reviewed the results below. Echo Nov 2013 - LVH, EF 60%, LA upper nl  MRA neck/brain Nov 2013 - normal  Loop Monitor 1/17/14-PAC's and periods of ectopic atrial rhythm. Period of Atrial Fibrillation with rates of  BPM with aberrant beats     24 Hr Holter monitor 7/23/18 - Atrial flutter, rates 67 to 149. Rare episodes of AF with RVR. Frequent PVC's. EKG 7/31/21 - Afib - no ischemic ST changes - I independently viewed and interpreted the test image(s) myself. ECHO ADULT COMPLETE 07/31/2021   Interpretation Summary  · Image quality for this study was technically difficult. · LV: Estimated LVEF is 55 - 60%. Normal cavity size and systolic function (ejection fraction normal). Mildly increased wall thickness. Wall motion: normal.  · Pericardium: There is a moderate left pleural effusion. · MV: Mild mitral annular calcification. · TV: Mild tricuspid valve regurgitation is present. Pulmonary hypertension found to be severe. · AV: Aortic valve leaflet calcification present. Mild aortic valve stenosis is present. · LA: Dilated left atrium. · RA: Dilated right atrium. Signed by: Leeann Elena MD on 7/31/2021  5:40 PM      CXR 7/31/21 - 1. Unchanged cardiomegaly. Increased mild to moderate pulmonary edema.  2. Increased mild left pleural effusion. Unchanged retrocardiac consolidation. ASSESSMENT AND PLAN:     Assessment and Plan:    1) Minimal flat troponin elevation 0.7 to 0.8   - No CP or SOB  - No ischemic EKG changes   - Echo with normal LVEF and wall motion   - This is a Non-MI troponin elevation   - No further cardiac testing is needed at this time  ----->  If he develops cardiac symptoms or has objective data of ischemia then could consider further CAD workup in the future     2) Encephalopathy   - family feels strongly problems related to Seroquel     3) Atrial fibrillation: Currently rate controlled. Continue home Xarelto.  ----> can add metoprolol if HR runs faster     4) Pleural effusion/edema on CXR   - He denies SOB and looks comfortable laying down - O2 sats normal on RA   - given abnormal CXR, will give Bumex 1 mg daily for a couple of days and then make PRN     5) Will sign off and he can FU with Dr. Donald Avery as outpatient. Kristie Carcamo MD, [address]  [address]  Ph: [phone]   Ph [phone]

## 2021-08-02 ENCOUNTER — APPOINTMENT (OUTPATIENT)
Dept: GENERAL RADIOLOGY | Age: 86
DRG: 291 | End: 2021-08-02
Attending: NURSE PRACTITIONER
Payer: MEDICARE

## 2021-08-02 LAB
ALBUMIN SERPL-MCNC: 2.6 G/DL (ref 3.5–5)
ALBUMIN/GLOB SERPL: 0.8 {RATIO} (ref 1.1–2.2)
ALP SERPL-CCNC: 197 U/L (ref 45–117)
ALT SERPL-CCNC: 17 U/L (ref 12–78)
ANION GAP SERPL CALC-SCNC: 3 MMOL/L (ref 5–15)
AST SERPL-CCNC: 18 U/L (ref 15–37)
B PERT DNA SPEC QL NAA+PROBE: NOT DETECTED
BASOPHILS # BLD: 0 K/UL (ref 0–0.1)
BASOPHILS NFR BLD: 0 % (ref 0–1)
BILIRUB SERPL-MCNC: 0.9 MG/DL (ref 0.2–1)
BORDETELLA PARAPERTUSSIS PCR, BORPAR: NOT DETECTED
BUN SERPL-MCNC: 12 MG/DL (ref 6–20)
BUN/CREAT SERPL: 16 (ref 12–20)
C PNEUM DNA SPEC QL NAA+PROBE: NOT DETECTED
CALCIUM SERPL-MCNC: 8.3 MG/DL (ref 8.5–10.1)
CALCULATED R AXIS, ECG10: -4 DEGREES
CALCULATED T AXIS, ECG11: 47 DEGREES
CHLORIDE SERPL-SCNC: 109 MMOL/L (ref 97–108)
CO2 SERPL-SCNC: 28 MMOL/L (ref 21–32)
CREAT SERPL-MCNC: 0.77 MG/DL (ref 0.7–1.3)
DIAGNOSIS, 93000: NORMAL
DIFFERENTIAL METHOD BLD: ABNORMAL
EOSINOPHIL # BLD: 0.1 K/UL (ref 0–0.4)
EOSINOPHIL NFR BLD: 1 % (ref 0–7)
ERYTHROCYTE [DISTWIDTH] IN BLOOD BY AUTOMATED COUNT: 18.6 % (ref 11.5–14.5)
FLUAV H1 2009 PAND RNA SPEC QL NAA+PROBE: NOT DETECTED
FLUAV H1 RNA SPEC QL NAA+PROBE: NOT DETECTED
FLUAV H3 RNA SPEC QL NAA+PROBE: NOT DETECTED
FLUAV SUBTYP SPEC NAA+PROBE: NOT DETECTED
FLUBV RNA SPEC QL NAA+PROBE: NOT DETECTED
GLOBULIN SER CALC-MCNC: 3.3 G/DL (ref 2–4)
GLUCOSE SERPL-MCNC: 138 MG/DL (ref 65–100)
HADV DNA SPEC QL NAA+PROBE: NOT DETECTED
HCOV 229E RNA SPEC QL NAA+PROBE: NOT DETECTED
HCOV HKU1 RNA SPEC QL NAA+PROBE: NOT DETECTED
HCOV NL63 RNA SPEC QL NAA+PROBE: NOT DETECTED
HCOV OC43 RNA SPEC QL NAA+PROBE: NOT DETECTED
HCT VFR BLD AUTO: 29.5 % (ref 36.6–50.3)
HGB BLD-MCNC: 8.9 G/DL (ref 12.1–17)
HMPV RNA SPEC QL NAA+PROBE: NOT DETECTED
HPIV1 RNA SPEC QL NAA+PROBE: NOT DETECTED
HPIV2 RNA SPEC QL NAA+PROBE: NOT DETECTED
HPIV3 RNA SPEC QL NAA+PROBE: NOT DETECTED
HPIV4 RNA SPEC QL NAA+PROBE: NOT DETECTED
IMM GRANULOCYTES # BLD AUTO: 0.1 K/UL (ref 0–0.04)
IMM GRANULOCYTES NFR BLD AUTO: 1 % (ref 0–0.5)
LYMPHOCYTES # BLD: 0.7 K/UL (ref 0.8–3.5)
LYMPHOCYTES NFR BLD: 6 % (ref 12–49)
M PNEUMO DNA SPEC QL NAA+PROBE: NOT DETECTED
MCH RBC QN AUTO: 31.8 PG (ref 26–34)
MCHC RBC AUTO-ENTMCNC: 30.2 G/DL (ref 30–36.5)
MCV RBC AUTO: 105.4 FL (ref 80–99)
MONOCYTES # BLD: 1 K/UL (ref 0–1)
MONOCYTES NFR BLD: 9 % (ref 5–13)
NEUTS SEG # BLD: 9.7 K/UL (ref 1.8–8)
NEUTS SEG NFR BLD: 83 % (ref 32–75)
NRBC # BLD: 0 K/UL (ref 0–0.01)
NRBC BLD-RTO: 0 PER 100 WBC
PLATELET # BLD AUTO: 274 K/UL (ref 150–400)
PMV BLD AUTO: 10 FL (ref 8.9–12.9)
POTASSIUM SERPL-SCNC: 4 MMOL/L (ref 3.5–5.1)
PROT SERPL-MCNC: 5.9 G/DL (ref 6.4–8.2)
Q-T INTERVAL, ECG07: 408 MS
QRS DURATION, ECG06: 86 MS
QTC CALCULATION (BEZET), ECG08: 461 MS
RBC # BLD AUTO: 2.8 M/UL (ref 4.1–5.7)
RBC MORPH BLD: ABNORMAL
RSV RNA SPEC QL NAA+PROBE: NOT DETECTED
RV+EV RNA SPEC QL NAA+PROBE: NOT DETECTED
SARS-COV-2 PCR, COVPCR: NOT DETECTED
SODIUM SERPL-SCNC: 140 MMOL/L (ref 136–145)
VENTRICULAR RATE, ECG03: 77 BPM
WBC # BLD AUTO: 11.6 K/UL (ref 4.1–11.1)

## 2021-08-02 PROCEDURE — 74011250637 HC RX REV CODE- 250/637: Performed by: FAMILY MEDICINE

## 2021-08-02 PROCEDURE — 74011250637 HC RX REV CODE- 250/637: Performed by: INTERNAL MEDICINE

## 2021-08-02 PROCEDURE — 65660000000 HC RM CCU STEPDOWN

## 2021-08-02 PROCEDURE — 80053 COMPREHEN METABOLIC PANEL: CPT

## 2021-08-02 PROCEDURE — 36415 COLL VENOUS BLD VENIPUNCTURE: CPT

## 2021-08-02 PROCEDURE — 73552 X-RAY EXAM OF FEMUR 2/>: CPT

## 2021-08-02 PROCEDURE — 85025 COMPLETE CBC W/AUTO DIFF WBC: CPT

## 2021-08-02 PROCEDURE — 0202U NFCT DS 22 TRGT SARS-COV-2: CPT

## 2021-08-02 PROCEDURE — 74011250636 HC RX REV CODE- 250/636: Performed by: FAMILY MEDICINE

## 2021-08-02 PROCEDURE — 74011000250 HC RX REV CODE- 250: Performed by: INTERNAL MEDICINE

## 2021-08-02 RX ORDER — TAMSULOSIN HYDROCHLORIDE 0.4 MG/1
0.4 CAPSULE ORAL DAILY
Status: DISCONTINUED | OUTPATIENT
Start: 2021-08-02 | End: 2021-08-03 | Stop reason: HOSPADM

## 2021-08-02 RX ORDER — HALOPERIDOL 5 MG/ML
0.5 INJECTION INTRAMUSCULAR
Status: DISCONTINUED | OUTPATIENT
Start: 2021-08-02 | End: 2021-08-03 | Stop reason: HOSPADM

## 2021-08-02 RX ADMIN — Medication 10 ML: at 22:49

## 2021-08-02 RX ADMIN — Medication 1 TABLET: at 12:37

## 2021-08-02 RX ADMIN — TAMSULOSIN HYDROCHLORIDE 0.4 MG: 0.4 CAPSULE ORAL at 12:37

## 2021-08-02 RX ADMIN — Medication 1 TABLET: at 18:43

## 2021-08-02 RX ADMIN — OXYCODONE HYDROCHLORIDE AND ACETAMINOPHEN 500 MG: 500 TABLET ORAL at 09:09

## 2021-08-02 RX ADMIN — RIVAROXABAN 15 MG: 15 TABLET, FILM COATED ORAL at 18:43

## 2021-08-02 RX ADMIN — DULOXETINE HYDROCHLORIDE 90 MG: 30 CAPSULE, DELAYED RELEASE ORAL at 09:09

## 2021-08-02 RX ADMIN — HALOPERIDOL LACTATE 0.5 MG: 5 INJECTION, SOLUTION INTRAMUSCULAR at 07:44

## 2021-08-02 RX ADMIN — Medication 10 ML: at 06:46

## 2021-08-02 RX ADMIN — Medication 1 TABLET: at 09:09

## 2021-08-02 RX ADMIN — ALLOPURINOL 150 MG: 300 TABLET ORAL at 22:49

## 2021-08-02 RX ADMIN — DEXTROSE AND SODIUM CHLORIDE 50 ML/HR: 5; 450 INJECTION, SOLUTION INTRAVENOUS at 03:08

## 2021-08-02 NOTE — ROUTINE PROCESS
0430 - pt confused and asking to speak to daughter. Attempting to get out of bed and calling out into the hallway. RN educated pt that he is in hospital and family is asleep at this time since it is 4am, but we can attempt to call them later in the morning. Pt unable to be redirected. Spoke to Dr. Aditi Duncan. PRN haldol ordered. [id number] - Pt more agreeable to waiting to call family. Agrees to try to stay in bed. Will continue to monitor. Haldol not given.

## 2021-08-02 NOTE — WOUND CARE
Wound Consult:  New consult Visit. Chart reviewed. Consulted for left leg wounds. Spoke with patients nurse,  Jaqui Taveras RN in room for assessment and repositioning. Nicole Willis RN in room at bedside  Patient is resting on a [place] bed with versacare mattress. Heels off loaded with pillows and left heel boot. inconmtinent of brown formed stool, incontinent care given. mojica catheter in place  Patient is awake alert, cooperative [place]; requires 2 assist to move side to side in bed. daughter at bedside for historian  Isidro score 16. Assessment:all wounds POA  Left hand-skin tear- healed flap in tact. Dry. Left heel- 4x3cm serous filled blister, edges intact. Blanching redness surrounding blister. Left lateral leg incisons X3- well approximated with old blister/ dry flap intact. Right heel no redness and skin intact  Buttocks - blanching redness. Slight. Treatment:  cleansed all wounds with NSS and applied foam dressing on left heel and left lateral leg for protection  Heel boot to left foot  Wound Recommendations:  Left heel and left lateral leg blisters- cleanse with NSS and cover with foam dressing. Change every 3 days  Skin Care / PI Prevention Recommendations:  1. Minimize friction/shear: minimize layers of linen/pads under patient. 2. Off load pressure/reposition:  turn and reposition approximately every 2 hours; float heels with pillows or use off loading heel boots; waffle cushion for sitting; position wedge. 3. Manage Moisture - keep skin folds dry; incontinence skin care with incontinence wipes; mojica in use to help contain urine. 4. Continue to monitor nutrition, pain, and skin risk scale, and skin assessment. Plan:  Spoke with Dr. Nieves Jara regarding findings and proposed orders for treatment. We will continue to reassess  and as needed. Please re-consult should concerns arise despite continued skin/PI prevention measures.     [facility], Wound / Ostomy Department  Wound [facility] ER Office [phone]

## 2021-08-02 NOTE — ROUTINE PROCESS
Bedside shift change report given to Nadira (oncoming nurse) by Ugo Tavera RN (offgoing nurse). Report included the following information SBAR, Kardex, ED Summary, Intake/Output, MAR, Accordion, Recent Results, Med Rec Status and Cardiac Rhythm A-flutter.

## 2021-08-02 NOTE — ROUTINE PROCESS
Critera met for insert Yes  Reason for insert: Urinary obstruction/urinary retention  Date of insert: prior to admission  Order is current: Yes  MD or RN driven: Nursing  Removal Discussed Yes  Last CHG Bath: 08/01/21 at 2008  [place] Care Last Completed: Date/Time: 08/01/21 @ 2008  [place] Care After Each Bowel Movement: Yes  Education documented every 24 hours Yes  Care Plan (Risk for UTI, [place]) Updated Every 24 hours Yes  Bag below Bladder Yes        Bag off Floor Yes      Sheet Clip used Yes          Tubing free of dependant loops Yes          Seal Intact Yes            Bag less than 1/2 full Yes

## 2021-08-02 NOTE — PROGRESS NOTES
Daily Progress Note: 8/2/2021  Анна Arreguin MD    Assessment/Plan:   NSTEMI (non-ST elevated myocardial infarction) [facility]) (7/31/2021): Denies any current chest pain. Possibly related to volume overload. -Continue to trend troponins. -echo ok  -Start aspirin.    -Continue statin.   -Consult Cardiology- non MI elevation of troponin       Encephalopathy/ Unresponsiveness: Unclear etiology. Possibly related to recently initiated Seroquel and other medications or cardiac etiology. CT head with no acute concerns. -Check UA, TSH and ammonia level.    -Hold any sedating medications. -May consider outpatient pulmonary testing for sleep apnea. -Monitor.     Pulmonary edema/left pleural effusion:   -echo ok  -Consider diuretics as blood pressure able to tolerate.     Status post nail insertion for L displaced intertrochanteric femur fx: Status post Surg on 7/19/21. Monitor.    -Consider orthopedics consult as needed.     Macrocytic anemia: Appears around baseline.    -Check folate and B12 levels.     History of urinary retention:   -Avendaño currently in [facility].    -Start Flomax and consider voiding trial.     Atrial fibrillation: Currently rate controlled.     -Continue home Xarelto.     Neuropathy:   -Hold pregabalin   -Restart duloxetine      Hx uric acid stones:   -Continue allopurinol.      Risk of deterioration: high              Problem List:  Problem List as of 8/2/2021 Date Reviewed: 12/20/2018        Codes Class Noted - Resolved    NSTEMI (non-ST elevated myocardial infarction) ([facility] ICD-10-CM: I21.4  ICD-9-CM: 410.70  7/31/2021 - Present        Hip fracture ([facility] ICD-10-CM: S72.009A  ICD-9-CM: 820.8  7/18/2021 - Present        CAP (community acquired pneumonia) ICD-10-CM: J18.9  ICD-9-CM: 486  7/18/2021 - Present        Chronic atrial fibrillation ([facility] ICD-10-CM: I48.20  ICD-9-CM: 427.31  11/10/2017 - Present        Sinus node dysfunction ([facility] ICD-10-CM: I49.5  ICD-9-CM: 427.81  4/12/2016 - Present        Depression ICD-10-CM: F32.9  ICD-9-CM: 171  9/28/2015 - Present        Dyslipidemia ICD-10-CM: E78.5  ICD-9-CM: 272.4  9/23/2014 - Present        Gout ICD-10-CM: M10.9  ICD-9-CM: 274.9  Unknown - Present        Hx of carcinoma in situ of prostate ICD-10-CM: B65.168  ICD-9-CM: V13.89  Unknown - Present        Essential hypertension, benign ICD-10-CM: I10  ICD-9-CM: 401.1  11/16/2013 - Present        Kidney stones ICD-10-CM: N20.0  ICD-9-CM: 592.0  11/16/2013 - Present        Embolic stroke involving right middle cerebral artery ([facility] ICD-10-CM: I63.411  ICD-9-CM: 434.11  11/1/2013 - Present    Overview Signed 12/7/2013  9:23 AM by César Gonzales MD     presented with dysarthria, MRI Several tiny acute infarcts in the right middle cerebral artery             RESOLVED: Dizziness ICD-10-CM: R42  ICD-9-CM: 780.4  4/12/2016 - 6/21/2016        RESOLVED: Typical atrial flutter ([facility] ICD-10-CM: I48.3  ICD-9-CM: 427.32  5/6/2015 - 9/28/2015        RESOLVED: Paroxysmal atrial fibrillation ([facility] ICD-10-CM: I48.0  ICD-9-CM: 427.31  9/23/2014 - 11/10/2017        RESOLVED: Dysarthria ICD-9-CM: 784.5  11/17/2013 - 12/7/2013        RESOLVED: TIA (transient ischemic attack) ICD-10-CM: G45.9  ICD-9-CM: 435.9  11/16/2013 - 12/7/2013        RESOLVED: Other and unspecified hyperlipidemia ICD-10-CM: E78.5  ICD-9-CM: 272.4  11/16/2013 - 9/23/2014        RESOLVED: Hypokalemia ICD-10-CM: E87.6  ICD-9-CM: 276.8  11/16/2013 - 12/7/2013              HPI:   Mr. Bj Mckinney is a 80 y.o.  male with a past medical history of A. fib on Xarelto, neuropathy and uric acid stones who is admitted with encephalopathy and NSTEMI. Mr. Bj Mckinney is very hard of hearing and so history is obtained from both him and his son at bedside. Per son, this morning staff at [facility] were unable to awaken patient. At that time they placed a Avendaño in him and called EMS, and then patient was brought to the emergency room. According to son, he is oriented x4 at baseline. Last month he suffered a hip fracture and was hospitalized at [address] [place] [address] and then discharged on  to [facility]hab. Per son, on discharge she was prescribed Seroquel which she believes may be contributing to his father's current symptoms. Patient has no complaints other than difficulty moving his left lower extremity following his recent fracture and surgery. (Dr Jen Alford)    : Feeling better. More alert and oriented. Knows he is in the hospital.     : More confusion during the night. Haldol was ordered if needed but was not given. More alert this am.     Review of Systems:   Review of systems not obtained due to patient factors. Objective:   Physical Exam:     Visit Vitals  /78 (BP 1 Location: Right arm, BP Patient Position: At rest)   Pulse 95   Temp 99.1 °F (37.3 °C)   Resp 21   Ht 5' 7\" (1.702 m)   Wt 162 lb 1.6 oz (73.5 kg)   SpO2 95%   BMI 25.39 kg/m²    O2 Flow Rate (L/min): 2 l/min O2 Device: None (Room air)    Temp (24hrs), Av.3 °F (36.8 °C), Min:97.9 °F (36.6 °C), Max:99.1 °F (37.3 °C)    1901 -  0700  In: 1528.3 [I.V.:1528.3]  Out: 9387 [Urine:1450]   701 - 1900  In: 400 [P.O.:120; I.V.:355]  Out: 2600 [Urine:2600]    General:  Alert, cooperative, no distress, appears stated age. Thin and frail   Head:  Normocephalic, without obvious abnormality, atraumatic. Eyes:  Conjunctivae/corneas clear. Nose: Nares normal. Septum midline. Mucosa normal. No drainage or sinus tenderness. Throat: Lips, mucosa, and tongue normal.    Neck: Supple, symmetrical, trachea midline, no adenopathy, thyroid: no enlargement/tenderness/nodules, no carotid bruit and no JVD. Back:   Symmetric, no curvature. ROM normal. No CVA tenderness. Lungs:   Clear to auscultation bilaterally. Chest wall:  No tenderness or deformity. Heart:  Regular rate and rhythm, S1, S2 normal, no murmur, click, rub or gallop.    Abdomen: Soft, non-tender. Bowel sounds normal. No masses,  No organomegaly. Extremities: Surgical site healing well, no cyanosis or edema. No calf tenderness or cords. Pulses: 2+ and symmetric all extremities. Skin: Skin color, texture, turgor normal.    Neurologic: CNII-XII intact. Alert and oriented X 2. Fine motor of hands and fingers normal.   equal.  No cogwheeling or rigidity. Gait not tested at this time. Sensation grossly normal to touch. Gross motor of extremities normal.       Data Review:     Interpretation Summary-ECHO 7/31/21    · Image quality for this study was technically difficult. · LV: Estimated LVEF is 55 - 60%. Normal cavity size and systolic function (ejection fraction normal). Mildly increased wall thickness. Wall motion: normal.  · Pericardium: There is a moderate left pleural effusion. · MV: Mild mitral annular calcification. · TV: Mild tricuspid valve regurgitation is present. Pulmonary hypertension found to be severe. · AV: Aortic valve leaflet calcification present. Mild aortic valve stenosis is present. · LA: Dilated left atrium. · RA: Dilated right atrium. Recent Days:  Recent Labs     08/02/21  0301 08/01/21  0028 07/31/21  0751   WBC 11.6* 8.7 9.0   HGB 8.9* 8.9* 9.0*   HCT 29.5* 29.7* 30.1*    288 305     Recent Labs     08/02/21  0301 08/01/21  0028 07/31/21  0751    140 140   K 4.0 4.4 4.1   * 110* 110*   CO2 28 27 31   * 131* 106*   BUN 12 16 19   CREA 0.77 0.82 0.88   CA 8.3* 7.7* 8.1*   MG  --   --  2.3   ALB 2.6* 2.4* 2.8*   TBILI 0.9 0.8 1.0   ALT 17 19 18     No results for input(s): PH, PCO2, PO2, HCO3, FIO2 in the last 72 hours.     24 Hour Results:  Recent Results (from the past 24 hour(s))   CBC WITH AUTOMATED DIFF    Collection Time: 08/02/21  3:01 AM   Result Value Ref Range    WBC 11.6 (H) 4.1 - 11.1 K/uL    RBC 2.80 (L) 4.10 - 5.70 M/uL    HGB 8.9 (L) 12.1 - 17.0 g/dL    HCT 29.5 (L) 36.6 - 50.3 %    .4 (H) 80.0 - 99.0 FL    MCH 31.8 26.0 - 34.0 PG    MCHC 30.2 30.0 - 36.5 g/dL    RDW 18.6 (H) 11.5 - 14.5 %    PLATELET 219 252 - 493 K/uL    MPV 10.0 8.9 - 12.9 FL    NRBC 0.0 0  WBC    ABSOLUTE NRBC 0.00 0.00 - 0.01 K/uL    NEUTROPHILS 83 (H) 32 - 75 %    LYMPHOCYTES 6 (L) 12 - 49 %    MONOCYTES 9 5 - 13 %    EOSINOPHILS 1 0 - 7 %    BASOPHILS 0 0 - 1 %    IMMATURE GRANULOCYTES 1 (H) 0.0 - 0.5 %    ABS. NEUTROPHILS 9.7 (H) 1.8 - 8.0 K/UL    ABS. LYMPHOCYTES 0.7 (L) 0.8 - 3.5 K/UL    ABS. MONOCYTES 1.0 0.0 - 1.0 K/UL    ABS. EOSINOPHILS 0.1 0.0 - 0.4 K/UL    ABS. BASOPHILS 0.0 0.0 - 0.1 K/UL    ABS. IMM. GRANS. 0.1 (H) 0.00 - 0.04 K/UL    DF SMEAR SCANNED      RBC COMMENTS ANISOCYTOSIS  1+       METABOLIC PANEL, COMPREHENSIVE    Collection Time: 08/02/21  3:01 AM   Result Value Ref Range    Sodium 140 136 - 145 mmol/L    Potassium 4.0 3.5 - 5.1 mmol/L    Chloride 109 (H) 97 - 108 mmol/L    CO2 28 21 - 32 mmol/L    Anion gap 3 (L) 5 - 15 mmol/L    Glucose 138 (H) 65 - 100 mg/dL    BUN 12 6 - 20 MG/DL    Creatinine 0.77 0.70 - 1.30 MG/DL    BUN/Creatinine ratio 16 12 - 20      GFR est AA >60 >60 ml/min/1.73m2    GFR est non-AA >60 >60 ml/min/1.73m2    Calcium 8.3 (L) 8.5 - 10.1 MG/DL    Bilirubin, total 0.9 0.2 - 1.0 MG/DL    ALT (SGPT) 17 12 - 78 U/L    AST (SGOT) 18 15 - 37 U/L    Alk.  phosphatase 197 (H) 45 - 117 U/L    Protein, total 5.9 (L) 6.4 - 8.2 g/dL    Albumin 2.6 (L) 3.5 - 5.0 g/dL    Globulin 3.3 2.0 - 4.0 g/dL    A-G Ratio 0.8 (L) 1.1 - 2.2         Medications reviewed  Current Facility-Administered Medications   Medication Dose Route Frequency    haloperidol lactate (HALDOL) injection 0.5 mg  0.5 mg IntraVENous Q6H PRN    calcium-vitamin D (OS-VINCENT +D3) 500 mg-200 unit per tablet 1 Tablet  1 Tablet Oral TID WITH MEALS    ascorbic acid (vitamin C) (VITAMIN C) tablet 500 mg  500 mg Oral DAILY    DULoxetine (CYMBALTA) capsule 90 mg  90 mg Oral DAILY    vitamin A (AQUASOL A) capsule 10,000 Units (Patient Supplied)  10,000 Units Oral DAILY    bumetanide (BUMEX) tablet 1 mg  1 mg Oral DAILY    sodium chloride (NS) flush 5-40 mL  5-40 mL IntraVENous Q8H    sodium chloride (NS) flush 5-40 mL  5-40 mL IntraVENous PRN    dextrose 5 % - 0.45% NaCl infusion  50 mL/hr IntraVENous CONTINUOUS    allopurinoL (ZYLOPRIM) tablet 150 mg  150 mg Oral QHS    rivaroxaban (XARELTO) tablet 15 mg  15 mg Oral DAILY WITH DINNER       Care Plan discussed with: Patient/Family    Total time spent with patient and review of records: 30 minutes.     Grecia Fraser MD

## 2021-08-02 NOTE — PROGRESS NOTES
Comprehensive Nutrition Assessment      Type and Reason for Visit: Initial, Wound    Nutrition Recommendations/Plan:   1. Continue Regular diet  2. Added Ensure Enlive TID due to poor PO intake. 3. Document PO intake, weight,       Nutrition Assessment:       Pt admitted for NSTEMI (non-ST elevated myocardial infarction) ([facility] [I21.4]. Pt  has a past medical history of Anxiety, Arthritis, Atrial fibrillation, chronic, Colon cancer, DJD of knee, Dyslipidemia, Embolic stroke involving right middle cerebral artery, Falls, Gout, carcinoma in situ of prostate, Hypertension, and Vertigo. MST for P/I received. Wound care following. Pt with multiple skin injuries. See WOCN note for details. Pt with recent hip fracture/surgical fix. Documented wt is up 8-10 lbs from usual BW and wt from last admission. PO documented as 0%. Noted pt couldn't chew pork chop yesterday, but this may have been an issue with food and not with dentition or pts ability to chew. Pt denies difficulty normally with chew/swallow ability. KNFA. Added Ensure Enlive for ease of kcal/protein intake. Wt Readings from Last 10 Encounters:   08/01/21 73.5 kg (162 lb 1.6 oz)   07/25/21 70.2 kg (154 lb 12.2 oz)   07/01/21 68.9 kg (152 lb)   04/08/21 68.9 kg (152 lb)   01/19/21 68.9 kg (152 lb)   02/20/20 68.9 kg (152 lb)   10/08/19 69.4 kg (153 lb)   05/31/19 69.4 kg (153 lb)   11/26/18 74.6 kg (164 lb 6.4 oz)   11/12/18 74.1 kg (163 lb 4.8 oz)       Malnutrition Assessment:  Malnutrition Status:  Insufficient data    Context:   Poor PO last 1-2 days. Current wt up? Estimated Daily Nutrient Needs:  Energy (kcal): 1674  (BMRx1.3); Weight Used for Energy Requirements: Usual  Protein (g): 73.5 (1.0g/kg);  Weight Used for Protein Requirements: Current  Fluid (ml/day):  ; Method Used for Fluid Requirements:      Documented meal intake:   Patient Vitals for the past 168 hrs:   % Diet Eaten   08/02/21 0312 0%   08/01/21 2317 0%   08/01/21 2010 0% Documented Supplement intake:  Patient Vitals for the past 168 hrs:   Supplement intake %   08/02/21 0312 0   08/01/21 2317 0   08/01/21 2010 0       Nutrition Related Findings:     Last BM 8/2, no edema documented      Nutritionally Significant Medications:   Allopurinol, Bumex, Vit C, Vernon/VitD, Flomax, Vit A. D5 @ 50 mL/hr. (306 kcal)      Wounds:    Pressure injury, Multiple       Current Nutrition Therapies:  ADULT DIET Regular; patient wants a grilled cheeese or hamburger today for dinner. CANNOT chew the pork chop. thank you  ADULT ORAL NUTRITION SUPPLEMENT Dinner, Lunch, Breakfast; Standard High Calorie/High Protein    Anthropometric Measures:  · Height:  5' 7.01\" (170.2 cm)  · Current Body Wt:  73.5 kg (162 lb 0.6 oz)   · Admission Body Wt:   162 lbs    · Usual Body Wt:   154 lbs     · Ideal Body Wt:  148 lbs:  109.5 %   · BMI Category: Overweight (BMI 25.0-29. 9)       Nutrition Diagnosis:   · Inadequate energy intake related to early satiety as evidenced by intake 0-25%      Nutrition Interventions:   Food and/or Nutrient Delivery: Continue current diet, Start oral nutrition supplement  Nutrition Education and Counseling: No recommendations at this time  Coordination of Nutrition Care: Continue to monitor while inpatient, Interdisciplinary rounds    Goals:  PO >50% fo meald and 1-2 ONS per day within 5-7 days       Nutrition Monitoring and Evaluation:   Behavioral-Environmental Outcomes: None identified  Food/Nutrient Intake Outcomes: Food and nutrient intake, Supplement intake  Physical Signs/Symptoms Outcomes: Biochemical data, Weight, Skin    Discharge Planning:    Continue oral nutrition supplement, Continue current diet     Electronically signed by Sonu Fuchs, [address]     Contact: [phone]

## 2021-08-02 NOTE — PROGRESS NOTES
Care Management follow up    Patient admitted for NSTEMI, encephalopathy. History of: afib on xarelto, neuropathy, hip fracture (one month ago). COVID Vaccine:  yes  type: Moderna        RUR 19 (Score %) moderate   Is This a Readmission YES  Is this a Bundle YES    Current status  Patient continues to require medical management including ongoing assessment and management. Cardiology following. Spoke with son at bedside, discharge plans discussed. Will need PT/OT evaluations prior to DC. Transition of Care Plan  1. Monitor patient status and response to treatment. 2. Patient continues to require medical management. 3. NEED PT/OT evaluations. 4. Likely will [facility] SNF [facility] 5. Transport via ambulance. 6. CM to monitor progress and recommendations.     Zeeshan Smith RN, MSN/Care manager

## 2021-08-02 NOTE — CONSULTS
ORTHOPAEDIC CONSULT NOTE    Subjective:     Date of Consultation:  August 2, 2021      Carla Harris is a 80 y.o. male who is being seen for follow up after a L IMN for L IT Fx on 7/19/21 with Dr. Gale Sanchez. Per daughter the pt has been doing well until last Friday, has been up with PT and working with staff to use BR and sit in chair. PT was readmitted over the weekend for encephopathy and chest pain, which have improved and pt is feeling better. + pain at times to the L leg. Pt's family at bedside during exam. Plan of care discussed with pt and family, all questions/concerns addressed and pt and family verbalized understanding of plan of care. Patient Active Problem List    Diagnosis Date Noted    NSTEMI (non-ST elevated myocardial infarction) ([facility] 07/31/2021    Hip fracture ([facility] 07/18/2021    CAP (community acquired pneumonia) 07/18/2021    Chronic atrial fibrillation ([address] 11/10/2017    Sinus node dysfunction ([address] 04/12/2016    Depression 09/28/2015    Dyslipidemia 09/23/2014    Gout     Hx of carcinoma in situ of prostate     Essential hypertension, benign 11/16/2013    Kidney stones 89/28/0446    Embolic stroke involving right middle cerebral artery ([address] 11/01/2013     Family History   Problem Relation Age of Onset    Stroke Father     Heart Disease Father     Stroke Sister       Social History     Tobacco Use    Smoking status: Never Smoker    Smokeless tobacco: Never Used   Substance Use Topics    Alcohol use:  Yes     Alcohol/week: 1.0 standard drinks     Types: 1 Standard drinks or equivalent per week     Comment: rarely     Past Medical History:   Diagnosis Date    Anxiety     Arthritis     right shoulder    Atrial fibrillation, chronic ([address] 09/23/2014    Colon cancer ([address] 2002    colon resection    Depression 9/28/2015    DJD (degenerative joint disease) of knee     left TKR    Dyslipidemia     Embolic stroke involving right middle cerebral artery [facility]) Nov 2013 presented with dysarthria, MRI Several tiny acute infarcts in the right middle cerebral artery    Falls     Gout     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    Hypertension     Memory loss     Peripheral neuropathy     Vertigo       Past Surgical History:   Procedure Laterality Date    HX CATARACT REMOVAL      HX GI      colon resection    HX ORTHOPAEDIC      left knee, left shoulder    SD ABDOMEN SURGERY PROC UNLISTED      colorectal    SD CARDIAC SURG PROCEDURE UNLIST      cardio version      Prior to Admission medications    Medication Sig Start Date End Date Taking? Authorizing Provider   traMADoL (ULTRAM) 50 mg tablet Take 50 mg by mouth every six (6) hours as needed for Pain. Yes Provider, Historical   multivitamin (ONE A DAY) tablet Take 1 Tablet by mouth daily. Yes Provider, Historical   vitamin A (AQUASOL A) 10,000 unit capsule Take 10,000 Units by mouth daily. 7/27/21 8/5/21 Yes Provider, Historical   ascorbic acid, vitamin C, (Vitamin C) 500 mg tablet Take 500 mg by mouth daily. 7/27/21 8/5/21 Yes Provider, Historical   acetaminophen (TYLENOL) 325 mg tablet Take 650 mg by mouth three (3) times daily. Yes Provider, Historical   polyethylene glycol (Miralax) 17 gram/dose powder Take 17 g by mouth daily as needed for Constipation. Yes Provider, Historical   calcium-vitamin D (OS-VINCENT +D3) 500 mg-200 unit per tablet Take 1 Tablet by mouth three (3) times daily (with meals). 7/25/21  Yes Keshav Lockett MD   QUEtiapine (SEROquel) 25 mg tablet Take 1 Tablet by mouth nightly. 7/25/21  Yes Keshav Lockett MD   rivaroxaban (Xarelto) 15 mg tab tablet Take 15 mg by mouth daily (with dinner). Indications: prevention of thromboembolism in paroxysmal atrial fibrillation   Yes Other, MD Yanelis   pregabalin (LYRICA) 75 mg capsule Take 75 mg by mouth two (2) times a day. Yes Sameer, MD Yanelis   DULoxetine (CYMBALTA) 30 mg capsule Take 90 mg by mouth daily.    Yes Yanelis Estrella MD   zinc sulfate 220 mg tablet Take 1 Tab by mouth daily. 21  Yes Nafisa NAYAK MD   ondansetron (Zofran ODT) 4 mg disintegrating tablet 1 Tab by SubLINGual route every eight (8) hours as needed for Nausea or Vomiting. 21  Yes Nafisa NAYAK MD   simvastatin (Zocor) 10 mg tablet Take 10 mg by mouth nightly. Yes Provider, Historical   folic acid (FOLVITE) 1 mg tablet Take 1 mg by mouth daily. Yes Provider, Historical   allopurinol (ZYLOPRIM) 300 mg tablet Take 150 mg by mouth nightly. Yes Other, MD Yanelis     Current Facility-Administered Medications   Medication Dose Route Frequency    haloperidol lactate (HALDOL) injection 0.5 mg  0.5 mg IntraVENous Q6H PRN    tamsulosin (FLOMAX) capsule 0.4 mg  0.4 mg Oral DAILY    calcium-vitamin D (OS-VINCENT +D3) 500 mg-200 unit per tablet 1 Tablet  1 Tablet Oral TID WITH MEALS    ascorbic acid (vitamin C) (VITAMIN C) tablet 500 mg  500 mg Oral DAILY    DULoxetine (CYMBALTA) capsule 90 mg  90 mg Oral DAILY    vitamin A (AQUASOL A) capsule 10,000 Units (Patient Supplied)  10,000 Units Oral DAILY    bumetanide (BUMEX) tablet 1 mg  1 mg Oral DAILY    sodium chloride (NS) flush 5-40 mL  5-40 mL IntraVENous Q8H    sodium chloride (NS) flush 5-40 mL  5-40 mL IntraVENous PRN    dextrose 5 % - 0.45% NaCl infusion  50 mL/hr IntraVENous CONTINUOUS    allopurinoL (ZYLOPRIM) tablet 150 mg  150 mg Oral QHS    rivaroxaban (XARELTO) tablet 15 mg  15 mg Oral DAILY WITH DINNER      No Known Allergies     Review of Systems:  A comprehensive review of systems was negative except for that written in the HPI.     Mental Status: medium dementia    Objective:     Patient Vitals for the past 8 hrs:   BP Temp Pulse Resp SpO2 Height   21 1519 132/60 98.5 °F (36.9 °C) 90 21 100 %    21 1500   (!) 102      21 1448      5' 7.01\" (1.702 m)     Temp (24hrs), Av.5 °F (36.9 °C), Min:97.9 °F (36.6 °C), Max:99.1 °F (37.3 °C)      Gen: Well-developed, in no acute distress   Musc: LLE - + ROM with healed incisions - no erythema or fluctuance, + mild eccymosis noted to thigh, NVI    Skin: No skin breakdown noted. Skin warm, pink, dry  Neuro: Cranial nerves are grossly intact, no focal motor weakness, follows commands appropriately       Imaging Review: pending     Labs:   Recent Results (from the past 24 hour(s))   CBC WITH AUTOMATED DIFF    Collection Time: 08/02/21  3:01 AM   Result Value Ref Range    WBC 11.6 (H) 4.1 - 11.1 K/uL    RBC 2.80 (L) 4.10 - 5.70 M/uL    HGB 8.9 (L) 12.1 - 17.0 g/dL    HCT 29.5 (L) 36.6 - 50.3 %    .4 (H) 80.0 - 99.0 FL    MCH 31.8 26.0 - 34.0 PG    MCHC 30.2 30.0 - 36.5 g/dL    RDW 18.6 (H) 11.5 - 14.5 %    PLATELET 539 485 - 784 K/uL    MPV 10.0 8.9 - 12.9 FL    NRBC 0.0 0  WBC    ABSOLUTE NRBC 0.00 0.00 - 0.01 K/uL    NEUTROPHILS 83 (H) 32 - 75 %    LYMPHOCYTES 6 (L) 12 - 49 %    MONOCYTES 9 5 - 13 %    EOSINOPHILS 1 0 - 7 %    BASOPHILS 0 0 - 1 %    IMMATURE GRANULOCYTES 1 (H) 0.0 - 0.5 %    ABS. NEUTROPHILS 9.7 (H) 1.8 - 8.0 K/UL    ABS. LYMPHOCYTES 0.7 (L) 0.8 - 3.5 K/UL    ABS. MONOCYTES 1.0 0.0 - 1.0 K/UL    ABS. EOSINOPHILS 0.1 0.0 - 0.4 K/UL    ABS. BASOPHILS 0.0 0.0 - 0.1 K/UL    ABS. IMM. GRANS. 0.1 (H) 0.00 - 0.04 K/UL    DF SMEAR SCANNED      RBC COMMENTS ANISOCYTOSIS  1+       METABOLIC PANEL, COMPREHENSIVE    Collection Time: 08/02/21  3:01 AM   Result Value Ref Range    Sodium 140 136 - 145 mmol/L    Potassium 4.0 3.5 - 5.1 mmol/L    Chloride 109 (H) 97 - 108 mmol/L    CO2 28 21 - 32 mmol/L    Anion gap 3 (L) 5 - 15 mmol/L    Glucose 138 (H) 65 - 100 mg/dL    BUN 12 6 - 20 MG/DL    Creatinine 0.77 0.70 - 1.30 MG/DL    BUN/Creatinine ratio 16 12 - 20      GFR est AA >60 >60 ml/min/1.73m2    GFR est non-AA >60 >60 ml/min/1.73m2    Calcium 8.3 (L) 8.5 - 10.1 MG/DL    Bilirubin, total 0.9 0.2 - 1.0 MG/DL    ALT (SGPT) 17 12 - 78 U/L    AST (SGOT) 18 15 - 37 U/L    Alk.  phosphatase 197 (H) 45 - 117 U/L Protein, total 5.9 (L) 6.4 - 8.2 g/dL    Albumin 2.6 (L) 3.5 - 5.0 g/dL    Globulin 3.3 2.0 - 4.0 g/dL    A-G Ratio 0.8 (L) 1.1 - 2.2           Impression:     Patient Active Problem List    Diagnosis Date Noted    NSTEMI (non-ST elevated myocardial infarction) ([facility] 07/31/2021    Hip fracture ([facility] 07/18/2021    CAP (community acquired pneumonia) 07/18/2021    Chronic atrial fibrillation ([facility] 11/10/2017    Sinus node dysfunction ([facility] 04/12/2016    Depression 09/28/2015    Dyslipidemia 09/23/2014    Gout     Hx of carcinoma in situ of prostate     Essential hypertension, benign 11/16/2013    Kidney stones 97/15/8427    Embolic stroke involving right middle cerebral artery ([facility] 11/01/2013     Active Problems:    NSTEMI (non-ST elevated myocardial infarction) ([facility] (7/31/2021)        Plan:   -  Pt is stable orthopaedically, XR pending   -  continue up as tolerated and PT/OT at the SNF, xarelto as per home meds, tylenol for pain     Dr. Shine Duggan aware and agrees with plan as above.         Makenna Kuo, NP  Orthopedic Nurse Practitioner   [place]

## 2021-08-02 NOTE — CARDIO/PULMONARY
[facility] Cardiopulmonary Rehab: 79 yo male admitted from nursing home with lethargy & mildly elevated troponin. Initially, dx was NSTEMI. Per Dr Shawnee Moore, this is a non-MI troponin elevation and no further cardiac testing is indicated. Pt is not eligible for outpatient cardiac rehab program, due to lack of documented CAD.

## 2021-08-02 NOTE — PROGRESS NOTES
Patient has received a BPCI (Bundled Payments for Care Improvement) Advanced notification. Patient notified as being on the Cardiac Bundle at [place] [id number].     Julianne Agarwal, RN, MSN/Care manager  [phone]

## 2021-08-02 NOTE — PROGRESS NOTES
Problem: Falls - Risk of  Goal: *Absence of Falls  Description: Document Maribel Elvin Fall Risk and appropriate interventions in the flowsheet.   Outcome: Progressing Towards Goal  Note: Fall Risk Interventions:  Mobility Interventions: Bed/chair exit alarm, Patient to call before getting OOB, PT Consult for mobility concerns    Mentation Interventions: Bed/chair exit alarm, Door open when patient unattended, Eyeglasses and hearing aids, More frequent rounding, Reorient patient    Medication Interventions: Bed/chair exit alarm, Patient to call before getting OOB    Elimination Interventions: Call light in reach, Bed/chair exit alarm, Patient to call for help with toileting needs, Toileting schedule/hourly rounds    History of Falls Interventions: Bed/chair exit alarm, Door open when patient unattended, Investigate reason for fall

## 2021-08-03 VITALS
HEIGHT: 67 IN | OXYGEN SATURATION: 94 % | TEMPERATURE: 97.9 F | RESPIRATION RATE: 23 BRPM | BODY MASS INDEX: 25.44 KG/M2 | DIASTOLIC BLOOD PRESSURE: 72 MMHG | HEART RATE: 116 BPM | WEIGHT: 162.1 LBS | SYSTOLIC BLOOD PRESSURE: 105 MMHG

## 2021-08-03 LAB
ALBUMIN SERPL-MCNC: 2.6 G/DL (ref 3.5–5)
ALBUMIN/GLOB SERPL: 0.7 {RATIO} (ref 1.1–2.2)
ALP SERPL-CCNC: 209 U/L (ref 45–117)
ALT SERPL-CCNC: 14 U/L (ref 12–78)
ANION GAP SERPL CALC-SCNC: 5 MMOL/L (ref 5–15)
AST SERPL-CCNC: 34 U/L (ref 15–37)
BACTERIA SPEC CULT: NORMAL
BASOPHILS # BLD: 0 K/UL (ref 0–0.1)
BASOPHILS NFR BLD: 0 % (ref 0–1)
BILIRUB SERPL-MCNC: 1.1 MG/DL (ref 0.2–1)
BUN SERPL-MCNC: 14 MG/DL (ref 6–20)
BUN/CREAT SERPL: 19 (ref 12–20)
CALCIUM SERPL-MCNC: 8.3 MG/DL (ref 8.5–10.1)
CHLORIDE SERPL-SCNC: 107 MMOL/L (ref 97–108)
CO2 SERPL-SCNC: 26 MMOL/L (ref 21–32)
CREAT SERPL-MCNC: 0.73 MG/DL (ref 0.7–1.3)
DIFFERENTIAL METHOD BLD: ABNORMAL
EOSINOPHIL # BLD: 0.1 K/UL (ref 0–0.4)
EOSINOPHIL NFR BLD: 1 % (ref 0–7)
ERYTHROCYTE [DISTWIDTH] IN BLOOD BY AUTOMATED COUNT: 18.6 % (ref 11.5–14.5)
GLOBULIN SER CALC-MCNC: 3.8 G/DL (ref 2–4)
GLUCOSE SERPL-MCNC: 123 MG/DL (ref 65–100)
HCT VFR BLD AUTO: 32.4 % (ref 36.6–50.3)
HGB BLD-MCNC: 10.1 G/DL (ref 12.1–17)
IMM GRANULOCYTES # BLD AUTO: 0.1 K/UL (ref 0–0.04)
IMM GRANULOCYTES NFR BLD AUTO: 1 % (ref 0–0.5)
LYMPHOCYTES # BLD: 0.9 K/UL (ref 0.8–3.5)
LYMPHOCYTES NFR BLD: 9 % (ref 12–49)
MCH RBC QN AUTO: 32.4 PG (ref 26–34)
MCHC RBC AUTO-ENTMCNC: 31.2 G/DL (ref 30–36.5)
MCV RBC AUTO: 103.8 FL (ref 80–99)
MONOCYTES # BLD: 1 K/UL (ref 0–1)
MONOCYTES NFR BLD: 9 % (ref 5–13)
NEUTS SEG # BLD: 8.3 K/UL (ref 1.8–8)
NEUTS SEG NFR BLD: 80 % (ref 32–75)
NRBC # BLD: 0 K/UL (ref 0–0.01)
NRBC BLD-RTO: 0 PER 100 WBC
PLATELET # BLD AUTO: 280 K/UL (ref 150–400)
PMV BLD AUTO: 10.4 FL (ref 8.9–12.9)
POTASSIUM SERPL-SCNC: 4.4 MMOL/L (ref 3.5–5.1)
PROT SERPL-MCNC: 6.4 G/DL (ref 6.4–8.2)
RBC # BLD AUTO: 3.12 M/UL (ref 4.1–5.7)
SERVICE CMNT-IMP: NORMAL
SODIUM SERPL-SCNC: 138 MMOL/L (ref 136–145)
WBC # BLD AUTO: 10.4 K/UL (ref 4.1–11.1)

## 2021-08-03 PROCEDURE — 74011250637 HC RX REV CODE- 250/637: Performed by: SPECIALIST

## 2021-08-03 PROCEDURE — 97161 PT EVAL LOW COMPLEX 20 MIN: CPT

## 2021-08-03 PROCEDURE — 80053 COMPREHEN METABOLIC PANEL: CPT

## 2021-08-03 PROCEDURE — 74011250637 HC RX REV CODE- 250/637: Performed by: FAMILY MEDICINE

## 2021-08-03 PROCEDURE — 97530 THERAPEUTIC ACTIVITIES: CPT

## 2021-08-03 PROCEDURE — 97535 SELF CARE MNGMENT TRAINING: CPT

## 2021-08-03 PROCEDURE — 36415 COLL VENOUS BLD VENIPUNCTURE: CPT

## 2021-08-03 PROCEDURE — 85025 COMPLETE CBC W/AUTO DIFF WBC: CPT

## 2021-08-03 PROCEDURE — 97116 GAIT TRAINING THERAPY: CPT

## 2021-08-03 PROCEDURE — 97165 OT EVAL LOW COMPLEX 30 MIN: CPT

## 2021-08-03 RX ORDER — BUMETANIDE 1 MG/1
1 TABLET ORAL DAILY
Qty: 30 TABLET | Refills: 0 | Status: SHIPPED
Start: 2021-08-03 | End: 2021-08-31

## 2021-08-03 RX ORDER — TAMSULOSIN HYDROCHLORIDE 0.4 MG/1
0.4 CAPSULE ORAL DAILY
Qty: 30 CAPSULE | Refills: 0 | Status: SHIPPED
Start: 2021-08-03

## 2021-08-03 RX ADMIN — Medication 1 TABLET: at 08:35

## 2021-08-03 RX ADMIN — TAMSULOSIN HYDROCHLORIDE 0.4 MG: 0.4 CAPSULE ORAL at 08:35

## 2021-08-03 RX ADMIN — Medication 1 TABLET: at 11:38

## 2021-08-03 RX ADMIN — DULOXETINE HYDROCHLORIDE 90 MG: 30 CAPSULE, DELAYED RELEASE ORAL at 08:35

## 2021-08-03 RX ADMIN — OXYCODONE HYDROCHLORIDE AND ACETAMINOPHEN 500 MG: 500 TABLET ORAL at 08:35

## 2021-08-03 RX ADMIN — BUMETANIDE 1 MG: 1 TABLET ORAL at 08:36

## 2021-08-03 NOTE — PROGRESS NOTES
PHYSICAL THERAPY EVALUATION/DISCHARGE  Patient: Silvia Coronel (46 y.o. male)  Date: 8/3/2021  Primary Diagnosis: NSTEMI (non-ST elevated myocardial infarction) [facility]) [I21.4]       Precautions: universal, recent (7/19/21 left ORIF femur)         ASSESSMENT  Based on the objective data described below, the patient presents with a fall history, recent left femoral ORIF 7/19/21, decreased strength and balance, assist needed for safe mobility. Hadn't been out of bed in four days. Daughter present and providing history - he was in independent living prior to his femoral fx, then at [facility]. Will return to [facility]. Performed transfer training - Initial sitting with increased posterior lean, improved with exercise and verbal cues. Performed standing - patient needed to sit on bedpan. Incontinent twice with standing (condom cath not adhering). Gait training to the bedside chair. Would benefit from continued rehab at [facility] . Vitals   BP 81/90 with supine, 138/62 with sitting, no complaints with standing and gait  -115 throughout treatment    Functional Outcome Measure: The patient scored 7 out of 28 on the Tinetti outcome measure which is indicative of high fall risk. Other factors to consider for discharge: fall history, [place]     Further skilled acute physical therapy is not indicated at this time. PLAN :  Recommendation for discharge: (in order for the patient to meet his/her long term goals)  Therapy up to 5 days/week in SNF setting    This discharge recommendation:  Has not yet been discussed the attending provider and/or case management    IF patient discharges home will need the following DME: patient owns DME required for discharge       SUBJECTIVE:   Patient stated what do you want me to do next? Jelani Ashley    OBJECTIVE DATA SUMMARY:   HISTORY:    Past Medical History:   Diagnosis Date    Anxiety     Arthritis     right shoulder    Atrial fibrillation, chronic ([facility] 09/23/2014    [facility] 2002    colon resection    Depression 9/28/2015    DJD (degenerative joint disease) of knee     left TKR    Dyslipidemia     Embolic stroke involving right middle cerebral artery [facility]) Nov 2013    presented with dysarthria, MRI Several tiny acute infarcts in the right middle cerebral artery    Falls     Gout     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    Hypertension     Memory loss     Peripheral neuropathy     Vertigo      Past Surgical History:   Procedure Laterality Date    HX CATARACT REMOVAL      HX GI      colon resection    HX ORTHOPAEDIC      left knee, left shoulder    TN ABDOMEN SURGERY PROC UNLISTED      colorectal    TN CARDIAC SURG PROCEDURE UNLIST      cardio version       Prior level of function: ambulated with a rollator, fall history, fell with left femoral fracture  Personal factors and/or comorbidities impacting plan of care: fall hx    Home Situation  Home Environment: Rehabilitation facility  One/Two Story Residence: One story  Living Alone: No  Support Systems: Assisted living, Child(divya), Taoism / sia community, Family member(s)  Patient Expects to be Discharged to[de-identified] Rehabilitation facility  Current DME Used/Available at Home: danny Marin    EXAMINATION/PRESENTATION/DECISION MAKING:   Critical Behavior:  Neurologic State: Alert, Confused  Orientation Level: Oriented to person, Disoriented to place, Disoriented to situation, Disoriented to time  Cognition: Poor safety awareness, Decreased attention/concentration, Decreased command following     Hearing:   Auditory  Auditory Impairment: Hard of hearing, bilateral    Range Of Motion:  AROM: Generally decreased, functional           PROM: Generally decreased, functional           Strength:    Strength: Generally decreased, functional                    Tone & Sensation:   Tone: Normal                              Coordination:  Coordination: Within functional limits  Vision:      Functional Mobility:  Bed Mobility:     Supine to Sit: Moderate assistance;Assist x1;Additional time        Transfers:  Sit to Stand: Moderate assistance;Assist x1  Stand to Sit: Moderate assistance;Assist x1;Additional time; Adaptive equipment                       Balance:   Sitting: Impaired  Sitting - Static: Fair (occasional)  Sitting - Dynamic: Fair (occasional)  Standing: Impaired  Standing - Static: Fair  Standing - Dynamic : Fair  Ambulation/Gait Training:  Distance (ft): 8 Feet (ft)  Assistive Device: Walker, rolling;Gait belt  Ambulation - Level of Assistance: Moderate assistance;Assist x1;Additional time                               Therapeutic Exercises: In sitting performed LAQs and trunk anter/poster wt shift, in supine performed APs    Functional Measure:  Tinetti test:    Sitting Balance: 0  Arises: 0  Attempts to Rise: 0  Immediate Standing Balance: 0  Standing Balance: 1  Nudged: 0  Eyes Closed: 0  Turn 360 Degrees - Continuous/Discontinuous: 0  Turn 360 Degrees - Steady/Unsteady: 1  Sitting Down: 1  Balance Score: 3 Balance total score  Indication of Gait: 0  R Step Length/Height: 0  L Step Length/Height: 0  R Foot Clearance: 1  L Foot Clearance: 1  Step Symmetry: 1  Step Continuity: 0  Path: 1  Trunk: 0  Walking Time: 0  Gait Score: 4 Gait total score  Total Score: 7/28 Overall total score         Tinetti Tool Score Risk of Falls  <19 = High Fall Risk  19-24 = Moderate Fall Risk  25-28 = Low Fall Risk  Tinetti ME. Performance-Oriented Assessment of Mobility Problems in Elderly Patients. [facility]; [id number].  (Scoring Description: PT Bulletin Feb. 10, 1993)    Older adults: Abi Barth et al, 2009; n = 1000 [facility] elderly evaluated with ABC, JODI, ADL, and IADL)  · Mean JODI score for males aged 69-68 years = 26.21(3.40)  · Mean JODI score for females age 69-68 years = 25.16(4.30)  · Mean JODI score for males over 80 years = 23.29(6.02)  · Mean JODI score for females over 80 years = 17.20(8.32)          Physical Therapy Evaluation Charge Determination   History Examination Presentation Decision-Making   MEDIUM  Complexity : 1-2 comorbidities / personal factors will impact the outcome/ POC  LOW Complexity : 1-2 Standardized tests and measures addressing body structure, function, activity limitation and / or participation in recreation  LOW Complexity : Stable, uncomplicated  LOW Complexity : FOTO score of       Based on the above components, the patient evaluation is determined to be of the following complexity level: LOW     Pain Rating:  None stated    Activity Tolerance:   Good      After treatment patient left in no apparent distress:   Sitting in chair, Call bell within reach, Bed / chair alarm activated and Caregiver / family present    COMMUNICATION/EDUCATION:   The patients plan of care was discussed with: Occupational therapist, Registered nurse and Certified nursing assistant/patient care technician. Fall prevention education was provided and the patient/caregiver indicated understanding., Patient/family have participated as able in goal setting and plan of care. and Patient/family agree to work toward stated goals and plan of care.     Thank you for this referral.  Con Slater, PT   Time Calculation: 43 mins

## 2021-08-03 NOTE — PROGRESS NOTES
1057: TRANSFER - OUT REPORT:    Verbal report given to RAKESH Magallanes(name) on David Greenwood  being transferred to [facility] (unit) for routine progression of care       Report consisted of patients Situation, Background, Assessment and   Recommendations(SBAR). Information from the following report(s) SBAR, Kardex, Intake/Output, MAR, Recent Results and Cardiac Rhythm Afib was reviewed with the receiving nurse. Lines:       Opportunity for questions and clarification was provided. Patient transported with:  [facility] scheduled for 1235      1140: Reviewed discharge instructions with patient. Opportunity for questions provided. Patient verbalized understanding. IV and telemetry removed. Signed copy of discharge instructions placed in chart.

## 2021-08-03 NOTE — PROGRESS NOTES
Bedside shift change report given to [facility] (oncoming nurse) by [employer] nurse). Report included the following information SBAR, Kardex, ED Summary, Intake/Output, and Recent Results. Bedside shift change report given to Tiffanie(oncoming nurse) by [facility] (offgoing nurse). Report included the following information SBAR, Kardex, ED Summary, Intake/Output, and Recent Results. This patient was assisted with Intentional Toileting every 2 hours during this shift as appropriate. Documentation of ambulation and output reflected on Flowsheet as appropriate. Purposeful hourly rounding was completed using AIDET and 5Ps. Outcomes of PHR documented as they occurred. Bed alarm in use as appropriate. Dual Suction and ambubag in place.

## 2021-08-03 NOTE — PROGRESS NOTES
Daily Progress Note: 8/3/2021  Bereket Amato MD    Assessment/Plan:   NSTEMI (non-ST elevated myocardial infarction) [facility]) (7/31/2021): Denies any current chest pain. Possibly related to volume overload.    -echo ok  -Start aspirin.    -Continue statin.   -Consult Cardiology- non MI elevation of troponin       Encephalopathy/ Unresponsiveness: Unclear etiology. Possibly related to recently initiated Seroquel and other medications or cardiac etiology. CT head with no acute concerns. -Check UA, TSH and ammonia level.    -Hold any sedating medications.    -Monitor.     Pulmonary edema/left pleural effusion:   -echo ok  -Consider diuretics as blood pressure able to tolerate.     Status post nail insertion for L displaced intertrochanteric femur fx: Status post Surg on 7/19/21. Monitor.    -Orthopedics consult   -Xray with good alignment     Macrocytic anemia: Appears around baseline.    -Check folate and B12 levels.     History of urinary retention:   -Avendaño currently in place from [facility]    -Start Flomax and consider voiding trial.     Atrial fibrillation: Currently rate controlled.     -Continue home Xarelto.     Neuropathy:   -Hold pregabalin   -Restart duloxetine      Hx uric acid stones:   -Continue allopurinol.      Risk of deterioration: high              Problem List:  Problem List as of 8/3/2021 Date Reviewed: 12/20/2018        Codes Class Noted - Resolved    NSTEMI (non-ST elevated myocardial infarction) ([facility] ICD-10-CM: I21.4  ICD-9-CM: 410.70  7/31/2021 - Present        Hip fracture ([facility] ICD-10-CM: S72.009A  ICD-9-CM: 820.8  7/18/2021 - Present        CAP (community acquired pneumonia) ICD-10-CM: J18.9  ICD-9-CM: 486  7/18/2021 - Present        Chronic atrial fibrillation ([facility] ICD-10-CM: I48.20  ICD-9-CM: 427.31  11/10/2017 - Present        Sinus node dysfunction ([facility] ICD-10-CM: I49.5  ICD-9-CM: 427.81  4/12/2016 - Present        Depression ICD-10-CM: F32.9  ICD-9-CM: 659 9/28/2015 - Present        Dyslipidemia ICD-10-CM: E78.5  ICD-9-CM: 272.4  9/23/2014 - Present        Gout ICD-10-CM: M10.9  ICD-9-CM: 274.9  Unknown - Present        Hx of carcinoma in situ of prostate ICD-10-CM: F13.618  ICD-9-CM: V13.89  Unknown - Present        Essential hypertension, benign ICD-10-CM: I10  ICD-9-CM: 401.1  11/16/2013 - Present        Kidney stones ICD-10-CM: N20.0  ICD-9-CM: 592.0  11/16/2013 - Present        Embolic stroke involving right middle cerebral artery ([facility] ICD-10-CM: I63.411  ICD-9-CM: 434.11  11/1/2013 - Present    Overview Signed 12/7/2013  9:23 AM by Tha Chaparro MD     presented with dysarthria, MRI Several tiny acute infarcts in the right middle cerebral artery             RESOLVED: Dizziness ICD-10-CM: R42  ICD-9-CM: 780.4  4/12/2016 - 6/21/2016        RESOLVED: Typical atrial flutter ([facility] ICD-10-CM: I48.3  ICD-9-CM: 427.32  5/6/2015 - 9/28/2015        RESOLVED: Paroxysmal atrial fibrillation ([facility] ICD-10-CM: I48.0  ICD-9-CM: 427.31  9/23/2014 - 11/10/2017        RESOLVED: Dysarthria ICD-9-CM: 784.5  11/17/2013 - 12/7/2013        RESOLVED: TIA (transient ischemic attack) ICD-10-CM: G45.9  ICD-9-CM: 435.9  11/16/2013 - 12/7/2013        RESOLVED: Other and unspecified hyperlipidemia ICD-10-CM: E78.5  ICD-9-CM: 272.4  11/16/2013 - 9/23/2014        RESOLVED: Hypokalemia ICD-10-CM: E87.6  ICD-9-CM: 276.8  11/16/2013 - 12/7/2013              HPI:   Mr. Greta Marie is a 80 y.o.  male with a past medical history of A. fib on Xarelto, neuropathy and uric acid stones who is admitted with encephalopathy and NSTEMI. Mr. Greta Marie is very hard of hearing and so history is obtained from both him and his son at bedside. Per son, this morning staff at [facility] were unable to awaken patient. At that time they placed a Avendaño in him and called EMS, and then patient was brought to the emergency room. According to son, he is oriented x4 at baseline.   Last month he suffered a hip fracture and was hospitalized at [address] [place] [address] and then discharged on  to [facility] for rehab. Per son, on discharge she was prescribed Seroquel which she believes may be contributing to his father's current symptoms. Patient has no complaints other than difficulty moving his left lower extremity following his recent fracture and surgery. (Dr Diane Kaufman)    : Feeling better. More alert and oriented. Knows he is in the hospital.     : More confusion during the night. Haldol was ordered if needed but was not given. More alert this am.     8/3: Appreciate Ortho consult. Family holding bed at [facility]. He is stable and doing well. Will d/c today. Review of Systems:   Review of systems not obtained due to patient factors. Objective:   Physical Exam:     Visit Vitals  /87 (BP 1 Location: Right upper arm, BP Patient Position: Semi fowlers)   Pulse 86   Temp 98.1 °F (36.7 °C)   Resp 28   Ht 5' 7.01\" (1.702 m)   Wt 162 lb 1.6 oz (73.5 kg)   SpO2 97%   BMI 25.38 kg/m²    O2 Flow Rate (L/min): 2 l/min O2 Device: None (Room air)    Temp (24hrs), Av.7 °F (37.1 °C), Min:98.1 °F (36.7 °C), Max:99.3 °F (37.4 °C)    1901 -  0700  In: 0   Out: 550 [Urine:550]   701 - 1900  In: 1528.3 [I.V.:1528.3]  Out: 8735 [Urine:4050]    General:  Alert, cooperative, no distress, appears stated age. Thin and frail   Head:  Normocephalic, without obvious abnormality, atraumatic. Eyes:  Conjunctivae/corneas clear. Nose: Nares normal. Septum midline. Mucosa normal. No drainage or sinus tenderness. Throat: Lips, mucosa, and tongue normal.    Neck: Supple, symmetrical, trachea midline, no adenopathy, thyroid: no enlargement/tenderness/nodules, no carotid bruit and no JVD. Back:   Symmetric, no curvature. ROM normal. No CVA tenderness. Lungs:   Clear to auscultation bilaterally. Chest wall:  No tenderness or deformity.    Heart:  Regular rate and rhythm, S1, S2 normal, no murmur, click, rub or gallop. Abdomen:   Soft, non-tender. Bowel sounds normal. No masses,  No organomegaly. Extremities: Surgical site healing well, no cyanosis or edema. No calf tenderness or cords. Pulses: 2+ and symmetric all extremities. Skin: Skin color, texture, turgor normal.    Neurologic: CNII-XII intact. Alert and oriented X 2. Fine motor of hands and fingers normal.   equal.  No cogwheeling or rigidity. Gait not tested at this time. Sensation grossly normal to touch. Gross motor of extremities normal.       Data Review:     Interpretation Summary-ECHO 7/31/21    · Image quality for this study was technically difficult. · LV: Estimated LVEF is 55 - 60%. Normal cavity size and systolic function (ejection fraction normal). Mildly increased wall thickness. Wall motion: normal.  · Pericardium: There is a moderate left pleural effusion. · MV: Mild mitral annular calcification. · TV: Mild tricuspid valve regurgitation is present. Pulmonary hypertension found to be severe. · AV: Aortic valve leaflet calcification present. Mild aortic valve stenosis is present. · LA: Dilated left atrium. · RA: Dilated right atrium. X-ray left femur 8/2/21  COMPARISON: 7/19/2021     FINDINGS: Two views of the left femur demonstrate ORIF of a proximal left hip  fracture. No change in alignment. Brachytherapy seeds are noted. Left knee is  been replaced. No new fracture. .     IMPRESSION  No change in alignment post-ORIF.   Recent Days:  Recent Labs     08/02/21  0301 08/01/21  0028 07/31/21  0751   WBC 11.6* 8.7 9.0   HGB 8.9* 8.9* 9.0*   HCT 29.5* 29.7* 30.1*    288 305     Recent Labs     08/02/21  0301 08/01/21  0028 07/31/21  0751    140 140   K 4.0 4.4 4.1   * 110* 110*   CO2 28 27 31   * 131* 106*   BUN 12 16 19   CREA 0.77 0.82 0.88   CA 8.3* 7.7* 8.1*   MG  --   --  2.3   ALB 2.6* 2.4* 2.8*   TBILI 0.9 0.8 1.0   ALT 17 19 18     No results for input(s): PH, PCO2, PO2, HCO3, FIO2 in the last 72 hours.     24 Hour Results:  Recent Results (from the past 24 hour(s))   RESPIRATORY VIRUS PANEL W/COVID-19, PCR    Collection Time: 08/02/21  2:40 PM    Specimen: Nasopharyngeal   Result Value Ref Range    Adenovirus Not detected NOTD      Coronavirus 229E Not detected NOTD      Coronavirus HKU1 Not detected NOTD      Coronavirus CVNL63 Not detected NOTD      Coronavirus OC43 Not detected NOTD      SARS-CoV-2, PCR Not detected NOTD      Metapneumovirus Not detected NOTD      Rhinovirus and Enterovirus Not detected NOTD      Influenza A Not detected NOTD      Influenza A, subtype H1 Not detected NOTD      Influenza A, subtype H3 Not detected NOTD      INFLUENZA A H1N1 PCR Not detected NOTD      Influenza B Not detected NOTD      Parainfluenza 1 Not detected NOTD      Parainfluenza 2 Not detected NOTD      Parainfluenza 3 Not detected NOTD      Parainfluenza virus 4 Not detected NOTD      RSV by PCR Not detected NOTD      B. parapertussis, PCR Not detected NOTD      Bordetella pertussis - PCR Not detected NOTD      Chlamydophila pneumoniae DNA, QL, PCR Not detected NOTD      Mycoplasma pneumoniae DNA, QL, PCR Not detected NOTD         Medications reviewed  Current Facility-Administered Medications   Medication Dose Route Frequency    haloperidol lactate (HALDOL) injection 0.5 mg  0.5 mg IntraVENous Q6H PRN    tamsulosin (FLOMAX) capsule 0.4 mg  0.4 mg Oral DAILY    calcium-vitamin D (OS-VINCENT +D3) 500 mg-200 unit per tablet 1 Tablet  1 Tablet Oral TID WITH MEALS    ascorbic acid (vitamin C) (VITAMIN C) tablet 500 mg  500 mg Oral DAILY    DULoxetine (CYMBALTA) capsule 90 mg  90 mg Oral DAILY    vitamin A (AQUASOL A) capsule 10,000 Units (Patient Supplied)  10,000 Units Oral DAILY    bumetanide (BUMEX) tablet 1 mg  1 mg Oral DAILY    sodium chloride (NS) flush 5-40 mL  5-40 mL IntraVENous Q8H    sodium chloride (NS) flush 5-40 mL  5-40 mL IntraVENous PRN    dextrose 5 % - 0.45% NaCl infusion  50 mL/hr IntraVENous CONTINUOUS    allopurinoL (ZYLOPRIM) tablet 150 mg  150 mg Oral QHS    rivaroxaban (XARELTO) tablet 15 mg  15 mg Oral DAILY WITH DINNER       Care Plan discussed with: Patient/Family    Total time spent with patient and review of records: 30 minutes.     Destinee Lockwood MD

## 2021-08-03 NOTE — PROGRESS NOTES
Ortho Progress Note:    Pt is s/p IMN on 7/19/21 for L IT fracture   Follow up XR reviewed   Continue with PT/OT at [facility]   Pt to follow with Dr. Tori Kirkpatrick in 4 weeks, please call with any questions or concerns     EXAM: XR FEMUR LT 2 V     INDICATION: post op XR, hx of hip fx.     COMPARISON: 7/19/2021     FINDINGS: Two views of the left femur demonstrate ORIF of a proximal left hip  fracture. No change in alignment. Brachytherapy seeds are noted. Left knee is  been replaced. No new fracture. .     IMPRESSION  No change in alignment post-ORIF      HALLE Cardenas-BC  Orthopedic Trauma Team ED [facility]

## 2021-08-03 NOTE — DISCHARGE SUMMARY
Physician Discharge Summary     Patient ID:    David Greenwood  [id number]  75 y.o.  5/25/1927  Darrius Lam MD    Admit date: 7/31/2021  Discharge date and time: 8/3/2021  Admission Diagnoses: NSTEMI (non-ST elevated myocardial infarction) [facility]) [I21.4]   Discharge Diagnosis:Encephalopathy resolved, Pul Edema, Urinary retention  Condition on discharge: stable  Discharge Medications:   Current Discharge Medication List      START taking these medications    Details   tamsulosin (FLOMAX) 0.4 mg capsule Take 1 Capsule by mouth daily. Qty: 30 Capsule, Refills: 0      bumetanide (BUMEX) 1 mg tablet Take 1 Tablet by mouth daily. Qty: 30 Tablet, Refills: 0         CONTINUE these medications which have NOT CHANGED    Details   multivitamin (ONE A DAY) tablet Take 1 Tablet by mouth daily. vitamin A (AQUASOL A) 10,000 unit capsule Take 10,000 Units by mouth daily. ascorbic acid, vitamin C, (Vitamin C) 500 mg tablet Take 500 mg by mouth daily. acetaminophen (TYLENOL) 325 mg tablet Take 650 mg by mouth three (3) times daily. polyethylene glycol (Miralax) 17 gram/dose powder Take 17 g by mouth daily as needed for Constipation. calcium-vitamin D (OS-VINCENT +D3) 500 mg-200 unit per tablet Take 1 Tablet by mouth three (3) times daily (with meals). Qty: 90 Tablet, Refills: 0      rivaroxaban (Xarelto) 15 mg tab tablet Take 15 mg by mouth daily (with dinner). Indications: prevention of thromboembolism in paroxysmal atrial fibrillation      DULoxetine (CYMBALTA) 30 mg capsule Take 90 mg by mouth daily. zinc sulfate 220 mg tablet Take 1 Tab by mouth daily. Qty: 30 Tab, Refills: 0      ondansetron (Zofran ODT) 4 mg disintegrating tablet 1 Tab by SubLINGual route every eight (8) hours as needed for Nausea or Vomiting. Qty: 20 Tab, Refills: 0      simvastatin (Zocor) 10 mg tablet Take 10 mg by mouth nightly. folic acid (FOLVITE) 1 mg tablet Take 1 mg by mouth daily.       allopurinol (ZYLOPRIM) 300 mg tablet Take 150 mg by mouth nightly. STOP taking these medications       traMADoL (ULTRAM) 50 mg tablet Comments:   Reason for Stopping:         QUEtiapine (SEROquel) 25 mg tablet Comments:   Reason for Stopping:         pregabalin (LYRICA) 75 mg capsule Comments:   Reason for Stopping: Follow up Care:    1. Aditya Younger MD with in 1 weeks  2. specialists as directed. Diet:  Regular Diet  Disposition:  SNF.   Advanced Directive:  Discharge Exam:  [See today's progress note.]  CONSULTATIONS: Cardiology and Orthopedic Surgery    Significant Diagnostic Studies:   Recent Labs     08/03/21 0621 08/02/21  0301   WBC 10.4 11.6*   HGB 10.1* 8.9*   HCT 32.4* 29.5*    274     Recent Labs     08/02/21 0301 08/01/21  0028 07/31/21  0751    140 140   K 4.0 4.4 4.1   * 110* 110*   CO2 28 27 31   BUN 12 16 19   CREA 0.77 0.82 0.88   * 131* 106*   CA 8.3* 7.7* 8.1*   MG  --   --  2.3     Recent Labs     08/02/21  0301 08/01/21  0028 07/31/21  0751   ALT 17 19 18   * 171* 174*   TBILI 0.9 0.8 1.0   TP 5.9* 5.8* 6.1*   ALB 2.6* 2.4* 2.8*   GLOB 3.3 3.4 3.3       Lab Results   Component Value Date/Time    Glucose (POC) 107 07/31/2021 07:49 AM    Glucose (POC) 166 (H) 05/20/2014 08:30 PM    Glucose (POC) 153 (H) 11/16/2013 04:26 PM    Glucose (POC) 134 (H) 11/16/2013 12:25 PM     Lab Results   Component Value Date/Time    TSH 2.66 07/31/2021 07:51 AM         HOSPITAL COURSE & TREATMENT RENDERED:   NSTEMI (non-ST elevated myocardial infarction) ([facility] (7/31/2021): Denies any current chest pain.  Possibly related to volume overload.    -echo ok  -Start aspirin.    -Continue statin.   -Consult Cardiology- non MI elevation of troponin       Encephalopathy/ Unresponsiveness: Unclear etiology.  Possibly related to recently initiated Seroquel and other medications or cardiac etiology.  CT head with no acute concerns. -Check UA, TSH and ammonia level.    -Hold any sedating medications.    -Monitor.     Pulmonary edema/left pleural effusion:   -echo ok  -Consider diuretics as blood pressure able to tolerate.     Status post nail insertion for L displaced intertrochanteric femur fx: Status post Surg on 7/19/21.  Monitor.    -Orthopedics consult   -Xray with good alignment     Macrocytic anemia: Appears around baseline.    -Check folate and B12 levels.     History of urinary retention:   -Avendaño currently in place from [facility] and consider voiding trial.     Atrial fibrillation: Currently rate controlled.    -Continue home Xarelto.     Neuropathy:   -Hold pregabalin   -Restart duloxetine      Hx uric acid stones:   -Continue allopurinol.      Risk of deterioration: high                HPI:   Mr. Barr is a 80 y. o.  male with a past medical history of A. fib on Xarelto, neuropathy and uric acid stones who is admitted with encephalopathy and NSTEMI.  Mr. Barr is very hard of hearing and so history is obtained from both him and his son at bedside.  Per son, this morning staff at [facility] were unable to awaken patient.  At that time they placed a Avendaño in him and called EMS, and then patient was brought to the emergency room.  According to son, he is oriented x4 at baseline.  Last month he suffered a hip fracture and was hospitalized at [facility] and then discharged on July 26 to [facility] for rehab.  Per son, on discharge she was prescribed Seroquel which she believes may be contributing to his father's current symptoms.  Patient has no complaints other than difficulty moving his left lower extremity following his recent fracture and surgery. (Dr Will Klein)     8/1: Feeling better. More alert and oriented. Knows he is in the hospital.      8/2: More confusion during the night. Haldol was ordered if needed but was not given. More alert this am.      8/3: Appreciate Ortho consult. Family holding bed at [facility]. He is stable and doing well. Will d/c today. Review of Systems:   Review of systems not obtained due to patient factors.     Objective:   Physical Exam:      Visit Vitals  /87 (BP 1 Location: Right upper arm, BP Patient Position: Semi fowlers)   Pulse 86   Temp 98.1 °F (36.7 °C)   Resp 28   Ht 5' 7.01\" (1.702 m)   Wt 162 lb 1.6 oz (73.5 kg)   SpO2 97%   BMI 25.38 kg/m²    O2 Flow Rate (L/min): 2 l/min O2 Device: None (Room air)     Temp (24hrs), Av.7 °F (37.1 °C), Min:98.1 °F (36.7 °C), Max:99.3 °F (37.4 °C)    1901 -  0700  In: 0   Out: 550 [Urine:550]   701 -  190  In: 1528.3 [I.V.:1528.3]  Out: 4051 [Urine:4050]     General:  Alert, cooperative, no distress, appears stated age. Thin and frail   Head:  Normocephalic, without obvious abnormality, atraumatic. Eyes:  Conjunctivae/corneas clear. Nose: Nares normal. Septum midline. Mucosa normal. No drainage or sinus tenderness. Throat: Lips, mucosa, and tongue normal.    Neck: Supple, symmetrical, trachea midline, no adenopathy, thyroid: no enlargement/tenderness/nodules, no carotid bruit and no JVD. Back:   Symmetric, no curvature. ROM normal. No CVA tenderness. Lungs:   Clear to auscultation bilaterally. Chest wall:  No tenderness or deformity. Heart:  Regular rate and rhythm, S1, S2 normal, no murmur, click, rub or gallop. Abdomen:   Soft, non-tender. Bowel sounds normal. No masses,  No organomegaly. Extremities: Surgical site healing well, no cyanosis or edema. No calf tenderness or cords. Pulses: 2+ and symmetric all extremities. Skin: Skin color, texture, turgor normal.    Neurologic: CNII-XII intact. Alert and oriented X 2. Fine motor of hands and fingers normal.   equal.  No cogwheeling or rigidity. Gait not tested at this time. Sensation grossly normal to touch. Gross motor of extremities normal.        Data Review:     Interpretation Summary-ECHO 21     · Image quality for this study was technically difficult.   · LV: Estimated LVEF is 55 - 60%. Normal cavity size and systolic function (ejection fraction normal). Mildly increased wall thickness. Wall motion: normal.  · Pericardium: There is a moderate left pleural effusion. · MV: Mild mitral annular calcification. · TV: Mild tricuspid valve regurgitation is present. Pulmonary hypertension found to be severe. · AV: Aortic valve leaflet calcification present. Mild aortic valve stenosis is present. · LA: Dilated left atrium. · RA: Dilated right atrium.      X-ray left femur 8/2/21  COMPARISON: 7/19/2021     FINDINGS: Two views of the left femur demonstrate ORIF of a proximal left hip  fracture. No change in alignment. Brachytherapy seeds are noted. Left knee is  been replaced. No new fracture. .     IMPRESSION  No change in alignment post-ORIF.         Signed:  Kirt Lira MD  8/3/2021  7:02 AM

## 2021-08-03 NOTE — PROGRESS NOTES
This patient was assisted with Intentional Toileting every 2 hours during this shift as appropriate.  Documentation of ambulation and output reflected on Flowsheet as appropriate. Purposeful hourly rounding was completed using AIDET and 5Ps.  Outcomes of PHR documented as they occurred. Bed alarm in use as appropriate.  Dual Suction and ambubag in place.

## 2021-08-03 NOTE — PROGRESS NOTES
OCCUPATIONAL THERAPY EVALUATION/DISCHARGE  Patient: Jeny Tang (48 y.o. male)  Date: 8/3/2021  Primary Diagnosis: NSTEMI (non-ST elevated myocardial infarction) ([facility] [I21.4]       Precautions: fall, WBAT L LE        ASSESSMENT  Based on the objective data described below, the patient presents with decreased activity tolerance, generalized weakness, and impaired balance following admission 7/31 for AMS. Patient underwent recent L femoral ORIF on 7/19/2021. He has been at [facility] for rehab following surgery; He [facility] at [facility] prior to sx. Patient was received in the chair and required up to mod A for transfers using rolling walker; He performed stand-pivot transfer to [facility] to attempt toileting. Patient engaged in ADLs with fair tolerance; increased assistance needed for LB and standing ADLs. Patient was educated on safe transfer techniques and adaptive ADL techniques. Patient would benefit from continued skilled OT to progress towards goals and improve overall independence. He anticipates discharge back to [facility] this afternoon for continued rehab. Current Level of Function (ADLs/self-care): Patient required up to mod A for functional mobility. Patient required supervision/setup to min A for UB ADLs and mod to max A for LB ADLs. Functional Outcome Measure: The patient scored 15/100 on the Barthel Index outcome measure. PLAN :    Recommendation for discharge: (in order for the patient to meet his/her long term goals)  Therapy up to 5 days/week in SNF setting    This discharge recommendation:  Has been made in collaboration with the attending provider and/or case management    IF patient discharges home will need the following DME: none       SUBJECTIVE:   Patient agreeable to OT evaluation.       OBJECTIVE DATA SUMMARY:   HISTORY:   Past Medical History:   Diagnosis Date    Anxiety     Arthritis     right shoulder    Atrial fibrillation, chronic ([facility] 09/23/2014    Colon cancer ([facility] 2002    colon resection    Depression 9/28/2015    DJD (degenerative joint disease) of knee     left TKR    Dyslipidemia     Embolic stroke involving right middle cerebral artery [facility]) Nov 2013    presented with dysarthria, MRI Several tiny acute infarcts in the right middle cerebral artery    Falls     Gout     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 Expanded or extensive additional review of patient history:   Home Situation  Home Environment: Rehabilitation facility  One/Two Story Residence: One story  Living Alone: No  Support Systems: Assisted living, Child(divya), Buddhism / sia community, Family member(s)  Patient Expects to be Discharged to[de-identified] Rehabilitation facility  Current DME Used/Available at Home: Keith Major straight    Hand dominance: Right    EXAMINATION OF PERFORMANCE DEFICITS:  Cognitive/Behavioral Status:  Neurologic State: Alert  Orientation Level: Oriented to person;Oriented to place  Cognition: Follows commands;Decreased attention/concentration;Poor safety awareness  Perception: Appears intact  Perseveration: No perseveration noted  Safety/Judgement: Awareness of environment    Skin: Intact in the uppers    Edema: None noted in the uppers    Hearing:   Auditory  Auditory Impairment: Hard of hearing, bilateral    Vision/Perceptual:    Tracking: Able to track stimulus in all quadrants w/o difficulty Diplopia: No    Corrective Lenses: Glasses    Range of Motion:   WDL in the uppers    Strength:  Strength: Generally decreased, functional    Coordination:  Fine Motor Skills-Upper: Left Intact; Right Intact (slowed movements however intact)    Gross Motor Skills-Upper: Left Intact; Right Intact    Tone & Sensation:  Tone: Normal   Sensation: intact     Balance:  Sitting: Impaired  Sitting - Static: Fair (occasional)  Sitting - Dynamic: Fair (occasional)  Standing: Impaired  Standing - Static: Fair  Standing - Dynamic : Fair    Functional Mobility and Transfers for ADLs:  Bed Mobility:  Supine to Sit:  (pt was received up and remained up)    Transfers:  Sit to Stand: Moderate assistance;Assist x1  Stand to Sit: Moderate assistance;Assist x1;Additional time; Adaptive equipment  Bed to Chair: Moderate assistance;Assist x1;Additional time  Toilet Transfer : Moderate assistance;Assist x1;Additional time (BSC transfer)    ADL Assessment:  Feeding: Setup    Oral Facial Hygiene/Grooming: Setup    Bathing: Moderate assistance    Upper Body Dressing: Minimum assistance    Lower Body Dressing: Maximum assistance    Toileting: Maximum assistance     Cognitive Retraining  Safety/Judgement: Awareness of environment    Functional Measure:  Barthel Index:    Bathin  Bladder: 0  Bowels: 0  Groomin  Dressin  Feedin  Mobility: 0  Stairs: 0  Toilet Use: 0  Transfer (Bed to Chair and Back): 10  Total: 15/100        The Barthel ADL Index: Guidelines  1. The index should be used as a record of what a patient does, not as a record of what a patient could do. 2. The main aim is to establish degree of independence from any help, physical or verbal, however minor and for whatever reason. 3. The need for supervision renders the patient not independent. 4. A patient's performance should be established using the best available evidence.  Asking the patient, friends/relatives and nurses are the usual sources, but direct observation and common sense are also important. However direct testing is not needed. 5. Usually the patient's performance over the preceding 24-48 hours is important, but occasionally longer periods will be relevant. 6. Middle categories imply that the patient supplies over 50 per cent of the effort. 7. Use of aids to be independent is allowed. Tina Davalos., Barthel, D.W. (4582). Functional evaluation: the Barthel Index. [address] (14)2. [facility] sanna JOSHUA Weller, Christiane Díaz., Mary Garcia., Gennaro, [facility] (1999). Measuring the change indisability after inpatient rehabilitation; comparison of the responsiveness of the Barthel Index and Functional [place] Measure. Journal of Neurology, Neurosurgery, and Psychiatry, 66(4), 274-515. EMILY Easley, BENNETT Diaz, & Pat Gordillo MJACKSON. (2004.) Assessment of post-stroke quality of life in cost-effectiveness studies: The usefulness of the Barthel Index and the EuroQoL-5D. Quality of Life Research, 15, 397-90       Occupational Therapy Evaluation Charge Determination   History Examination Decision-Making   LOW Complexity : Brief history review  LOW Complexity : 1-3 performance deficits relating to physical, cognitive , or psychosocial skils that result in activity limitations and / or participation restrictions  LOW Complexity : No comorbidities that affect functional and no verbal or physical assistance needed to complete eval tasks       Based on the above components, the patient evaluation is determined to be of the following complexity level: LOW     Activity Tolerance:   Good    After treatment patient left in no apparent distress:    Sitting in chair, Call bell within reach and Bed / chair alarm activated    COMMUNICATION/EDUCATION:   The patients plan of care was discussed with: Registered nurse and patient. .     Thank you for this referral.  Ashly Ricardo OTR/L  Time Calculation: 35 mins

## 2021-08-03 NOTE — ROUTINE PROCESS
Bedside shift change report given to Zane Childers RN (oncoming nurse) by Socorro Barreto RN (offgoing nurse). Report included the following information SBAR, Kardex, ED Summary, Procedure Summary, Intake/Output, MAR, Accordion, Recent Results, Med Rec Status and Cardiac Rhythm Afib, Aflutter.

## 2021-08-03 NOTE — PROGRESS NOTES
Call placed to Lencho Gonzalez [facility] admissions coordinator. Voice message left requesting return call regarding patient's discharge today. Message sent via Planwise re:  DC today. Discharge summary and COVID PCR results faxed to [facility] at [phone]. Await response. Patient will need ambulance transport, [facility] referral sent for Will Call. [facility] confirmed for  at 1235. [facility] form, face sheet, and H&P attached to [employer] referral and placed on front of chart.     Lynn Han, RN, MSN/Care manager  [phone]

## 2021-08-03 NOTE — PROGRESS NOTES
Cathryn at bedside. Told her I was here to place new IV. MD stated it is okay to leave without access because patient is going back to [facility] today. Let primary RN know.

## 2021-08-03 NOTE — PROGRESS NOTES
Transition of Care Plan to SNF/Rehab    SNF/Rehab Transition:  Patient has been accepted to [facility]  and meets criteria for admission. Patient will transported by [facility] and expected to leave at 1235. Communication to Patient/Family:  Met with patient and his daughter Karen Sanchez (identified care giver) and they are agreeable to the transition plan. Communication to SNF/Rehab:  Bedside RN, Lissette Webster, has been notified to update the transition plan to the facility and call report (phone number [phone], room 306). Discharge information has been updated on the AVS.     Discharge instructions to be fax'd to facility at [facility] # [phone]). Patient has been identified as part of the BCPI-A Program.  For Care Coordination associated with the cardiac Bundle Program, please contact Care coordination team [phone]  Bundle information has been communication to [facility]. Nursing Please include all hard scripts for controlled substances, med rec and dc summary, and AVS in packet. Reviewed and confirmed with [facility], [facility] , can manage the patient care needs for the following:     SNF/Rehab Transition:  Patient to follow-up with PCP/Specialist:     Reviewed and confirmed with [facility], [facility]  they can manage the patient care needs for the following:     Peder Median with (X) only those applicable:    Medication:  [x]  Medications will be available at the facility  []  IV Antibiotics   []  Controlled Substance - hard copy to be sent with patient   []  Weekly Labs   Documents:  [] Hard RX  [] MAR  [] Kardex  [x] AVS  []Transfer Summary  [x]Discharge   Equipment:  []  CPAP/BiPAP  []  Wound Vacuum  []  Avendaño or Urinary Device  []  PICC/Central Line  []  Nebulizer  []  Ventilator   Treatment:  []Isolation (for MRSA, VRE, etc.)  []Surgical Drain Management  []Tracheostomy Care  []Dressing Changes  []Dialysis with transportation and chair time .   []PEG Care  []Oxygen  []Daily Weights for Heart Failure   Dietary:  []Any diet limitations  []Tube Feedings   []Total Parenteral Management (TPN)   Eligible for Medicaid Long Term Services and Supports  Yes:  [] Eligible for medical assistance or will become eligible within 180 days and UAI completed. [] Provider/Patient and/or support system has requested screening. [] UAI copy provided to patient or responsible party. [] UAI unavailable at discharge will send once processed to SNF provider. [] UAI unavailable at discharged mailed to patient  No:   [] Private pay and is not financially eligible for Medicaid within the next 180 days. [] Reside out-of-state.   [] A residents of a state owned/operated facility that is licensed  by [employer] [address] [employer] [facility] or [facility]  [] Enrollment in [facility] hospice services  []  [address]  [] Patient /Family declines to have screening completed or provide financial information for screening     Financial Resources:  Medicaid    [] Initiated and application pending   [] Full coverage     Advanced Care Plan:  []Surrogate Decision Maker of Care  []POA  [x]Communicated Code Status  (DNR\")    Other     Douglas Friend RN, MSN/Care manager  [phone]

## 2021-08-03 NOTE — DISCHARGE INSTRUCTIONS
Patient Discharge Instructions    Padmini Olson / [id number] : 1927    Admitted 2021 Discharged: 8/3/2021 7:01 AM     ACUTE DIAGNOSES:  NSTEMI (non-ST elevated myocardial infarction) ([facility] [I21.4]    CHRONIC MEDICAL DIAGNOSES:  Problem List as of 8/3/2021 Date Reviewed: 2018        Codes Class Noted - Resolved    NSTEMI (non-ST elevated myocardial infarction) ([facility] ICD-10-CM: I21.4  ICD-9-CM: 410.70  2021 - Present        Hip fracture ([facility] ICD-10-CM: S72.009A  ICD-9-CM: 820.8  2021 - Present        CAP (community acquired pneumonia) ICD-10-CM: J18.9  ICD-9-CM: 486  2021 - Present        Chronic atrial fibrillation ([facility] ICD-10-CM: I48.20  ICD-9-CM: 427.31  11/10/2017 - Present        Sinus node dysfunction ([facility] ICD-10-CM: I49.5  ICD-9-CM: 427.81  2016 - Present        Depression ICD-10-CM: F32.9  ICD-9-CM: 311  2015 - Present        Dyslipidemia ICD-10-CM: E78.5  ICD-9-CM: 272.4  2014 - Present        Gout ICD-10-CM: M10.9  ICD-9-CM: 274.9  Unknown - Present        Hx of carcinoma in situ of prostate ICD-10-CM: Z86.002  ICD-9-CM: V13.89  Unknown - Present        Essential hypertension, benign ICD-10-CM: I10  ICD-9-CM: 401.1  2013 - Present        Kidney stones ICD-10-CM: N20.0  ICD-9-CM: 592.0  2013 - Present        Embolic stroke involving right middle cerebral artery ([facility] ICD-10-CM: I63.411  ICD-9-CM: 434.11  2013 - Present    Overview Signed 2013  9:23 AM by Harriett Tariq MD     presented with dysarthria, MRI Several tiny acute infarcts in the right middle cerebral artery             RESOLVED: Dizziness ICD-10-CM: R42  ICD-9-CM: 780.4  2016 - 2016        RESOLVED: Typical atrial flutter ([facility] ICD-10-CM: I48.3  ICD-9-CM: 427.32  2015 - 2015        RESOLVED: Paroxysmal atrial fibrillation ([facility] ICD-10-CM: I48.0  ICD-9-CM: 427.31  2014 - 11/10/2017        RESOLVED: Dysarthria ICD-9-CM: 784.5  2013 - 12/7/2013        RESOLVED: TIA (transient ischemic attack) ICD-10-CM: G45.9  ICD-9-CM: 435.9  11/16/2013 - 12/7/2013        RESOLVED: Other and unspecified hyperlipidemia ICD-10-CM: E78.5  ICD-9-CM: 272.4  11/16/2013 - 9/23/2014        RESOLVED: Hypokalemia ICD-10-CM: E87.6  ICD-9-CM: 276.8  11/16/2013 - 12/7/2013              DISCHARGE MEDICATIONS:         · It is important that you take the medication exactly as they are prescribed. · Keep your medication in the bottles provided by the pharmacist and keep a list of the medication names, dosages, and times to be taken in your wallet. · Do not take other medications without consulting your doctor. DIET:  Regular Diet  ACTIVITY: Activity as tolerated    ADDITIONAL INFORMATION: If you experience any of the following symptoms then please call your primary care physician or return to the emergency room if you cannot get hold of your doctor: Fever, chills, nausea, vomiting, diarrhea, change in mentation, falling, bleeding, shortness of breath. FOLLOW UP CARE:  Dr. Guerda Negrete MD  you are to call and set up an appointment to see them with in 1 week. Follow-up with specialists at directed by them      Information obtained by :  I understand that if any problems occur once I am at home I am to contact my physician. I understand and acknowledge receipt of the instructions indicated above.                                                                                                                                            Physician's or R.N.'s Signature                                                                  Date/Time                                                                                                                                              Patient or Representative Signature                                                          Date/Time

## 2021-08-04 ENCOUNTER — PATIENT OUTREACH (OUTPATIENT)
Dept: CASE MANAGEMENT | Age: 86
End: 2021-08-04

## 2021-08-10 NOTE — PROGRESS NOTES
Physician Progress Note      PATIENT:               Ursula Pierce  CSN #:                  [id number]  :                       1927  ADMIT DATE:       2021 7:11 AM  DISCH DATE:        8/3/2021 1:59 PM  RESPONDING  PROVIDER #:        Meng Lebron MD          QUERY TEXT:    Dear Dr. Esther Long,  Patient admitted with NSTEMI. In order to support the diagnosis of NSTEMI, please include additional clinical indicators in your documentation. Or please document if the diagnosis of NSTEMI has been ruled out after further study. The medical record reflects the following:  Risk Factors: 80 yr. old male admitted with NSTEMI possibly due to volume overload. Clinical Indicators: H&P notes NSTEMI possibly due to volume overload. Cardiology consult notes, \"Minimal flat troponin elevation 0.7 to 0.8, This is a Non-MI troponin elevation. Echo with normal LVEF and wall motion\". Treatment: Cardiology consult, EKG, ECHO, Lab work  Options provided:  -- NSTEMI present as evidenced by, Please document evidence. -- NSTEMI was ruled out  -- Type 2 MI confirmed  -- Other - I will add my own diagnosis  -- Disagree - Not applicable / Not valid  -- Disagree - Clinically unable to determine / Unknown  -- Refer to Clinical Documentation Reviewer    PROVIDER RESPONSE TEXT:    NSTEMI was ruled out after study. Query created by: Deanna Bennett on 8/10/2021 12:21 PM      QUERY TEXT:    Pt admitted with NSTEMI. Pt noted to have pulmonary edema. If possible, please document in the progress notes and discharge summary if you are evaluating and/or treating any of the following: The medical record reflects the following:  Risk Factors: 80 yr. old male admitted with NSTEMI likely due to volume overload. Clinical Indicators: CXR 21 - 1. Unchanged cardiomegaly. Increased mild to moderate pulmonary edema. 2. Increased mild left pleural effusion.  ECHO on  with EF of 55-60%  Treatment: Cardiology consult ECHO, Lab work, Bumex.   Options provided:  -- Acute pulmonary edema due to heart disease  -- Acute pulmonary edema due to heart failure  -- Chronic pulmonary edema due to heart disease  -- Chronic pulmonary edema due to heart failure  -- Other - I will add my own diagnosis  -- Disagree - Not applicable / Not valid  -- Disagree - Clinically unable to determine / Unknown  -- Refer to Clinical Documentation Reviewer    PROVIDER RESPONSE TEXT:    This patient has acute pulmonary edema due to heart failure    Query created by: Gita Garcia on 8/10/2021 12:44 PM      Electronically signed by:  Hasmukh Jackson MD 8/10/2021 4:04 PM

## 2021-08-12 NOTE — PROGRESS NOTES
Physician Progress Note      PATIENT:               Saundra Rajan  CSN #:                  [id number]  :                       1927  ADMIT DATE:       2021 7:11 AM  DISCH DATE:        8/3/2021 1:59 PM  RESPONDING  PROVIDER #:        Santiago Whitaker MD          QUERY TEXT:    Dear Dr. Zoe Lae,  Pt. admitted with acute pulmonary edema and has CHF documented. If possible, please document in progress notes and discharge summary further specificity regarding the type and acuity of CHF:    The medical record reflects the following:  Risk Factors: 80 yr. old male admitted with acute pulmonary edema due to heart failure. Clinical Indicators: CXR 21 - 1. Unchanged cardiomegaly. Increased mild to moderate pulmonary edema. 2. Increased mild left pleural effusion. ECHO on  with EF of 55-60%  Treatment: Cardiology consult ECHO, Lab work, Bumex. Options provided:  -- Acute Diastolic CHF/HFpEF  -- Chronic Diastolic CHF/HFpEF  -- Acute on Chronic and Diastolic CHF  -- Other - I will add my own diagnosis  -- Disagree - Not applicable / Not valid  -- Disagree - Clinically unable to determine / Unknown  -- Refer to Clinical Documentation Reviewer    PROVIDER RESPONSE TEXT:    This patient has acute on chronic diastolic CHF.     Query created by: Petra Wells on 2021 9:02 AM      Electronically signed by:  Santiago Whitaker MD 2021 5:27 PM

## 2021-08-25 ENCOUNTER — HOSPITAL ENCOUNTER (INPATIENT)
Age: 86
LOS: 6 days | Discharge: NURSING, REHAB OR LONG-TERM CARE | DRG: 308 | End: 2021-08-31
Attending: EMERGENCY MEDICINE | Admitting: FAMILY MEDICINE
Payer: MEDICARE

## 2021-08-25 DIAGNOSIS — I48.91 ATRIAL FIBRILLATION WITH RVR (HCC): Primary | ICD-10-CM

## 2021-08-25 DIAGNOSIS — G45.9 TIA (TRANSIENT ISCHEMIC ATTACK): ICD-10-CM

## 2021-08-25 DIAGNOSIS — R77.8 ELEVATED TROPONIN: ICD-10-CM

## 2021-08-25 DIAGNOSIS — G93.40 ENCEPHALOPATHY: ICD-10-CM

## 2021-08-25 LAB
ALBUMIN SERPL-MCNC: 2.9 G/DL (ref 3.5–5)
ALBUMIN/GLOB SERPL: 0.7 {RATIO} (ref 1.1–2.2)
ALP SERPL-CCNC: 211 U/L (ref 45–117)
ALT SERPL-CCNC: 15 U/L (ref 12–78)
ANION GAP SERPL CALC-SCNC: 6 MMOL/L (ref 5–15)
AST SERPL-CCNC: 13 U/L (ref 15–37)
BASOPHILS # BLD: 0 K/UL (ref 0–0.1)
BASOPHILS NFR BLD: 0 % (ref 0–1)
BILIRUB SERPL-MCNC: 0.3 MG/DL (ref 0.2–1)
BUN SERPL-MCNC: 17 MG/DL (ref 6–20)
BUN/CREAT SERPL: 15 (ref 12–20)
CALCIUM SERPL-MCNC: 8.7 MG/DL (ref 8.5–10.1)
CHLORIDE SERPL-SCNC: 107 MMOL/L (ref 97–108)
CO2 SERPL-SCNC: 29 MMOL/L (ref 21–32)
CREAT SERPL-MCNC: 1.1 MG/DL (ref 0.7–1.3)
DIFFERENTIAL METHOD BLD: ABNORMAL
EOSINOPHIL # BLD: 0.3 K/UL (ref 0–0.4)
EOSINOPHIL NFR BLD: 4 % (ref 0–7)
ERYTHROCYTE [DISTWIDTH] IN BLOOD BY AUTOMATED COUNT: 16.7 % (ref 11.5–14.5)
GLOBULIN SER CALC-MCNC: 4.2 G/DL (ref 2–4)
GLUCOSE SERPL-MCNC: 164 MG/DL (ref 65–100)
HCT VFR BLD AUTO: 31 % (ref 36.6–50.3)
HGB BLD-MCNC: 9.5 G/DL (ref 12.1–17)
IMM GRANULOCYTES # BLD AUTO: 0 K/UL (ref 0–0.04)
IMM GRANULOCYTES NFR BLD AUTO: 0 % (ref 0–0.5)
LYMPHOCYTES # BLD: 0.9 K/UL (ref 0.8–3.5)
LYMPHOCYTES NFR BLD: 9 % (ref 12–49)
MAGNESIUM SERPL-MCNC: 2.1 MG/DL (ref 1.6–2.4)
MCH RBC QN AUTO: 32.1 PG (ref 26–34)
MCHC RBC AUTO-ENTMCNC: 30.6 G/DL (ref 30–36.5)
MCV RBC AUTO: 104.7 FL (ref 80–99)
MONOCYTES # BLD: 0.9 K/UL (ref 0–1)
MONOCYTES NFR BLD: 9 % (ref 5–13)
NEUTS SEG # BLD: 7.4 K/UL (ref 1.8–8)
NEUTS SEG NFR BLD: 78 % (ref 32–75)
NRBC # BLD: 0 K/UL (ref 0–0.01)
NRBC BLD-RTO: 0 PER 100 WBC
PLATELET # BLD AUTO: 289 K/UL (ref 150–400)
PMV BLD AUTO: 9.5 FL (ref 8.9–12.9)
POTASSIUM SERPL-SCNC: 3.9 MMOL/L (ref 3.5–5.1)
PROT SERPL-MCNC: 7.1 G/DL (ref 6.4–8.2)
RBC # BLD AUTO: 2.96 M/UL (ref 4.1–5.7)
SODIUM SERPL-SCNC: 142 MMOL/L (ref 136–145)
TROPONIN I SERPL-MCNC: 0.11 NG/ML
WBC # BLD AUTO: 9.6 K/UL (ref 4.1–11.1)

## 2021-08-25 PROCEDURE — 80053 COMPREHEN METABOLIC PANEL: CPT

## 2021-08-25 PROCEDURE — 83735 ASSAY OF MAGNESIUM: CPT

## 2021-08-25 PROCEDURE — 74011250636 HC RX REV CODE- 250/636: Performed by: EMERGENCY MEDICINE

## 2021-08-25 PROCEDURE — 85025 COMPLETE CBC W/AUTO DIFF WBC: CPT

## 2021-08-25 PROCEDURE — 65660000000 HC RM CCU STEPDOWN

## 2021-08-25 PROCEDURE — 96374 THER/PROPH/DIAG INJ IV PUSH: CPT

## 2021-08-25 PROCEDURE — 84484 ASSAY OF TROPONIN QUANT: CPT

## 2021-08-25 PROCEDURE — 99285 EMERGENCY DEPT VISIT HI MDM: CPT

## 2021-08-25 PROCEDURE — 74011250637 HC RX REV CODE- 250/637: Performed by: FAMILY MEDICINE

## 2021-08-25 PROCEDURE — 93005 ELECTROCARDIOGRAM TRACING: CPT

## 2021-08-25 PROCEDURE — 36415 COLL VENOUS BLD VENIPUNCTURE: CPT

## 2021-08-25 PROCEDURE — 74011000250 HC RX REV CODE- 250: Performed by: EMERGENCY MEDICINE

## 2021-08-25 RX ORDER — SODIUM CHLORIDE 0.9 % (FLUSH) 0.9 %
5-40 SYRINGE (ML) INJECTION EVERY 8 HOURS
Status: DISCONTINUED | OUTPATIENT
Start: 2021-08-25 | End: 2021-08-31 | Stop reason: HOSPADM

## 2021-08-25 RX ORDER — DULOXETIN HYDROCHLORIDE 30 MG/1
90 CAPSULE, DELAYED RELEASE ORAL DAILY
Status: DISCONTINUED | OUTPATIENT
Start: 2021-08-26 | End: 2021-08-31 | Stop reason: HOSPADM

## 2021-08-25 RX ORDER — ATORVASTATIN CALCIUM 10 MG/1
10 TABLET, FILM COATED ORAL DAILY
Status: DISCONTINUED | OUTPATIENT
Start: 2021-08-26 | End: 2021-08-31 | Stop reason: HOSPADM

## 2021-08-25 RX ORDER — TAMSULOSIN HYDROCHLORIDE 0.4 MG/1
0.4 CAPSULE ORAL DAILY
Status: DISCONTINUED | OUTPATIENT
Start: 2021-08-26 | End: 2021-08-31 | Stop reason: HOSPADM

## 2021-08-25 RX ORDER — ONDANSETRON 2 MG/ML
4 INJECTION INTRAMUSCULAR; INTRAVENOUS
Status: DISCONTINUED | OUTPATIENT
Start: 2021-08-25 | End: 2021-08-31 | Stop reason: HOSPADM

## 2021-08-25 RX ORDER — AMOXICILLIN 250 MG
1 CAPSULE ORAL 2 TIMES DAILY
COMMUNITY

## 2021-08-25 RX ORDER — FOLIC ACID 1 MG/1
1 TABLET ORAL DAILY
Status: DISCONTINUED | OUTPATIENT
Start: 2021-08-26 | End: 2021-08-31 | Stop reason: HOSPADM

## 2021-08-25 RX ORDER — ASCORBIC ACID 500 MG
500 TABLET ORAL DAILY
COMMUNITY

## 2021-08-25 RX ORDER — SAME BUTANEDISULFONATE/BETAINE 400-600 MG
250 POWDER IN PACKET (EA) ORAL DAILY
COMMUNITY

## 2021-08-25 RX ORDER — CLINDAMYCIN HYDROCHLORIDE 150 MG/1
150 CAPSULE ORAL 3 TIMES DAILY
Status: DISCONTINUED | OUTPATIENT
Start: 2021-08-26 | End: 2021-08-27

## 2021-08-25 RX ORDER — POLYETHYLENE GLYCOL 3350 17 G/17G
17 POWDER, FOR SOLUTION ORAL DAILY PRN
Status: DISCONTINUED | OUTPATIENT
Start: 2021-08-25 | End: 2021-08-31 | Stop reason: HOSPADM

## 2021-08-25 RX ORDER — ALLOPURINOL 300 MG/1
150 TABLET ORAL
Status: DISCONTINUED | OUTPATIENT
Start: 2021-08-25 | End: 2021-08-31 | Stop reason: HOSPADM

## 2021-08-25 RX ORDER — FACIAL-BODY WIPES
10 EACH TOPICAL
COMMUNITY

## 2021-08-25 RX ORDER — CLINDAMYCIN HYDROCHLORIDE 300 MG/1
300 CAPSULE ORAL 3 TIMES DAILY
COMMUNITY
End: 2021-08-31

## 2021-08-25 RX ORDER — CLINDAMYCIN HYDROCHLORIDE 150 MG/1
300 CAPSULE ORAL 3 TIMES DAILY
Status: DISCONTINUED | OUTPATIENT
Start: 2021-08-26 | End: 2021-08-27

## 2021-08-25 RX ORDER — METOPROLOL TARTRATE 5 MG/5ML
5 INJECTION INTRAVENOUS ONCE
Status: COMPLETED | OUTPATIENT
Start: 2021-08-25 | End: 2021-08-25

## 2021-08-25 RX ORDER — ONDANSETRON 4 MG/1
4 TABLET, ORALLY DISINTEGRATING ORAL
Status: DISCONTINUED | OUTPATIENT
Start: 2021-08-25 | End: 2021-08-31 | Stop reason: HOSPADM

## 2021-08-25 RX ORDER — SODIUM CHLORIDE 0.9 % (FLUSH) 0.9 %
5-40 SYRINGE (ML) INJECTION AS NEEDED
Status: DISCONTINUED | OUTPATIENT
Start: 2021-08-25 | End: 2021-08-31 | Stop reason: HOSPADM

## 2021-08-25 RX ORDER — CLINDAMYCIN HYDROCHLORIDE 150 MG/1
150 CAPSULE ORAL 3 TIMES DAILY
COMMUNITY
End: 2021-08-31

## 2021-08-25 RX ORDER — ACETAMINOPHEN 325 MG/1
650 TABLET ORAL
Status: DISCONTINUED | OUTPATIENT
Start: 2021-08-25 | End: 2021-08-31 | Stop reason: HOSPADM

## 2021-08-25 RX ORDER — ACETAMINOPHEN 650 MG/1
650 SUPPOSITORY RECTAL
Status: DISCONTINUED | OUTPATIENT
Start: 2021-08-25 | End: 2021-08-31 | Stop reason: HOSPADM

## 2021-08-25 RX ORDER — OMEPRAZOLE 20 MG/1
20 TABLET, DELAYED RELEASE ORAL DAILY
COMMUNITY

## 2021-08-25 RX ORDER — CIPROFLOXACIN 750 MG/1
750 TABLET, FILM COATED ORAL EVERY 12 HOURS
COMMUNITY
Start: 2021-08-23 | End: 2021-08-31

## 2021-08-25 RX ADMIN — SODIUM CHLORIDE 1000 ML: 9 INJECTION, SOLUTION INTRAVENOUS at 20:51

## 2021-08-25 RX ADMIN — ALLOPURINOL 150 MG: 300 TABLET ORAL at 22:59

## 2021-08-25 RX ADMIN — METOPROLOL TARTRATE 5 MG: 5 INJECTION INTRAVENOUS at 20:49

## 2021-08-25 RX ADMIN — CIPROFLOXACIN HYDROCHLORIDE 750 MG: 250 TABLET, FILM COATED ORAL at 23:38

## 2021-08-25 RX ADMIN — Medication 10 ML: at 22:59

## 2021-08-25 NOTE — ED TRIAGE NOTES
Patient presents to ED via EMS from [facility] for hypotension and elevated HR. Pt has become increasingly hypotensive throughout the day and staff at facility noted patient in afib. Patient at [facility] for rehab for recent hip fracture.      pmhx Afib

## 2021-08-25 NOTE — ED NOTES
Bedside and Verbal shift change report given to sangeetha (oncoming nurse) by Radhika Beltrán (offgoing nurse). Report included the following information SBAR and Procedure Summary. Garland Spencer

## 2021-08-25 NOTE — ED PROVIDER NOTES
68-year-old gentleman with history of atrial fibrillation, colon cancer status post resection, DJD, stroke, hypertension, recent hip fracture presents to the emergency department by EMS today with chief complaint of A. fib RVR. He apparently was mildly hypotensive at the facility yesterday and his beta-blocker was held. Today he remained mildly hypotensive and systolic 83P with A. fib RVR in the low teens. He has no complaints to me. The history is provided by the patient, the EMS personnel and medical records. Palpitations   This is a chronic problem. The problem has not changed since onset. The problem occurs constantly. Pertinent negatives include no fever, no chest pain, no abdominal pain, no nausea, no vomiting, no headaches, no weakness, no cough and no shortness of breath. He has tried nothing for the symptoms. His past medical history is significant for heart disease, hypertension and atrial fibrillation.         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    Hypertension     Memory loss     Peripheral neuropathy     Vertigo        Past Surgical History:   Procedure Laterality Date    HX CATARACT REMOVAL      HX GI      colon resection    HX ORTHOPAEDIC      left knee, left shoulder    [place] ABDOMEN SURGERY PROC UNLISTED      colorectal    IA CARDIAC SURG PROCEDURE UNLIST      cardio version         Family History:   Problem Relation Age of Onset    Stroke Father     Heart Disease Father     Stroke Sister Social History     Socioeconomic History    Marital status:      Spouse name: Not on file    Number of children: Not on file    Years of education: Not on file    Highest education level: Not on file   Occupational History    Not on file   Tobacco Use    Smoking status: Never Smoker    Smokeless tobacco: Never Used   Substance and Sexual Activity    Alcohol use: Yes     Alcohol/week: 1.0 standard drinks     Types: 1 Standard drinks or equivalent per week     Comment: rarely    Drug use: Not on file    Sexual activity: Not on file   Other Topics Concern    Not on file   Social History Narrative    Not on file     Social Determinants of Health     Financial Resource Strain:     Difficulty of Paying Living Expenses:    Food Insecurity:     Worried About Running Out of Food in the Last Year:     [address] in the Last Year:    Transportation Needs:     Lack of Transportation (Medical):  Lack of Transportation (Non-Medical):    Physical Activity:     Days of Exercise per Week:     Minutes of Exercise per Session:    Stress:     Feeling of Stress :    Social Connections:     Frequency of Communication with Friends and Family:     Frequency of Social Gatherings with Friends and Family:     Attends Synagogue Services:     Active Member of Clubs or Organizations:     Attends Club or Organization Meetings:     Marital Status:    Intimate Partner Violence:     Fear of Current or Ex-Partner:     Emotionally Abused:     Physically Abused:     Sexually Abused: ALLERGIES: Patient has no known allergies. Review of Systems   Constitutional: Negative for fatigue and fever. HENT: Negative for sneezing and sore throat. Respiratory: Negative for cough and shortness of breath. Cardiovascular: Positive for palpitations. Negative for chest pain and leg swelling. Gastrointestinal: Negative for abdominal pain, diarrhea, nausea and vomiting.    Genitourinary: Negative for difficulty urinating and dysuria. Musculoskeletal: Negative for arthralgias and myalgias. Skin: Negative for color change and rash. Neurological: Negative for weakness and headaches. Psychiatric/Behavioral: Negative for agitation and behavioral problems. There were no vitals filed for this visit. Physical Exam  Vitals and nursing note reviewed. Constitutional:       General: He is not in acute distress. Appearance: Normal appearance. He is well-developed. He is not ill-appearing, toxic-appearing or diaphoretic. HENT:      Head: Normocephalic and atraumatic. Nose: Nose normal.      Mouth/Throat:      Mouth: Mucous membranes are moist.      Pharynx: Oropharynx is clear. Eyes:      Extraocular Movements: Extraocular movements intact. Conjunctiva/sclera: Conjunctivae normal.      Pupils: Pupils are equal, round, and reactive to light. Cardiovascular:      Rate and Rhythm: Regular rhythm. Tachycardia present. Pulses: Normal pulses. Heart sounds: No murmur heard. Pulmonary:      Effort: Pulmonary effort is normal. No respiratory distress. Breath sounds: Normal breath sounds. No wheezing. Chest:      Chest wall: No mass or tenderness. Abdominal:      General: There is no distension. Palpations: Abdomen is soft. Tenderness: There is no abdominal tenderness. There is no guarding or rebound. Musculoskeletal:         General: No swelling, tenderness, deformity or signs of injury. Normal range of motion. Cervical back: Normal range of motion and neck supple. No rigidity. No muscular tenderness. Right lower leg: No tenderness. No edema. Left lower leg: No tenderness. No edema. Skin:     General: Skin is warm and dry. Capillary Refill: Capillary refill takes less than 2 seconds. Neurological:      General: No focal deficit present. Mental Status: He is alert and oriented to person, place, and time.    Psychiatric: Mood and Affect: Mood normal.         Behavior: Behavior normal.          MDM  Number of Diagnoses or Management Options  Diagnosis management comments: 80year-old gentleman presents as above with A. fib RVR, not significantly tachycardic but does have elevation of his baseline elevated troponin. Admit to the hospital for further management. Amount and/or Complexity of Data Reviewed  Clinical lab tests: reviewed  Tests in the medicine section of CPT®: reviewed  Decide to obtain previous medical records or to obtain history from someone other than the patient: yes      ED Course as of Aug 25 1917   Wed Aug 25, 2021   1915 ED EKG interpretation:Rhythm: Junctional tachycardia at a rate of approximate 118. Axis: normal.  ST segment: Nonspecific T wave abnormality. This EKG was interpreted by Lis Dennis MD,ED Provider. [JM]      ED Course User Index  [JM] Eda Child MD       Procedures          Perfect Serve Consult for Admission  8:43 PM    ED Room Number: ER12/12  Patient Name and age: Trip Snyder 80 y.o.  male  Working Diagnosis:   1. Atrial fibrillation with RVR (HCC)    2. Elevated troponin        COVID-19 Suspicion:  no  Sepsis present:  no  Reassessment needed: N/A  Code Status:  TBD  Readmission: yes  Isolation Requirements:  no  Recommended Level of Care:  telemetry  Department:[facility] ED - [phone]  Other: Elevation of troponin above his baseline with A. fib RVR, not significantly tachycardic. On beta-blockers at home, attempting Lopressor IV.

## 2021-08-25 NOTE — ED NOTES
Bedside and Verbal shift change report given to Gio Espitia and Alf Jaime RN (oncoming nurse) by RN (offgoing nurse). Report included the following information SBAR, Kardex and ED Summary.

## 2021-08-26 ENCOUNTER — APPOINTMENT (OUTPATIENT)
Dept: NON INVASIVE DIAGNOSTICS | Age: 86
DRG: 308 | End: 2021-08-26
Attending: NURSE PRACTITIONER
Payer: MEDICARE

## 2021-08-26 LAB
ANION GAP SERPL CALC-SCNC: 3 MMOL/L (ref 5–15)
BUN SERPL-MCNC: 17 MG/DL (ref 6–20)
BUN/CREAT SERPL: 19 (ref 12–20)
CALCIUM SERPL-MCNC: 8 MG/DL (ref 8.5–10.1)
CHLORIDE SERPL-SCNC: 110 MMOL/L (ref 97–108)
CO2 SERPL-SCNC: 28 MMOL/L (ref 21–32)
CREAT SERPL-MCNC: 0.89 MG/DL (ref 0.7–1.3)
ERYTHROCYTE [DISTWIDTH] IN BLOOD BY AUTOMATED COUNT: 16.9 % (ref 11.5–14.5)
GLUCOSE SERPL-MCNC: 119 MG/DL (ref 65–100)
HCT VFR BLD AUTO: 24.5 % (ref 36.6–50.3)
HGB BLD-MCNC: 7.5 G/DL (ref 12.1–17)
MCH RBC QN AUTO: 32.3 PG (ref 26–34)
MCHC RBC AUTO-ENTMCNC: 30.6 G/DL (ref 30–36.5)
MCV RBC AUTO: 105.6 FL (ref 80–99)
NRBC # BLD: 0 K/UL (ref 0–0.01)
NRBC BLD-RTO: 0 PER 100 WBC
PLATELET # BLD AUTO: 245 K/UL (ref 150–400)
PMV BLD AUTO: 9.8 FL (ref 8.9–12.9)
POTASSIUM SERPL-SCNC: 4.1 MMOL/L (ref 3.5–5.1)
RBC # BLD AUTO: 2.32 M/UL (ref 4.1–5.7)
SODIUM SERPL-SCNC: 141 MMOL/L (ref 136–145)
WBC # BLD AUTO: 8.3 K/UL (ref 4.1–11.1)

## 2021-08-26 PROCEDURE — 97165 OT EVAL LOW COMPLEX 30 MIN: CPT

## 2021-08-26 PROCEDURE — 93321 DOPPLER ECHO F-UP/LMTD STD: CPT

## 2021-08-26 PROCEDURE — 93308 TTE F-UP OR LMTD: CPT | Performed by: SPECIALIST

## 2021-08-26 PROCEDURE — 97535 SELF CARE MNGMENT TRAINING: CPT

## 2021-08-26 PROCEDURE — 85027 COMPLETE CBC AUTOMATED: CPT

## 2021-08-26 PROCEDURE — 74011250637 HC RX REV CODE- 250/637: Performed by: NURSE PRACTITIONER

## 2021-08-26 PROCEDURE — 99222 1ST HOSP IP/OBS MODERATE 55: CPT | Performed by: SPECIALIST

## 2021-08-26 PROCEDURE — APPSS45 APP SPLIT SHARED TIME 31-45 MINUTES: Performed by: NURSE PRACTITIONER

## 2021-08-26 PROCEDURE — 77010033678 HC OXYGEN DAILY

## 2021-08-26 PROCEDURE — 65270000029 HC RM PRIVATE

## 2021-08-26 PROCEDURE — 74011250637 HC RX REV CODE- 250/637: Performed by: FAMILY MEDICINE

## 2021-08-26 PROCEDURE — 36415 COLL VENOUS BLD VENIPUNCTURE: CPT

## 2021-08-26 PROCEDURE — 93325 DOPPLER ECHO COLOR FLOW MAPG: CPT | Performed by: SPECIALIST

## 2021-08-26 PROCEDURE — 80048 BASIC METABOLIC PNL TOTAL CA: CPT

## 2021-08-26 PROCEDURE — 97161 PT EVAL LOW COMPLEX 20 MIN: CPT

## 2021-08-26 PROCEDURE — 93321 DOPPLER ECHO F-UP/LMTD STD: CPT | Performed by: SPECIALIST

## 2021-08-26 RX ORDER — DIGOXIN 125 MCG
0.12 TABLET ORAL ONCE
Status: COMPLETED | OUTPATIENT
Start: 2021-08-26 | End: 2021-08-26

## 2021-08-26 RX ORDER — METOPROLOL TARTRATE 25 MG/1
12.5 TABLET, FILM COATED ORAL 2 TIMES DAILY
Status: DISCONTINUED | OUTPATIENT
Start: 2021-08-26 | End: 2021-08-31 | Stop reason: HOSPADM

## 2021-08-26 RX ADMIN — CLINDAMYCIN HYDROCHLORIDE 300 MG: 150 CAPSULE ORAL at 21:26

## 2021-08-26 RX ADMIN — METOPROLOL TARTRATE 12.5 MG: 25 TABLET, FILM COATED ORAL at 10:39

## 2021-08-26 RX ADMIN — FOLIC ACID 1 MG: 1 TABLET ORAL at 10:38

## 2021-08-26 RX ADMIN — CLINDAMYCIN HYDROCHLORIDE 300 MG: 150 CAPSULE ORAL at 17:49

## 2021-08-26 RX ADMIN — CIPROFLOXACIN HYDROCHLORIDE 750 MG: 250 TABLET, FILM COATED ORAL at 21:21

## 2021-08-26 RX ADMIN — DIGOXIN 0.12 MG: 125 TABLET ORAL at 10:38

## 2021-08-26 RX ADMIN — CLINDAMYCIN HYDROCHLORIDE 150 MG: 150 CAPSULE ORAL at 21:26

## 2021-08-26 RX ADMIN — Medication 10 ML: at 21:29

## 2021-08-26 RX ADMIN — TAMSULOSIN HYDROCHLORIDE 0.4 MG: 0.4 CAPSULE ORAL at 10:38

## 2021-08-26 RX ADMIN — CLINDAMYCIN HYDROCHLORIDE 150 MG: 150 CAPSULE ORAL at 10:40

## 2021-08-26 RX ADMIN — CLINDAMYCIN HYDROCHLORIDE 300 MG: 150 CAPSULE ORAL at 10:36

## 2021-08-26 RX ADMIN — DULOXETINE HYDROCHLORIDE 90 MG: 30 CAPSULE, DELAYED RELEASE PELLETS ORAL at 10:37

## 2021-08-26 RX ADMIN — RIVAROXABAN 15 MG: 15 TABLET, FILM COATED ORAL at 17:48

## 2021-08-26 RX ADMIN — METOPROLOL TARTRATE 12.5 MG: 25 TABLET, FILM COATED ORAL at 17:49

## 2021-08-26 RX ADMIN — CLINDAMYCIN HYDROCHLORIDE 150 MG: 150 CAPSULE ORAL at 17:49

## 2021-08-26 RX ADMIN — CIPROFLOXACIN HYDROCHLORIDE 750 MG: 250 TABLET, FILM COATED ORAL at 10:35

## 2021-08-26 RX ADMIN — ALLOPURINOL 150 MG: 300 TABLET ORAL at 21:21

## 2021-08-26 RX ADMIN — ATORVASTATIN CALCIUM 10 MG: 20 TABLET, FILM COATED ORAL at 10:38

## 2021-08-26 NOTE — PROGRESS NOTES
Care Management Readmission Assessment        NAME:   Wilver Pina   :     1927   MRN:     [id number]             RUR Score/Risk Level:       RUR:  22%  Risk Level: Moderate    Assessment: In person with patient and son Tolu Britt ([phone])    Reason for Readmission:  Mr. Ifeanyi Beasley is a 80 y.o. male with history that includes AFIB, HTN and HLD  who was emergently admitted for:  a-fib with rvr    Patient Active Problem List   Diagnosis Code    Essential hypertension, benign I10    Kidney stones N20.0    Gout M10.9    Hx of carcinoma in situ of prostate M62.023    Embolic stroke involving right middle cerebral artery ([facility] I63.411    Dyslipidemia E78.5    Depression F32.9    Sinus node dysfunction (HCC) I49.5    Chronic atrial fibrillation (HCC) I48.20    Hip fracture ([facility] [facility] S72.009A    CAP (community acquired pneumonia) J18.9    NSTEMI (non-ST elevated myocardial infarction) ([facility] [facility] I21.4    Atrial fibrillation with rapid ventricular response ([facility] I48.91       Has any pertinent information changed since previous Care Management Assessment? Living arrangements:  Pt was admitted from [facility] (SNF)   ADLs:  Pt requires assistance with ADLs   DME:   Unknown   Pharmacy:  [facility]    Insurer:  Payor: Malgorzata Lopez / Plan: [place] MED[place] PART A & B / Product Type: Medicare /     PCP: Derrek Sutherland MD   Name of Practice:  [facility]   Current patient: Yes   Approximate date of last visit: unknown   Access to virtual PCP visits:   No    Is a Care Conference indicated:   No    Did you attend your follow up appointment(s):  Yes  If not, why not:  N/A    Resources/supports as identified by patient/family:  Pt has supportive family         Top Challenges facing patient (as identified by patient/family and CM): Finances/Medication cost?  None identified  Transportation? None identified       Support system or lack thereof?   None identified     Living arrangements? None identified         Self-care/ADLs/Cognition? None identified       Advance Directive:  DNR, has an advance directive. Copy on chart. Plan for utilizing home health:  N/A             Transition of Care Plan:      SNF / IPR    Additional information:  CM reviewed documentation which indicates Pt was admitted from [facility]. CM met with Pt and Pt's son, Kyleigh Alatorre, to confirm discharge plan. Kyleigh Alatorre confirms that Pt will return to [facility] when medically stable. VIOLETA spoke with [facility] at [facility] who confirms that they are able to re-accept upon discharge. [facility] states that she is about to call family re: bed hold. Pt needs a COVID test prior to readmission. [facility] confirms that a rapid test would be acceptable. CM will continue to follow. _____________________________________  LORI Farris - Care Management  8/26/2021   12:07 PM     Readmission Assessment  Number of days since last admission?: 8-30 days  Previous disposition: SNF  Who is being interviewed?: Caregiver  What was the patient's/caregiver's perception as to why they think they needed to return back to the hospital?: Other (Comment) (rapid heart rate (a-fib))  Did you visit your Primary Care Physician after you left the hospital, before you returned this time?: Yes (MD at SNF)  Did you see a specialist, such as Cardiac, Pulmonary, Orthopedic Physician, etc. after you left the hospital?: No  Who advised the patient to return to the hospital?: Skilled Unit  Does the patient report anything that got in the way of taking their medications?: No  In our efforts to provide the best possible care to you and others like you, can you think of anything that we could have done to help you after you left the hospital the first time, so that you might not have needed to return so soon?: Other (Comment) (no)        Care Management Interventions  PCP Verified by CM:  Yes (Andie)  Mode of Transport at Discharge:  Other (see comment) (TBD)  Transition of Care Consult (CM Consult): SNF  MyChart Signup: No  Discharge Durable Medical Equipment: No  Physical Therapy Consult: Yes  Occupational Therapy Consult: Yes  Speech Therapy Consult: No  Current Support Network: [address] [place] [address]  Confirm Follow Up Transport: Family  Discharge Location  Discharge Placement: Skilled nursing facility

## 2021-08-26 NOTE — PROGRESS NOTES
Problem: Mobility Impaired (Adult and Pediatric)  Goal: *Acute Goals and Plan of Care (Insert Text)  Outcome: Progressing Towards Goal  Note: FUNCTIONAL STATUS PRIOR TO ADMISSION: walks with assist of one and rolling walker    [address]: He was admitted from [address] [place] [address] rehab. Has been back and forth from acute care and rehab since his hip fracture surgery on 7/21/2021    Physical Therapy Goals  Initiated 8/26/2021  1. Patient will scoot up and down in bed with modified independence within 7 day(s). 2.  Patient will transfer from bed to chair and chair to bed with minimal assistance/contact guard assist using the least restrictive device within 7 day(s). 3.  Patient will perform sit to stand with supervision/set-up within 7 day(s). 4.  Patient will ambulate with minimal assistance/contact guard assist for 80 feet with the least restrictive device within 7 day(s). PHYSICAL THERAPY EVALUATION  Patient: Anthony Talley (64 y.o. male)  Date: 8/26/2021  Primary Diagnosis: Atrial fibrillation with rapid ventricular response ([address] [I48.91]        Precautions: [place], Falls       ASSESSMENT  Based on the objective data described below, the patient presents with decreased strength, decreased balance, and assist needed for safe mobility. He was admitted from [address] [place], [place] [address]ab. Has been back and forth from acute care and rehab since his hip fracture surgery on 7/21/2021. At discharge the plan is he would return to rehab prior to longterm. He fell last night getting up on his own. Son present in the room. Troponin is elevated, but nurse reports that a trend will not be evaluated due to chronic elevated troponin. Hgb is 7.5 g/dL    Performed transfer training in the room and gait training in the room and in the bathroom. Patient has a tendency towards decreased anterior weight shift with sit <> stand and standing.   Needs cues for best hand placement with transfers and he is at high risk for loss of balance posterior in standing. Current Level of Function Impacting Discharge (mobility/balance): min assist with sit to stand and with gait    Functional Outcome Measure: The patient scored 50 out of 100 on the Barthel outcome measure which is indicative of functional impairment. Other factors to consider for discharge: recent multiple falls      Patient will benefit from skilled therapy intervention to address the above noted impairments. PLAN :  Recommendations and Planned Interventions: bed mobility training, transfer training, gait training, and therapeutic exercises      Frequency/Duration: Patient will be followed by physical therapy:  5 times a week to address goals.     Recommendation for discharge: (in order for the patient to meet his/her long term goals)  Therapy up to 5 days/week in SNF setting    This discharge recommendation:  Has not yet been discussed the attending provider and/or case management    IF patient discharges home will need the following DME: to be determined (TBD)         SUBJECTIVE:   Patient stated How kevin am I?.  to have two therapist see him     OBJECTIVE DATA SUMMARY:   HISTORY:    Past Medical History:   Diagnosis Date    Anxiety     Arthritis     right shoulder    Atrial fibrillation, chronic ([facility] 09/23/2014    Colon cancer ([facility] [address] 2002    colon resection    Depression 9/28/2015    DJD (degenerative joint disease) of knee     left TKR    Dyslipidemia     Embolic stroke involving right middle cerebral artery [facility]) Nov 2013    presented with dysarthria, MRI Several tiny acute infarcts in the right middle cerebral artery    Falls     Gout     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    Hypertension     Memory loss     Peripheral neuropathy     Vertigo      Past Surgical History:   Procedure Laterality Date    HX CATARACT REMOVAL      HX GI      colon resection    HX ORTHOPAEDIC      left knee, left shoulder    ME ABDOMEN SURGERY PROC UNLISTED      colorectal    [place] CARDIAC SURG PROCEDURE UNLIST      cardio version       Personal factors and/or comorbidities impacting plan of care: He was admitted from [facility] rehab. Has been back and forth from acute care and rehab since his hip fracture surgery on 7/21/2021    Home Situation  Home Environment: Apartment  One/Two Story Residence: Other (Comment)  Living Alone: No  Support Systems: Assisted living, Family member(s)  Patient Expects to be Discharged to[de-identified] Assisted living  Current DME Used/Available at Home: None    EXAMINATION/PRESENTATION/DECISION MAKING:   Critical Behavior:  Neurologic State: Alert  Orientation Level: Oriented to person, Oriented to place, Disoriented to time  Cognition: Follows commands  Safety/Judgement: Fall prevention  Hearing: Auditory  Auditory Impairment: Hard of hearing, bilateral  Hearing Aids/Status: Bilateral, With patient    Range Of Motion:  AROM: Generally decreased, functional           PROM: Generally decreased, functional           Strength:    Strength: Generally decreased, functional                    Tone & Sensation:   Tone: Normal              Sensation: Intact               Coordination:  Coordination: Generally decreased, functional  Vision:   Corrective Lenses: Glasses  Functional Mobility:  Bed Mobility:     Supine to Sit: Contact guard assistance; Additional time  Sit to Supine: Contact guard assistance  Scooting: Minimum assistance  Transfers:  Sit to Stand: Contact guard assistance;Minimum assistance; Additional time  Stand to Sit: Contact guard assistance        Bed to Chair: Contact guard assistance;Minimum assistance (with rolling walker)              Balance:   Sitting: Intact  Standing: Impaired; With support  Standing - Static: Constant support; Fair  Standing - Dynamic : Constant support; Fair  Ambulation/Gait Training:  Distance (ft): 20 Feet (ft) (twice)  Assistive Device: Walker, rolling;Gait belt  Ambulation - Level of Assistance: Minimal assistance;Assist x1;Additional time        Gait Abnormalities: Decreased step clearance             Functional Measure:  Barthel Index:    Bathin  Bladder: 5  Bowels: 5  Groomin  Dressin  Feeding: 10  Mobility: 5  Stairs: 0  Toilet Use: 5  Transfer (Bed to Chair and Back): 10  Total: 50/100       The Barthel ADL Index: Guidelines  1. The index should be used as a record of what a patient does, not as a record of what a patient could do. 2. The main aim is to establish degree of independence from any help, physical or verbal, however minor and for whatever reason. 3. The need for supervision renders the patient not independent. 4. A patient's performance should be established using the best available evidence. Asking the patient, friends/relatives and nurses are the usual sources, but direct observation and common sense are also important. However direct testing is not needed. 5. Usually the patient's performance over the preceding 24-48 hours is important, but occasionally longer periods will be relevant. 6. Middle categories imply that the patient supplies over 50 per cent of the effort. 7. Use of aids to be independent is allowed. Mariana Cruz., Barthel, D.W. ([id number]). Functional evaluation: the Barthel Index. [address] [facility] (14)2. Estefany Crocker sanna Nithin, XENIAF, Sultana Foley., Irish Starkey., [place], [address] [place] [address] (). Measuring the change indisability after inpatient rehabilitation; comparison of the responsiveness of the Barthel Index and Functional [place] Measure. Journal of Neurology, Neurosurgery, and Psychiatry, 66(4), 102-539. Julissa Bass, [place].NAEL, BENNETT Diaz, & NAOMY BrooksA. (2004.) Assessment of post-stroke quality of life in cost-effectiveness studies: The usefulness of the Barthel Index and the EuroQoL-5D.  Quality of Life Research, 15, 771-85           Physical Therapy Evaluation Charge Determination   History Examination Presentation Decision-Making   LOW Complexity : Zero comorbidities / personal factors that will impact the outcome / POC LOW Complexity : 1-2 Standardized tests and measures addressing body structure, function, activity limitation and / or participation in recreation  LOW Complexity : Stable, uncomplicated  LOW Complexity : FOTO score of       Based on the above components, the patient evaluation is determined to be of the following complexity level: LOW     Pain Rating:  denies    Activity Tolerance:   Good    After treatment patient left in no apparent distress:   Supine in bed, Call bell within reach, Bed / chair alarm activated, Caregiver / family present, and Side rails x 3    COMMUNICATION/EDUCATION:   The patients plan of care was discussed with: Occupational therapist and Registered nurse. Fall prevention education was provided and the patient/caregiver indicated understanding., Patient/family have participated as able in goal setting and plan of care. , and Patient/family agree to work toward stated goals and plan of care.     Thank you for this referral.  Virgilio Cm, PT   Time Calculation: 15 mins

## 2021-08-26 NOTE — ED NOTES
Bedside and Verbal shift change report given to Jennifer Espino RN (oncoming nurse) by Afia Yan and Manolo Ty RN (offgoing nurse). Report included the following information SBAR, Kardex, ED Summary, Intake/Output, MAR and Recent Results.

## 2021-08-26 NOTE — PROGRESS NOTES
[facility]: MED RECONCILIATION    Information obtained from: Patient's med list from [id number] [place] [address]. Confirmed last administration time with RN at facility. Patient has not received any meds from 1630 pm onwards today     Allergies: Patient has no known allergies. Prior to Admission Medications:     Medication Documentation Review Audit       Reviewed by Janine Jones PHARMD (Pharmacist) on 08/25/21 at 2316      Medication Sig Documenting Provider Last Dose Status Taking?   acetaminophen (TYLENOL) 325 mg tablet Take 650 mg by mouth three (3) times daily. Provider, Historical 8/25/2021 1400 Active Yes   allopurinol (ZYLOPRIM) 300 mg tablet Take 150 mg by mouth nightly. Yanelis Estrella MD 8/24/2021 Active Yes   ascorbic acid, vitamin C, (VITAMIN C) 500 mg tablet Take 500 mg by mouth daily. Provider, Historical 8/25/2021 Active Yes   bisacodyL (DULCOLAX) 10 mg supp Insert 10 mg into rectum daily as needed for Constipation. Provider, Historical  Active Yes   bumetanide (BUMEX) 1 mg tablet Take 1 Tablet by mouth daily. Luzma Lockett MD 8/25/2021 0900 Active Yes   calcium-vitamin D (OS-VINCENT +D3) 500 mg-200 unit per tablet Take 1 Tablet by mouth three (3) times daily (with meals). Dixie Dunbar MD 8/25/2021 1130 Active Yes   ciprofloxacin HCl (CIPRO) 750 mg tablet Take 750 mg by mouth every twelve (12) hours. For 10 days : 8/23/2021 to 9/2/2021 Provider, Historical 8/25/2021 0900 Active Yes   clindamycin (CLEOCIN) 150 mg capsule Take 150 mg by mouth three (3) times daily. With 300 mg capsule for a total of 450 mg dose  For 10 days : 8/23/2021 to 9/2/2021 Provider, Historical 8/25/2021 1400 Active Yes   clindamycin (CLEOCIN) 300 mg capsule Take 300 mg by mouth three (3) times daily. With 150 mg capsule for a total of 450 mg dose  For 10 days : 8/23/2021 to 9/2/2021 Provider, Historical 8/25/2021 1400 Active Yes   DULoxetine (CYMBALTA) 30 mg capsule Take 90 mg by mouth daily.  Yanelis Estrella MD 8/25/2021 Active Yes folic acid (FOLVITE) 1 mg tablet Take 1 mg by mouth daily. Provider, Historical 8/25/2021 Active Yes   multivitamin (ONE A DAY) tablet Take 1 Tablet by mouth daily. Provider, Historical 8/25/2021 Active Yes   omeprazole (PriLOSEC OTC) 20 mg tablet Take 20 mg by mouth daily. Provider, Historical 8/25/2021 0630 Active Yes   ondansetron (Zofran ODT) 4 mg disintegrating tablet 1 Tab by SubLINGual route every eight (8) hours as needed for Nausea or Vomiting. Dimitri Jeffery MD  Active Yes   OTHER Apply house moisturizer to left cheek scab once every shift until resolved, may leave open to [facility] if intact    Monitor Left heel for dressing once every shift    Clean left heel with normal saline and cover with a hydrocolloid dressing 3 times per week and as needed     Monitor left hip for dressing placement once daily    Clean left hip with normal saline and cover with dry dressing once a day Provider, Historical  Active Yes   polyethylene glycol (Miralax) 17 gram/dose powder Take 17 g by mouth daily as needed for Constipation. Provider, Historical  Active Yes   rivaroxaban (Xarelto) 15 mg tab tablet Take 15 mg by mouth daily (with dinner). Indications: prevention of thromboembolism in paroxysmal atrial fibrillation Yanelis Estrella MD 8/24/2021 1700 Active Yes   Saccharomyces boulardii (Florastor) 250 mg capsule Take 250 mg by mouth daily. For 10 days : 8/24/2021 to 9/3/2021 Provider, Historical 8/25/2021 0900 Active Yes   senna-docusate (PERICOLACE) 8.6-50 mg per tablet Take 1 Tablet by mouth two (2) times a day. Provider, Historical 8/25/2021 0900 Active Yes   simvastatin (Zocor) 10 mg tablet Take 10 mg by mouth nightly. Provider, Historical 8/24/2021 Active Yes   tamsulosin (FLOMAX) 0.4 mg capsule Take 1 Capsule by mouth daily. Jo Cota MD 8/25/2021 1000 Active Yes   zinc sulfate 220 mg tablet Take 1 Tab by mouth daily.  Dimitri Jeffery MD 8/25/2021 Active Yes                      [address] [facility], PHARMD Contact: [phone]

## 2021-08-26 NOTE — WOUND CARE
Wound Consult:  New Patint Visit. Chart reviewed. Consulted for left heel ulcer POA. Spoke with patients nurse,  Trish Patricio and handed off on findings/care after visit. Patient is resting on a Versacare bed with accumax mattress. Heels off loaded with boot on left and he can lift right completely off bed; he lifts left as well but with ulcer, boot should be used. Patient is alert and oriented x 4; requires minimal assistance to move side to side in bed. Isidro score 14. Assessment:  Left heel - 2.4 x 2 x 01 cm, moist pink base budding through thin layer of pale white/yellow slough, resurfaced surrounding, no redness, no odor or purulence, small amount of drainage (serous); Stage 3 PI POA. Left hip - proximal incision well approximated, slightly pale pink surrounding, no drainage noted but skin slightly macerated. Left flank - ecchymosis with slightly abraded skin; Trish Patricio reported patient had fall. No redness to sacrum, buttocks, spine or right heel. Treatment:  Honey gel and foam dressing to left heel ulcer; off loading boot reapplied. Turned and repositioned. Aloe vesta to buttocks/sacrum. Wound Recommendations:  Honey gel and foam dressing to left heel ulcer; cleanse with wound cleanser or saline, change every three days. Skin Care / PI Prevention Recommendations:  1. Minimize friction/shear: minimize layers of linen/pads under patient. 2. Off load pressure/reposition: continue to turn and reposition approximately every 2 hours; float heels with pillows or use off loading heel boots. 3. Manage Moisture - keep skin folds dry; incontinence skin care with incontinence wipes; intentional toileting, brief in use. 4. Continue to monitor nutrition, pain, and skin risk scale, and skin assessment. Plan: Will discuss with MD regarding findings and propose orders for treatment. We will continue to reassess weekly and as needed.   Please re-consult should concerns arise despite continued skin/PI prevention measures.   Jackson Weiss, [id number] [facility], Wound / Ostomy Department  Wound Healing Office [phone]

## 2021-08-26 NOTE — ED NOTES
[facility] Sitter at bedside.

## 2021-08-26 NOTE — PROGRESS NOTES
1030: TRANSFER - IN REPORT:    Verbal report received from Radha Mederos RN(name) on Dalia Miner  being received from ED(unit) for routine progression of care      Report consisted of patients Situation, Background, Assessment and   Recommendations(SBAR). Information from the following report(s) SBAR, Kardex, Accordion and Cardiac Rhythm A Fib was reviewed with the receiving nurse. Opportunity for questions and clarification was provided. Assessment completed upon patients arrival to unit and care assumed. This patient was assisted with Intentional Toileting every 2 hours during this shift as appropriate. Documentation of ambulation and output reflected on Flowsheet as appropriate. Purposeful hourly rounding was completed using AIDET and 5Ps. Outcomes of PHR documented as they occurred. Bed alarm in use as appropriate. Dual Suction and ambubag in place.

## 2021-08-26 NOTE — PROGRESS NOTES
CARE MANAGEMENT NOTE          Patient/family seen: YES       Informed patient/family of BPCI-A Bundle Program. Also advised of potential outreach by Care Transitions Team.    Bundle Payment Care Improvement Beneficiary Letter Delivered to Beneficiary or Representative: YES.  Date BCPI -A was given:8/26/2021    _____________________________________  LORI Arredondo - Care Management  8/26/2021   9:59 AM

## 2021-08-26 NOTE — PROGRESS NOTES
Daily Progress Note: 8/26/2021  Rj Magallanes MD    Assessment/Plan:   Atrial fibrillation with RVR  -Continue Xarelto  -Consulted cardiology     Elevated troponin  -Likely due to A.  Fib  -Continue to trend     Anemia, chronic  -Continue to monitor  -Transfuse if Hb <7     Recent hip fracture  -S/p surgical repair July 2021  -Resume PT/OT     CAD  -Continue aspirin and statin     History of urinary retention  -Continue Flomax     Gout  -Continue allopurinol    Altered Mental Status; likely underlying dementia  -monitor     Problem List:  Problem List as of 8/26/2021 Date Reviewed: 12/20/2018        Codes Class Noted - Resolved    Atrial fibrillation with rapid ventricular response (HCC) ICD-10-CM: I48.91  ICD-9-CM: 427.31  8/25/2021 - Present        NSTEMI (non-ST elevated myocardial infarction) ([facility] ICD-10-CM: I21.4  ICD-9-CM: 410.70  7/31/2021 - Present        Hip fracture ([facility] ICD-10-CM: S72.009A  ICD-9-CM: 820.8  7/18/2021 - Present        CAP (community acquired pneumonia) ICD-10-CM: J18.9  ICD-9-CM: 486  7/18/2021 - Present        Chronic atrial fibrillation ([facility] ICD-10-CM: I48.20  ICD-9-CM: 427.31  11/10/2017 - Present        Sinus node dysfunction ([facility] ICD-10-CM: I49.5  ICD-9-CM: 427.81  4/12/2016 - Present        Depression ICD-10-CM: F32.9  ICD-9-CM: 311  9/28/2015 - Present        Dyslipidemia ICD-10-CM: E78.5  ICD-9-CM: 272.4  9/23/2014 - Present        Gout ICD-10-CM: M10.9  ICD-9-CM: 274.9  Unknown - Present        Hx of carcinoma in situ of prostate ICD-10-CM: P98.026  ICD-9-CM: V13.89  Unknown - Present        Essential hypertension, benign ICD-10-CM: I10  ICD-9-CM: 401.1  11/16/2013 - Present        Kidney stones ICD-10-CM: N20.0  ICD-9-CM: 592.0  11/16/2013 - Present        Embolic stroke involving right middle cerebral artery ([facility] ICD-10-CM: I93.436  ICD-9-CM: 434.11  11/1/2013 - Present    Overview Signed 12/7/2013  9:23 AM by Guerrero Saucedo MD     presented with dysarthria, MRI Several tiny acute infarcts in the right middle cerebral artery             RESOLVED: Dizziness ICD-10-CM: R42  ICD-9-CM: 780.4  2016 - 2016        RESOLVED: Typical atrial flutter ([facility] ICD-10-CM: I48.3  ICD-9-CM: 427.32  2015 - 2015        RESOLVED: Paroxysmal atrial fibrillation ([facility] ICD-10-CM: I48.0  ICD-9-CM: 427.31  2014 - 11/10/2017        RESOLVED: Dysarthria ICD-9-CM: 784.5  2013 - 2013        RESOLVED: TIA (transient ischemic attack) ICD-10-CM: G45.9  ICD-9-CM: 435.9  2013 - 2013        RESOLVED: Other and unspecified hyperlipidemia ICD-10-CM: E78.5  ICD-9-CM: 272.4  2013 - 2014        RESOLVED: Hypokalemia ICD-10-CM: E87.6  ICD-9-CM: 276.8  2013 - 2013              HPI:   Cielo Hein is a 80 y.o. male who presented with elevated heart rate from [facility] rehab. Staff at the facility noticed patient was increasingly more hypotensive and was in atrial fibrillation. He has a history of atrial fibrillation and takes a beta-blocker. He currently reports no complaints but earlier felt dizzy/lightheaded. No palpitations, SOB, or chest pain. (Dr Zeus Baron)    :  No complaints except he thinks he is being held here against his will. Wandering speech. Not oriented to day/date/location. He thinks he is in [place]. Hb a little lower - rechecking.       Review of Systems:   Review of systems not obtained due to patient factors. Objective:   Physical Exam:     Visit Vitals  /78 (BP 1 Location: Right lower arm, BP Patient Position: At rest)   Pulse (!) 101   Temp 97.5 °F (36.4 °C)   Resp 19   Ht 5' 7\" (1.702 m)   Wt 162 lb 0.6 oz (73.5 kg)   SpO2 100%   BMI 25.38 kg/m²    O2 Flow Rate (L/min): 4 l/min O2 Device: Nasal cannula  Temp (24hrs), Av °F (36.7 °C), Min:97.5 °F (36.4 °C), Max:98.7 °F (37.1 °C)    No intake/output data recorded. No intake/output data recorded.     General:  Alert, cooperative, no distress, appears stated age. Head:  Normocephalic, without obvious abnormality, atraumatic. Eyes:  Conjunctivae/corneas clear. PERRL, EOMs intact. Nose: Nares normal. Septum midline. Mucosa normal. No drainage or sinus tenderness. Throat: Lips, mucosa, and tongue normal. Teeth and gums normal.   Neck: Supple, symmetrical, trachea midline, no adenopathy, thyroid: no enlargement/tenderness/nodules, no carotid bruit and no JVD. Lungs:   Clear to auscultation bilaterally. Chest wall:  No tenderness or deformity. Heart:  Rapid rate, no murmur, click, rub or gallop. Abdomen:   Soft, non-tender. Bowel sounds normal. No masses,  No organomegaly. Extremities: Extremities normal, atraumatic, no cyanosis or edema. No calf tenderness or cords. Site of left hip fx repairC/D/I   Pulses: 2+ and symmetric all extremities. Skin: Skin color, texture, turgor normal. No rashes   Neurologic/Psy: Diminished hearing. Alert and oriented X 1. Pleasant but he thinks he is at the [facility].  He believes he is being held here against his will.  equal.  No cogwheeling or rigidity. Gait not tested at this time. Sensation grossly normal to touch. Gross motor of extremities normal.       Data Review:       Recent Days:  Recent Labs     08/26/21  0025 08/25/21 1907   WBC 8.3 9.6   HGB 7.5* 9.5*   HCT 24.5* 31.0*    289     Recent Labs     08/26/21  0025 08/25/21 1907    142   K 4.1 3.9   * 107   CO2 28 29   * 164*   BUN 17 17   CREA 0.89 1.10   CA 8.0* 8.7   MG  --  2.1   ALB  --  2.9*   TBILI  --  0.3   ALT  --  15     No results for input(s): PH, PCO2, PO2, HCO3, FIO2 in the last 72 hours.     24 Hour Results:  Recent Results (from the past 24 hour(s))   EKG, 12 LEAD, INITIAL    Collection Time: 08/25/21  7:03 PM   Result Value Ref Range    Ventricular Rate 118 BPM    Atrial Rate 119 BPM    QRS Duration 104 ms    Q-T Interval 354 ms    QTC Calculation (Bezet) 496 ms    Calculated R Axis -11 degrees    Calculated T Axis 72 degrees    Diagnosis       Accelerated Junctional rhythm  Nonspecific T wave abnormality  Abnormal ECG  When compared with ECG of 31-JUL-2021 19:29,  Junctional rhythm has replaced Atrial fibrillation  Vent. rate has increased BY  41 BPM     CBC WITH AUTOMATED DIFF    Collection Time: 08/25/21  7:07 PM   Result Value Ref Range    WBC 9.6 4.1 - 11.1 K/uL    RBC 2.96 (L) 4.10 - 5.70 M/uL    HGB 9.5 (L) 12.1 - 17.0 g/dL    HCT 31.0 (L) 36.6 - 50.3 %    .7 (H) 80.0 - 99.0 FL    MCH 32.1 26.0 - 34.0 PG    MCHC 30.6 30.0 - 36.5 g/dL    RDW 16.7 (H) 11.5 - 14.5 %    PLATELET 055 104 - 446 K/uL    MPV 9.5 8.9 - 12.9 FL    NRBC 0.0 0  WBC    ABSOLUTE NRBC 0.00 0.00 - 0.01 K/uL    NEUTROPHILS 78 (H) 32 - 75 %    LYMPHOCYTES 9 (L) 12 - 49 %    MONOCYTES 9 5 - 13 %    EOSINOPHILS 4 0 - 7 %    BASOPHILS 0 0 - 1 %    IMMATURE GRANULOCYTES 0 0.0 - 0.5 %    ABS. NEUTROPHILS 7.4 1.8 - 8.0 K/UL    ABS. LYMPHOCYTES 0.9 0.8 - 3.5 K/UL    ABS. MONOCYTES 0.9 0.0 - 1.0 K/UL    ABS. EOSINOPHILS 0.3 0.0 - 0.4 K/UL    ABS. BASOPHILS 0.0 0.0 - 0.1 K/UL    ABS. IMM. GRANS. 0.0 0.00 - 0.04 K/UL    DF AUTOMATED     METABOLIC PANEL, COMPREHENSIVE    Collection Time: 08/25/21  7:07 PM   Result Value Ref Range    Sodium 142 136 - 145 mmol/L    Potassium 3.9 3.5 - 5.1 mmol/L    Chloride 107 97 - 108 mmol/L    CO2 29 21 - 32 mmol/L    Anion gap 6 5 - 15 mmol/L    Glucose 164 (H) 65 - 100 mg/dL    BUN 17 6 - 20 MG/DL    Creatinine 1.10 0.70 - 1.30 MG/DL    BUN/Creatinine ratio 15 12 - 20      GFR est AA >60 >60 ml/min/1.73m2    GFR est non-AA >60 >60 ml/min/1.73m2    Calcium 8.7 8.5 - 10.1 MG/DL    Bilirubin, total 0.3 0.2 - 1.0 MG/DL    ALT (SGPT) 15 12 - 78 U/L    AST (SGOT) 13 (L) 15 - 37 U/L    Alk.  phosphatase 211 (H) 45 - 117 U/L    Protein, total 7.1 6.4 - 8.2 g/dL    Albumin 2.9 (L) 3.5 - 5.0 g/dL    Globulin 4.2 (H) 2.0 - 4.0 g/dL    A-G Ratio 0.7 (L) 1.1 - 2.2     TROPONIN I    Collection Time: 08/25/21  7:07 PM   Result Value Ref Range    Troponin-I, Qt. 0.11 (H) <0.05 ng/mL   MAGNESIUM    Collection Time: 08/25/21  7:07 PM   Result Value Ref Range    Magnesium 2.1 1.6 - 2.4 mg/dL   METABOLIC PANEL, BASIC    Collection Time: 08/26/21 12:25 AM   Result Value Ref Range    Sodium 141 136 - 145 mmol/L    Potassium 4.1 3.5 - 5.1 mmol/L    Chloride 110 (H) 97 - 108 mmol/L    CO2 28 21 - 32 mmol/L    Anion gap 3 (L) 5 - 15 mmol/L    Glucose 119 (H) 65 - 100 mg/dL    BUN 17 6 - 20 MG/DL    Creatinine 0.89 0.70 - 1.30 MG/DL    BUN/Creatinine ratio 19 12 - 20      GFR est AA >60 >60 ml/min/1.73m2    GFR est non-AA >60 >60 ml/min/1.73m2    Calcium 8.0 (L) 8.5 - 10.1 MG/DL   CBC W/O DIFF    Collection Time: 08/26/21 12:25 AM   Result Value Ref Range    WBC 8.3 4.1 - 11.1 K/uL    RBC 2.32 (L) 4.10 - 5.70 M/uL    HGB 7.5 (L) 12.1 - 17.0 g/dL    HCT 24.5 (L) 36.6 - 50.3 %    .6 (H) 80.0 - 99.0 FL    MCH 32.3 26.0 - 34.0 PG    MCHC 30.6 30.0 - 36.5 g/dL    RDW 16.9 (H) 11.5 - 14.5 %    PLATELET 061 709 - 416 K/uL    MPV 9.8 8.9 - 12.9 FL    NRBC 0.0 0  WBC    ABSOLUTE NRBC 0.00 0.00 - 0.01 K/uL       Medications reviewed  Current Facility-Administered Medications   Medication Dose Route Frequency    sodium chloride (NS) flush 5-40 mL  5-40 mL IntraVENous Q8H    sodium chloride (NS) flush 5-40 mL  5-40 mL IntraVENous PRN    acetaminophen (TYLENOL) tablet 650 mg  650 mg Oral Q6H PRN    Or    acetaminophen (TYLENOL) suppository 650 mg  650 mg Rectal Q6H PRN    polyethylene glycol (MIRALAX) packet 17 g  17 g Oral DAILY PRN    ondansetron (ZOFRAN ODT) tablet 4 mg  4 mg Oral Q8H PRN    Or    ondansetron (ZOFRAN) injection 4 mg  4 mg IntraVENous Q6H PRN    allopurinoL (ZYLOPRIM) tablet 150 mg  150 mg Oral QHS    DULoxetine (CYMBALTA) capsule 90 mg  90 mg Oral DAILY    folic acid (FOLVITE) tablet 1 mg  1 mg Oral DAILY    rivaroxaban (XARELTO) tablet 15 mg  15 mg Oral DAILY    atorvastatin (LIPITOR) tablet 10 mg  10 mg Oral DAILY    tamsulosin (FLOMAX) capsule 0.4 mg  0.4 mg Oral DAILY    ciprofloxacin HCl (CIPRO) tablet 750 mg  750 mg Oral Q12H    clindamycin (CLEOCIN) capsule 300 mg  300 mg Oral TID    clindamycin (CLEOCIN) capsule 150 mg  150 mg Oral TID     Current Outpatient Medications   Medication Sig    ascorbic acid, vitamin C, (VITAMIN C) 500 mg tablet Take 500 mg by mouth daily.  bisacodyL (DULCOLAX) 10 mg supp Insert 10 mg into rectum daily as needed for Constipation.  senna-docusate (PERICOLACE) 8.6-50 mg per tablet Take 1 Tablet by mouth two (2) times a day.  omeprazole (PriLOSEC OTC) 20 mg tablet Take 20 mg by mouth daily.  ciprofloxacin HCl (CIPRO) 750 mg tablet Take 750 mg by mouth every twelve (12) hours. For 10 days : 8/23/2021 to 9/2/2021    clindamycin (CLEOCIN) 300 mg capsule Take 300 mg by mouth three (3) times daily. With 150 mg capsule for a total of 450 mg dose  For 10 days : 8/23/2021 to 9/2/2021    clindamycin (CLEOCIN) 150 mg capsule Take 150 mg by mouth three (3) times daily. With 300 mg capsule for a total of 450 mg dose  For 10 days : 8/23/2021 to 9/2/2021    Saccharomyces boulardii (Florastor) 250 mg capsule Take 250 mg by mouth daily. For 10 days : 8/24/2021 to 9/3/2021    OTHER Apply house moisturizer to left cheek scab once every shift until resolved, may leave open to ait if intact    Monitor Left heel for dressing once every shift    Clean left heel with normal saline and cover with a hydrocolloid dressing 3 times per week and as needed     Monitor left hip for dressing placement once daily    Clean left hip with normal saline and cover with dry dressing once a day    tamsulosin (FLOMAX) 0.4 mg capsule Take 1 Capsule by mouth daily.  bumetanide (BUMEX) 1 mg tablet Take 1 Tablet by mouth daily.  multivitamin (ONE A DAY) tablet Take 1 Tablet by mouth daily.     acetaminophen (TYLENOL) 325 mg tablet Take 650 mg by mouth three (3) times daily.  polyethylene glycol (Miralax) 17 gram/dose powder Take 17 g by mouth daily as needed for Constipation.  calcium-vitamin D (OS-VINCENT +D3) 500 mg-200 unit per tablet Take 1 Tablet by mouth three (3) times daily (with meals).  rivaroxaban (Xarelto) 15 mg tab tablet Take 15 mg by mouth daily (with dinner). Indications: prevention of thromboembolism in paroxysmal atrial fibrillation    DULoxetine (CYMBALTA) 30 mg capsule Take 90 mg by mouth daily.  zinc sulfate 220 mg tablet Take 1 Tab by mouth daily.  ondansetron (Zofran ODT) 4 mg disintegrating tablet 1 Tab by SubLINGual route every eight (8) hours as needed for Nausea or Vomiting.  simvastatin (Zocor) 10 mg tablet Take 10 mg by mouth nightly.  folic acid (FOLVITE) 1 mg tablet Take 1 mg by mouth daily.  allopurinol (ZYLOPRIM) 300 mg tablet Take 150 mg by mouth nightly. Care Plan discussed with: Patient and Nurse  Total time spent with patient and review of records: 30 minutes.   Pati Flores MD

## 2021-08-26 NOTE — PROGRESS NOTES
Comprehensive Nutrition Assessment      Type and Reason for Visit: Initial, Wound    Nutrition Recommendations/Plan:   1. Continue Regular diet  2. Added Ensure Enlive BID due to poor PO intake. 3. Document PO intake, weight,       Nutrition Assessment:       Pt admitted for Atrial fibrillation with rapid ventricular response ([facility] [I48.91]. Pt Anxiety, Arthritis, Atrial fibrillation, chronic, Colon cancer, DJD of knee, Dyslipidemia, Embolic stroke involving right middle cerebral artery, Falls, Gout, carcinoma in situ of prostate, Hypertension, and Vertigo. MST for \"wounds\" received. Wound care consult pending \"scattered wounds\". Pt familiar to service from 2 recent admissions in last July and earlier in August. Pt confused. Pt with recent hip fracture with surgical intervention. No PO documented. Wt noted same as on previous admission, which is still up ~10 lbs from usual BW. No reported chew/swallow difficulties other than with tough meats. Wt Readings from Last 10 Encounters:   08/25/21 73.5 kg (162 lb 0.6 oz)   08/01/21 73.5 kg (162 lb 1.6 oz)   07/25/21 70.2 kg (154 lb 12.2 oz)   07/01/21 68.9 kg (152 lb)   04/08/21 68.9 kg (152 lb)   01/19/21 68.9 kg (152 lb)   02/20/20 68.9 kg (152 lb)   10/08/19 69.4 kg (153 lb)   05/31/19 69.4 kg (153 lb)   11/26/18 74.6 kg (164 lb 6.4 oz)       Malnutrition Assessment:  Malnutrition Status:  Insufficient data    Context:  Acute illness     Findings of the 6 clinical characteristics of malnutrition:   Energy Intake:  Unable to assess  Weight Loss:  No significant weight loss     Body Fat Loss:  Unable to assess,     Muscle Mass Loss:  Unable to assess,    Fluid Accumulation:  No significant fluid accumulation,     Strength:  Not performed           Estimated Daily Nutrient Needs:  Energy (kcal): 1674 (BMRx1.3); Weight Used for Energy Requirements:    Protein (g): 74 - 88 (1.0-1.2g/kg);  Weight Used for Protein Requirements: Current  Fluid (ml/day): 2394; Method Used for Fluid Requirements: 1 ml/kcal    Documented meal intake:   No data found. Documented Supplement intake:  No data found. Nutrition Related Findings:     Last BM 8/25. Edema: 2+ LLE, 1+ RLE      Nutritionally Significant Medications:   Lipitor, Cipro, Clindamycin, Folic acid,Flomax. Wounds:    Multiple (\"scattered wounds\")       Current Nutrition Therapies:  ADULT DIET Regular    Anthropometric Measures:  · Height:  5' 7.01\" (170.2 cm)  · Current Body Wt:  73.5 kg (162 lb 0.6 oz)   · Admission Body Wt:   162 lbs    · Usual Body Wt:    150-155 lbs    · Ideal Body Wt:  148 lbs:  109.5 %   · BMI Category: Overweight (BMI 25.0-29. 9)       Nutrition Diagnosis:   · In context of social or environmental circumstances related to cognitive or neurological impairment as evidenced by  (confusion, unsure PO intake, previous wounds/current wounds)      Nutrition Interventions:   Food and/or Nutrient Delivery: Continue current diet, Start oral nutrition supplement  Nutrition Education and Counseling: No recommendations at this time  Coordination of Nutrition Care: Continue to monitor while inpatient, Interdisciplinary rounds    Goals:  PO >50% of meals and 1-2 ONS per day within 5-7 days       Nutrition Monitoring and Evaluation:   Behavioral-Environmental Outcomes: Beliefs and attitudes  Food/Nutrient Intake Outcomes: Food and nutrient intake, Supplement intake  Physical Signs/Symptoms Outcomes: Biochemical data, Weight, Skin, Meal time behavior    Discharge Planning:    Continue oral nutrition supplement, Continue current diet     Electronically signed by Peter Acosta, [address]     Contact: [phone]

## 2021-08-26 NOTE — PROGRESS NOTES
Post Fall Documentation    Anabelle Barr witnessed/unwitnessed fall occurred on 08/26/2021 (Date) at 80AM (Time). The answers to the following questions summarize the fall: In the patient's own words:  · What were you attempting to do when you fell? \"I don't know\". · Do you know why you fell? \"no\"  · Do you have any pain/discomfort or any other complaints? \"no\"  · Which part of your body made contact with the floor or other object?  unsure  Nurse:  Feliberto Was this an assisted fall? no   Was fall witnessed? No   If witnessed, what part of the body made contact with the floor or other object?  n/a   Patients mental status after the fall/when found: Alert and oriented, pleasantly confused   Any apparent injury:  No apparent injury   Immediate interventions for injury/suspected injury? Other made sure call bell is on   Patient assisted back to bed? Assist X1   Name of provider notified and time, any comments? Dr. Miriam Stout.  Name of family member notified and time: Called son Rubi Heath), no answer. Document Immediate VS and physical assessment in flowsheets. Document Neuro assessment every hour x 4 (for potential head injury or unwitnessed fall) in flowsheets.         Ariel Springer RN

## 2021-08-26 NOTE — PROGRESS NOTES
Problem: Self Care Deficits Care Plan (Adult)  Goal: *Acute Goals and Plan of Care (Insert Text)  Description:   FUNCTIONAL STATUS PRIOR TO ADMISSION: Pt is received from [facility] rehab where he was recovering from a hip fx in July 2021. Plan is for pt to transition to [facility] after rehab. Pt had progressed to walking with RW and using w/c for mobility. Pt requires assistance for ADLs. Occupational Therapy Goals  Initiated 8/26/2021  1. Patient will perform grooming, standing at sink, with supervision/set-up within 7 day(s). 2.  Patient will perform lower body dressing with minimal assistance/contact guard assist within 7 day(s). 3.  Patient will perform bathing with minimal assistance/contact guard assist within 7 day(s). 4.  Patient will perform toilet transfers with supervision/set-up within 7 day(s). 5.  Patient will perform all aspects of toileting with minimal assistance/contact guard assist within 7 day(s). 6.  Patient will participate in upper extremity therapeutic exercise/activities with supervision/set-up for 10 minutes within 7 day(s). 7.  Patient will utilize energy conservation techniques during functional activities with verbal cues within 7 day(s). Outcome: Progressing Towards Goal     OCCUPATIONAL THERAPY EVALUATION  Patient: Chandni Galo (50 y.o. male)  Date: 8/26/2021  Primary Diagnosis: Atrial fibrillation with rapid ventricular response ([address] [I48.91]        Precautions: Fall       ASSESSMENT  Based on the objective data described below, the patient presents with decreased strength, impaired balance, decreased activity tolerance, and high fall risk following admission for Afib with RVR and hypotension. Pt also with GLF here in hospital early this AM; cleared to participate. Pt is familiar to this OT from previous admission for hip fx repair. He has been at [facility] for rehab and reports he has been doing well.  Today, pt is alert, pleasant, oriented x 2, and eager to participate with therapy. BP monitored (see below) and pt denies lightheadedness/dizziness. Pt requires increased assistance for LB ADLs and for stability during standing portions of tasks due to posterior lean. He is able to progress mobility to bathroom this session for toileting, again requiring up to mod A for toileting tasks. Returned to supine for ECHO at end of session. Pt offers excellent efforts and supportive son is present for duration of session. Recommend return to SNF at discharge and son reports plan for transition to [facility] once that is complete    BP during Session: At rest: 110/74  Sitting EOB: 111/66  Standin/66  Supine; End of session: 99/57    Current Level of Function Impacting Discharge (ADLs/self-care): up to mod A for ADLs; up to min A for ADL transfers    Functional Outcome Measure: The patient scored Total: 50/100 on the Barthel Index outcome measure which is indicative of 50% impaired ability to care for basic self needs/dependency on others; inferred 100% dependency on others for instrumental ADLs. Other factors to consider for discharge: baseline cognition/confusion deficits; recent admissions; GLF in house; supportive family; fall risk     Patient will benefit from skilled therapy intervention to address the above noted impairments. PLAN :  Recommendations and Planned Interventions: self care training, functional mobility training, therapeutic exercise, balance training, therapeutic activities, endurance activities, patient education, home safety training and family training/education    Frequency/Duration: Patient will be followed by occupational therapy 5 times a week to address goals.     Recommendation for discharge: (in order for the patient to meet his/her long term goals)  Therapy up to 5 days/week in SNF setting    This discharge recommendation:  Has been made in collaboration with the attending provider and/or case management    IF patient discharges home will need the following DME: TBD       SUBJECTIVE:   Patient stated It's so nice to have you here to help me.     OBJECTIVE DATA SUMMARY:   HISTORY:   Past Medical History:   Diagnosis Date    Anxiety     Arthritis     right shoulder    Atrial fibrillation, chronic ([facility] [address] 09/23/2014    Colon cancer ([facility] [address] [facility] 2002    colon resection    Depression 9/28/2015    DJD (degenerative joint disease) of knee     left TKR    Dyslipidemia     Embolic stroke involving right middle cerebral artery 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    Hypertension     Memory loss     Peripheral neuropathy     Vertigo      Past Surgical History:   Procedure Laterality Date    HX CATARACT REMOVAL      HX GI      colon resection    HX ORTHOPAEDIC      left knee, left shoulder    AL ABDOMEN SURGERY PROC UNLISTED      colorectal    AL CARDIAC SURG PROCEDURE UNLIST      cardio version       Expanded or extensive additional review of patient history:     Home Situation  Home Environment: Apartment  One/Two Story Residence: Other (Comment)  Living Alone: No  Support Systems: Assisted living, Family member(s)  Patient Expects to be Discharged to[de-identified] Assisted living  Current DME Used/Available at Home: None    Hand dominance: Right    EXAMINATION OF PERFORMANCE DEFICITS:  Cognitive/Behavioral Status:  Neurologic State: Alert  Orientation Level: Oriented to person;Oriented to place; Disoriented to time  Cognition: Follows commands  Perception: Cues to maintain midline in standing (posterior lean)  Perseveration: No perseveration noted  Safety/Judgement: Fall prevention    Hearing:   Auditory  Auditory Impairment: Hard of hearing, bilateral  Hearing Aids/Status: Bilateral, With patient    Vision/Perceptual:    Corrective Lenses: Glasses    Range of Motion:  AROM: Generally decreased, functional  PROM: Generally decreased, functional    Strength:  Strength: Generally decreased, functional    Coordination:  Coordination: Generally decreased, functional  Fine Motor Skills-Upper: Left Intact; Right Intact    Gross Motor Skills-Upper: Left Intact; Right Intact    Tone & Sensation:  Tone: Normal  Sensation: Intact    Balance:  Sitting: Intact  Standing: Impaired; With support  Standing - Static: Constant support; Fair  Standing - Dynamic : Constant support; Fair    Functional Mobility and Transfers for ADLs:  Bed Mobility:  Supine to Sit: Contact guard assistance; Additional time  Sit to Supine: Contact guard assistance  Scooting: Minimum assistance    Transfers:  Sit to Stand: Contact guard assistance;Minimum assistance; Additional time  Stand to Sit: Contact guard assistance  Bed to Chair: Contact guard assistance;Minimum assistance (with rolling walker)  Bathroom Mobility: Contact guard assistance  Toilet Transfer : Contact guard assistance;Minimum assistance    ADL Assessment:  Feeding: Setup    Oral Facial Hygiene/Grooming: Contact guard assistance;Minimum assistance (for stability during standing tasks)    Bathing: Minimum assistance    Upper Body Dressing: Setup;Minimum assistance    Lower Body Dressing: Moderate assistance (for distal mgmt)    Toileting: Minimum assistance; Moderate assistance    ADL Intervention and task modifications:    Grooming  Grooming Assistance: Contact guard assistance;Minimum assistance  Position Performed: Standing (at sink with rolling walker)  Washing Hands: Contact guard assistance;Minimum assistance  Cues: Verbal cues provided    Lower Body Dressing Assistance  Protective Undergarmet: Moderate assistance (to don brief)  Leg Crossed Method Used: No  Position Performed: Seated edge of bed  Cues: Don;Physical assistance; Tactile cues provided;Verbal cues provided;Visual cues provided  Adaptive Equipment Used: Walker    Toileting  Toileting Assistance:  Moderate assistance  Bladder Hygiene: Supervision  Bowel Hygiene: Moderate assistance  Clothing Management: Moderate assistance  Cues: Physical assistance for pants down;Physical assistance for pants up; Tactile cues provided;Verbal cues provided;Visual cues provided  Adaptive Equipment: Grab bars; Walker    Cognitive Retraining  Safety/Judgement: Fall prevention    Functional Measure:  Barthel Index:    Bathin  Bladder: 5  Bowels: 5  Groomin  Dressin  Feeding: 10  Mobility: 5  Stairs: 0  Toilet Use: 5  Transfer (Bed to Chair and Back): 10  Total: 50/100        The Barthel ADL Index: Guidelines  1. The index should be used as a record of what a patient does, not as a record of what a patient could do. 2. The main aim is to establish degree of independence from any help, physical or verbal, however minor and for whatever reason. 3. The need for supervision renders the patient not independent. 4. A patient's performance should be established using the best available evidence. Asking the patient, friends/relatives and nurses are the usual sources, but direct observation and common sense are also important. However direct testing is not needed. 5. Usually the patient's performance over the preceding 24-48 hours is important, but occasionally longer periods will be relevant. 6. Middle categories imply that the patient supplies over 50 per cent of the effort. 7. Use of aids to be independent is allowed. Everett Fitzgerald., Barthel, D.W. (1493). Functional evaluation: the Barthel Index. [facility] (14)2. JOSHUA Rahman, Rocío Shannon., Leticia Duran, [facility], [address] [place] [address] (). Measuring the change indisability after inpatient rehabilitation; comparison of the responsiveness of the Barthel Index and Functional [place] Measure. Journal of Neurology, Neurosurgery, and Psychiatry, 66(4), 533-154.   Brendon Ibanez, N.J.A, BENNETT Diaz, & Melisa Monroe MOscarA. (2004.) Assessment of post-stroke quality of life in cost-effectiveness studies: The usefulness of the Barthel Index and the EuroQoL-5D. Quality of Life Research, 15, 103-87     Occupational Therapy Evaluation Charge Determination   History Examination Decision-Making   LOW Complexity : Brief history review  HIGH Complexity : 5 or more performance deficits relating to physical, cognitive , or psychosocial skils that result in activity limitations and / or participation restrictions HIGH Complexity : Patient presents with comorbidities that affect occupational performance. Signifigant modification of tasks or assistance (eg, physical or verbal) with assessment (s) is necessary to enable patient to complete evaluation       Based on the above components, the patient evaluation is determined to be of the following complexity level: LOW   Pain Rating:  Pt denied pain this session    Activity Tolerance:   Fair and requires rest breaks    After treatment patient left in no apparent distress:    Supine in bed, Call bell within reach, Bed / chair alarm activated, Caregiver / family present and Side rails x 3    COMMUNICATION/EDUCATION:   The patients plan of care was discussed with: Physical therapist and Registered nurse. Home safety education was provided and the patient/caregiver indicated understanding., Patient/family have participated as able in goal setting and plan of care. and Patient/family agree to work toward stated goals and plan of care. This patients plan of care is appropriate for delegation to [facility].     Thank you for this referral.  Nicky Saleh, OT  Time Calculation: 34 mins

## 2021-08-26 NOTE — H&P
[address] Adult  Hospitalist Group    History & Physical    Date of service: 8/25/2021    Patient name: Eliza Camacho  MRN: [id number]  YOB: 1927  Age: 80 y.o. Primary care provider:  Luke Zazutea MD     Source of Information: patient, medical records                                 Chief complain: Elevated heart rate    History of present illness  Eliza Camacho is a 80 y.o. male who [facility] with [facility]. Staff at the facility noticed patient was increasingly more hypotensive and was in atrial fibrillation. He has a history of atrial fibrillation and takes a beta-blocker. He currently reports no complaints but earlier felt dizzy/lightheaded. No palpitations, SOB, or chest pain. Past Medical History:   Diagnosis Date    Anxiety     Arthritis     right shoulder    Atrial fibrillation, chronic ([facility] [address] 09/23/2014    Colon cancer ([facility] [address] 2002    colon resection    Depression 9/28/2015    DJD (degenerative joint disease) of knee     left TKR    Dyslipidemia     Embolic stroke involving right middle cerebral artery [facility]) Nov 2013    presented with dysarthria, MRI Several tiny acute infarcts in the right middle cerebral artery    Falls     Gout     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    Hypertension     Memory loss     Peripheral neuropathy     Vertigo       Past Surgical History:   Procedure Laterality Date    HX CATARACT REMOVAL      HX GI      colon resection    HX ORTHOPAEDIC      left knee, left shoulder    OR ABDOMEN SURGERY PROC UNLISTED      colorectal    OR CARDIAC SURG PROCEDURE UNLIST      cardio version     Prior to Admission medications    Medication Sig Start Date End Date Taking? Authorizing Provider   tamsulosin (FLOMAX) 0.4 mg capsule Take 1 Capsule by mouth daily. 8/3/21   Frankie Lockett MD   bumetanide (BUMEX) 1 mg tablet Take 1 Tablet by mouth daily.  8/3/21 Indy Lockett MD   multivitamin (ONE A DAY) tablet Take 1 Tablet by mouth daily. Provider, Historical   acetaminophen (TYLENOL) 325 mg tablet Take 650 mg by mouth three (3) times daily. Provider, Historical   polyethylene glycol (Miralax) 17 gram/dose powder Take 17 g by mouth daily as needed for Constipation. Provider, Historical   calcium-vitamin D (OS-VINCENT +D3) 500 mg-200 unit per tablet Take 1 Tablet by mouth three (3) times daily (with meals). 7/25/21   Indy Lockett MD   rivaroxaban (Xarelto) 15 mg tab tablet Take 15 mg by mouth daily (with dinner). Indications: prevention of thromboembolism in paroxysmal atrial fibrillation    Yanelis Estrella MD   DULoxetine (CYMBALTA) 30 mg capsule Take 90 mg by mouth daily. Yanelis Estrella MD   zinc sulfate 220 mg tablet Take 1 Tab by mouth daily. 1/19/21   Kareem Jones MD   ondansetron (Zofran ODT) 4 mg disintegrating tablet 1 Tab by SubLINGual route every eight (8) hours as needed for Nausea or Vomiting. 1/19/21   Kareem Jones MD   simvastatin (Zocor) 10 mg tablet Take 10 mg by mouth nightly. Provider, Historical   folic acid (FOLVITE) 1 mg tablet Take 1 mg by mouth daily. Provider, Historical   allopurinol (ZYLOPRIM) 300 mg tablet Take 150 mg by mouth nightly. Yanelis Estrella MD     No Known Allergies   Family History   Problem Relation Age of Onset    Stroke Father     Heart Disease Father     Stroke Sister          Social history    Social History     Tobacco Use   Smoking Status Never Smoker   Smokeless Tobacco Never Used       Social History     Substance and Sexual Activity   Alcohol Use Yes    Alcohol/week: 1.0 standard drinks    Types: 1 Standard drinks or equivalent per week    Comment: rarely       Code status  Code status discussed with the patient/caregivers. Prior    Review of systems    A comprehensive review of systems was negative except for that written in the History of Present Illness.        Physical Examination Visit Vitals  /70   Pulse (!) 116   Temp 98.7 °F (37.1 °C)   Resp 21   Ht 5' 7\" (1.702 m)   Wt 73.5 kg (162 lb 0.6 oz)   SpO2 95%   BMI 25.38 kg/m²               General:  Alert, cooperative, no distress   Head:  Normocephalic, without obvious abnormality, atraumatic   Eyes:  Conjunctivae/corneas clear. PERRL, EOMs intact   Head/Neck: Nares normal. No nasal drainage or sinus tenderness  Lips, mucosa, and tongue normal   Teeth and gums normal  Clear oropharynx  Trachea midline  No palpable adenopathy  No thyroid enlargement, tenderness     Lungs:   Symmetrical chest expansion and respiratory effort  Clear to auscultation bilaterally       Heart:  Regular rhythm   Sounds normal; no murmur, rub or gallop   Abdomen:   Soft, no tenderness  Bowel sounds normal  No masses or hepatosplenomegaly     Back: No CVA tenderness   Extremities: Extremities normal, atraumatic  No cyanosis or edema     Skin: No rashes or ulcers   Musculo-      skeletal: Gait not tested  Normal symmetry, ROM, strength and tone   Neuro: Cranial nerves grossly normal     Psych: Alert, oriented x3  Normal affect, judgement and insight     Data Review    24 Hour Results:  Recent Results (from the past 24 hour(s))   EKG, 12 LEAD, INITIAL    Collection Time: 08/25/21  7:03 PM   Result Value Ref Range    Ventricular Rate 118 BPM    Atrial Rate 119 BPM    QRS Duration 104 ms    Q-T Interval 354 ms    QTC Calculation (Bezet) 496 ms    Calculated R Axis -11 degrees    Calculated T Axis 72 degrees    Diagnosis       Accelerated Junctional rhythm  Nonspecific T wave abnormality  Abnormal ECG  When compared with ECG of 31-JUL-2021 19:29,  Junctional rhythm has replaced Atrial fibrillation  Vent.  rate has increased BY  41 BPM     CBC WITH AUTOMATED DIFF    Collection Time: 08/25/21  7:07 PM   Result Value Ref Range    WBC 9.6 4.1 - 11.1 K/uL    RBC 2.96 (L) 4.10 - 5.70 M/uL    HGB 9.5 (L) 12.1 - 17.0 g/dL    HCT 31.0 (L) 36.6 - 50.3 %    .7 (H) 80.0 - 99.0 FL    MCH 32.1 26.0 - 34.0 PG    MCHC 30.6 30.0 - 36.5 g/dL    RDW 16.7 (H) 11.5 - 14.5 %    PLATELET 415 612 - 998 K/uL    MPV 9.5 8.9 - 12.9 FL    NRBC 0.0 0  WBC    ABSOLUTE NRBC 0.00 0.00 - 0.01 K/uL    NEUTROPHILS 78 (H) 32 - 75 %    LYMPHOCYTES 9 (L) 12 - 49 %    MONOCYTES 9 5 - 13 %    EOSINOPHILS 4 0 - 7 %    BASOPHILS 0 0 - 1 %    IMMATURE GRANULOCYTES 0 0.0 - 0.5 %    ABS. NEUTROPHILS 7.4 1.8 - 8.0 K/UL    ABS. LYMPHOCYTES 0.9 0.8 - 3.5 K/UL    ABS. MONOCYTES 0.9 0.0 - 1.0 K/UL    ABS. EOSINOPHILS 0.3 0.0 - 0.4 K/UL    ABS. BASOPHILS 0.0 0.0 - 0.1 K/UL    ABS. IMM. GRANS. 0.0 0.00 - 0.04 K/UL    DF AUTOMATED     METABOLIC PANEL, COMPREHENSIVE    Collection Time: 08/25/21  7:07 PM   Result Value Ref Range    Sodium 142 136 - 145 mmol/L    Potassium 3.9 3.5 - 5.1 mmol/L    Chloride 107 97 - 108 mmol/L    CO2 29 21 - 32 mmol/L    Anion gap 6 5 - 15 mmol/L    Glucose 164 (H) 65 - 100 mg/dL    BUN 17 6 - 20 MG/DL    Creatinine 1.10 0.70 - 1.30 MG/DL    BUN/Creatinine ratio 15 12 - 20      GFR est AA >60 >60 ml/min/1.73m2    GFR est non-AA >60 >60 ml/min/1.73m2    Calcium 8.7 8.5 - 10.1 MG/DL    Bilirubin, total 0.3 0.2 - 1.0 MG/DL    ALT (SGPT) 15 12 - 78 U/L    AST (SGOT) 13 (L) 15 - 37 U/L    Alk. phosphatase 211 (H) 45 - 117 U/L    Protein, total 7.1 6.4 - 8.2 g/dL    Albumin 2.9 (L) 3.5 - 5.0 g/dL    Globulin 4.2 (H) 2.0 - 4.0 g/dL    A-G Ratio 0.7 (L) 1.1 - 2.2     TROPONIN I    Collection Time: 08/25/21  7:07 PM   Result Value Ref Range    Troponin-I, Qt. 0.11 (H) <0.05 ng/mL   MAGNESIUM    Collection Time: 08/25/21  7:07 PM   Result Value Ref Range    Magnesium 2.1 1.6 - 2.4 mg/dL     Recent Labs     08/25/21  1907   WBC 9.6   HGB 9.5*   HCT 31.0*        Recent Labs     08/25/21  1907      K 3.9      CO2 29   *   BUN 17   CREA 1.10   CA 8.7   MG 2.1   ALB 2.9*   TBILI 0.3   ALT 15       Imaging  No results found.       Assessment and Plan     Atrial fibrillation with RVR  -Given 5 mg Lopressor in the ED  -Consider starting diltiazem drip if no improvement  -Continue Xarelto  -Consult cardiology    Elevated troponin  -Likely due to A. Fib  -Continue to trend    Anemia, chronic  -Continue to monitor    Recent hip fracture  -S/p surgical repair July 2021  -Resume PT/OT    CAD  -Continue aspirin and statin    History of urinary retention  -Continue Flomax    Gout  -Continue allopurinol    Diet: Regular  Activity: As tolerated  DVT prophylaxis: Xarelto  Isolation precautions: None       Signed by:  Zac Nolasco MD    August 25, 2021 at 8:45 PM

## 2021-08-26 NOTE — ED NOTES
TRANSFER - OUT REPORT:    Verbal report given to 3rd floor RN(name) on Carissa Ellison  being transferred to 331(unit) for routine progression of care       Report consisted of patients Situation, Background, Assessment and   Recommendations(SBAR). Information from the following report(s) SBAR, Kardex, ED Summary, [facility] and Cardiac Rhythm ST was reviewed with the receiving nurse. Lines:   Peripheral IV 08/25/21 Right Antecubital (Active)   Site Assessment Clean, dry, & intact 08/25/21 2049   Phlebitis Assessment 0 08/25/21 2049   Infiltration Assessment 0 08/25/21 2049   Dressing Status Clean, dry, & intact 08/25/21 2049   Dressing Type Transparent 08/25/21 2049   Hub Color/Line Status Pink 08/25/21 2049   Action Taken Blood drawn 08/25/21 2049        Opportunity for questions and clarification was provided.       Patient transported with:   Transporter

## 2021-08-26 NOTE — PROGRESS NOTES
Spiritual Care Assessment/Progress Note  [address]      NAME: Dwight Henriqeuz      MRN: [id number]  AGE: 80 y.o. SEX: male  Quaker Affiliation: Hinduism   Language: English     8/26/2021     Total Time (in minutes): 5     Spiritual Assessment begun in [facility] 3 PROG CARE TELE 2 through conversation with:         [x]Patient        [x] Family    [] Friend(s)        Reason for Consult: [facility]     Spiritual beliefs: (Please include comment if needed)     [x] Identifies with a sia tradition: Hinduism        [x] Supported by a sia community: [employer]           [] Claims no spiritual orientation:           [] Seeking spiritual identity:                [] Adheres to an individual form of spirituality:           [] Not able to assess:                           Identified resources for coping:      [x] Prayer                               [x] Music                  [] Guided Imagery     [x] Family/friends                 [] Pet visits     [] Devotional reading                         [] Unknown     [] Other:                                               Interventions offered during this visit: (See comments for more details)    Patient Interventions: Affirmation of sia, Communion [employer]), Initial/Spiritual assessment, patient floor, Prayer (actual), Prayer (assurance of)     Family/Friend(s):  Affirmation of sia, Catharsis/review of pertinent events in supportive environment, Normalization of emotional/spiritual concerns, Prayer (actual)     Plan of Care:     [x] Support spiritual and/or cultural needs    [] Support AMD and/or advance care planning process      [] Support grieving process   [] Coordinate Rites and/or Rituals    [] Coordination with community clergy   [] No spiritual needs identified at this time   [] Detailed Plan of Care below (See Comments)  [] Make referral to Music Therapy  [] Make referral to Pet Therapy     [] Make referral to Addiction services  [] Make referral to [facility]  [] Make referral to Spiritual Care Partner  [] No future visits requested        [] Follow up upon further referrals     Comments: Mr. Ted Toribio was in bed and woke up when I entered. His son was with him. Mr. Ted Toribio declined communion and told this  that he was seen this morning by Fr. Hurst and the two of them know each other. He also talked about singing in the [email or web address] choir and training the [employer]. The conversation had portions of it that were incongruent with the statements. His son said his father was started on some new medications today. Berna Godron did know he was in the hospital and who visited him this morning. Prayer offered.     ROBETR Danielson, RN, ACSW, LCSW   Page:  [phone]

## 2021-08-26 NOTE — CONSULTS
CARDIOLOGY CONSULTATION NOTE    Barber Costello MD,  [address], [place], [address] [facility]  Phone [phone]; Fax [phone]  Mobile [phone]   Voice Mail [phone]                               2021  6:52 PM  Primary Care Janusz Bartholomew MD  :  [id number]   MRN:  [id number]     CC: None  but he was noted at the nursing facility that his heart rate was elevated and his blood pressure was low    Reason for consult:  Paroxysmal atrial fibrillation. Admission Diagnosis: Atrial fibrillation with rapid ventricular response (HCC) [I48.91]              ATTENTION:   This medical record was transcribed using an electronic medical records/speech recognition system. Although proofread, it may and can contain electronic, spelling and other errors. Corrections may be executed at a later time. Please feel free to contact us for any clarifications as needed. Impression Plan/Recommendation   1. Chronic atrial fibrillation  2. Elevated troponin  3. Anemia  4. Status post hip fracture  5. History of embolic stroke  6. Sinus node dysfunction  7. Dyslipidemia            ECG: Appears to be possibly a junctional rhythm  with a rapid rate  H&H is 7.5/24, potassium 4.1, BUN is 17, creatinine 1.89, troponin was 0.11 and this is most likely indicative of supply demand mismatch and in the setting of anemia. Unclear why troponin  was ordered 1. Will resume his metoprolol and digoxin for the time being to control rate. 2. Check echo to look at LV function  3. Continue anticoagulation  4. Apply mismatch is clearly response for elevated troponin      We will be judicious and adding back negative chronotropic agents in light of the fact that there is sinus node and AV node dysfunction       [place] is a 80 y.o. male I am seeing for fibrillation with rapid ventricular rate. He has been at [facility] rehab second secondary to recent hip fracture.   He has a history of A. fib for which he takes Xarelto. He had been on metoprolol in the past but apparently this was discontinued at some point. He does not have any chest pain or shortness of breath. Also noted that he had developed hypotension when he was in atrial fibrillation. Note in the past he was considered to have sinus AV node dysfunction and he had asymptomatic bradycardia at times. Dr. Yoana Waller has talked to the patient about potential role of pacemaker in the past.    Risk factors are age, hypertension            No Known Allergies      Past Medical History:   Diagnosis Date    Anxiety     Arthritis     right shoulder    Atrial fibrillation, chronic ([facility] [address] 09/23/2014    Colon cancer ([facility] [address] 2002    colon resection    Depression 9/28/2015    DJD (degenerative joint disease) of knee     left TKR    Dyslipidemia     Embolic stroke involving right middle cerebral artery [facility]) Nov 2013    presented with dysarthria, MRI Several tiny acute infarcts in the right middle cerebral artery    Falls     Gout     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    Hypertension     Memory loss     Peripheral neuropathy     Vertigo         Past Surgical History:   Procedure Laterality Date    HX CATARACT REMOVAL      HX GI      colon resection    HX ORTHOPAEDIC      left knee, left shoulder    [place] ABDOMEN SURGERY PROC UNLISTED      colorectal    [place] CARDIAC SURG PROCEDURE UNLIST      cardio version        . Home Medications:  Prior to Admission Medications   Prescriptions Last Dose Informant Patient Reported? Taking? DULoxetine (CYMBALTA) 30 mg capsule 8/25/2021 Transfer Papers Yes Yes   Sig: Take 90 mg by mouth daily.    OTHER  Transfer Papers Yes Yes   Sig: Apply house moisturizer to left cheek scab once every shift until resolved, may leave open to ait if intact    Monitor Left heel for dressing once every shift    Clean left heel with normal saline and cover with a hydrocolloid dressing 3 times per week and as needed     Monitor left hip for dressing placement once daily    Clean left hip with normal saline and cover with dry dressing once a day   Saccharomyces boulardii (Florastor) 250 mg capsule 8/25/2021 at 0900 Transfer Papers Yes Yes   Sig: Take 250 mg by mouth daily. For 10 days : 8/24/2021 to 9/3/2021   acetaminophen (TYLENOL) 325 mg tablet 8/25/2021 at 1400 Transfer Papers Yes Yes   Sig: Take 650 mg by mouth three (3) times daily. allopurinol (ZYLOPRIM) 300 mg tablet 8/24/2021 Transfer Papers Yes Yes   Sig: Take 150 mg by mouth nightly. ascorbic acid, vitamin C, (VITAMIN C) 500 mg tablet 8/25/2021 Transfer Papers Yes Yes   Sig: Take 500 mg by mouth daily. bisacodyL (DULCOLAX) 10 mg supp  Transfer Papers Yes Yes   Sig: Insert 10 mg into rectum daily as needed for Constipation. bumetanide (BUMEX) 1 mg tablet 8/25/2021 at 0900 Transfer Papers No Yes   Sig: Take 1 Tablet by mouth daily. calcium-vitamin D (OS-VINCENT +D3) 500 mg-200 unit per tablet 8/25/2021 at 1130 Transfer Papers No Yes   Sig: Take 1 Tablet by mouth three (3) times daily (with meals). ciprofloxacin HCl (CIPRO) 750 mg tablet 8/25/2021 at 0900 Transfer Papers Yes Yes   Sig: Take 750 mg by mouth every twelve (12) hours. For 10 days : 8/23/2021 to 9/2/2021   clindamycin (CLEOCIN) 150 mg capsule 8/25/2021 at 1400 Transfer Papers Yes Yes   Sig: Take 150 mg by mouth three (3) times daily. With 300 mg capsule for a total of 450 mg dose  For 10 days : 8/23/2021 to 9/2/2021   clindamycin (CLEOCIN) 300 mg capsule 8/25/2021 at 1400 Transfer Papers Yes Yes   Sig: Take 300 mg by mouth three (3) times daily. With 150 mg capsule for a total of 450 mg dose  For 10 days : 8/23/2021 to 5/5/7649   folic acid (FOLVITE) 1 mg tablet 8/25/2021 Transfer Papers Yes Yes   Sig: Take 1 mg by mouth daily. multivitamin (ONE A DAY) tablet 8/25/2021 Transfer Papers Yes Yes   Sig: Take 1 Tablet by mouth daily.    omeprazole (PriLOSEC OTC) 20 mg tablet 2021 at 0630 Transfer Papers Yes Yes   Sig: Take 20 mg by mouth daily. ondansetron (Zofran ODT) 4 mg disintegrating tablet  Transfer Papers No Yes   Si Tab by SubLINGual route every eight (8) hours as needed for Nausea or Vomiting. polyethylene glycol (Miralax) 17 gram/dose powder  Transfer Papers Yes Yes   Sig: Take 17 g by mouth daily as needed for Constipation. rivaroxaban (Xarelto) 15 mg tab tablet 2021 at 1700 Transfer Papers Yes Yes   Sig: Take 15 mg by mouth daily (with dinner). Indications: prevention of thromboembolism in paroxysmal atrial fibrillation   senna-docusate (PERICOLACE) 8.6-50 mg per tablet 2021 at 0900 Transfer Papers Yes Yes   Sig: Take 1 Tablet by mouth two (2) times a day. simvastatin (Zocor) 10 mg tablet 2021 Transfer Papers Yes Yes   Sig: Take 10 mg by mouth nightly. tamsulosin (FLOMAX) 0.4 mg capsule 2021 at 1000 Transfer Papers No Yes   Sig: Take 1 Capsule by mouth daily. zinc sulfate 220 mg tablet 2021 Transfer Papers No Yes   Sig: Take 1 Tab by mouth daily.       Facility-Administered Medications: None       Hospital Medications:  Current Facility-Administered Medications   Medication Dose Route Frequency    metoprolol tartrate (LOPRESSOR) tablet 12.5 mg  12.5 mg Oral BID    sodium chloride (NS) flush 5-40 mL  5-40 mL IntraVENous Q8H    sodium chloride (NS) flush 5-40 mL  5-40 mL IntraVENous PRN    acetaminophen (TYLENOL) tablet 650 mg  650 mg Oral Q6H PRN    Or    acetaminophen (TYLENOL) suppository 650 mg  650 mg Rectal Q6H PRN    polyethylene glycol (MIRALAX) packet 17 g  17 g Oral DAILY PRN    ondansetron (ZOFRAN ODT) tablet 4 mg  4 mg Oral Q8H PRN    Or    ondansetron (ZOFRAN) injection 4 mg  4 mg IntraVENous Q6H PRN    allopurinoL (ZYLOPRIM) tablet 150 mg  150 mg Oral QHS    DULoxetine (CYMBALTA) capsule 90 mg  90 mg Oral DAILY    folic acid (FOLVITE) tablet 1 mg  1 mg Oral DAILY    rivaroxaban (XARELTO) tablet 15 mg  15 mg Oral DAILY    atorvastatin (LIPITOR) tablet 10 mg  10 mg Oral DAILY    tamsulosin (FLOMAX) capsule 0.4 mg  0.4 mg Oral DAILY    ciprofloxacin HCl (CIPRO) tablet 750 mg  750 mg Oral Q12H    clindamycin (CLEOCIN) capsule 300 mg  300 mg Oral TID    clindamycin (CLEOCIN) capsule 150 mg  150 mg Oral TID          OBJECTIVE       Laboratory and Imaging have been reviewed and are notable for          Diagnostic Tests:     Recent Labs     08/25/21  1907   TROIQ 0.11*     Recent Labs     08/26/21  0025 08/25/21  1907    142   K 4.1 3.9   CO2 28 29   BUN 17 17   CREA 0.89 1.10   * 164*   MG  --  2.1   WBC 8.3 9.6   HGB 7.5* 9.5*   HCT 24.5* 31.0*    289         Cardiac work up to date:  Echo Nov 2013 - LVH, EF 60%, LA upper nl  MRA neck/brain Nov 2013 - normal  Loop Monitor 1/17/14-PAC's and periods of ectopic atrial rhythm. Period of Atrial Fibrillation with rates of  BPM with aberrant beats    24 Hr Holter monitor 7/23/18 - Atrial flutter, rates 67 to 149. Rare episodes of AF with RVR. Frequent PVC's. Social History:  Social History     Tobacco Use    Smoking status: Never Smoker    Smokeless tobacco: Never Used   Substance Use Topics    Alcohol use: Yes     Alcohol/week: 1.0 standard drinks     Types: 1 Standard drinks or equivalent per week     Comment: rarely       Family History:  Family History   Problem Relation Age of Onset    Stroke Father     Heart Disease Father     Stroke Sister        Review of Symptoms:  A comprehensive review of systems was negative except for that written in the HPI. Physical Exam:      Visit Vitals  /82 (BP 1 Location: Right upper arm, BP Patient Position: Lying)   Pulse (!) 119   Temp 97.7 °F (36.5 °C)   Resp 22   Ht 5' 7.01\" (1.702 m)   Wt 162 lb 0.6 oz (73.5 kg)   SpO2 90%   BMI 25.37 kg/m²     General Appearance:  Well developed, well nourished,alert in no distress   Ears/Nose/Mouth/Throat:   Hearing reduced. Normal oral mucosa,no scleral icterus   Neck: Supple no JVD or bruits,no cervical lymphadenopathy   Chest:   Lungs clear to auscultation bilaterally   Cardiovascular:   Irregular and rate controlled   Extremities: No edema bilaterally. Pulses detected, no varicosities   Skin: Warm and dry. No bruising  Neuro  Moves all extermities and neurologically intact                                                       I have discussed the diagnosis with the patient and the intended plan as seen in the above orders. Questions were answered concerning future plans. I have discussed medication side effects and warnings with the patient as well. Emil Carr is in agreement to the plan listed above and wishes to proceed. he  was instructed not to smoke, eat heart healthy diet  and to exercise. Thank you for the consult and the opportunity to be involved in the care of Emil Carr.         Sumeet Freeman MD

## 2021-08-26 NOTE — PROGRESS NOTES
Rounded on [facility] patients and provided Anointing of the Sick at request of patient.     Apoorva Valentine

## 2021-08-26 NOTE — ED NOTES
While checking in new EMS pt at  desk, EMS noted pt to be on the floor and yelling for help. Pt assisted back into stretcher by nursing staff and provider.

## 2021-08-26 NOTE — PROGRESS NOTES
Cardiology Initial Care Encounter    Patient: Ny Vargas MRN: [id number]     YOB: 1927  Age: 80 y.o. Sex: male      Admit Date: 8/25/2021       Assessment/Plan     1. Paroxysmal a fib : off AV gricelda agents the past year or so. Start low dose BB , one time digoxin today. Cont Xarelto. Limited ECHO to reassess LV function . TSH normal in July 2021. 2.  memory impairment:   3 minimally elevated troponin: not ACS related. 4 anemia: worse after IV fluids  5 s/p recent hip fracture          Discussed plan with pt and son at bedside. HPI     Ny Vargas is a 80 y.o.   male  with PMH of a fib on xarelto, neuropathy, recent hip fracture who was admitted from [facility] for a fib RVR. Apparently taken off BB by his PCP some time last year. Chart reviewed and history obtained from son. Pt was given metoprolol 5 mg IV and 1000 ml saline bolus in the ED, HR currently 110. The patient has been referred to cardiology for on going management of a fib RVR       The patient denies chest pain, dependent edema, diaphoresis, VALDOVINOS, orthopnea, palpitations, PND, , claudication or syncope. No bleeding. Review of Symptoms:  Constitutional: negative for fatigue  ENT: negative   Respiratory: positive for dyspnea on exertion  Gastrointestinal: negative for reflux symptoms, nausea and vomiting  Genitourinary: no dysuria, hematuria, frequency   Musculoskeletal:positive for bone pain  Neurological: positive for memory problems  Other systems reviewed and negative except as above. Previous cardiac hx  Echo Nov 2013 - LVH, EF 60%, LA upper nl  MRA neck/brain Nov 2013 - normal  Loop Monitor 1/17/14-PAC's and periods of ectopic atrial rhythm. Period of Atrial Fibrillation with rates of  BPM with aberrant beats    24 Hr Holter monitor 7/23/18 - Atrial flutter, rates 67 to 149. Rare episodes of AF with RVR.  Frequent PVC's.     07/31/21    ECHO ADULT COMPLETE 07/31/2021 7/31/2021    Interpretation Summary  · Image quality for this study was technically difficult. · LV: Estimated LVEF is 55 - 60%. Normal cavity size and systolic function (ejection fraction normal). Mildly increased wall thickness. Wall motion: normal.  · Pericardium: There is a moderate left pleural effusion. · MV: Mild mitral annular calcification. · TV: Mild tricuspid valve regurgitation is present. Pulmonary hypertension found to be severe. · AV: Aortic valve leaflet calcification present. Mild aortic valve stenosis is present. · LA: Dilated left atrium. · RA: Dilated right atrium. Signed by: Slime Cummins MD on 7/31/2021  5:40 PM        Risk factors: Male   HTN    Social History     Tobacco Use    Smoking status: Never Smoker    Smokeless tobacco: Never Used   Substance Use Topics    Alcohol use:  Yes     Alcohol/week: 1.0 standard drinks     Types: 1 Standard drinks or equivalent per week     Comment: rarely     Family History   Problem Relation Age of Onset    Stroke Father     Heart Disease Father     Stroke Sister        Current Facility-Administered Medications   Medication Dose Route Frequency    digoxin (LANOXIN) tablet 0.125 mg  0.125 mg Oral ONCE    metoprolol tartrate (LOPRESSOR) tablet 12.5 mg  12.5 mg Oral BID    sodium chloride (NS) flush 5-40 mL  5-40 mL IntraVENous Q8H    sodium chloride (NS) flush 5-40 mL  5-40 mL IntraVENous PRN    acetaminophen (TYLENOL) tablet 650 mg  650 mg Oral Q6H PRN    Or    acetaminophen (TYLENOL) suppository 650 mg  650 mg Rectal Q6H PRN    polyethylene glycol (MIRALAX) packet 17 g  17 g Oral DAILY PRN    ondansetron (ZOFRAN ODT) tablet 4 mg  4 mg Oral Q8H PRN    Or    ondansetron (ZOFRAN) injection 4 mg  4 mg IntraVENous Q6H PRN    allopurinoL (ZYLOPRIM) tablet 150 mg  150 mg Oral QHS    DULoxetine (CYMBALTA) capsule 90 mg  90 mg Oral DAILY    folic acid (FOLVITE) tablet 1 mg  1 mg Oral DAILY    rivaroxaban (XARELTO) tablet 15 mg  15 mg Oral DAILY    atorvastatin (LIPITOR) tablet 10 mg  10 mg Oral DAILY    tamsulosin (FLOMAX) capsule 0.4 mg  0.4 mg Oral DAILY    ciprofloxacin HCl (CIPRO) tablet 750 mg  750 mg Oral Q12H    clindamycin (CLEOCIN) capsule 300 mg  300 mg Oral TID    clindamycin (CLEOCIN) capsule 150 mg  150 mg Oral TID     Current Outpatient Medications   Medication Sig    ascorbic acid, vitamin C, (VITAMIN C) 500 mg tablet Take 500 mg by mouth daily.  bisacodyL (DULCOLAX) 10 mg supp Insert 10 mg into rectum daily as needed for Constipation.  senna-docusate (PERICOLACE) 8.6-50 mg per tablet Take 1 Tablet by mouth two (2) times a day.  omeprazole (PriLOSEC OTC) 20 mg tablet Take 20 mg by mouth daily.  ciprofloxacin HCl (CIPRO) 750 mg tablet Take 750 mg by mouth every twelve (12) hours. For 10 days : 8/23/2021 to 9/2/2021    clindamycin (CLEOCIN) 300 mg capsule Take 300 mg by mouth three (3) times daily. With 150 mg capsule for a total of 450 mg dose  For 10 days : 8/23/2021 to 9/2/2021    clindamycin (CLEOCIN) 150 mg capsule Take 150 mg by mouth three (3) times daily. With 300 mg capsule for a total of 450 mg dose  For 10 days : 8/23/2021 to 9/2/2021    Saccharomyces boulardii (Florastor) 250 mg capsule Take 250 mg by mouth daily. For 10 days : 8/24/2021 to 9/3/2021    OTHER Apply house moisturizer to left cheek scab once every shift until resolved, may leave open to ait if intact    Monitor Left heel for dressing once every shift    Clean left heel with normal saline and cover with a hydrocolloid dressing 3 times per week and as needed     Monitor left hip for dressing placement once daily    Clean left hip with normal saline and cover with dry dressing once a day    tamsulosin (FLOMAX) 0.4 mg capsule Take 1 Capsule by mouth daily.  bumetanide (BUMEX) 1 mg tablet Take 1 Tablet by mouth daily.  multivitamin (ONE A DAY) tablet Take 1 Tablet by mouth daily.     acetaminophen (TYLENOL) 325 mg tablet Take 650 mg by mouth three (3) times daily.  polyethylene glycol (Miralax) 17 gram/dose powder Take 17 g by mouth daily as needed for Constipation.  calcium-vitamin D (OS-VINCENT +D3) 500 mg-200 unit per tablet Take 1 Tablet by mouth three (3) times daily (with meals).  rivaroxaban (Xarelto) 15 mg tab tablet Take 15 mg by mouth daily (with dinner). Indications: prevention of thromboembolism in paroxysmal atrial fibrillation    DULoxetine (CYMBALTA) 30 mg capsule Take 90 mg by mouth daily.  zinc sulfate 220 mg tablet Take 1 Tab by mouth daily.  ondansetron (Zofran ODT) 4 mg disintegrating tablet 1 Tab by SubLINGual route every eight (8) hours as needed for Nausea or Vomiting.  simvastatin (Zocor) 10 mg tablet Take 10 mg by mouth nightly.  folic acid (FOLVITE) 1 mg tablet Take 1 mg by mouth daily.  allopurinol (ZYLOPRIM) 300 mg tablet Take 150 mg by mouth nightly. Objective:     Vitals:    08/25/21 2115 08/25/21 2230 08/26/21 0100 08/26/21 0442   BP: 110/77 113/72 113/78    Pulse: (!) 114 (!) 115 (!) 101    Resp: 19 15 19    Temp:   97.9 °F (36.6 °C) 97.5 °F (36.4 °C)   SpO2: 96% 93% 100%    Weight:       Height:            Intake and Output:  Current Shift: No intake/output data recorded. Last three shifts: No intake/output data recorded. Gen: Well-developed, well-nourished, in no acute distress  Neck: Supple,No JVD, No Carotid Bruit,   Resp: No accessory muscle use,  Clear breath sounds,prolonged exp phase   Card: Tachycardic, irregular no murmur, rubs or gallop. No thrills.    Abd:  Soft, non-tender, non-distended, BS+,   MSK: No cyanosis  Skin: No rashes    Neuro: moving all four extremities , follows commands appropriately  Psych:  Limited  insight, oriented to person, place , alert, Nml Affect  LE: No edema    EKG:  EKG Results     Procedure 720 Value Units Date/Time    EKG, 12 LEAD, INITIAL [[id number]] Collected: 08/25/21 1903    Order Status: Completed Updated: 08/25/21 1903 Ventricular Rate 118 BPM      Atrial Rate 119 BPM      QRS Duration 104 ms      Q-T Interval 354 ms      QTC Calculation (Bezet) 496 ms      Calculated R Axis -11 degrees      Calculated T Axis 72 degrees      Diagnosis --     Accelerated Junctional rhythm  Nonspecific T wave abnormality  Abnormal ECG  When compared with ECG of 31-JUL-2021 19:29,  Junctional rhythm has replaced Atrial fibrillation  Vent. rate has increased BY  41 BPM          }.      TELEMETRY: a fib RVR     Lab/Data Review:  BMP:   Lab Results   Component Value Date/Time     08/26/2021 12:25 AM    K 4.1 08/26/2021 12:25 AM     (H) 08/26/2021 12:25 AM    CO2 28 08/26/2021 12:25 AM    AGAP 3 (L) 08/26/2021 12:25 AM     (H) 08/26/2021 12:25 AM    BUN 17 08/26/2021 12:25 AM    CREA 0.89 08/26/2021 12:25 AM    GFRAA >60 08/26/2021 12:25 AM    GFRNA >60 08/26/2021 12:25 AM     CBC:   Lab Results   Component Value Date/Time    WBC 8.3 08/26/2021 12:25 AM    HGB 7.5 (L) 08/26/2021 12:25 AM    HCT 24.5 (L) 08/26/2021 12:25 AM     08/26/2021 12:25 AM     All Cardiac Markers in the last 24 hours:   Lab Results   Component Value Date/Time    TROIQ 0.11 (H) 08/25/2021 07:07 PM        Signed By: Nile Oates NP     August 26, 2021

## 2021-08-26 NOTE — PROGRESS NOTES
Reviewed chart. Patient's troponin is elevated and has a history of cardiac issues including recent MI (7/31/21). Will wait until troponin trending down before proceeding with PT eval.    1329 Afternoon Addendum    Checked chart, no new troponin results yet.

## 2021-08-26 NOTE — PROGRESS NOTES
CM Note:  1. Pt will need rapid covid prior to return to [facility]; a referral was sent in [place]  2. Family to transport to snf at d/c  3. Cards following  4. PT/OT following  5. CM following for any needs prior to d/c  JUAN Chavez

## 2021-08-27 ENCOUNTER — APPOINTMENT (OUTPATIENT)
Dept: GENERAL RADIOLOGY | Age: 86
DRG: 308 | End: 2021-08-27
Attending: FAMILY MEDICINE
Payer: MEDICARE

## 2021-08-27 ENCOUNTER — APPOINTMENT (OUTPATIENT)
Dept: CT IMAGING | Age: 86
DRG: 308 | End: 2021-08-27
Attending: FAMILY MEDICINE
Payer: MEDICARE

## 2021-08-27 ENCOUNTER — APPOINTMENT (OUTPATIENT)
Dept: MRI IMAGING | Age: 86
DRG: 308 | End: 2021-08-27
Attending: INTERNAL MEDICINE
Payer: MEDICARE

## 2021-08-27 LAB
ALBUMIN SERPL-MCNC: 2.6 G/DL (ref 3.5–5)
ALBUMIN/GLOB SERPL: 0.6 {RATIO} (ref 1.1–2.2)
ALP SERPL-CCNC: 196 U/L (ref 45–117)
ALT SERPL-CCNC: 15 U/L (ref 12–78)
AMMONIA PLAS-SCNC: 14 UMOL/L
ANION GAP SERPL CALC-SCNC: 3 MMOL/L (ref 5–15)
AST SERPL-CCNC: 19 U/L (ref 15–37)
ATRIAL RATE: 119 BPM
BASOPHILS # BLD: 0.1 K/UL (ref 0–0.1)
BASOPHILS NFR BLD: 1 % (ref 0–1)
BILIRUB SERPL-MCNC: 0.5 MG/DL (ref 0.2–1)
BUN SERPL-MCNC: 20 MG/DL (ref 6–20)
BUN/CREAT SERPL: 19 (ref 12–20)
CALCIUM SERPL-MCNC: 8.2 MG/DL (ref 8.5–10.1)
CALCULATED R AXIS, ECG10: -11 DEGREES
CALCULATED T AXIS, ECG11: 72 DEGREES
CHLORIDE SERPL-SCNC: 109 MMOL/L (ref 97–108)
CO2 SERPL-SCNC: 27 MMOL/L (ref 21–32)
CREAT SERPL-MCNC: 1.04 MG/DL (ref 0.7–1.3)
DIAGNOSIS, 93000: NORMAL
DIFFERENTIAL METHOD BLD: ABNORMAL
ECHO IVC PROX: 2.51 CM
ECHO LA MAJOR AXIS: 3.6 CM
ECHO LA MINOR AXIS: 1.95 CM
ECHO LV EDV A2C: 95.67 ML
ECHO LV EDV A4C: 65.37 ML
ECHO LV EDV BP: 81.13 ML (ref 67–155)
ECHO LV EDV INDEX A4C: 35.3 ML/M2
ECHO LV EDV INDEX BP: 43.9 ML/M2
ECHO LV EDV NDEX A2C: 51.7 ML/M2
ECHO LV EJECTION FRACTION A2C: 46 PERCENT
ECHO LV EJECTION FRACTION A4C: 28 PERCENT
ECHO LV EJECTION FRACTION BIPLANE: 38.9 PERCENT (ref 55–100)
ECHO LV ESV A2C: 51.65 ML
ECHO LV ESV A4C: 47.28 ML
ECHO LV ESV BP: 49.61 ML (ref 22–58)
ECHO LV ESV INDEX A2C: 27.9 ML/M2
ECHO LV ESV INDEX A4C: 25.6 ML/M2
ECHO LV ESV INDEX BP: 26.8 ML/M2
ECHO LV INTERNAL DIMENSION DIASTOLIC: 4.43 CM (ref 4.2–5.9)
ECHO LV INTERNAL DIMENSION SYSTOLIC: 2.92 CM
ECHO LV IVSD: 1.02 CM (ref 0.6–1)
ECHO LV MASS 2D: 163.1 G (ref 88–224)
ECHO LV MASS INDEX 2D: 88.2 G/M2 (ref 49–115)
ECHO LV POSTERIOR WALL DIASTOLIC: 1.11 CM (ref 0.6–1)
ECHO TV REGURGITANT MAX VELOCITY: 314.65 CM/S
ECHO TV REGURGITANT PEAK GRADIENT: 39.6 MMHG
EOSINOPHIL # BLD: 0.2 K/UL (ref 0–0.4)
EOSINOPHIL NFR BLD: 2 % (ref 0–7)
ERYTHROCYTE [DISTWIDTH] IN BLOOD BY AUTOMATED COUNT: 16.7 % (ref 11.5–14.5)
GLOBULIN SER CALC-MCNC: 4.1 G/DL (ref 2–4)
GLUCOSE BLD STRIP.AUTO-MCNC: 110 MG/DL (ref 65–117)
GLUCOSE SERPL-MCNC: 136 MG/DL (ref 65–100)
HCT VFR BLD AUTO: 30.7 % (ref 36.6–50.3)
HGB BLD-MCNC: 9.3 G/DL (ref 12.1–17)
IMM GRANULOCYTES # BLD AUTO: 0.1 K/UL (ref 0–0.04)
IMM GRANULOCYTES NFR BLD AUTO: 1 % (ref 0–0.5)
LYMPHOCYTES # BLD: 0.7 K/UL (ref 0.8–3.5)
LYMPHOCYTES NFR BLD: 7 % (ref 12–49)
MAGNESIUM SERPL-MCNC: 2.1 MG/DL (ref 1.6–2.4)
MCH RBC QN AUTO: 32.1 PG (ref 26–34)
MCHC RBC AUTO-ENTMCNC: 30.3 G/DL (ref 30–36.5)
MCV RBC AUTO: 105.9 FL (ref 80–99)
MONOCYTES # BLD: 0.8 K/UL (ref 0–1)
MONOCYTES NFR BLD: 8 % (ref 5–13)
NEUTS SEG # BLD: 9.1 K/UL (ref 1.8–8)
NEUTS SEG NFR BLD: 83 % (ref 32–75)
NRBC # BLD: 0 K/UL (ref 0–0.01)
NRBC BLD-RTO: 0 PER 100 WBC
PLATELET # BLD AUTO: 283 K/UL (ref 150–400)
PMV BLD AUTO: 10.1 FL (ref 8.9–12.9)
POTASSIUM SERPL-SCNC: 4.6 MMOL/L (ref 3.5–5.1)
PROT SERPL-MCNC: 6.7 G/DL (ref 6.4–8.2)
Q-T INTERVAL, ECG07: 354 MS
QRS DURATION, ECG06: 104 MS
QTC CALCULATION (BEZET), ECG08: 496 MS
RBC # BLD AUTO: 2.9 M/UL (ref 4.1–5.7)
SERVICE CMNT-IMP: NORMAL
SODIUM SERPL-SCNC: 139 MMOL/L (ref 136–145)
TSH SERPL DL<=0.05 MIU/L-ACNC: 2.93 UIU/ML (ref 0.36–3.74)
VENTRICULAR RATE, ECG03: 118 BPM
VIT B12 SERPL-MCNC: 493 PG/ML (ref 193–986)
WBC # BLD AUTO: 11 K/UL (ref 4.1–11.1)

## 2021-08-27 PROCEDURE — 82140 ASSAY OF AMMONIA: CPT

## 2021-08-27 PROCEDURE — 0042T CT CODE NEURO PERF W CBF: CPT

## 2021-08-27 PROCEDURE — 70450 CT HEAD/BRAIN W/O DYE: CPT

## 2021-08-27 PROCEDURE — 80053 COMPREHEN METABOLIC PANEL: CPT

## 2021-08-27 PROCEDURE — 71045 X-RAY EXAM CHEST 1 VIEW: CPT

## 2021-08-27 PROCEDURE — 70496 CT ANGIOGRAPHY HEAD: CPT

## 2021-08-27 PROCEDURE — 82607 VITAMIN B-12: CPT

## 2021-08-27 PROCEDURE — 84443 ASSAY THYROID STIM HORMONE: CPT

## 2021-08-27 PROCEDURE — 74011000636 HC RX REV CODE- 636: Performed by: STUDENT IN AN ORGANIZED HEALTH CARE EDUCATION/TRAINING PROGRAM

## 2021-08-27 PROCEDURE — 82962 GLUCOSE BLOOD TEST: CPT

## 2021-08-27 PROCEDURE — 99232 SBSQ HOSP IP/OBS MODERATE 35: CPT | Performed by: SPECIALIST

## 2021-08-27 PROCEDURE — 70551 MRI BRAIN STEM W/O DYE: CPT

## 2021-08-27 PROCEDURE — 83735 ASSAY OF MAGNESIUM: CPT

## 2021-08-27 PROCEDURE — 36415 COLL VENOUS BLD VENIPUNCTURE: CPT

## 2021-08-27 PROCEDURE — 85025 COMPLETE CBC W/AUTO DIFF WBC: CPT

## 2021-08-27 PROCEDURE — APPSS30 APP SPLIT SHARED TIME 16-30 MINUTES: Performed by: NURSE PRACTITIONER

## 2021-08-27 PROCEDURE — 92610 EVALUATE SWALLOWING FUNCTION: CPT

## 2021-08-27 PROCEDURE — 95816 EEG AWAKE AND DROWSY: CPT | Performed by: PSYCHIATRY & NEUROLOGY

## 2021-08-27 PROCEDURE — 4A03X5D MEASUREMENT OF ARTERIAL FLOW, INTRACRANIAL, EXTERNAL APPROACH: ICD-10-PCS | Performed by: RADIOLOGY

## 2021-08-27 PROCEDURE — 0CJY8ZZ INSPECTION OF MOUTH AND THROAT, VIA NATURAL OR ARTIFICIAL OPENING ENDOSCOPIC: ICD-10-PCS | Performed by: SPECIALIST

## 2021-08-27 PROCEDURE — 65270000029 HC RM PRIVATE

## 2021-08-27 PROCEDURE — 99223 1ST HOSP IP/OBS HIGH 75: CPT | Performed by: PSYCHIATRY & NEUROLOGY

## 2021-08-27 RX ADMIN — IOPAMIDOL 120 ML: 755 INJECTION, SOLUTION INTRAVENOUS at 10:48

## 2021-08-27 RX ADMIN — Medication 10 ML: at 16:19

## 2021-08-27 RX ADMIN — Medication 10 ML: at 21:00

## 2021-08-27 NOTE — PROGRESS NOTES
1140:  Code S called was called. He is to have a [place] w/u, MRI of xiomara and neuro consult. CM Note:  Waiting on clearance from cards to d/c to [facility] [employer] [facility] [facility]. Needs a rapid covid prior to d/c. A referral was sent in [facility] to [facility] and put pt on a will call.   RAKESH Kapoor

## 2021-08-27 NOTE — NURSE NAVIGATOR
Chart reviewed by Heart Failure Nurse Navigator. Heart Failure database completed. Recent admission 7/18/21 to 7/26/21 with L hip fracture and possible CAP. Discharged to [facility]. Readmitted from 7/31/21 to 8/3/21 with encephalopathy and pulmonary edema with L pleural effusion. He had no previous history of HF. Patient is on HF Bundle from this admission and he discharged back to [facility]. Readmission risk score is 21%. He is admitted since 8/25/21 with atrial fib. This morning a Code S was called for decreased responsiveness. Neuro and Cardiology have been consulted. His daughter has been at bedside for [employer] [address] discussions. He has AMD and DDNR in place. Change in EF noted and discussed with primary nurse, Cristian Falk. EF:  Recent Echo with EF of 55 to 60%. Repeat Echo yesterday with EF 35 to 40%. ACEi/ARB/ARNi: **    BB: Metoprolol tartrate 12.5 mg BID. Aldosterone Antagonist: **    Obstructive Sleep Apnea Screening:   STOP-BANG score:   Referred to Sleep Medicine:     CRT  Not indicated. NYHA Functional Class **. Cardiologist: Dr Jackie Osler discharge follow up phone call to be made within 48-72 hours of discharge.

## 2021-08-27 NOTE — PROGRESS NOTES
Problem: Dysphagia (Adult)  Goal: *Acute Goals and Plan of Care (Insert Text)  Description: Swallowing goals initiatd 8-27-21:  1) tolerate Easy to chew diet without s/s aspiration by 8-30-21  Outcome: Progressing Towards Goal   SPEECH LANGUAGE PATHOLOGY BEDSIDE SWALLOW EVALUATION  Patient: Ny Vargas (30 y.o. male)  Date: 8/27/2021  Primary Diagnosis: Atrial fibrillation with rapid ventricular response (HCC) [I48.91]        Precautions: aspriation       ASSESSMENT :  Based on the objective data described below, the patient presents with odd dysphagia with wincing and double swallows. This was new after code s today. Now aphasic as well. Head CT with minimal findings in head(hyperprefusion in R temporal and occipital lobes). possible motion artifact. 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 ?new odynophagia. No overt thrush, needs ENT. Admitted 8-25-21 with hypotension, a-fib RVR.  8-26 ;fall  8-27:  acute delirium with dysarthria and weakness-Code S called. PMh:  lives at [facility],  recent hip fx with surgery, a-fib,  colon CA sp resection, anxiety and depression, cataract surgery, DJD, embolic shower,  CVA (R embolic), HTN, HLD, arthritis, memory loss, vertigo. Patient will benefit from skilled intervention to address the above impairments. Patients rehabilitation potential is considered to be Good     PLAN :  Recommendations and Planned Interventions:  Easy to chew diet. Low threshold to go NPO again if dysphagia increases   Await MRI findings. Try meds crushed. He will need MBS if still here Monday  Recommend ENT for evaluation of vocal cords  Needs intensive SLP for aphasia. Frequency/Duration: Patient will be followed by speech-language pathology 2 times a week to address goals. Discharge Recommendations: To Be Determined     SUBJECTIVE:   Patient stated yes. or \"no\" and not much else     OBJECTIVE:     Past Medical History: Diagnosis Date    Anxiety     Arthritis     right shoulder    Atrial fibrillation, chronic (HCC) 09/23/2014    Colon cancer ([facility] [address] 2002    colon resection    Depression 9/28/2015    DJD (degenerative joint disease) of knee     left TKR    Dyslipidemia     Embolic stroke involving right middle cerebral artery [facility]) Nov 2013    presented with dysarthria, MRI Several tiny acute infarcts in the right middle cerebral artery    Falls     Gout     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    Hypertension     Memory loss     Peripheral neuropathy     Vertigo      Past Surgical History:   Procedure Laterality Date    HX CATARACT REMOVAL      HX GI      colon resection    HX ORTHOPAEDIC      left knee, left shoulder    VA ABDOMEN SURGERY PROC UNLISTED      colorectal    VA CARDIAC SURG PROCEDURE UNLIST      cardio version     Prior Level of Function/Home Situation:   Home Situation  Home Environment: Apartment  One/Two Story Residence: Other (Comment)  Living Alone: No  Support Systems: Assisted living, Family member(s)  Patient Expects to be Discharged to[de-identified] Assisted living  Current DME Used/Available at Home: None  Diet prior to admission: regular, thins  Current Diet:  NPO   Cognitive and Communication Status:  Neurologic State: Drowsy  Orientation Level: Unable to verbalize  Cognition: Decreased command following  Perception: Cues to maintain midline in standing (posterior lean)  Perseveration: No perseveration noted  Safety/Judgement: Fall prevention  Oral Assessment:     P.O. Trials:  Patient Position: upright in bed  Vocal quality prior to P.O.:  (harsh and loud-daughter reports that his voice changed)  Consistency Presented: Thin liquid; Solid;Puree (ate a frances cracker, ice cream, water)  How Presented: Self-fed/presented;SLP-fed/presented;Spoon;Straw;Successive swallows   ORAL PHASE:   Bolus Acceptance: No impairment  Bolus Formation/Control: No impairment     Propulsion: No impairment  Oral Residue: None  PHARYNGEAL PHASE:   Initiation of Swallow: No impairment  Laryngeal Elevation:  (wincing with swallow)  Aspiration Signs/Symptoms: Clear throat     HE appeared to have odynophagia. Daughter and RNs report no dysphagia ever (yesterday, after CVA 8 years ago) He ate dinner last night WNL  No PO x sips of water today. SPEECH:   Did not appear to understand y/n ?'s accurately. Better with written information. Complicated by D[facility]IN [facility]  Followed directions with max cues. He did not answer ?'s or respond with sentences. RNs report he talked nonstop yesterday . He had dementia so confused yesteday, but no aphasia. NOMS:   The NOMS functional outcome measure was used to quantify this patient's level of swallowing impairment. Based on the NOMS, the patient was determined to be at level 5 for swallow function       NOMS Swallowing Levels:  Level 1 (CN): NPO  Level 2 (CM): NPO but takes consistency in therapy  Level 3 (CL): Takes less than 50% of nutrition p.o. and continues with nonoral feedings; and/or safe with mod cues; and/or max diet restriction  Level 4 (CK): Safe swallow but needs mod cues; and/or mod diet restriction; and/or still requires some nonoral feeding/supplements  Level 5 (CJ): Safe swallow with min diet restriction; and/or needs min cues  Level 6 (CI): Independent with p.o.; rare cues; usually self cues; may need to avoid some foods or needs extra time  Level 7 ([address] [place] [address]): Independent for all p.o.  ALYSE. (2003). National Outcomes Measurement System (NOMS): Adult Speech-Language Pathology User's Guide. Pain:  Pain Scale 1: Numeric (0 - 10)  Pain Intensity 1: 0       After treatment:   Patient left in no apparent distress in bed and Nursing notified    COMMUNICATION/EDUCATION:   Patient was educated regarding his deficit(s) of dysphagia, aphasia as this relates to his diagnosis of possible CVA, possible throat cancer.   He demonstrated Guarded understanding as evidenced by aphasia, daughter present and understood . The patient's plan of care including recommendations, planned interventions, and recommended diet changes were discussed with: Registered nurse. Patient/family have participated as able in goal setting and plan of care. Patient/family agree to work toward stated goals and plan of care.     Thank you for this referral.  Duane Fletcher, SLP  Time Calculation: 20 mins

## 2021-08-27 NOTE — PROGRESS NOTES
Bedside and Verbal shift change report given to Moose Truong RN (oncoming nurse) by Suzanna Lopez RN and Cande Lopez RN (offgoing nurse). Report included the following information SBAR, Kardex, Intake/Output, MAR, Recent Results, Med Rec Status and Cardiac Rhythm Afib.     2300 Bedside and Verbal shift change report given to Rema Wright RN (oncoming nurse) by Moose Truong RN (offgoing nurse). Report included the following information SBAR, Kardex, Intake/Output, MAR, Recent Results, Med Rec Status and Cardiac Rhythm Afib.

## 2021-08-27 NOTE — PROGRESS NOTES
Daily Progress Note: 8/27/2021  Nia Levi MD    Assessment/Plan:   Atrial fibrillation with RVR  -Continue Xarelto  -Consulted cardiology     Elevated troponin  -Likely due to A.  Fib  -Continue to trend     Anemia, chronic  -Continue to monitor  -Transfuse if Hb <7     Recent hip fracture  -S/p surgical repair July 2021  -Resume PT/OT     CAD  -Continue aspirin and statin     History of urinary retention  -Continue Flomax     Gout  -Continue allopurinol    Altered Mental Status; likely underlying dementia  -monitor     Problem List:  Problem List as of 8/27/2021 Date Reviewed: 12/20/2018        Codes Class Noted - Resolved    Atrial fibrillation with rapid ventricular response (HCC) ICD-10-CM: I48.91  ICD-9-CM: 427.31  8/25/2021 - Present        NSTEMI (non-ST elevated myocardial infarction) ([facility] ICD-10-CM: I21.4  ICD-9-CM: 410.70  7/31/2021 - Present        Hip fracture ([facility] ICD-10-CM: S72.009A  ICD-9-CM: 820.8  7/18/2021 - Present        CAP (community acquired pneumonia) ICD-10-CM: J18.9  ICD-9-CM: 486  7/18/2021 - Present        Chronic atrial fibrillation ([facility] ICD-10-CM: I48.20  ICD-9-CM: 427.31  11/10/2017 - Present        Sinus node dysfunction ([facility] ICD-10-CM: I49.5  ICD-9-CM: 427.81  4/12/2016 - Present        Depression ICD-10-CM: F32.9  ICD-9-CM: 311  9/28/2015 - Present        Dyslipidemia ICD-10-CM: E78.5  ICD-9-CM: 272.4  9/23/2014 - Present        Gout ICD-10-CM: M10.9  ICD-9-CM: 274.9  Unknown - Present        Hx of carcinoma in situ of prostate ICD-10-CM: V87.937  ICD-9-CM: V13.89  Unknown - Present        Essential hypertension, benign ICD-10-CM: I10  ICD-9-CM: 401.1  11/16/2013 - Present        Kidney stones ICD-10-CM: N20.0  ICD-9-CM: 592.0  11/16/2013 - Present        Embolic stroke involving right middle cerebral artery ([facility] [address] ICD-10-CM: G24.422  ICD-9-CM: 434.11  11/1/2013 - Present    Overview Signed 12/7/2013  9:23 AM by Karson Antonio MD     presented with dysarthria, MRI Several tiny acute infarcts in the right middle cerebral artery             RESOLVED: Dizziness ICD-10-CM: R42  ICD-9-CM: 780.4  4/12/2016 - 6/21/2016        RESOLVED: Typical atrial flutter ([facility] ICD-10-CM: I48.3  ICD-9-CM: 427.32  5/6/2015 - 9/28/2015        RESOLVED: Paroxysmal atrial fibrillation ([facility] [address] ICD-10-CM: I48.0  ICD-9-CM: 427.31  9/23/2014 - 11/10/2017        RESOLVED: Dysarthria ICD-9-CM: 784.5  11/17/2013 - 12/7/2013        RESOLVED: TIA (transient ischemic attack) ICD-10-CM: G45.9  ICD-9-CM: 435.9  11/16/2013 - 12/7/2013        RESOLVED: Other and unspecified hyperlipidemia ICD-10-CM: E78.5  ICD-9-CM: 272.4  11/16/2013 - 9/23/2014        RESOLVED: Hypokalemia ICD-10-CM: E87.6  ICD-9-CM: 276.8  11/16/2013 - 12/7/2013              HPI:   Bandar Browning is a 80 y.o. male who presented with elevated heart rate from [facility] rehab. Staff at the facility noticed patient was increasingly more hypotensive and was in atrial fibrillation. He has a history of atrial fibrillation and takes a beta-blocker. He currently reports no complaints but earlier felt dizzy/lightheaded. No palpitations, SOB, or chest pain. (Dr Audrey Carbajal)    8/26:  No complaints except he thinks he is being held here against his will. Wandering speech. Not oriented to day/date/location. He thinks he is in [place]. Hb a little lower - rechecking.     8/27:  More agitated and says he is leaving. Daughter in room helping with the agitation. Not oriented to day/date/location. Rate better this AM.  Hb ok at 9.3. Possibly could go back to [facility] today if ok with Cards. A few rales noted this AM - may be atelectasis but will check CXR.        Review of Systems:   Review of systems not obtained due to patient factors.     Objective:   Physical Exam:     Visit Vitals  BP (!) 108/59 (BP 1 Location: Left upper arm, BP Patient Position: At rest)   Pulse 76   Temp 98.1 °F (36.7 °C)   Resp 18   Ht 5' 7\" (1.702 m)   Wt 73.5 kg (162 lb 0.6 oz)   SpO2 92%   BMI 25.38 kg/m²    O2 Flow Rate (L/min): 3 l/min O2 Device: None (Room air)  Temp (24hrs), Av.7 °F (36.5 °C), Min:97.4 °F (36.3 °C), Max:98.1 °F (36.7 °C)    1901 -  0700  In: 200 [P.O.:200]  Out: 200 [Urine:200]   No intake/output data recorded. General:  Alert, cooperative, no distress, appears stated age. Head:  Normocephalic, without obvious abnormality, atraumatic. Eyes:  Conjunctivae/corneas clear. PERRL, EOMs intact. Neck: Supple, symmetrical, trachea midline, no adenopathy, thyroid: no enlargement/tenderness/nodules, no carotid bruit and no JVD. Lungs:   A few basilar rales. Chest wall:  No tenderness or deformity. Heart:  Rate in 70s - irreg, no murmur, click, rub or gallop. Abdomen:   Soft, non-tender. Bowel sounds normal. No masses,  No organomegaly. Extremities: Extremities normal, atraumatic, no cyanosis or edema. No calf tenderness or cords. Site of left hip fx repairC/D/I   Pulses: 2+ and symmetric all extremities. Skin:  turgor normal. No rashes   Neurologic/Psy: Diminished hearing. Alert and oriented X 1. Does not know his location. He believes he is being held here against his will.  equal.  No cogwheeling or rigidity. Gait not tested at this time. Sensation grossly normal to touch. Gross motor of extremities normal.       Data Review:       Recent Days:  Recent Labs     21  0045 21  0025 21   WBC 11.0 8.3 9.6   HGB 9.3* 7.5* 9.5*   HCT 30.7* 24.5* 31.0*    245 289     Recent Labs     21  0045 21  0025 21    141 142   K 4.6 4.1 3.9   * 110* 107   CO2 27 28 29   * 119* 164*   BUN 20 17 17   CREA 1.04 0.89 1.10   CA 8.2* 8.0* 8.7   MG 2.1  --  2.1   ALB 2.6*  --  2.9*   TBILI 0.5  --  0.3   ALT 15  --  15     No results for input(s): PH, PCO2, PO2, HCO3, FIO2 in the last 72 hours.     24 Hour Results:  Recent Results (from the past 24 hour(s))   ECHO ADULT FOLLOW-UP OR LIMITED    Collection Time: 08/26/21  3:38 PM   Result Value Ref Range    IVSd 1.02 (A) 0.60 - 1.00 cm    LVIDd 4.43 4.20 - 5.90 cm    LVIDs 2.92 cm    LVPWd 1.11 (A) 0.60 - 1.00 cm    BP EF 38.9 (A) 55.0 - 100.0 percent    LV Ejection Fraction MOD 2C 46 percent    LV Ejection Fraction MOD 4C 28 percent    LV ED Vol A2C 95.67 mL    LV ED Vol A4C 65.37 mL    LV ED Vol BP 81.13 67.0 - 155.0 mL    LV ES Vol A2C 51.65 mL    LV ES Vol A4C 47.28 mL    LV ES Vol BP 49.61 22.0 - 58.0 mL    Left Atrium Major Axis 3.60 cm    Triscuspid Valve Regurgitation Peak Gradient 39.60 mmHg    TR Max Velocity 314.65 cm/s    IVC proximal 2.51 cm    LV Mass .1 88.0 - 224.0 g    LV Mass AL Index 88.2 49.0 - 115.0 g/m2    LVES Vol Index BP 26.8 mL/m2    LVED Vol Index BP 43.9 mL/m2    Left Atrium Minor Axis 1.95 cm    LVED Vol Index A4C 35.3 mL/m2    LVED Vol Index A2C 51.7 mL/m2    LVES Vol Index A4C 25.6 mL/m2    LVES Vol Index A2C 57.9 mL/m2   METABOLIC PANEL, COMPREHENSIVE    Collection Time: 08/27/21 12:45 AM   Result Value Ref Range    Sodium 139 136 - 145 mmol/L    Potassium 4.6 3.5 - 5.1 mmol/L    Chloride 109 (H) 97 - 108 mmol/L    CO2 27 21 - 32 mmol/L    Anion gap 3 (L) 5 - 15 mmol/L    Glucose 136 (H) 65 - 100 mg/dL    BUN 20 6 - 20 MG/DL    Creatinine 1.04 0.70 - 1.30 MG/DL    BUN/Creatinine ratio 19 12 - 20      GFR est AA >60 >60 ml/min/1.73m2    GFR est non-AA >60 >60 ml/min/1.73m2    Calcium 8.2 (L) 8.5 - 10.1 MG/DL    Bilirubin, total 0.5 0.2 - 1.0 MG/DL    ALT (SGPT) 15 12 - 78 U/L    AST (SGOT) 19 15 - 37 U/L    Alk.  phosphatase 196 (H) 45 - 117 U/L    Protein, total 6.7 6.4 - 8.2 g/dL    Albumin 2.6 (L) 3.5 - 5.0 g/dL    Globulin 4.1 (H) 2.0 - 4.0 g/dL    A-G Ratio 0.6 (L) 1.1 - 2.2     CBC WITH AUTOMATED DIFF    Collection Time: 08/27/21 12:45 AM   Result Value Ref Range    WBC 11.0 4.1 - 11.1 K/uL    RBC 2.90 (L) 4.10 - 5.70 M/uL    HGB 9.3 (L) 12.1 - 17.0 g/dL    HCT 30.7 (L) 36.6 - 50.3 %    .9 (H) 80.0 - 99.0 FL    MCH 32.1 26.0 - 34.0 PG    MCHC 30.3 30.0 - 36.5 g/dL    RDW 16.7 (H) 11.5 - 14.5 %    PLATELET 569 185 - 495 K/uL    MPV 10.1 8.9 - 12.9 FL    NRBC 0.0 0  WBC    ABSOLUTE NRBC 0.00 0.00 - 0.01 K/uL    NEUTROPHILS 83 (H) 32 - 75 %    LYMPHOCYTES 7 (L) 12 - 49 %    MONOCYTES 8 5 - 13 %    EOSINOPHILS 2 0 - 7 %    BASOPHILS 1 0 - 1 %    IMMATURE GRANULOCYTES 1 (H) 0.0 - 0.5 %    ABS. NEUTROPHILS 9.1 (H) 1.8 - 8.0 K/UL    ABS. LYMPHOCYTES 0.7 (L) 0.8 - 3.5 K/UL    ABS. MONOCYTES 0.8 0.0 - 1.0 K/UL    ABS. EOSINOPHILS 0.2 0.0 - 0.4 K/UL    ABS. BASOPHILS 0.1 0.0 - 0.1 K/UL    ABS. IMM.  GRANS. 0.1 (H) 0.00 - 0.04 K/UL    DF AUTOMATED     MAGNESIUM    Collection Time: 08/27/21 12:45 AM   Result Value Ref Range    Magnesium 2.1 1.6 - 2.4 mg/dL       Medications reviewed  Current Facility-Administered Medications   Medication Dose Route Frequency    metoprolol tartrate (LOPRESSOR) tablet 12.5 mg  12.5 mg Oral BID    sodium chloride (NS) flush 5-40 mL  5-40 mL IntraVENous Q8H    sodium chloride (NS) flush 5-40 mL  5-40 mL IntraVENous PRN    acetaminophen (TYLENOL) tablet 650 mg  650 mg Oral Q6H PRN    Or    acetaminophen (TYLENOL) suppository 650 mg  650 mg Rectal Q6H PRN    polyethylene glycol (MIRALAX) packet 17 g  17 g Oral DAILY PRN    ondansetron (ZOFRAN ODT) tablet 4 mg  4 mg Oral Q8H PRN    Or    ondansetron (ZOFRAN) injection 4 mg  4 mg IntraVENous Q6H PRN    allopurinoL (ZYLOPRIM) tablet 150 mg  150 mg Oral QHS    DULoxetine (CYMBALTA) capsule 90 mg  90 mg Oral DAILY    folic acid (FOLVITE) tablet 1 mg  1 mg Oral DAILY    rivaroxaban (XARELTO) tablet 15 mg  15 mg Oral DAILY    atorvastatin (LIPITOR) tablet 10 mg  10 mg Oral DAILY    tamsulosin (FLOMAX) capsule 0.4 mg  0.4 mg Oral DAILY    ciprofloxacin HCl (CIPRO) tablet 750 mg  750 mg Oral Q12H    clindamycin (CLEOCIN) capsule 300 mg  300 mg Oral TID    clindamycin (CLEOCIN) capsule 150 mg  150 mg Oral TID       Care Plan discussed with: Patient and Nurse  Total time spent with patient and review of records: 30 minutes.   Marleen Beebe MD

## 2021-08-27 NOTE — PROGRESS NOTES
CARDIOLOGY PROGRESS NOTE        [address], [place], [address] [facility]  Phone [phone]; Fax [phone]          2021 8:14 AM       Admit Date:           2021  Admit Diagnosis:  Atrial fibrillation with rapid ventricular response ([address] [I48.91]  :          1927   MRN:          [id number]        Assessment/Plan  1 chronic a fib: RVR on admission. Had been off BB approx 1 yr. Cont metoprolol 12.5 mg BID. Cont Xarelto  2.elevated troponin: not ACS, this is a chronic elevation   3. Anemia  4. Status post hip fracture  5. History of embolic stroke  6. Sinus node dysfunction  7. Dyslipidemia    Stable for discharge. OP follow up Dr Sebas Garcia. D/W dtr in law at bedside. Will sign off                 We discussed the expected course, resolution and complications of the diagnosis(es) in detail. Medication risks, benefits, costs, interactions, and alternatives were discussed as indicated. No intake/output data recorded. Last 3 Recorded Weights in this Encounter    21 1858 21 1512   Weight: 73.5 kg (162 lb 0.6 oz) 73.5 kg (162 lb 0.6 oz)         1901 -  0700  In: 200 [P.O.:200]  Out: 200 [Urine:200]    SUBJECTIVE      Admitted for a fib RVR           Eliza Camacho reports none.       Current Facility-Administered Medications   Medication Dose Route Frequency    metoprolol tartrate (LOPRESSOR) tablet 12.5 mg  12.5 mg Oral BID    sodium chloride (NS) flush 5-40 mL  5-40 mL IntraVENous Q8H    sodium chloride (NS) flush 5-40 mL  5-40 mL IntraVENous PRN    acetaminophen (TYLENOL) tablet 650 mg  650 mg Oral Q6H PRN    Or    acetaminophen (TYLENOL) suppository 650 mg  650 mg Rectal Q6H PRN    polyethylene glycol (MIRALAX) packet 17 g  17 g Oral DAILY PRN    ondansetron (ZOFRAN ODT) tablet 4 mg  4 mg Oral Q8H PRN    Or    ondansetron (ZOFRAN) injection 4 mg  4 mg IntraVENous Q6H PRN    allopurinoL (ZYLOPRIM) tablet 150 mg 150 mg Oral QHS    DULoxetine (CYMBALTA) capsule 90 mg  90 mg Oral DAILY    folic acid (FOLVITE) tablet 1 mg  1 mg Oral DAILY    rivaroxaban (XARELTO) tablet 15 mg  15 mg Oral DAILY    atorvastatin (LIPITOR) tablet 10 mg  10 mg Oral DAILY    tamsulosin (FLOMAX) capsule 0.4 mg  0.4 mg Oral DAILY    ciprofloxacin HCl (CIPRO) tablet 750 mg  750 mg Oral Q12H    clindamycin (CLEOCIN) capsule 300 mg  300 mg Oral TID    clindamycin (CLEOCIN) capsule 150 mg  150 mg Oral TID      OBJECTIVE               Intake/Output Summary (Last 24 hours) at 8/27/2021 0814  Last data filed at 8/27/2021 3898  Gross per 24 hour   Intake 200 ml   Output 200 ml   Net 0 ml       Review of Systems - History obtained from the patient AS PER  HPI        PHYSICAL EXAM        Visit Vitals  BP (!) 108/59 (BP 1 Location: Left upper arm, BP Patient Position: At rest)   Pulse 76   Temp 98.1 °F (36.7 °C)   Resp 18   Ht 5' 7\" (1.702 m)   Wt 73.5 kg (162 lb 0.6 oz)   SpO2 92%   BMI 25.38 kg/m²       Gen: Well-developed, well-nourished, in no acute distress  alert and oriented x 2  HEENT:  Pink conjunctivae, [place],  No scleral icterus or conjunctival, moist mucous membranes  Neck: Supple,No JVD, No Carotid Bruit,  Resp: No accessory muscle use, Clear breath sounds, No rales or rhonchi  Card: irregular Rhythm,Normal S1, S2, No murmurs. MSK: No cyanosis or clubbing, good capillary refill  Skin: No rashes or ulcers, no bruising  Neuro:  Moving all four extremities, no focal deficit, follows commands appropriately  Psych:  Fair  insight, oriented to person, place and time, alert, Nml Affect  LE: No edema       DATA REVIEW      Echo Nov 2013 - LVH, EF 60%, LA upper nl  MRA neck/brain Nov 2013 - normal  Loop Monitor 1/17/14-PAC's and periods of ectopic atrial rhythm. Period of Atrial Fibrillation with rates of  BPM with aberrant beats    24 Hr Holter monitor 7/23/18 - Atrial flutter, rates 67 to 149. Rare episodes of AF with RVR.  Frequent PVC's.    Cardiac monitor a fib 80's         Laboratory and Imaging have been reviewed by me and are notable for  Recent Labs     08/25/21  1907   TROIQ 0.11*     Recent Labs     08/27/21  0045 08/26/21  0025 08/25/21  1907    141 142   K 4.6 4.1 3.9   CO2 27 28 29   BUN 20 17 17   CREA 1.04 0.89 1.10   * 119* 164*   MG 2.1  --  2.1   WBC 11.0 8.3 9.6   HGB 9.3* 7.5* 9.5*   HCT 30.7* 24.5* 31.0*    245 289             Gurwinder Landry NP

## 2021-08-27 NOTE — PROGRESS NOTES
Noted events this AM regarding RRT/Code S.  Spoke with RN, pt currently very lethargic and difficult to arouse, in agreement to defer this PM and follow up tomorrow

## 2021-08-27 NOTE — PROGRESS NOTES
0700: Bedside and Verbal shift change report given to Amber Hooker (oncoming nurse) by Chris Jordan RN (offgoing nurse). Report included the following information SBAR, Kardex, MAR, Accordion and Cardiac Rhythm AFib. This patient was assisted with Intentional Toileting every 2 hours during this shift as appropriate. Documentation of ambulation and output reflected on Flowsheet as appropriate. This patient was assisted with Intentional Toileting every 2 hours during this shift as appropriate. Documentation of ambulation and output reflected on Flowsheet as appropriate. Purposeful hourly rounding was completed using AIDET and 5Ps. Outcomes of PHR documented as they occurred. Bed alarm in use as appropriate. Dual Suction and ambubag in place. 1028: Upon entering the room, it was noticed that the patient was difficult to arouse, unable to follow commands and unable to verbalize. Daughter in law stated that his state was not baseline. Stroke Education provided to patient and relative(s) and the following topics were discussed    1. Patients personal risk factors for stroke are hypertension, atrial fibrillation, family history and prior stroke    2. Warning signs of Stroke:        * Sudden numbness or weakness of the face, arm or leg, especially on one side of          The body            * Sudden confusion, trouble speaking or understanding        * Sudden trouble seeing in one or both eyes        * Sudden trouble walking, dizziness, loss of balance or coordination        * Sudden severe headache with no known cause      3. Importance of activation Emergency Medical Services ( 9-1-1 ) immediately if experience any warning signs of stroke. 4. Be sure and schedule a follow-up appointment with your primary care doctor or any specialists as instructed. 5. You must take medicine every day to treat your risk factors for stroke. Be sure to take your medicines exactly as your doctor tells you: no more, no less. Know what your medicines are for , what they do. Anti-thrombotics /anticoagulants can help prevent strokes. You are taking the following medicine(s)  xarelto 15mg. 6.  Smoking and second-hand smoke greatly increase your risk of stroke, cardiovascular disease and death. Smoking history never    7. Information provided was [facility] Stroke Education Binder    8. Documentation of teaching completed in Patient Education Activity and on Care Plan with teaching response noted?   yes

## 2021-08-27 NOTE — PROGRESS NOTES
Reviewed chart and spoke with daughter in law. Patient currently sleeping and had been agitated last night. Deferred therapy this morning - later Code stroke  Called. Will defer until work up is compete.

## 2021-08-27 NOTE — CONSULTS
NEUROLOGY IN-PATIENT NEW CONSULTATION      2021    RE: Henry Mead         1927      REFERRED BY:  Lola Marquis MD      CHIEF COMPLAINT:  This is Henry Mead is a 80 y.o. male   who had concerns including Irregular Heart Beat. HPI:     Patient admitted on 21 for elevated heart rate with note of hypotension and afib. Was having lightheaded spells. Cardiology Dr Queen Grey was involved. Placed on Metoprolol. Currently on Xarelto    Today, patient noted to be more agitated    This morning at 11:40 AM, nurse noted sudden  in mentation and unable to form words with weakness of L hand. Code S was called. Possible dementia but not on any medications. Had similar episode of unresponsiveness 2 weeks ago. ROS   (-) fever  (-) rash  All other systems reviewed and are negative    Past Medical Hx  Past Medical History:   Diagnosis Date    Anxiety     Arthritis     right shoulder    Atrial fibrillation, chronic ([facility] [address] 2014    Colon cancer ([facility] [address]     colon resection    Depression 2015    DJD (degenerative joint disease) of knee     left TKR    Dyslipidemia     Embolic stroke involving right middle cerebral artery [facility]) 2013    presented with dysarthria, MRI Several tiny acute infarcts in the right middle cerebral artery    Falls     Gout     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    Hypertension     Memory loss     Peripheral neuropathy     Vertigo        Social Hx  Social History     Socioeconomic History    Marital status:      Spouse name: Not on file    Number of children: Not on file    Years of education: Not on file    Highest education level: Not on file   Tobacco Use    Smoking status: Never Smoker    Smokeless tobacco: Never Used   Substance and Sexual Activity    Alcohol use:  Yes     Alcohol/week: 1.0 standard drinks     Types: 1 Standard drinks or equivalent per week     Comment: rarely     Social Determinants of Health     Financial Resource Strain:     Difficulty of Paying Living Expenses:    Food Insecurity:     Worried About Running Out of Food in the Last Year:     [address] in the Last Year:    Transportation Needs:     Lack of Transportation (Medical):      Lack of Transportation (Non-Medical):    Physical Activity:     Days of Exercise per Week:     Minutes of Exercise per Session:    Stress:     Feeling of Stress :    Social Connections:     Frequency of Communication with Friends and Family:     Frequency of Social Gatherings with Friends and Family:     Attends Spiritism Services:     Active Member of Clubs or Organizations:     Attends Club or Organization Meetings:     Marital Status:        Family Hx  Family History   Problem Relation Age of Onset    Stroke Father     Heart Disease Father     Stroke Sister        ALLERGIES  No Known Allergies    CURRENT MEDS  Current Facility-Administered Medications   Medication Dose Route Frequency Provider Last Rate Last Admin    metoprolol tartrate (LOPRESSOR) tablet 12.5 mg  12.5 mg Oral BID Shelton Banker NAEL, NP   12.5 mg at 08/26/21 1749    sodium chloride (NS) flush 5-40 mL  5-40 mL IntraVENous Q8H Jose Downey MD   10 mL at 08/26/21 2129    sodium chloride (NS) flush 5-40 mL  5-40 mL IntraVENous PRN Jose Downey MD        acetaminophen (TYLENOL) tablet 650 mg  650 mg Oral Q6H PRN Jose Downey MD        Or   Bautista Saliva acetaminophen (TYLENOL) suppository 650 mg  650 mg Rectal Q6H PRN Jose Downey MD        polyethylene glycol (MIRALAX) packet 17 g  17 g Oral DAILY PRN Jose Downey MD        ondansetron (ZOFRAN ODT) tablet 4 mg  4 mg Oral Q8H PRN Jose Downey MD        Or    ondansetron [facility]) injection 4 mg  4 mg IntraVENous Q6H PRN Jose Downey MD        allopurinoL (ZYLOPRIM) tablet 150 mg  150 mg Oral QHS Jose Downey MD   150 mg at 08/26/21 2121    DULoxetine (CYMBALTA) capsule 90 mg  90 mg Oral DAILY Jose Downey MD 90 mg at 59/72/23 1456    folic acid (FOLVITE) tablet 1 mg  1 mg Oral DAILY Calvin Herzog MD   1 mg at 08/26/21 1038    rivaroxaban (XARELTO) tablet 15 mg  15 mg Oral DAILY Calvin Herzog MD   15 mg at 08/26/21 1748    atorvastatin (LIPITOR) tablet 10 mg  10 mg Oral DAILY Calvin Herzog MD   10 mg at 08/26/21 1038    tamsulosin (FLOMAX) capsule 0.4 mg  0.4 mg Oral DAILY Calvin Herzog MD   0.4 mg at 08/26/21 1038           PREVIOUS WORKUP: (reviewed)  IMAGING:    CT Results (recent):  Results from Hospital Encounter encounter on 08/25/21    CT CODE NEURO PERF W CBF    Narrative  INDICATION: code s    EXAMINATION:  CT ANGIOGRAPHY HEAD AND NECK    COMPARISON: No prior CTs are available for comparison. MRA of the brain was  reviewed. TECHNIQUE:  Following the uneventful administration of 100 mL of Isovue-370,  axial CT angiography of the head and neck was performed. Delayed axial images  through the head were also obtained. Coronal and sagittal reconstructions were  obtained. Manual postprocessing of images was performed. 3-D  Sagittal maximal  intensity projection images were obtained. 3-D Coronal maximal intensity  projections were obtained. CT dose reduction was achieved through use of a  standardized protocol tailored for this examination and automatic exposure  control for dose modulation. This study was analyzed by the [address] ai algorithm. FINDINGS:    CTA NECK:    Aortic Arch: Atherosclerotic plaque. .  Right Common Carotid Artery: No significant abnormality. Right Internal Carotid Artery: No significant abnormality. NASCET Right: 0-25%. Left Common Carotid Artery: No significant abnormality. Left Internal Carotid Artery: No significant abnormality. NASCET Left: 0-25%. .  Carotid stenosis determined using NASCET criteria. Right Vertebral Artery: Small caliber. .  Left Vertebral Artery: Dominant vessel tortuous. No significant stenosis.   Cervical Soft Tissues: Soft tissue mass involving the left aryepiglottic fold  extending to the left true vocal cord measuring approximately 2.0 x 1.5 cm which  extends laterally to the left thyroid cartilage. . Superior to inferior extension  approximately 4.1 cm. No definite adenopathy is identified in the neck. Evaluation is limited by the phase of the contrast bolus. Lung Apices: Bilateral pleural effusions. Bones: No destructive bone lesion. Multilevel degenerative changes in the  cervical spine. Additional Comments: N/A.    CTA HEAD:    Posterior Circulation: No flow limiting stenosis or occlusion. Anterior Circulation: No flow limiting stenosis or occlusion. Additional Comments: Small bilateral posterior communicating arteries. Chronic  small vessel ischemic disease. .    There are no regional areas of elevated Tmax, decreased cerebral blood flow or  blood volume. rCBF < 30% = 0 cc. Tmax > 6 seconds = 0 cc. There is an area of relative hypoperfusion involving the right temporal lobe and  right occipital lobe. This may be secondary to motion artifact    Impression  1. No acute process. No arterial thrombosis/occlusion, dissection, or  flow-limiting stenosis. 2.  Mass centered on the left aryepiglottic fold extending superiorly to  inferiorly from the left vallecula to the left true vocal cord. 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 3.  Bilateral pleural effusions. Multilevel degenerative disc disease in the  cervical spine. Intracranial chronic small vessel ischemic disease      MRI Results (recent):  Results from [place] encounter on 11/16/13    MRA BRAIN WO CONT    Narrative  **Final Report**      ICD Codes / Adm. Diagnosis: 435.9  [id number] / Unspecified transient cerebral  Other  Examination:  MR HEAD MRA WO CON  - [id number] - Nov 16 2013  7:43PM  Accession No:  [id number]  Reason:  TIA      REPORT:  INDICATION: Acute right hemisphere infarct. CVA.   TECHNIQUE: Axial 3-D time-of-flight MR angiography was performed of the  cranial base and proximal intracranial vessels. During intravenous bolus  infusion 15 mL of gadolinium 3-D slab MR angiography was performed from  aortic arch to the skull base. MIP reconstructions were performed of both  data sets. COMPARISON: None available. FINDINGS:  Normal takeoffs of the brachycephalic vessels from the arch. The vertebral arteries have good flow. The left vertebral artery is dominant. The basilar artery has a normal appearance and the dominate supply to the  posterior cerebral arteries. A small left posterior communicating artery is  demonstrated. The common carotid arteries are both normal.  Carotid bifurcations are essentially normal with minimal if any  atherosclerotic plaque and no stenosis. Mild tortuosity of the cervical internal carotid arteries which are  otherwise normal.  The carotid siphons are unremarkable. There is symmetric flow in the middle cerebral arteries. The anterior cerebral arteries are relatively symmetric. Impression  : Normal MRA of the arch, neck and head arteries. Signing/Reading Doctor: Alta Levin ([id number])  Approved: Alta Levin ([id number])  Nov 16 2013  8:16PM      IR Results (recent):  No results found for this or any previous visit. VAS/US Results (recent):  No results found for this or any previous visit.           LABS (reviewed)  Results for orders placed or performed during the hospital encounter of 08/25/21   CBC WITH AUTOMATED DIFF   Result Value Ref Range    WBC 9.6 4.1 - 11.1 K/uL    RBC 2.96 (L) 4.10 - 5.70 M/uL    HGB 9.5 (L) 12.1 - 17.0 g/dL    HCT 31.0 (L) 36.6 - 50.3 %    .7 (H) 80.0 - 99.0 FL    MCH 32.1 26.0 - 34.0 PG    MCHC 30.6 30.0 - 36.5 g/dL    RDW 16.7 (H) 11.5 - 14.5 %    PLATELET 360 046 - 625 K/uL    MPV 9.5 8.9 - 12.9 FL    NRBC 0.0 0  WBC    ABSOLUTE NRBC 0.00 0.00 - 0.01 K/uL    NEUTROPHILS 78 (H) 32 - 75 %    LYMPHOCYTES 9 (L) 12 - 49 %    MONOCYTES 9 5 - 13 %    EOSINOPHILS 4 0 - 7 % BASOPHILS 0 0 - 1 %    IMMATURE GRANULOCYTES 0 0.0 - 0.5 %    ABS. NEUTROPHILS 7.4 1.8 - 8.0 K/UL    ABS. LYMPHOCYTES 0.9 0.8 - 3.5 K/UL    ABS. MONOCYTES 0.9 0.0 - 1.0 K/UL    ABS. EOSINOPHILS 0.3 0.0 - 0.4 K/UL    ABS. BASOPHILS 0.0 0.0 - 0.1 K/UL    ABS. IMM. GRANS. 0.0 0.00 - 0.04 K/UL    DF AUTOMATED     METABOLIC PANEL, COMPREHENSIVE   Result Value Ref Range    Sodium 142 136 - 145 mmol/L    Potassium 3.9 3.5 - 5.1 mmol/L    Chloride 107 97 - 108 mmol/L    CO2 29 21 - 32 mmol/L    Anion gap 6 5 - 15 mmol/L    Glucose 164 (H) 65 - 100 mg/dL    BUN 17 6 - 20 MG/DL    Creatinine 1.10 0.70 - 1.30 MG/DL    BUN/Creatinine ratio 15 12 - 20      GFR est AA >60 >60 ml/min/1.73m2    GFR est non-AA >60 >60 ml/min/1.73m2    Calcium 8.7 8.5 - 10.1 MG/DL    Bilirubin, total 0.3 0.2 - 1.0 MG/DL    ALT (SGPT) 15 12 - 78 U/L    AST (SGOT) 13 (L) 15 - 37 U/L    Alk.  phosphatase 211 (H) 45 - 117 U/L    Protein, total 7.1 6.4 - 8.2 g/dL    Albumin 2.9 (L) 3.5 - 5.0 g/dL    Globulin 4.2 (H) 2.0 - 4.0 g/dL    A-G Ratio 0.7 (L) 1.1 - 2.2     TROPONIN I   Result Value Ref Range    Troponin-I, Qt. 0.11 (H) <0.05 ng/mL   MAGNESIUM   Result Value Ref Range    Magnesium 2.1 1.6 - 2.4 mg/dL   METABOLIC PANEL, BASIC   Result Value Ref Range    Sodium 141 136 - 145 mmol/L    Potassium 4.1 3.5 - 5.1 mmol/L    Chloride 110 (H) 97 - 108 mmol/L    CO2 28 21 - 32 mmol/L    Anion gap 3 (L) 5 - 15 mmol/L    Glucose 119 (H) 65 - 100 mg/dL    BUN 17 6 - 20 MG/DL    Creatinine 0.89 0.70 - 1.30 MG/DL    BUN/Creatinine ratio 19 12 - 20      GFR est AA >60 >60 ml/min/1.73m2    GFR est non-AA >60 >60 ml/min/1.73m2    Calcium 8.0 (L) 8.5 - 10.1 MG/DL   CBC W/O DIFF   Result Value Ref Range    WBC 8.3 4.1 - 11.1 K/uL    RBC 2.32 (L) 4.10 - 5.70 M/uL    HGB 7.5 (L) 12.1 - 17.0 g/dL    HCT 24.5 (L) 36.6 - 50.3 %    .6 (H) 80.0 - 99.0 FL    MCH 32.3 26.0 - 34.0 PG    MCHC 30.6 30.0 - 36.5 g/dL    RDW 16.9 (H) 11.5 - 14.5 %    PLATELET 931 491 - 584 K/uL    MPV 9.8 8.9 - 12.9 FL    NRBC 0.0 0  WBC    ABSOLUTE NRBC 0.00 0.00 - 2.61 K/uL   METABOLIC PANEL, COMPREHENSIVE   Result Value Ref Range    Sodium 139 136 - 145 mmol/L    Potassium 4.6 3.5 - 5.1 mmol/L    Chloride 109 (H) 97 - 108 mmol/L    CO2 27 21 - 32 mmol/L    Anion gap 3 (L) 5 - 15 mmol/L    Glucose 136 (H) 65 - 100 mg/dL    BUN 20 6 - 20 MG/DL    Creatinine 1.04 0.70 - 1.30 MG/DL    BUN/Creatinine ratio 19 12 - 20      GFR est AA >60 >60 ml/min/1.73m2    GFR est non-AA >60 >60 ml/min/1.73m2    Calcium 8.2 (L) 8.5 - 10.1 MG/DL    Bilirubin, total 0.5 0.2 - 1.0 MG/DL    ALT (SGPT) 15 12 - 78 U/L    AST (SGOT) 19 15 - 37 U/L    Alk. phosphatase 196 (H) 45 - 117 U/L    Protein, total 6.7 6.4 - 8.2 g/dL    Albumin 2.6 (L) 3.5 - 5.0 g/dL    Globulin 4.1 (H) 2.0 - 4.0 g/dL    A-G Ratio 0.6 (L) 1.1 - 2.2     CBC WITH AUTOMATED DIFF   Result Value Ref Range    WBC 11.0 4.1 - 11.1 K/uL    RBC 2.90 (L) 4.10 - 5.70 M/uL    HGB 9.3 (L) 12.1 - 17.0 g/dL    HCT 30.7 (L) 36.6 - 50.3 %    .9 (H) 80.0 - 99.0 FL    MCH 32.1 26.0 - 34.0 PG    MCHC 30.3 30.0 - 36.5 g/dL    RDW 16.7 (H) 11.5 - 14.5 %    PLATELET 224 839 - 856 K/uL    MPV 10.1 8.9 - 12.9 FL    NRBC 0.0 0  WBC    ABSOLUTE NRBC 0.00 0.00 - 0.01 K/uL    NEUTROPHILS 83 (H) 32 - 75 %    LYMPHOCYTES 7 (L) 12 - 49 %    MONOCYTES 8 5 - 13 %    EOSINOPHILS 2 0 - 7 %    BASOPHILS 1 0 - 1 %    IMMATURE GRANULOCYTES 1 (H) 0.0 - 0.5 %    ABS. NEUTROPHILS 9.1 (H) 1.8 - 8.0 K/UL    ABS. LYMPHOCYTES 0.7 (L) 0.8 - 3.5 K/UL    ABS. MONOCYTES 0.8 0.0 - 1.0 K/UL    ABS. EOSINOPHILS 0.2 0.0 - 0.4 K/UL    ABS. BASOPHILS 0.1 0.0 - 0.1 K/UL    ABS. IMM.  GRANS. 0.1 (H) 0.00 - 0.04 K/UL    DF AUTOMATED     MAGNESIUM   Result Value Ref Range    Magnesium 2.1 1.6 - 2.4 mg/dL   GLUCOSE, POC   Result Value Ref Range    Glucose (POC) 110 65 - 117 mg/dL    Performed by Sanna Oneal (pct)    EKG, 12 LEAD, INITIAL   Result Value Ref Range    Ventricular Rate 118 BPM    Atrial Rate 119 BPM    QRS Duration 104 ms    Q-T Interval 354 ms    QTC Calculation (Bezet) 496 ms    Calculated R Axis -11 degrees    Calculated T Axis 72 degrees    Diagnosis       Accelerated Junctional rhythm  Nonspecific T wave abnormality  Abnormal ECG  When compared with ECG of 31-JUL-2021 19:29,  Junctional rhythm has replaced Atrial fibrillation  Vent. rate has increased BY  41 BPM     ECHO ADULT FOLLOW-UP OR LIMITED   Result Value Ref Range    IVSd 1.02 (A) 0.60 - 1.00 cm    LVIDd 4.43 4.20 - 5.90 cm    LVIDs 2.92 cm    LVPWd 1.11 (A) 0.60 - 1.00 cm    BP EF 38.9 (A) 55.0 - 100.0 percent    LV Ejection Fraction MOD 2C 46 percent    LV Ejection Fraction MOD 4C 28 percent    LV ED Vol A2C 95.67 mL    LV ED Vol A4C 65.37 mL    LV ED Vol BP 81.13 67.0 - 155.0 mL    LV ES Vol A2C 51.65 mL    LV ES Vol A4C 47.28 mL    LV ES Vol BP 49.61 22.0 - 58.0 mL    Left Atrium Major Axis 3.60 cm    Triscuspid Valve Regurgitation Peak Gradient 39.60 mmHg    TR Max Velocity 314.65 cm/s    IVC proximal 2.51 cm    LV Mass .1 88.0 - 224.0 g    LV Mass AL Index 88.2 49.0 - 115.0 g/m2    LVES Vol Index BP 26.8 mL/m2    LVED Vol Index BP 43.9 mL/m2    Left Atrium Minor Axis 1.95 cm    LVED Vol Index A4C 35.3 mL/m2    LVED Vol Index A2C 51.7 mL/m2    LVES Vol Index A4C 25.6 mL/m2    LVES Vol Index A2C 27.9 mL/m2       Physical Exam:     Visit Vitals  /78   Pulse 80   Temp (!) 96.6 °F (35.9 °C)   Resp 18   Ht 5' 7\" (1.702 m)   Wt 73.5 kg (162 lb 0.6 oz)   SpO2 97%   BMI 25.38 kg/m²     General:  Lethargic   Head:  Normocephalic, without obvious abnormality, atraumatic. Eyes:  Conjunctivae/corneas clear. Lungs:  Heart:   Non labored breathing  Regular rate and rhythm, no carotid bruits   Abdomen:   Soft, non-distended   Extremities: Extremities normal, atraumatic, no cyanosis or edema. Pulses: 2+ and symmetric all extremities. Skin: Skin color, texture, turgor normal. No rashes or lesions.   Neurologic Exam     Gen: Opens eyes to name but goes back to sleep  Able to say his name and follows some commands  Cranial Nerves:  I: smell Not tested   II: visual fields Full to confrontation   II: pupils Equal, round, reactive to light   II: optic disc No papilledema   III,VII: ptosis none   III,IV,VI: extraocular muscles  Full ROM   V: mastication normal   V: facial light touch sensation  normal   VII: facial muscle function   symmetric   VIII: hearing symmetric   IX: soft palate elevation  normal   XI: trapezius strength  5/5   XI: sternocleidomastoid strength 5/5   XI: neck flexion strength  5/5   XII: tongue  midline     Motor: Moves all extremities  Reflexes: 1+ throughout; Down going toes  Coordination: Good FTN and HTS  Gait: deferred           Impression:     Dwight Henriquez is a 80 y.o. male who  has a past medical history of Anxiety, Arthritis, Atrial fibrillation, chronic ([facility] [address] (2014), Colon cancer ([facility] [address] (), Depression (2015), DJD (degenerative joint disease) of knee, Dyslipidemia, Embolic stroke involving right middle cerebral artery [facility]) (2013), Falls, Gout, carcinoma in situ of prostate, Hypertension, Memory loss, Peripheral neuropathy, and Vertigo. who was admitted on 21 for elevated heart rate with note of hypotension and afib. Was having lightheaded spells. Cardiology Dr August Lewis was involved. Placed on Metoprolol. Currently on Xarelto. Today, patient noted to be more agitated. This morning at 11:40 AM, nurse noted sudden  in mentation and unable to form words with weakness of L hand. Code S was called. Considerations include stroke and seizure event    Patient more likely has baseline dementia. (no prior formal neurological evaluation)    CT head: No acute process  CTA head and neck:.  No arterial thrombosis/occlusion, dissection, or  flow-limiting stenosis.     Mass centered on the left aryepiglottic fold extending superiorly to  inferiorly from the left vallecula to the left true vocal cord. 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     RECOMMENDATIONS  1. I had a long discussion with daughter-in-law. Discussed diagnosis, prognosis, pathophysiology and available treatment. Reviewed test results. All questions were answered. 2. EEG to look for seizure focus  3. MRI brain to look for stroke  4. Blood test for Vit B12, TSH and ammonia  5. Already on Xarelto for afib. If MRI brain is negative, can d/c ASA  6. Defer to medical team regarding evaluation of left aryepiglottic fold extending superiorly to  inferiorly from the left vallecula to the left true vocal cord. 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     Please call for questions        Thank you for the consultation      John Dorsey MD  Diplomate, American Board of Psychiatry and Neurology  Diplomate, Neuromuscular Medicine  Diplomate, American Board of Electrodiagnostic Medicine    Greater than 50% of time spent counseling patient        CC: Derrek Sutherland MD  Fax: [phone]

## 2021-08-27 NOTE — PROGRESS NOTES
Occupational Therapy:  08/27/21    New orders received. Noted events this AM regarding RRT/Code S. Spoke with RN, pt currently very lethargic and difficult to arouse, in agreement to defer this AM and follow up this afternoon.      Thank you,  Danna Ness, OTR/L

## 2021-08-27 NOTE — PROGRESS NOTES
[facility]    Medical Progress Note      NAME: [place]   :  1927  MRM:  [id number]    Date/Time of service 2021  10:58 AM          Assessment and Plan:     Acute delirium / Dysarthria / L side weakness / HX of CVA - Noted this AM.  Code S called. Send stat to CT for head. He is on Xarelto so he is not a TPA candidate. Return to tele. TIA workup. Neuro consult and neurochecks. MRI brain. ECHO heart. Check Lipids, TSH and A1c. Start ASA and lovenox. Fall precautions and repeat speech/PT/OT eval.  Chart shows old CVA based on MRI in . Continue duloxetine. Tele neuro called me just after CT to report it indicates an acute R side CVA. Recommend MRI and ECHO. Consider failure of xarelto. No further intervention. Atrial fibrillation with RVR - Management per cardiology. Metoprolol for rate control. Continue Xarelto    CAD / Elevated troponin - Management per cardiology. Continue BB, aspirin and statin. Anemia, chronic - Continue to monitor. Stable at 9.3. Transfuse if Hb <7     Debilitated patient / Recent hip fracture - S/p surgical repair 2021. Resume PT/OT     History of urinary retention - Continue Flomax     Gout - No symptoms. Continue allopurinol    Antibiotic use - I see Cipro and Clinda were started by his SNF on day of admission here. There are no notes as to reason. I see no indication of infection or reason for treatment. I will stop those. Subjective:     Chief Complaint:  I was called to bedside for code stroke. Patient with sudden decrease in responsiveness. He was alert, and tracked my eyes, but he could not form words. He could squeeze his R hand firmly, bu his L hand was weak. I did not see facial droop. He was being taken urgently to CT. I will initiate the stroke protocol. Vitals show somewhat hypothermic.      ROS:  (bold if positive, if negative)    Tolerating some PT  NPO        Objective:     Last 24hrs VS reviewed since prior progress note.  Most recent are:    Visit Vitals  /78   Pulse 80   Temp (!) 96.6 °F (35.9 °C)   Resp 18   Ht 5' 7\" (1.702 m)   Wt 73.5 kg (162 lb 0.6 oz)   SpO2 (!) 37%   BMI 25.38 kg/m²     SpO2 Readings from Last 6 Encounters:   08/27/21 (!) 37%   08/03/21 94%   07/26/21 92%   07/01/21 96%   01/19/21 94%   02/20/20 99%    O2 Flow Rate (L/min): 3 l/min       Intake/Output Summary (Last 24 hours) at 8/27/2021 1058  Last data filed at 8/27/2021 7261  Gross per 24 hour   Intake 200 ml   Output 200 ml   Net 0 ml        Physical Exam:    Gen:  Frail, in no acute distress  HEENT:  Pink conjunctivae, PERRL, hearing seems intact to voice, moist mucous membranes  Neck:  Supple, without masses, thyroid non-tender  Resp:  No accessory muscle use, clear breath sounds without wheezes rales or rhonchi  Card:  No murmurs, normal S1, S2 without thrills, bruits or peripheral edema  Abd:  Soft, non-tender, non-distended, normoactive bowel sounds are present, no mass  Lymph:  No cervical or inguinal adenopathy  Musc:  No cyanosis or clubbing  Skin:  No rashes or ulcers, skin turgor is good  Neuro:  Cranial nerves are grossly intact, General L>R motor weakness, follows commands appropriately  Psych:  Unclear insight, oriented to person maybe    Telemetry reviewed:   AFIB  __________________________________________________________________  Medications Reviewed: (see below)  Medications:     Current Facility-Administered Medications   Medication Dose Route Frequency    metoprolol tartrate (LOPRESSOR) tablet 12.5 mg  12.5 mg Oral BID    sodium chloride (NS) flush 5-40 mL  5-40 mL IntraVENous Q8H    sodium chloride (NS) flush 5-40 mL  5-40 mL IntraVENous PRN    acetaminophen (TYLENOL) tablet 650 mg  650 mg Oral Q6H PRN    Or    acetaminophen (TYLENOL) suppository 650 mg  650 mg Rectal Q6H PRN    polyethylene glycol (MIRALAX) packet 17 g  17 g Oral DAILY PRN    ondansetron (ZOFRAN ODT) tablet 4 mg  4 mg Oral Q8H PRN    Or  ondansetron (ZOFRAN) injection 4 mg  4 mg IntraVENous Q6H PRN    allopurinoL (ZYLOPRIM) tablet 150 mg  150 mg Oral QHS    DULoxetine (CYMBALTA) capsule 90 mg  90 mg Oral DAILY    folic acid (FOLVITE) tablet 1 mg  1 mg Oral DAILY    rivaroxaban (XARELTO) tablet 15 mg  15 mg Oral DAILY    atorvastatin (LIPITOR) tablet 10 mg  10 mg Oral DAILY    tamsulosin (FLOMAX) capsule 0.4 mg  0.4 mg Oral DAILY    ciprofloxacin HCl (CIPRO) tablet 750 mg  750 mg Oral Q12H    clindamycin (CLEOCIN) capsule 300 mg  300 mg Oral TID    clindamycin (CLEOCIN) capsule 150 mg  150 mg Oral TID        Lab Data Reviewed: (see below)  Lab Review:     Recent Labs     08/27/21 0045 08/26/21  0025 08/25/21  1907   WBC 11.0 8.3 9.6   HGB 9.3* 7.5* 9.5*   HCT 30.7* 24.5* 31.0*    245 289     Recent Labs     08/27/21 0045 08/26/21  0025 08/25/21  1907    141 142   K 4.6 4.1 3.9   * 110* 107   CO2 27 28 29   * 119* 164*   BUN 20 17 17   CREA 1.04 0.89 1.10   CA 8.2* 8.0* 8.7   MG 2.1  --  2.1   ALB 2.6*  --  2.9*   TBILI 0.5  --  0.3   ALT 15  --  15     Lab Results   Component Value Date/Time    Glucose (POC) 110 08/27/2021 10:29 AM    Glucose (POC) 107 07/31/2021 07:49 AM    Glucose (POC) 166 (H) 05/20/2014 08:30 PM    Glucose (POC) 153 (H) 11/16/2013 04:26 PM    Glucose (POC) 134 (H) 11/16/2013 12:25 PM     No results for input(s): PH, PCO2, PO2, HCO3, FIO2 in the last 72 hours. No results for input(s): INR, INREXT in the last 72 hours. All Micro Results     None          Other pertinent lab: none    Total time spent with patient: 30 Minutes I personally reviewed chart, notes, data and current medications in the medical record. I have personally examined and treated the patient at bedside during this period.                  Care Plan discussed with: Patient, Nursing Staff and >50% of time spent in counseling and coordination of care    Discussed:  Care Plan    Prophylaxis:  H2B/PPI and xarelto    Disposition:  [facility]/LTC           ___________________________________________________    Attending Physician: Harper Delgado MD

## 2021-08-27 NOTE — PROGRESS NOTES
responded to Rapid Response call to room 331, of patient, Mr. Kasi Royal. The Rapid Response was changed to a Code Stroke call.  arrived to patient's room and medical team was working with Mr. Mak Burt. Medical team decided to take him for some scans/tests. Mr. Papo Castro daughter-in-law was with him. Her  had spent the night. The son and daughter-in-law are Saleem Blankenship and she says that Mr. Papo Castro wife had passed away over a year ago and she was [address].  provided a caring, supporting presence with patient's daughter-in-law and informed her how to reach [facility] office. She is waiting in room 331 for Mr. Mak Burt to return. Advised staff to contact [facility] for any further referrals.     Chaplain Reagan Landry M.Div.  [place] Rivera ([id number])

## 2021-08-27 NOTE — PROGRESS NOTES
1028: Patient noticed to be less responsive than baseline, was having trouble arousing, and was aphasic. Patient unable to follow commands or answer orientation questions. Last known well 0900. Patients daughter in the room, states he fell asleep and she was having a hard time arousing patient. Rapid Response/Code Stroke called. Dr. Tao Sullivan and and Dr. Kimberley Sarabia at bedside. Patient to CT. Patient not a candidate for TPA and does not have a Large Vessel Occlusion. Q4h neuro checks initiated. Stroke Education provided to patient and the following topics were discussed    1. Patients personal risk factors for stroke are hypertension, atrial fibrillation, hyperlipidemia, hypercoagulable state and prior stroke    2. Warning signs of Stroke:        * Sudden numbness or weakness of the face, arm or leg, especially on one side of          The body            * Sudden confusion, trouble speaking or understanding        * Sudden trouble seeing in one or both eyes        * Sudden trouble walking, dizziness, loss of balance or coordination        * Sudden severe headache with no known cause      3. Importance of activation Emergency Medical Services ( 9-1-1 ) immediately if experience any warning signs of stroke. 4. Be sure and schedule a follow-up appointment with your primary care doctor or any specialists as instructed. 5. You must take medicine every day to treat your risk factors for stroke. Be sure to take your medicines exactly as your doctor tells you: no more, no less. Know what your medicines are for , what they do. Anti-thrombotics /anticoagulants can help prevent strokes. You are taking the following medicine(s)  Xeralto 15 mg    6. Smoking and second-hand smoke greatly increase your risk of stroke, cardiovascular disease and death. Smoking history never    7. Information provided was [facility] Stroke Education Binder    8.  Documentation of teaching completed in Patient Education Activity and on Care Plan with teaching response noted? Yes      1111: Patient back to room from CT, patient still unable to speak or follow complex commands. MRI checklist completed and sent to Radiology.      1620: Patient off floor to MRI

## 2021-08-28 LAB
CHOLEST SERPL-MCNC: 128 MG/DL
HDLC SERPL-MCNC: 54 MG/DL
HDLC SERPL: 2.4 {RATIO} (ref 0–5)
LDLC SERPL CALC-MCNC: 58 MG/DL (ref 0–100)
TRIGL SERPL-MCNC: 80 MG/DL (ref ?–150)
VLDLC SERPL CALC-MCNC: 16 MG/DL

## 2021-08-28 PROCEDURE — 74011000250 HC RX REV CODE- 250: Performed by: FAMILY MEDICINE

## 2021-08-28 PROCEDURE — 74011250637 HC RX REV CODE- 250/637: Performed by: NURSE PRACTITIONER

## 2021-08-28 PROCEDURE — 74011250637 HC RX REV CODE- 250/637: Performed by: FAMILY MEDICINE

## 2021-08-28 PROCEDURE — 97116 GAIT TRAINING THERAPY: CPT

## 2021-08-28 PROCEDURE — 97530 THERAPEUTIC ACTIVITIES: CPT

## 2021-08-28 PROCEDURE — 99233 SBSQ HOSP IP/OBS HIGH 50: CPT | Performed by: PSYCHIATRY & NEUROLOGY

## 2021-08-28 PROCEDURE — 80061 LIPID PANEL: CPT

## 2021-08-28 PROCEDURE — 36415 COLL VENOUS BLD VENIPUNCTURE: CPT

## 2021-08-28 PROCEDURE — 65270000029 HC RM PRIVATE

## 2021-08-28 RX ORDER — METOPROLOL TARTRATE 5 MG/5ML
5 INJECTION INTRAVENOUS ONCE
Status: COMPLETED | OUTPATIENT
Start: 2021-08-28 | End: 2021-08-28

## 2021-08-28 RX ADMIN — Medication 10 ML: at 18:28

## 2021-08-28 RX ADMIN — ALLOPURINOL 150 MG: 300 TABLET ORAL at 21:30

## 2021-08-28 RX ADMIN — TAMSULOSIN HYDROCHLORIDE 0.4 MG: 0.4 CAPSULE ORAL at 09:05

## 2021-08-28 RX ADMIN — FOLIC ACID 1 MG: 1 TABLET ORAL at 09:06

## 2021-08-28 RX ADMIN — DULOXETINE HYDROCHLORIDE 90 MG: 30 CAPSULE, DELAYED RELEASE PELLETS ORAL at 09:03

## 2021-08-28 RX ADMIN — Medication 10 ML: at 09:06

## 2021-08-28 RX ADMIN — METOPROLOL TARTRATE 5 MG: 5 INJECTION INTRAVENOUS at 02:36

## 2021-08-28 RX ADMIN — ATORVASTATIN CALCIUM 10 MG: 20 TABLET, FILM COATED ORAL at 09:06

## 2021-08-28 RX ADMIN — Medication 10 ML: at 21:30

## 2021-08-28 RX ADMIN — METOPROLOL TARTRATE 12.5 MG: 25 TABLET, FILM COATED ORAL at 18:29

## 2021-08-28 RX ADMIN — METOPROLOL TARTRATE 12.5 MG: 25 TABLET, FILM COATED ORAL at 09:01

## 2021-08-28 RX ADMIN — RIVAROXABAN 15 MG: 15 TABLET, FILM COATED ORAL at 18:29

## 2021-08-28 NOTE — PROGRESS NOTES
[facility] and [address] at [facility] at [address]   [phone]              Chief Complaint   Patient presents with    Irregular Heart Beat     Current Facility-Administered Medications   Medication Dose Route Frequency Provider Last Rate Last Admin    metoprolol tartrate (LOPRESSOR) tablet 12.5 mg  12.5 mg Oral BID Clarisseamy Naranjoer A, NP   12.5 mg at 08/28/21 0901    sodium chloride (NS) flush 5-40 mL  5-40 mL IntraVENous Q8H Watson Mdconnell MD   10 mL at 08/28/21 0906    sodium chloride (NS) flush 5-40 mL  5-40 mL IntraVENous PRN Watson Mcdonnell MD   10 mL at 08/27/21 2100    acetaminophen (TYLENOL) tablet 650 mg  650 mg Oral Q6H PRN Watson Mcdonnell MD        Or   [facility] acetaminophen (TYLENOL) suppository 650 mg  650 mg Rectal Q6H PRN Watson Mcdonnell MD        polyethylene glycol (MIRALAX) packet 17 g  17 g Oral DAILY PRN Watson Mcdonnell MD        ondansetron (ZOFRAN ODT) tablet 4 mg  4 mg Oral Q8H PRN Watson Mcdonnell MD        Or    ondansetron [facility]) injection 4 mg  4 mg IntraVENous Q6H PRN Watson Mcdonnell MD        allopurinoL (ZYLOPRIM) tablet 150 mg  150 mg Oral QHS Watson Mcdonnell MD   150 mg at 08/26/21 2121    DULoxetine (CYMBALTA) capsule 90 mg  90 mg Oral DAILY Watson Mcdonnell MD   90 mg at 78/84/59 5191    folic acid (FOLVITE) tablet 1 mg  1 mg Oral DAILY Watson Mcdonnell MD   1 mg at 08/28/21 2448    rivaroxaban (XARELTO) tablet 15 mg  15 mg Oral DAILY Watson Mcdonnell MD   15 mg at 08/26/21 0637    atorvastatin (LIPITOR) tablet 10 mg  10 mg Oral DAILY Watson Mcdonnell MD   10 mg at 08/28/21 9329    tamsulosin (FLOMAX) capsule 0.4 mg  0.4 mg Oral DAILY Watson Mcdonnell MD   0.4 mg at 08/28/21 1420      No Known Allergies  Social History     Tobacco Use    Smoking status: Never Smoker    Smokeless tobacco: Never Used   Substance Use Topics    Alcohol use: Yes     Alcohol/week: 1.0 standard drinks     Types: 1 Standard drinks or equivalent per week     Comment: rarely    Drug use: Not on file     Pt with TIA yesterday manifest as left sided weakness  Code S CT, CT perfusion, and CTA with no acute process and no LVO  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 EEG with diffuse slowing  He is presently on Lipitor 10 mg and his LDL is 31  Echocardiogram with ejection fraction 35-40%    This morning his daughter is at the bedside. She notes that he is back to normal.  She does recount that about 3 weeks ago when he first went to [facility] for rehab he had a similar event where he was confused. She is uncertain as to whether or not he had weakness with that however she does note that her  felt the episodes were similar. Again not sure about weakness. Examination  Visit Vitals  /68 (BP 1 Location: Left upper arm, BP Patient Position: At rest)   Pulse 91   Temp 98.3 °F (36.8 °C)   Resp 18   Ht 5' 2\" (1.575 m)   Wt 72.1 kg (158 lb 15.2 oz)   SpO2 96%   BMI 29.07 kg/m²   He is a very pleasant gentleman. He is awake and alert interactive and engaging.   Nonfocal.    Impression/Plan  80year-old gentleman with A. fib and transient episode of decreased responsiveness and left-sided weakness  Difficult diagnosis at this point TIA  Agree with maintaining Xarelto and not adding aspirin as aspirin would significantly increase bleeding risk particularly in this 80year-old with likely underlying /head and neck type cancer  Discussed staying on Xarelto and the fact Xarelto decreases her risk but does not notify it with patient and daughter and they both agree  Discussed the previous event that happened in rehab as well as the event yesterday and given the fact that we are uncertain as to whether or not weakness actually occurred we did discuss that it is possible that it could have been medication effect at rehab which was the default diagnosis then and this was a separate type of event and therefore will maintain that for now  If he has recurrence of this stereotypical event then we will need to entertain other potential diagnoses such as ictus although his EEG was   For now maintain Xarelto and Lipitor  We will follow up as needed    Emely Chawla MD        This note was created using voice recognition software. Despite editing, there may be syntax errors.

## 2021-08-28 NOTE — PROGRESS NOTES
Daily Progress Note: 8/28/2021  Jannie Gonzalez MD    Assessment/Plan:   Atrial fibrillation with RVR  -Continue Xarelto  -Consulted cardiology     Elevated troponin  -Likely due to A.  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  - incidental finding on CTA of laryngeal mass - ENT consulted       Problem List:  Problem List as of 8/28/2021 Date Reviewed: 12/20/2018        Codes Class Noted - Resolved    Atrial fibrillation with rapid ventricular response (HCC) ICD-10-CM: I48.91  ICD-9-CM: 427.31  8/25/2021 - Present        NSTEMI (non-ST elevated myocardial infarction) ([facility] ICD-10-CM: I21.4  ICD-9-CM: 410.70  7/31/2021 - Present        Hip fracture ([facility] ICD-10-CM: S72.009A  ICD-9-CM: 820.8  7/18/2021 - Present        CAP (community acquired pneumonia) ICD-10-CM: J18.9  ICD-9-CM: 486  7/18/2021 - Present        Chronic atrial fibrillation ([facility] ICD-10-CM: I48.20  ICD-9-CM: 427.31  11/10/2017 - Present        Sinus node dysfunction ([facility] ICD-10-CM: I49.5  ICD-9-CM: 427.81  4/12/2016 - Present        Depression ICD-10-CM: F32.9  ICD-9-CM: 311  9/28/2015 - Present        Dyslipidemia ICD-10-CM: E78.5  ICD-9-CM: 272.4  9/23/2014 - Present        Gout ICD-10-CM: M10.9  ICD-9-CM: 274.9  Unknown - Present        Hx of carcinoma in situ of prostate ICD-10-CM: Z86.002  ICD-9-CM: V13.89  Unknown - Present        Essential hypertension, benign ICD-10-CM: I10  ICD-9-CM: 401.1  11/16/2013 - Present        Kidney stones ICD-10-CM: N20.0  ICD-9-CM: 592.0  11/16/2013 - Present        Embolic stroke involving right middle cerebral artery [facility]) ICD-10-CM: I63.411  ICD-9-CM: 434.11  11/1/2013 - Present    Overview Signed 12/7/2013  9:23 AM by Lucy Santo MD     presented with dysarthria, MRI Several tiny acute infarcts in the right middle cerebral artery             RESOLVED: Dizziness ICD-10-CM: R42  ICD-9-CM: 780.4  4/12/2016 - 6/21/2016        RESOLVED: Typical atrial flutter ([facility] ICD-10-CM: I48.3  ICD-9-CM: 427.32  5/6/2015 - 9/28/2015        RESOLVED: Paroxysmal atrial fibrillation ([facility] ICD-10-CM: I48.0  ICD-9-CM: 427.31  9/23/2014 - 11/10/2017        RESOLVED: Dysarthria ICD-9-CM: 784.5  11/17/2013 - 12/7/2013        RESOLVED: TIA (transient ischemic attack) ICD-10-CM: G45.9  ICD-9-CM: 435.9  11/16/2013 - 12/7/2013        RESOLVED: Other and unspecified hyperlipidemia ICD-10-CM: E78.5  ICD-9-CM: 272.4  11/16/2013 - 9/23/2014        RESOLVED: Hypokalemia ICD-10-CM: E87.6  ICD-9-CM: 276.8  11/16/2013 - 12/7/2013            HPI:   Avelino Bowman is a 80 y.o. male who presented with elevated heart rate from [facility] rehab. Staff at the facility noticed patient was increasingly more hypotensive and was in atrial fibrillation. He has a history of atrial fibrillation and takes a beta-blocker. He currently reports no complaints but earlier felt dizzy/lightheaded. No palpitations, SOB, or chest pain. (Dr Anabelle Jain)    8/26:  No complaints except he thinks he is being held here against his will. Wandering speech. Not oriented to day/date/location. He thinks he is in [place] [facility]. Hb a little lower - rechecking.     8/27:  More agitated and says he is leaving. Daughter in room helping with the agitation. Not oriented to day/date/location. Rate better this AM.  Hb ok at 9.3. Possibly could go back to [facility] today if ok with Cards. A few rales noted this AM - may be atelectasis but will check CXR.      8/28:  \"Back to normal\" today per pts daughter.   He is oriented more today and knows he is in the hospital but not which one. Yesterday pt had episode of aphasia and left sided weakness. CT/CTA and MRI neg for acute CVA but incidental laryngeal mass found. Likely the episode was a TIA. This AM he reports he is \"feeling just fine\" and able to speak as usual and no longer has weakness. Remains on Xarelto. Will ask Neuro if he should be on  Xarelto plus ASA? Long discussion with daughter about likelyhood that pharyngeal mass is a cancer. She desires to know what it is but will hold off on any treatment decisions until dx known - will consult ENT.       Review of Systems:   Review of systems not obtained due to patient factors. Objective:   Physical Exam:   Visit Vitals  /85 (BP 1 Location: Left arm, BP Patient Position: At rest)   Pulse 74   Temp 98.3 °F (36.8 °C)   Resp 18   Ht 5' 2\" (1.575 m)   Wt 72.1 kg (158 lb 15.2 oz)   SpO2 95%   BMI 29.07 kg/m²    O2 Flow Rate (L/min): 3 l/min O2 Device: Nasal cannula  Temp (24hrs), Av.9 °F (36.6 °C), Min:97.3 °F (36.3 °C), Max:98.9 °F (37.2 °C)    1901 -  0700  In: 120 [P.O.:120]  Out: 200 [Urine:200]   701 - 1900  In: 200 [P.O.:200]  Out: 200 [Urine:200]    General:  Alert, cooperative, no distress, appears stated age. Head:  Normocephalic, without obvious abnormality, atraumatic. Eyes:  Conjunctivae/corneas clear. PERRL, EOMs intact. Neck: Supple, symmetrical, trachea midline, no adenopathy, thyroid: no enlargement/tenderness/nodules, no carotid bruit and no JVD. Lungs:   Clear today. Chest wall:  No tenderness or deformity. Heart:  Rate in 70s - irreg, no murmur, click, rub or gallop. Abdomen:   Soft, non-tender. Bowel sounds normal. No masses,  No organomegaly. Extremities: Extremities normal, atraumatic, no cyanosis or edema. No calf tenderness or cords. Site of left hip fx repairC/D/I   Pulses: 2+ and symmetric all extremities. Skin:  turgor normal. No rashes   Neurologic/Psy: Diminished hearing.   Alert and oriented X 2. Knows he is in the hospital and no long believes he is being held against his will.  equal.  Moves upper and lower extrem to command. No left sided weakness this AM.  No cogwheeling or rigidity. Gait not tested at this time. Sensation grossly normal to touch. Gross motor of extremities normal.       Data Review:    ECHO 8/26/21   Interpretation Summary  Result status: Final result   · Pericardium: There is a large left pleural effusion. There is a small right pleural effusion. · LV: Estimated LVEF is 35 - 40%. Normal cavity size. Mildly increased wall thickness. Moderately reduced systolic function. Left ventricular diastolic dysfunction. · LA: Dilated left atrium. · RA: Dilated right atrium. · MV: Mitral valve non-specific thickening. Mild mitral annular calcification. · TV: Mild tricuspid valve regurgitation is present. EXAMINATION:  CT ANGIOGRAPHY HEAD AND NECK  8/27/21  COMPARISON: No prior CTs are available for comparison. MRA of the brain was  reviewed.   FINDINGS:  CTA NECK:  Aortic Arch: Atherosclerotic plaque. .  Right Common Carotid Artery: No significant abnormality. Right Internal Carotid Artery: No significant abnormality. NASCET Right: 0-25%. Left Common Carotid Artery: No significant abnormality. Left Internal Carotid Artery: No significant abnormality. NASCET Left: 0-25%. .  Carotid stenosis determined using NASCET criteria. Right Vertebral Artery: Small caliber. .  Left Vertebral Artery: Dominant vessel tortuous. No significant stenosis. Cervical Soft Tissues: Soft tissue mass involving the left aryepiglottic fold  extending to the left true vocal cord measuring approximately 2.0 x 1.5 cm which  extends laterally to the left thyroid cartilage. . Superior to inferior extension  approximately 4.1 cm. No definite adenopathy is identified in the neck. Evaluation is limited by the phase of the contrast bolus. Lung Apices: Bilateral pleural effusions.   Bones: No destructive bone lesion. Multilevel degenerative changes in the  cervical spine. Additional Comments: N/A.  CTA HEAD:  Posterior Circulation: No flow limiting stenosis or occlusion. Anterior Circulation: No flow limiting stenosis or occlusion. Additional Comments: Small bilateral posterior communicating arteries. Chronic  small vessel ischemic disease. .  There are no regional areas of elevated Tmax, decreased cerebral blood flow or  blood volume. rCBF < 30% = 0 cc. Tmax > 6 seconds = 0 cc. There is an area of relative hypoperfusion involving the right temporal lobe and  right occipital lobe. This may be secondary to motion artifact  IMPRESSION  1. No acute process. No arterial thrombosis/occlusion, dissection, or  flow-limiting stenosis. 2.  Mass centered on the left aryepiglottic fold extending superiorly to  inferiorly from the left vallecula to the left true vocal cord. 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 3.  Bilateral pleural effusions. Multilevel degenerative disc disease in the  cervical spine. Intracranial chronic small vessel ischemic disease     EXAM: CT CODE NEURO HEAD WO CONTRAST 8/27/21  INDICATION: code s  COMPARISON: 7/31/2021. CONTRAST: None. FINDINGS:  The ventricles and sulci are normal in size, shape and configuration. .  Subcortical deep white matter hypodensities. . There is no intracranial  hemorrhage, extra-axial collection, or mass effect. The basilar cisterns are  open. No CT evidence of acute infarct. The bone windows demonstrate no abnormalities. Near complete opacification of  the left sphenoid sinus. Mucous retention cyst in the right maxillary sinus. IMPRESSION  No acute intracranial abnormality. Chronic small vessel ischemic disease.      EXAM: MRI BRAIN WO CONT 8/27/21  INDICATION: stroke  COMPARISON: CT head 8/27/2021, CTA head neck 8/27/2021. CONTRAST: None.   FINDINGS:  Generalized parenchymal volume loss with commensurate dilation of the sulci and  ventricular system. There are chronic infarcts noted in the right parietal lobe  and bilateral medial occipital lobes. There is no acute infarct, hemorrhage,  extra-axial fluid collection, or mass effect. There is no cerebellar tonsillar  herniation. Expected arterial flow-voids are present. Near complete opacification of the left sphenoid sinus with frothy secretions. Trace left mastoid effusion. The orbital contents are within normal limits with  bilateral lens implants. No significant osseous or scalp lesions are identified. IMPRESSION  1. No acute intracranial abnormality. 2. Generalized parenchymal volume loss, and chronic infarcts in the right  parietal lobe and bilateral medial occipital lobes. 3. Near complete opacification of the left sphenoid sinus with frothy  secretions, which may represent acute on chronic sinusitis. Correlate  clinically. EEG 8/27/21  IMPRESSION/INTERPRETATION:  This EEG recorded while the patient is noted to be awake, yet unresponsive, is abnormal secondary to diffuse slowing and disorganization of the background rhythms indicative of a moderate degree of bihemispheric dysfunction as is commonly seen with an encephalopathy which may have contributions from toxic, metabolic, diffuse structural, and/or pharmacologic effects and clinical correlation is recommended. No epileptiform abnormalities are seen. Alondra WORRELL MD    Recent Days:  Recent Labs     08/27/21  0045 08/26/21  0025 08/25/21  1907   WBC 11.0 8.3 9.6   HGB 9.3* 7.5* 9.5*   HCT 30.7* 24.5* 31.0*    245 289     Recent Labs     08/27/21  0045 08/26/21  0025 08/25/21  1907    141 142   K 4.6 4.1 3.9   * 110* 107   CO2 27 28 29   * 119* 164*   BUN 20 17 17   CREA 1.04 0.89 1.10   CA 8.2* 8.0* 8.7   MG 2.1  --  2.1   ALB 2.6*  --  2.9*   TBILI 0.5  --  0.3   ALT 15  --  15     No results for input(s): PH, PCO2, PO2, HCO3, FIO2 in the last 72 hours.     24 Hour Results:  Recent Results (from the past 24 hour(s))   GLUCOSE, POC    Collection Time: 08/27/21 10:29 AM   Result Value Ref Range    Glucose (POC) 110 65 - 117 mg/dL    Performed by Juan Larson (pct)    VITAMIN B12    Collection Time: 08/27/21 12:25 PM   Result Value Ref Range    Vitamin B12 493 193 - 986 pg/mL   TSH 3RD GENERATION    Collection Time: 08/27/21 12:25 PM   Result Value Ref Range    TSH 2.93 0.36 - 3.74 uIU/mL   AMMONIA    Collection Time: 08/27/21 12:25 PM   Result Value Ref Range    Ammonia 14 <32 UMOL/L       Medications reviewed  Current Facility-Administered Medications   Medication Dose Route Frequency    metoprolol tartrate (LOPRESSOR) tablet 12.5 mg  12.5 mg Oral BID    sodium chloride (NS) flush 5-40 mL  5-40 mL IntraVENous Q8H    sodium chloride (NS) flush 5-40 mL  5-40 mL IntraVENous PRN    acetaminophen (TYLENOL) tablet 650 mg  650 mg Oral Q6H PRN    Or    acetaminophen (TYLENOL) suppository 650 mg  650 mg Rectal Q6H PRN    polyethylene glycol (MIRALAX) packet 17 g  17 g Oral DAILY PRN    ondansetron (ZOFRAN ODT) tablet 4 mg  4 mg Oral Q8H PRN    Or    ondansetron (ZOFRAN) injection 4 mg  4 mg IntraVENous Q6H PRN    allopurinoL (ZYLOPRIM) tablet 150 mg  150 mg Oral QHS    DULoxetine (CYMBALTA) capsule 90 mg  90 mg Oral DAILY    folic acid (FOLVITE) tablet 1 mg  1 mg Oral DAILY    rivaroxaban (XARELTO) tablet 15 mg  15 mg Oral DAILY    atorvastatin (LIPITOR) tablet 10 mg  10 mg Oral DAILY    tamsulosin (FLOMAX) capsule 0.4 mg  0.4 mg Oral DAILY     Care Plan discussed with: Patient and Nurse  Total time spent with patient and review of records: 30 minutes.   Farnaz Olmedo MD

## 2021-08-28 NOTE — PROCEDURES
[facility]  EEG    Name:  Alex Trent  MR#:  [id number]  :  1927  ACCOUNT #:  [de-identified]  DATE OF SERVICE:  2021      REFERRING PROVIDER:  Zoë Ash MD    CLINICAL HISTORY:  An EEG is requested on this 80year-old man to evaluate for epileptiform abnormalities. MEDICATIONS:  Olive Alonzo to include Flomax, Bumex, MiraLax, Xarelto, Cymbalta, Zocor, Zyloprim. EEG REPORT:  This tracing is obtained while the patient is noted to be awake and unresponsive. During this state, the background consists primarily of diffuse mixed frequency theta and delta range activities. The tracing is reactive. Hyperventilation not performed. Intermittent photic stimulation little alters the tracing. Sleep architecture is not seen. IMPRESSION/INTERPRETATION:  This EEG recorded while the patient is noted to be awake, yet unresponsive, is abnormal secondary to diffuse slowing and disorganization of the background rhythms indicative of a moderate degree of bihemispheric dysfunction as is commonly seen with an encephalopathy which may have contributions from toxic, metabolic, diffuse structural, and/or pharmacologic effects and clinical correlation is recommended. No epileptiform abnormalities are seen.       Janelle Hirsch MD      [id number]  D:  2021 15:43  T:  2021 23:27  JOB #:  [id number]

## 2021-08-28 NOTE — PROGRESS NOTES
Bedside and Verbal shift change report given to Karen Perrin (oncoming nurse) by Santy Mireles  (offgoing nurse). Report included the following information SBAR, Kardex and Cardiac Rhythm Afib. 18- Primary RN spoke to Dr. Liss Goss in regards to pt. HR between 118-120's. PO metoprolol not admin. To pt. throughout the day shift. Per providers orders- Admin. 5mg IV metoprolol and continuing to monitor HR.    0252- Pt. tolerated IV metoprolol. HR now in the 70's. 7000- Pt. Pulled off NC, desat to 70's. Primary RN applied NC back onto pt. Pt. O2  >90's. Bedside and Verbal shift change report given to Pete Bishop (oncoming nurse) by Karen Perrin (offgoing nurse). Report included the following information SBAR, Kardex and Cardiac Rhythm Afib. This patient was assisted with Intentional Toileting every 2 hours during this shift as appropriate. Documentation of ambulation and output reflected on Flowsheet as appropriate. Purposeful hourly rounding was completed using AIDET and 5Ps. Outcomes of PHR documented as they occurred. Bed alarm in use as appropriate. Dual Suction and ambubag in place.

## 2021-08-28 NOTE — PROGRESS NOTES
Chart reviewed, care team members consulted. Patient received in bed with daughter present. Patient had recently returned to bed after time in chair, and was ready to nap. OT deferred. Daughter confirms that patient's symptoms related to Code S being called have all resolved. Patient has unrelated swallowing issue. Advised that patient should aim for OOB in chair for meals. Daughter agreeable. Will follow up for OT services as appropriate.    David Armando, OTR/L

## 2021-08-28 NOTE — PROGRESS NOTES
Bedside and Verbal shift change report given to Cali Mcdaniel RN (oncoming nurse) by Claudio Leong RN (offgoing nurse). Report included the following information SBAR, Kardex, Intake/Output, MAR, Accordion and Recent Results. This patient was assisted with Intentional Toileting every 2 hours during this shift as appropriate. Documentation of ambulation and output reflected on Flowsheet as appropriate. Purposeful hourly rounding was completed using AIDET and 5Ps. Outcomes of PHR documented as they occurred. Bed alarm in use as appropriate. Dual Suction and ambubag in place. Bedside and Verbal shift change report given to Claudio Leong RN (oncoming nurse) by Cali Mcdaniel RN (offgoing nurse). Report included the following information SBAR, Kardex, Intake/Output, MAR, Accordion and Recent Results.

## 2021-08-28 NOTE — PROGRESS NOTES
Problem: Mobility Impaired (Adult and Pediatric)  Goal: *Acute Goals and Plan of Care (Insert Text)  Description: Problem: Mobility Impaired (Adult and Pediatric)  Goal: *Acute Goals and Plan of Care (Insert Text)  Outcome: Progressing Towards Goal  Note: FUNCTIONAL STATUS PRIOR TO ADMISSION: walks with assist of one and rolling walker     [address]: He was admitted from [address] [place] [address] rehab. Has been back and forth from acute care and rehab since his hip fracture surgery on 7/21/2021     Physical Therapy Goals  Initiated 8/26/2021  1. Patient will scoot up and down in bed with modified independence within 7 day(s). 2.  Patient will transfer from bed to chair and chair to bed with minimal assistance/contact guard assist using the least restrictive device within 7 day(s). 3.  Patient will perform sit to stand with supervision/set-up within 7 day(s). 4.  Patient will ambulate with minimal assistance/contact guard assist for 80 feet with the least restrictive device within 7 day(s). 8/28/2021 1233 by Sarah Redd PT  Outcome: Progressing Towards Goal    PHYSICAL THERAPY TREATMENT  Patient: Fina Etienne (51 y.o. male)  Date: 8/28/2021  Diagnosis: Atrial fibrillation with rapid ventricular response ([facility] [I48.91] <principal problem not specified>       Precautions:    Chart, physical therapy assessment, plan of care and goals were reviewed. ASSESSMENT  Patient continues with skilled PT services and is progressing towards goals. Patient seen on Thursday for PT eval, yesterday code Stroke called due to decreased responsiveness, aphasia, and weakness. Spoke with Neuro, TIA likely. Patient's daughter in law in the room, states he's doing better today but has had some confusion. Performed transfer training and gait training in the room. Patient incontinent of urine with standing in the past, assisted patient with urinal in the bed prior to mobility.    Patient with improved standing balance today compared to Thursday, less posterior lean and improved anterior weight shift with sit to stand. Current Level of Function Impacting Discharge (mobility/balance): min assist for transfers and gait    Other factors to consider for discharge: rec rtn to [facility] :  Patient continues to benefit from skilled intervention to address the above impairments. Continue treatment per established plan of care. to address goals. Recommendation for discharge: (in order for the patient to meet his/her long term goals)  Therapy up to 5 days/week in SNF setting    This discharge recommendation:  Has not yet been discussed the attending provider and/or case management    IF patient discharges home will need the following DME: to be determined (TBD)       SUBJECTIVE:   Patient stated Elen Blade do you want me to do? Neoma Bold    OBJECTIVE DATA SUMMARY:   Critical Behavior:  Neurologic State: Drowsy, Alert  Orientation Level: Other (Comment) (verbalize person & place)  Cognition: Follows commands  Safety/Judgement: Fall prevention  Functional Mobility Training:  Bed Mobility:     Supine to Sit: Contact guard assistance;Assist x1  Sit to Supine: Contact guard assistance;Assist x1           Transfers:  Sit to Stand: Assist x1;Minimum assistance  Stand to Sit: Contact guard assistance                             Balance:  Sitting: Intact  Standing: Intact; With support  Ambulation/Gait Training:  Distance (ft): 15 Feet (ft)  Assistive Device: Walker, rolling;Gait belt  Ambulation - Level of Assistance: Minimal assistance;Assist x1;Additional time        Gait Abnormalities: Decreased step clearance                    Pain Rating:  No complaints    Activity Tolerance:   Good    After treatment patient left in no apparent distress:   Sitting in chair, Call bell within reach, Bed / chair alarm activated, and Caregiver / family present    COMMUNICATION/COLLABORATION:   The patients plan of care was discussed with: Registered nurse.      Ger Alston, PT   Time Calculation: 27 mins

## 2021-08-29 LAB
ANION GAP SERPL CALC-SCNC: 4 MMOL/L (ref 5–15)
BUN SERPL-MCNC: 18 MG/DL (ref 6–20)
BUN/CREAT SERPL: 22 (ref 12–20)
CALCIUM SERPL-MCNC: 8.1 MG/DL (ref 8.5–10.1)
CHLORIDE SERPL-SCNC: 108 MMOL/L (ref 97–108)
CO2 SERPL-SCNC: 28 MMOL/L (ref 21–32)
CREAT SERPL-MCNC: 0.83 MG/DL (ref 0.7–1.3)
ERYTHROCYTE [DISTWIDTH] IN BLOOD BY AUTOMATED COUNT: 16.4 % (ref 11.5–14.5)
GLUCOSE SERPL-MCNC: 129 MG/DL (ref 65–100)
HCT VFR BLD AUTO: 30.4 % (ref 36.6–50.3)
HGB BLD-MCNC: 9.3 G/DL (ref 12.1–17)
MCH RBC QN AUTO: 32.3 PG (ref 26–34)
MCHC RBC AUTO-ENTMCNC: 30.6 G/DL (ref 30–36.5)
MCV RBC AUTO: 105.6 FL (ref 80–99)
NRBC # BLD: 0 K/UL (ref 0–0.01)
NRBC BLD-RTO: 0 PER 100 WBC
PLATELET # BLD AUTO: 298 K/UL (ref 150–400)
PMV BLD AUTO: 9.6 FL (ref 8.9–12.9)
POTASSIUM SERPL-SCNC: 4.1 MMOL/L (ref 3.5–5.1)
RBC # BLD AUTO: 2.88 M/UL (ref 4.1–5.7)
SODIUM SERPL-SCNC: 140 MMOL/L (ref 136–145)
WBC # BLD AUTO: 11.9 K/UL (ref 4.1–11.1)

## 2021-08-29 PROCEDURE — 36415 COLL VENOUS BLD VENIPUNCTURE: CPT

## 2021-08-29 PROCEDURE — 80048 BASIC METABOLIC PNL TOTAL CA: CPT

## 2021-08-29 PROCEDURE — 74011250637 HC RX REV CODE- 250/637: Performed by: FAMILY MEDICINE

## 2021-08-29 PROCEDURE — 85027 COMPLETE CBC AUTOMATED: CPT

## 2021-08-29 PROCEDURE — 65270000029 HC RM PRIVATE

## 2021-08-29 PROCEDURE — 74011250637 HC RX REV CODE- 250/637: Performed by: NURSE PRACTITIONER

## 2021-08-29 RX ADMIN — Medication 10 ML: at 18:20

## 2021-08-29 RX ADMIN — ATORVASTATIN CALCIUM 10 MG: 20 TABLET, FILM COATED ORAL at 09:00

## 2021-08-29 RX ADMIN — METOPROLOL TARTRATE 12.5 MG: 25 TABLET, FILM COATED ORAL at 18:17

## 2021-08-29 RX ADMIN — TAMSULOSIN HYDROCHLORIDE 0.4 MG: 0.4 CAPSULE ORAL at 09:00

## 2021-08-29 RX ADMIN — Medication 10 ML: at 21:38

## 2021-08-29 RX ADMIN — ALLOPURINOL 150 MG: 300 TABLET ORAL at 21:38

## 2021-08-29 RX ADMIN — RIVAROXABAN 15 MG: 15 TABLET, FILM COATED ORAL at 18:17

## 2021-08-29 RX ADMIN — FOLIC ACID 1 MG: 1 TABLET ORAL at 09:00

## 2021-08-29 RX ADMIN — DULOXETINE HYDROCHLORIDE 90 MG: 30 CAPSULE, DELAYED RELEASE PELLETS ORAL at 09:00

## 2021-08-29 RX ADMIN — METOPROLOL TARTRATE 12.5 MG: 25 TABLET, FILM COATED ORAL at 09:08

## 2021-08-29 RX ADMIN — Medication 10 ML: at 05:33

## 2021-08-29 NOTE — PROGRESS NOTES
Bedside shift change report given to [facility] (oncoming nurse) by Prasad Campos   (offgoing nurse). Report included the following information SBAR, Kardex, ED Summary, Procedure Summary, Intake/Output, MAR, Recent Results and Cardiac Rhythm NSR.

## 2021-08-29 NOTE — PROGRESS NOTES
Bedside shift change report given to Nanette (oncoming nurse) by Shannon Cedeño (offgoing nurse). Report included the following information SBAR, Kardex, ED Summary, Intake/Output, MAR and Cardiac Rhythm Afib. Bedside shift change report given to Td Wakefield (oncoming nurse) by Nanette (offgoing nurse). Report included the following information SBAR, Kardex, ED Summary, MAR, Recent Results and Cardiac Rhythm Afib RVR.

## 2021-08-29 NOTE — PROGRESS NOTES
[id number] Report received from Bonifacio Zarate. Pt asleep, son at bedside. Call bell in reach. Pt denies complaint. Hearing aid not in for sleep so patient very hard of hearing.  See flowsheet and MAR for assessments and medications  0023 Report given to [facility] RN

## 2021-08-29 NOTE — PROGRESS NOTES
Daily Progress Note: 8/29/2021  Nghia Camejo MD    Assessment/Plan:   Atrial fibrillation with RVR  -Continue Xarelto  -Consulted cardiology     Elevated troponin  -Likely due to A.  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  - incidental finding on CTA of laryngeal mass - ENT consulted       Problem List:  Problem List as of 8/29/2021 Date Reviewed: 12/20/2018        Codes Class Noted - Resolved    Atrial fibrillation with rapid ventricular response ([facility] ICD-10-CM: I48.91  ICD-9-CM: 427.31  8/25/2021 - Present        NSTEMI (non-ST elevated myocardial infarction) ([facility] ICD-10-CM: I21.4  ICD-9-CM: 410.70  7/31/2021 - Present        Hip fracture ([facility] ICD-10-CM: S72.009A  ICD-9-CM: 820.8  7/18/2021 - Present        CAP (community acquired pneumonia) ICD-10-CM: J18.9  ICD-9-CM: 486  7/18/2021 - Present        Chronic atrial fibrillation ([facility] ICD-10-CM: I48.20  ICD-9-CM: 427.31  11/10/2017 - Present        Sinus node dysfunction ([facility] ICD-10-CM: I49.5  ICD-9-CM: 427.81  4/12/2016 - Present        Depression ICD-10-CM: F32.9  ICD-9-CM: 311  9/28/2015 - Present        Dyslipidemia ICD-10-CM: E78.5  ICD-9-CM: 272.4  9/23/2014 - Present        Gout ICD-10-CM: M10.9  ICD-9-CM: 274.9  Unknown - Present        Hx of carcinoma in situ of prostate ICD-10-CM: Z86.002  ICD-9-CM: V13.89  Unknown - Present        Essential hypertension, benign ICD-10-CM: I10  ICD-9-CM: 401.1  11/16/2013 - Present        Kidney stones ICD-10-CM: N20.0  ICD-9-CM: 592.0  11/16/2013 - Present        Embolic stroke involving right middle cerebral artery [facility]) ICD-10-CM: I63.411  ICD-9-CM: 434.11  11/1/2013 - Present    Overview Signed 12/7/2013  9:23 AM by Daryn Miller MD     presented with dysarthria, MRI Several tiny acute infarcts in the right middle cerebral artery             RESOLVED: Dizziness ICD-10-CM: R42  ICD-9-CM: 780.4  4/12/2016 - 6/21/2016        RESOLVED: Typical atrial flutter ([facility] ICD-10-CM: I48.3  ICD-9-CM: 427.32  5/6/2015 - 9/28/2015        RESOLVED: Paroxysmal atrial fibrillation ([facility] ICD-10-CM: I48.0  ICD-9-CM: 427.31  9/23/2014 - 11/10/2017        RESOLVED: Dysarthria ICD-9-CM: 784.5  11/17/2013 - 12/7/2013        RESOLVED: TIA (transient ischemic attack) ICD-10-CM: G45.9  ICD-9-CM: 435.9  11/16/2013 - 12/7/2013        RESOLVED: Other and unspecified hyperlipidemia ICD-10-CM: E78.5  ICD-9-CM: 272.4  11/16/2013 - 9/23/2014        RESOLVED: Hypokalemia ICD-10-CM: E87.6  ICD-9-CM: 276.8  11/16/2013 - 12/7/2013            HPI:   Sharifa Handy is a 80 y.o. male who presented with elevated heart rate from [facility] rehab. Staff at the facility noticed patient was increasingly more hypotensive and was in atrial fibrillation. He has a history of atrial fibrillation and takes a beta-blocker. He currently reports no complaints but earlier felt dizzy/lightheaded. No palpitations, SOB, or chest pain. (Dr Stevie Paul)    8/26:  No complaints except he thinks he is being held here against his will. Wandering speech. Not oriented to day/date/location. He thinks he is in [place]. Hb a little lower - rechecking.     8/27:  More agitated and says he is leaving. Daughter in room helping with the agitation. Not oriented to day/date/location. Rate better this AM.  Hb ok at 9.3. Possibly could go back to [employer] today if ok with Cards. A few rales noted this AM - may be atelectasis but will check CXR.      8/28:  \"Back to normal\" today per pts daughter.   He is oriented more today and knows he is in the hospital but not which one. Yesterday pt had episode of aphasia and left sided weakness. CT/CTA and MRI neg for acute CVA but incidental laryngeal mass found. Likely the episode was a TIA. This AM he reports he is \"feeling just fine\" and able to speak as usual and no longer has weakness. Remains on Xarelto. Will ask Neuro if he should be on  Xarelto plus ASA? Long discussion with daughter about likelyhood that pharyngeal mass is a cancer. She desires to know what it is but will hold off on any treatment decisions until dx known - will consult ENT.     :  Little change,. He reports no new problems. Daughter reports he remains in his usual state. ENT consult pending.         Review of Systems:   Review of systems not obtained due to patient factors. Objective:   Physical Exam:   Visit Vitals  /77   Pulse (!) 118   Temp 98.9 °F (37.2 °C)   Resp 19   Ht 5' 2\" (1.575 m)   Wt 72.1 kg (158 lb 15.2 oz)   SpO2 97%   BMI 29.07 kg/m²    O2 Flow Rate (L/min): 3 l/min O2 Device: Nasal cannula  Temp (24hrs), Av.4 °F (36.9 °C), Min:97.7 °F (36.5 °C), Max:98.9 °F (37.2 °C)    No intake/output data recorded.  1901 -  0700  In: 480 [P.O.:480]  Out: 200 [Urine:200]    General:  Alert, cooperative, no distress, appears stated age. Head:  Normocephalic, without obvious abnormality, atraumatic. Eyes:  Conjunctivae/corneas clear. PERRL, EOMs intact. Neck: Supple, symmetrical, trachea midline, no adenopathy, thyroid: no enlargement/tenderness/nodules, no carotid bruit and no JVD. Lungs:   Clear today. Chest wall:  No tenderness or deformity. Heart:  Rate in 70s - irreg, no murmur, click, rub or gallop. Abdomen:   Soft, non-tender. Bowel sounds normal. No masses,  No organomegaly. Extremities: Extremities normal, atraumatic, no cyanosis or edema. No calf tenderness or cords. Site of left hip fx repairC/D/I   Pulses: 2+ and symmetric all extremities.    Skin:  turgor normal. No rashes Neurologic/Psy: Diminished hearing. Alert and oriented X 2. Knows he is in the hospital and no long believes he is being held against his will.  equal.  Moves upper and lower extrem to command. No left sided weakness this AM.  No cogwheeling or rigidity. Gait not tested at this time. Sensation grossly normal to touch. Gross motor of extremities normal.       Data Review:    ECHO 8/26/21   Interpretation Summary  Result status: Final result   · Pericardium: There is a large left pleural effusion. There is a small right pleural effusion. · LV: Estimated LVEF is 35 - 40%. Normal cavity size. Mildly increased wall thickness. Moderately reduced systolic function. Left ventricular diastolic dysfunction. · LA: Dilated left atrium. · RA: Dilated right atrium. · MV: Mitral valve non-specific thickening. Mild mitral annular calcification. · TV: Mild tricuspid valve regurgitation is present. EXAMINATION:  CT ANGIOGRAPHY HEAD AND NECK  8/27/21  COMPARISON: No prior CTs are available for comparison. MRA of the brain was  reviewed.   FINDINGS:  CTA NECK:  Aortic Arch: Atherosclerotic plaque. .  Right Common Carotid Artery: No significant abnormality. Right Internal Carotid Artery: No significant abnormality. NASCET Right: 0-25%. Left Common Carotid Artery: No significant abnormality. Left Internal Carotid Artery: No significant abnormality. NASCET Left: 0-25%. .  Carotid stenosis determined using NASCET criteria. Right Vertebral Artery: Small caliber. .  Left Vertebral Artery: Dominant vessel tortuous. No significant stenosis. Cervical Soft Tissues: Soft tissue mass involving the left aryepiglottic fold  extending to the left true vocal cord measuring approximately 2.0 x 1.5 cm which  extends laterally to the left thyroid cartilage. . Superior to inferior extension  approximately 4.1 cm. No definite adenopathy is identified in the neck. Evaluation is limited by the phase of the contrast bolus.   Lung Apices: Bilateral pleural effusions. Bones: No destructive bone lesion. Multilevel degenerative changes in the  cervical spine. Additional Comments: N/A.  CTA HEAD:  Posterior Circulation: No flow limiting stenosis or occlusion. Anterior Circulation: No flow limiting stenosis or occlusion. Additional Comments: Small bilateral posterior communicating arteries. Chronic  small vessel ischemic disease. .  There are no regional areas of elevated Tmax, decreased cerebral blood flow or  blood volume. rCBF < 30% = 0 cc. Tmax > 6 seconds = 0 cc. There is an area of relative hypoperfusion involving the right temporal lobe and  right occipital lobe. This may be secondary to motion artifact  IMPRESSION  1. No acute process. No arterial thrombosis/occlusion, dissection, or  flow-limiting stenosis. 2.  Mass centered on the left aryepiglottic fold extending superiorly to  inferiorly from the left vallecula to the left true vocal cord. 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 3.  Bilateral pleural effusions. Multilevel degenerative disc disease in the  cervical spine. Intracranial chronic small vessel ischemic disease     EXAM: CT CODE NEURO HEAD WO CONTRAST 8/27/21  INDICATION: code s  COMPARISON: 7/31/2021. CONTRAST: None. FINDINGS:  The ventricles and sulci are normal in size, shape and configuration. .  Subcortical deep white matter hypodensities. . There is no intracranial  hemorrhage, extra-axial collection, or mass effect. The basilar cisterns are  open. No CT evidence of acute infarct. The bone windows demonstrate no abnormalities. Near complete opacification of  the left sphenoid sinus. Mucous retention cyst in the right maxillary sinus. IMPRESSION  No acute intracranial abnormality. Chronic small vessel ischemic disease.      EXAM: MRI BRAIN WO CONT 8/27/21  INDICATION: stroke  COMPARISON: CT head 8/27/2021, CTA head neck 8/27/2021. CONTRAST: None.   FINDINGS:  Generalized parenchymal volume loss with commensurate dilation of the sulci and  ventricular system. There are chronic infarcts noted in the right parietal lobe  and bilateral medial occipital lobes. There is no acute infarct, hemorrhage,  extra-axial fluid collection, or mass effect. There is no cerebellar tonsillar  herniation. Expected arterial flow-voids are present. Near complete opacification of the left sphenoid sinus with frothy secretions. Trace left mastoid effusion. The orbital contents are within normal limits with  bilateral lens implants. No significant osseous or scalp lesions are identified. IMPRESSION  1. No acute intracranial abnormality. 2. Generalized parenchymal volume loss, and chronic infarcts in the right  parietal lobe and bilateral medial occipital lobes. 3. Near complete opacification of the left sphenoid sinus with frothy  secretions, which may represent acute on chronic sinusitis. Correlate  clinically. EEG 8/27/21  IMPRESSION/INTERPRETATION:  This EEG recorded while the patient is noted to be awake, yet unresponsive, is abnormal secondary to diffuse slowing and disorganization of the background rhythms indicative of a moderate degree of bihemispheric dysfunction as is commonly seen with an encephalopathy which may have contributions from toxic, metabolic, diffuse structural, and/or pharmacologic effects and clinical correlation is recommended. No epileptiform abnormalities are seen. Freedom WORRELL MD    Recent Days:  Recent Labs     08/29/21  0046 08/27/21  0045   WBC 11.9* 11.0   HGB 9.3* 9.3*   HCT 30.4* 30.7*    283     Recent Labs     08/29/21  0046 08/27/21  0045    139   K 4.1 4.6    109*   CO2 28 27   * 136*   BUN 18 20   CREA 0.83 1.04   CA 8.1* 8.2*   MG  --  2.1   ALB  --  2.6*   TBILI  --  0.5   ALT  --  15     No results for input(s): PH, PCO2, PO2, HCO3, FIO2 in the last 72 hours.     24 Hour Results:  Recent Results (from the past 24 hour(s))   CBC W/O DIFF    Collection Time: 08/29/21 12:46 AM   Result Value Ref Range    WBC 11.9 (H) 4.1 - 11.1 K/uL    RBC 2.88 (L) 4.10 - 5.70 M/uL    HGB 9.3 (L) 12.1 - 17.0 g/dL    HCT 30.4 (L) 36.6 - 50.3 %    .6 (H) 80.0 - 99.0 FL    MCH 32.3 26.0 - 34.0 PG    MCHC 30.6 30.0 - 36.5 g/dL    RDW 16.4 (H) 11.5 - 14.5 %    PLATELET 004 299 - 619 K/uL    MPV 9.6 8.9 - 12.9 FL    NRBC 0.0 0  WBC    ABSOLUTE NRBC 0.00 0.00 - 1.16 K/uL   METABOLIC PANEL, BASIC    Collection Time: 08/29/21 12:46 AM   Result Value Ref Range    Sodium 140 136 - 145 mmol/L    Potassium 4.1 3.5 - 5.1 mmol/L    Chloride 108 97 - 108 mmol/L    CO2 28 21 - 32 mmol/L    Anion gap 4 (L) 5 - 15 mmol/L    Glucose 129 (H) 65 - 100 mg/dL    BUN 18 6 - 20 MG/DL    Creatinine 0.83 0.70 - 1.30 MG/DL    BUN/Creatinine ratio 22 (H) 12 - 20      GFR est AA >60 >60 ml/min/1.73m2    GFR est non-AA >60 >60 ml/min/1.73m2    Calcium 8.1 (L) 8.5 - 10.1 MG/DL       Medications reviewed  Current Facility-Administered Medications   Medication Dose Route Frequency    metoprolol tartrate (LOPRESSOR) tablet 12.5 mg  12.5 mg Oral BID    sodium chloride (NS) flush 5-40 mL  5-40 mL IntraVENous Q8H    sodium chloride (NS) flush 5-40 mL  5-40 mL IntraVENous PRN    acetaminophen (TYLENOL) tablet 650 mg  650 mg Oral Q6H PRN    Or    acetaminophen (TYLENOL) suppository 650 mg  650 mg Rectal Q6H PRN    polyethylene glycol (MIRALAX) packet 17 g  17 g Oral DAILY PRN    ondansetron (ZOFRAN ODT) tablet 4 mg  4 mg Oral Q8H PRN    Or    ondansetron (ZOFRAN) injection 4 mg  4 mg IntraVENous Q6H PRN    allopurinoL (ZYLOPRIM) tablet 150 mg  150 mg Oral QHS    DULoxetine (CYMBALTA) capsule 90 mg  90 mg Oral DAILY    folic acid (FOLVITE) tablet 1 mg  1 mg Oral DAILY    rivaroxaban (XARELTO) tablet 15 mg  15 mg Oral DAILY    atorvastatin (LIPITOR) tablet 10 mg  10 mg Oral DAILY    tamsulosin (FLOMAX) capsule 0.4 mg  0.4 mg Oral DAILY     Care Plan discussed with: Patient and Nurse  Total time spent with patient and review of records: 30 minutes.   Maya Damon MD

## 2021-08-30 ENCOUNTER — APPOINTMENT (OUTPATIENT)
Dept: GENERAL RADIOLOGY | Age: 86
DRG: 308 | End: 2021-08-30
Attending: FAMILY MEDICINE
Payer: MEDICARE

## 2021-08-30 PROCEDURE — 74011250637 HC RX REV CODE- 250/637: Performed by: FAMILY MEDICINE

## 2021-08-30 PROCEDURE — 97535 SELF CARE MNGMENT TRAINING: CPT

## 2021-08-30 PROCEDURE — 65270000029 HC RM PRIVATE

## 2021-08-30 PROCEDURE — 92526 ORAL FUNCTION THERAPY: CPT

## 2021-08-30 PROCEDURE — 97116 GAIT TRAINING THERAPY: CPT

## 2021-08-30 PROCEDURE — 74230 X-RAY XM SWLNG FUNCJ C+: CPT

## 2021-08-30 PROCEDURE — 92611 MOTION FLUOROSCOPY/SWALLOW: CPT

## 2021-08-30 PROCEDURE — 74011250637 HC RX REV CODE- 250/637: Performed by: NURSE PRACTITIONER

## 2021-08-30 PROCEDURE — 97530 THERAPEUTIC ACTIVITIES: CPT

## 2021-08-30 RX ORDER — LISINOPRIL 5 MG/1
2.5 TABLET ORAL
Status: DISCONTINUED | OUTPATIENT
Start: 2021-08-30 | End: 2021-08-31 | Stop reason: HOSPADM

## 2021-08-30 RX ADMIN — ALLOPURINOL 150 MG: 300 TABLET ORAL at 21:42

## 2021-08-30 RX ADMIN — LISINOPRIL 2.5 MG: 5 TABLET ORAL at 21:42

## 2021-08-30 RX ADMIN — DULOXETINE HYDROCHLORIDE 90 MG: 30 CAPSULE, DELAYED RELEASE PELLETS ORAL at 09:29

## 2021-08-30 RX ADMIN — Medication 10 ML: at 06:56

## 2021-08-30 RX ADMIN — TAMSULOSIN HYDROCHLORIDE 0.4 MG: 0.4 CAPSULE ORAL at 09:29

## 2021-08-30 RX ADMIN — Medication 10 ML: at 21:42

## 2021-08-30 RX ADMIN — METOPROLOL TARTRATE 12.5 MG: 25 TABLET, FILM COATED ORAL at 09:30

## 2021-08-30 RX ADMIN — ATORVASTATIN CALCIUM 10 MG: 20 TABLET, FILM COATED ORAL at 09:29

## 2021-08-30 RX ADMIN — METOPROLOL TARTRATE 12.5 MG: 25 TABLET, FILM COATED ORAL at 17:06

## 2021-08-30 RX ADMIN — FOLIC ACID 1 MG: 1 TABLET ORAL at 09:29

## 2021-08-30 RX ADMIN — Medication 10 ML: at 17:10

## 2021-08-30 RX ADMIN — RIVAROXABAN 15 MG: 15 TABLET, FILM COATED ORAL at 17:06

## 2021-08-30 NOTE — PROGRESS NOTES
Patient has not received ENT consult. Obtained order for MBS from Dr. Evita Whipple. Will complete today.

## 2021-08-30 NOTE — PROGRESS NOTES
Bedside shift change report given to [facility] (oncoming nurse) by Sushant Aguillon (offgoing nurse). Report included the following information SBAR, Kardex, ED Summary, Intake/Output, MAR, Recent Results and Cardiac Rhythm A fib.     1900 Pt seen by ENT. Will follow up 3-5 weeks in the office. Dr Ranjith Bazzi contacted. New order for Rapid Covid test for screening for [employer].

## 2021-08-30 NOTE — PROGRESS NOTES
Bedside shift change report given to Jayne Holloway (oncoming nurse) by Tyrone Rivera (offgoing nurse). Report included the following information SBAR, Kardex, ED Summary, Intake/Output, MAR, Recent Results and Cardiac Rhythm AFib . Bedside shift change report given to [facility] (oncoming nurse) by Tyrone Rivera (offgoing nurse). Report included the following information SBAR, Kardex, ED Summary, Intake/Output, MAR, Recent Results and Cardiac Rhythm Afib RVR.

## 2021-08-30 NOTE — PROGRESS NOTES
Daily Progress Note: 8/30/2021  Mary Lou Fischer MD    Assessment/Plan:   Atrial fibrillation with RVR  -Continue Xarelto  -Consulted cardiology     Elevated troponin  -Likely due to A.  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  - incidental finding on CTA of laryngeal mass - ENT consulted       Problem List:  Problem List as of 8/30/2021 Date Reviewed: 12/20/2018        Codes Class Noted - Resolved    Atrial fibrillation with rapid ventricular response (HCC) ICD-10-CM: I48.91  ICD-9-CM: 427.31  8/25/2021 - Present        NSTEMI (non-ST elevated myocardial infarction) ([facility] ICD-10-CM: I21.4  ICD-9-CM: 410.70  7/31/2021 - Present        Hip fracture ([facility] ICD-10-CM: S72.009A  ICD-9-CM: 820.8  7/18/2021 - Present        CAP (community acquired pneumonia) ICD-10-CM: J18.9  ICD-9-CM: 486  7/18/2021 - Present        Chronic atrial fibrillation ([facility] ICD-10-CM: I48.20  ICD-9-CM: 427.31  11/10/2017 - Present        Sinus node dysfunction ([facility] ICD-10-CM: I49.5  ICD-9-CM: 427.81  4/12/2016 - Present        Depression ICD-10-CM: F32.9  ICD-9-CM: 311  9/28/2015 - Present        Dyslipidemia ICD-10-CM: E78.5  ICD-9-CM: 272.4  9/23/2014 - Present        Gout ICD-10-CM: M10.9  ICD-9-CM: 274.9  Unknown - Present        Hx of carcinoma in situ of prostate ICD-10-CM: Z86.002  ICD-9-CM: V13.89  Unknown - Present        Essential hypertension, benign ICD-10-CM: I10  ICD-9-CM: 401.1  11/16/2013 - Present        Kidney stones ICD-10-CM: N20.0  ICD-9-CM: 592.0  11/16/2013 - Present        Embolic stroke involving right middle cerebral artery [facility]) ICD-10-CM: I63.411  ICD-9-CM: 434.11  11/1/2013 - Present    Overview Signed 12/7/2013  9:23 AM by Kristie Pope MD     presented with dysarthria, MRI Several tiny acute infarcts in the right middle cerebral artery             RESOLVED: Dizziness ICD-10-CM: R42  ICD-9-CM: 780.4  4/12/2016 - 6/21/2016        RESOLVED: Typical atrial flutter ([facility] ICD-10-CM: I48.3  ICD-9-CM: 427.32  5/6/2015 - 9/28/2015        RESOLVED: Paroxysmal atrial fibrillation ([facility] ICD-10-CM: I48.0  ICD-9-CM: 427.31  9/23/2014 - 11/10/2017        RESOLVED: Dysarthria ICD-9-CM: 784.5  11/17/2013 - 12/7/2013        RESOLVED: TIA (transient ischemic attack) ICD-10-CM: G45.9  ICD-9-CM: 435.9  11/16/2013 - 12/7/2013        RESOLVED: Other and unspecified hyperlipidemia ICD-10-CM: E78.5  ICD-9-CM: 272.4  11/16/2013 - 9/23/2014        RESOLVED: Hypokalemia ICD-10-CM: E87.6  ICD-9-CM: 276.8  11/16/2013 - 12/7/2013            HPI:   Savana Sy is a 80 y.o. male who presented with elevated heart rate from [facility] rehab. Staff at the facility noticed patient was increasingly more hypotensive and was in atrial fibrillation. He has a history of atrial fibrillation and takes a beta-blocker. He currently reports no complaints but earlier felt dizzy/lightheaded. No palpitations, SOB, or chest pain. (Dr Clifford Casey)    8/26:  No complaints except he thinks he is being held here against his will. Wandering speech. Not oriented to day/date/location. He thinks he is in [place] [facility]. Hb a little lower - rechecking.     8/27:  More agitated and says he is leaving. Daughter in room helping with the agitation. Not oriented to day/date/location. Rate better this AM.  Hb ok at 9.3. Possibly could go back to [facility] today if ok with Cards. A few rales noted this AM - may be atelectasis but will check CXR.      8/28:  \"Back to normal\" today per pts daughter.   He is oriented more today and knows he is in the hospital but not which one. Yesterday pt had episode of aphasia and left sided weakness. CT/CTA and MRI neg for acute CVA but incidental laryngeal mass found. Likely the episode was a TIA. This AM he reports he is \"feeling just fine\" and able to speak as usual and no longer has weakness. Remains on Xarelto. Will ask Neuro if he should be on  Xarelto plus ASA? Long discussion with daughter about likelyhood that pharyngeal mass is a cancer. She desires to know what it is but will hold off on any treatment decisions until dx known - will consult ENT.     :  Little change,. He reports no new problems. Daughter reports he remains in his usual state. ENT consult pending. : Little change. Sleeping this am. Was having issues with voiding but this has improved with Flomax. ENT consult pending.       Review of Systems:   Review of systems not obtained due to patient factors. Objective:   Physical Exam:   Visit Vitals  /70 (BP 1 Location: Left upper arm, BP Patient Position: At rest)   Pulse (!) 117   Temp 98.1 °F (36.7 °C)   Resp 20   Ht 5' 2\" (1.575 m)   Wt 148 lb (67.1 kg)   SpO2 98%   BMI 27.07 kg/m²    O2 Flow Rate (L/min): 2 l/min O2 Device: Nasal cannula  Temp (24hrs), Av.3 °F (36.8 °C), Min:98.1 °F (36.7 °C), Max:98.9 °F (37.2 °C)    No intake/output data recorded.  0701 -  1900  In: 360 [P.O.:360]  Out: -     General:  Sleeping but arouses easily, cooperative, no distress, appears stated age. Head:  Normocephalic, without obvious abnormality, atraumatic. Eyes:  Conjunctivae/corneas clear. PERRL, EOMs intact. Neck: Supple, symmetrical, trachea midline, no adenopathy, thyroid: no enlargement/tenderness/nodules, no carotid bruit and no JVD. Lungs:   Clear today. Chest wall:  No tenderness or deformity. Heart:  Rate in 70s - irreg, no murmur, click, rub or gallop. Abdomen:   Soft, non-tender. Bowel sounds normal. No masses,  No organomegaly.    Extremities: Extremities normal, atraumatic, no cyanosis or edema. No calf tenderness or cords. Site of left hip fx repairC/D/I   Pulses: 2+ and symmetric all extremities. Skin:  turgor normal. No rashes   Neurologic/Psy: Diminished hearing. Alert and oriented X 2. Knows he is in the hospital.   equal.  Moves upper and lower extrem to command. No left sided weakness this AM.  No cogwheeling or rigidity. Gait not tested at this time. Sensation grossly normal to touch. Gross motor of extremities normal.       Data Review:    ECHO 8/26/21   Interpretation Summary  Result status: Final result   · Pericardium: There is a large left pleural effusion. There is a small right pleural effusion. · LV: Estimated LVEF is 35 - 40%. Normal cavity size. Mildly increased wall thickness. Moderately reduced systolic function. Left ventricular diastolic dysfunction. · LA: Dilated left atrium. · RA: Dilated right atrium. · MV: Mitral valve non-specific thickening. Mild mitral annular calcification. · TV: Mild tricuspid valve regurgitation is present. EXAMINATION:  CT ANGIOGRAPHY HEAD AND NECK  8/27/21  COMPARISON: No prior CTs are available for comparison. MRA of the brain was  reviewed.   FINDINGS:  CTA NECK:  Aortic Arch: Atherosclerotic plaque. .  Right Common Carotid Artery: No significant abnormality. Right Internal Carotid Artery: No significant abnormality. NASCET Right: 0-25%. Left Common Carotid Artery: No significant abnormality. Left Internal Carotid Artery: No significant abnormality. NASCET Left: 0-25%. .  Carotid stenosis determined using NASCET criteria. Right Vertebral Artery: Small caliber. .  Left Vertebral Artery: Dominant vessel tortuous. No significant stenosis. Cervical Soft Tissues: Soft tissue mass involving the left aryepiglottic fold  extending to the left true vocal cord measuring approximately 2.0 x 1.5 cm which  extends laterally to the left thyroid cartilage. . Superior to inferior extension  approximately 4.1 cm. No definite adenopathy is identified in the neck. Evaluation is limited by the phase of the contrast bolus. Lung Apices: Bilateral pleural effusions. Bones: No destructive bone lesion. Multilevel degenerative changes in the  cervical spine. Additional Comments: N/A.  CTA HEAD:  Posterior Circulation: No flow limiting stenosis or occlusion. Anterior Circulation: No flow limiting stenosis or occlusion. Additional Comments: Small bilateral posterior communicating arteries. Chronic  small vessel ischemic disease. .  There are no regional areas of elevated Tmax, decreased cerebral blood flow or  blood volume. rCBF < 30% = 0 cc. Tmax > 6 seconds = 0 cc. There is an area of relative hypoperfusion involving the right temporal lobe and  right occipital lobe. This may be secondary to motion artifact  IMPRESSION  1. No acute process. No arterial thrombosis/occlusion, dissection, or  flow-limiting stenosis. 2.  Mass centered on the left aryepiglottic fold extending superiorly to  inferiorly from the left vallecula to the left true vocal cord. 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 3.  Bilateral pleural effusions. Multilevel degenerative disc disease in the  cervical spine. Intracranial chronic small vessel ischemic disease     EXAM: CT CODE NEURO HEAD WO CONTRAST 8/27/21  INDICATION: code s  COMPARISON: 7/31/2021. CONTRAST: None. FINDINGS:  The ventricles and sulci are normal in size, shape and configuration. .  Subcortical deep white matter hypodensities. . There is no intracranial  hemorrhage, extra-axial collection, or mass effect. The basilar cisterns are  open. No CT evidence of acute infarct. The bone windows demonstrate no abnormalities. Near complete opacification of  the left sphenoid sinus. Mucous retention cyst in the right maxillary sinus. IMPRESSION  No acute intracranial abnormality.  Chronic small vessel ischemic disease.      EXAM: MRI BRAIN WO CONT 8/27/21  INDICATION: stroke  COMPARISON: CT head 8/27/2021, CTA head neck 8/27/2021. CONTRAST: None. FINDINGS:  Generalized parenchymal volume loss with commensurate dilation of the sulci and  ventricular system. There are chronic infarcts noted in the right parietal lobe  and bilateral medial occipital lobes. There is no acute infarct, hemorrhage,  extra-axial fluid collection, or mass effect. There is no cerebellar tonsillar  herniation. Expected arterial flow-voids are present. Near complete opacification of the left sphenoid sinus with frothy secretions. Trace left mastoid effusion. The orbital contents are within normal limits with  bilateral lens implants. No significant osseous or scalp lesions are identified. IMPRESSION  1. No acute intracranial abnormality. 2. Generalized parenchymal volume loss, and chronic infarcts in the right  parietal lobe and bilateral medial occipital lobes. 3. Near complete opacification of the left sphenoid sinus with frothy  secretions, which may represent acute on chronic sinusitis. Correlate  clinically. EEG 8/27/21  IMPRESSION/INTERPRETATION:  This EEG recorded while the patient is noted to be awake, yet unresponsive, is abnormal secondary to diffuse slowing and disorganization of the background rhythms indicative of a moderate degree of bihemispheric dysfunction as is commonly seen with an encephalopathy which may have contributions from toxic, metabolic, diffuse structural, and/or pharmacologic effects and clinical correlation is recommended. No epileptiform abnormalities are seen. ABBY WORRELL MD    Recent Days:  Recent Labs     08/29/21  0046   WBC 11.9*   HGB 9.3*   HCT 30.4*        Recent Labs     08/29/21  0046      K 4.1      CO2 28   *   BUN 18   CREA 0.83   CA 8.1*     No results for input(s): PH, PCO2, PO2, HCO3, FIO2 in the last 72 hours.     24 Hour Results:  No results found for this or any previous visit (from the past 24 hour(s)). Medications reviewed  Current Facility-Administered Medications   Medication Dose Route Frequency    metoprolol tartrate (LOPRESSOR) tablet 12.5 mg  12.5 mg Oral BID    sodium chloride (NS) flush 5-40 mL  5-40 mL IntraVENous Q8H    sodium chloride (NS) flush 5-40 mL  5-40 mL IntraVENous PRN    acetaminophen (TYLENOL) tablet 650 mg  650 mg Oral Q6H PRN    Or    acetaminophen (TYLENOL) suppository 650 mg  650 mg Rectal Q6H PRN    polyethylene glycol (MIRALAX) packet 17 g  17 g Oral DAILY PRN    ondansetron (ZOFRAN ODT) tablet 4 mg  4 mg Oral Q8H PRN    Or    ondansetron (ZOFRAN) injection 4 mg  4 mg IntraVENous Q6H PRN    allopurinoL (ZYLOPRIM) tablet 150 mg  150 mg Oral QHS    DULoxetine (CYMBALTA) capsule 90 mg  90 mg Oral DAILY    folic acid (FOLVITE) tablet 1 mg  1 mg Oral DAILY    rivaroxaban (XARELTO) tablet 15 mg  15 mg Oral DAILY    atorvastatin (LIPITOR) tablet 10 mg  10 mg Oral DAILY    tamsulosin (FLOMAX) capsule 0.4 mg  0.4 mg Oral DAILY     Care Plan discussed with: Patient and Nurse  Total time spent with patient and review of records: 30 minutes.     Dallin Quijano MD

## 2021-08-30 NOTE — PROGRESS NOTES
Transition of Care Plan: RUR-22%; readmit; LOS, 5 days  1. Pt will need rapid covid prior to return to [facility]; a referral was sent in [place]  2. On a will call for [facility] stretcher transport  3. Cards following  4. PT/OT following  5. ENT consult pending  6. CM following for any needs prior to d/c  JUAN Ivan

## 2021-08-30 NOTE — CONSULTS
Otolaryngology - Head & Neck Surgery Progress Note        PATIENT NAME: Carissa Ellison  MRN: [id number]  DATE: 8/30/2021  ADMISSION DATE: 8/25/2021      Subjective:     Asked to evaluate for left laryngeal/hypopharyngeal mass. Possible CVA suspected 3 days ago. CTA neck incidentally showed a 4.1 cm mass extending from left vallecula to left vocal cord. He's having some mild dysphagia and some recommendations made by SLP. He's not aware of any throat or ear pain. No reports from patient/family about dysphonia. No hemoptysis. No history of tobacco use. Objective:     Visit Vitals  /69 (BP 1 Location: Left upper arm, BP Patient Position: Sitting)   Pulse (!) 120   Temp 98.2 °F (36.8 °C)   Resp 18   Ht 5' 2\" (1.575 m)   Wt 67.1 kg (148 lb)   SpO2 94%   BMI 27.07 kg/m²     08/29 0701 - 08/30 1900  In: -   Out: 250 [Urine:250]    Physical Exam:     General - very pleasant, NAD. Voice normal. Mild confusion. Head and face - no cutaneous lesions. CN 7 intact. Nose - clear anteriorly. Oral/oropharynx - no mucosal lesions. Tongue base soft to palpation. Neck - thin, no mass or adenopathy. PROCEDURE: flexible fiberoptic nasopharyngoscope inserted transnasally through left nasal cavity and advanced into oropharynx and larynx. Findings:  - normal tongue base.  - vocal folds wnl. No visible laryngeal mass. Subglottis and proximal trachea clear.  - MILD ERYTHEMA/??? LESION EXTENDING ALONG LEFT LATERAL PHARYNGEAL WALL TOWARD AE FOLD/HYPOPHARYNX BUT NO BULKY MASS. Assessment:     Incidental lesion in hypopharynx/AE fold found on recent CT. No bulky mass or obvious pathology on endoscopy today but there is some hint of some fullness/erythema extending along lateral pharyngeal wall on left side. His vocal folds and airway are clear. He only has mild dysphagia. Plan:     Discussed with patient and his son. I would like to see Mr Celestino Lopez back in the office in about 3 weeks to reassess.  No need for further work up in hospital at this time.          Verona Fabry, MD - [id number] [facility]  [phone] (office)  [phone] (cell)

## 2021-08-30 NOTE — PROGRESS NOTES
Problem: Self Care Deficits Care Plan (Adult)  Goal: *Acute Goals and Plan of Care (Insert Text)  Description:   FUNCTIONAL STATUS PRIOR TO ADMISSION: Pt is received from [facility] rehab where he was recovering from a hip fx in July 2021. Plan is for pt to transition to long term after rehab. Pt had progressed to walking with RW and using w/c for mobility. Pt requires assistance for ADLs. Occupational Therapy Goals  Initiated 8/26/2021  1. Patient will perform grooming, standing at sink, with supervision/set-up within 7 day(s). 2.  Patient will perform lower body dressing with minimal assistance/contact guard assist within 7 day(s). 3.  Patient will perform bathing with minimal assistance/contact guard assist within 7 day(s). 4.  Patient will perform toilet transfers with supervision/set-up within 7 day(s). 5.  Patient will perform all aspects of toileting with minimal assistance/contact guard assist within 7 day(s). 6.  Patient will participate in upper extremity therapeutic exercise/activities with supervision/set-up for 10 minutes within 7 day(s). 7.  Patient will utilize energy conservation techniques during functional activities with verbal cues within 7 day(s). Outcome: Progressing Towards Goal   OCCUPATIONAL THERAPY TREATMENT  Patient: Anthony Talley (42 y.o. male)  Date: 8/30/2021  Diagnosis: Atrial fibrillation with rapid ventricular response ([address] [I48.91] <principal problem not specified>       Precautions:    Chart, occupational therapy assessment, plan of care, and goals were reviewed. ASSESSMENT  Patient continues with skilled OT services and is progressing towards goals. Pt stand by assist  for grooming seated in chair, O2 sats 92% on room air, .  Sit to stand min assist.    Current Level of Function Impacting Discharge (ADLs): stand by assist grooming seated in chair    Other factors to consider for discharge:          PLAN :  Patient continues to benefit from skilled intervention to address the above impairments. Continue treatment per established plan of care to address goals. Recommend with staff: out of bed for meals, ADl's, there ex, there act    Recommend next OT session: cont towards goals    Recommendation for discharge: (in order for the patient to meet his/her long term goals)  Therapy up to 5 days/week in SNF setting    This discharge recommendation:  Has not yet been discussed the attending provider and/or case management    IF patient discharges home will need the following DME:       SUBJECTIVE:   Patient stated Skeet Hedges you    OBJECTIVE DATA SUMMARY:   Cognitive/Behavioral Status:  Neurologic State: Alert  Orientation Level: Disoriented to situation;Disoriented to time;Oriented to place;Oriented to person  Cognition: Follows commands             Functional Mobility and Transfers for ADLs:  Bed Mobility:  Supine to Sit: Contact guard assistance    Transfers:  Sit to Stand: Minimum assistance;Assist x1;Additional time          Balance:  Sitting: Intact  Standing: With support; Impaired  Standing - Static: Constant support; Fair  Standing - Dynamic : Constant support; Fair    ADL Intervention:       Grooming  Washing Face: Set-up; Supervision  Brushing Teeth: Stand-by assistance                 Activity Tolerance:   Fair    After treatment patient left in no apparent distress:   Sitting in chair    COMMUNICATION/COLLABORATION:   The patients plan of care was discussed with: Physical therapy assistant, Occupational therapist, and Registered nurse.      Mary High  Time Calculation: 20 mins

## 2021-08-30 NOTE — PROGRESS NOTES
[address] [facility] STUDY  Patient: Kalia Steel (40 y.o. male)  Date: 8/30/2021  Primary Diagnosis: Atrial fibrillation with rapid ventricular response ([address] [I48.91]       Precautions: aspiration       ASSESSMENT :  Based on the objective data described below, the patient presents with aspiration with thins, especially when taken with pills b/c he tilted his head back. He also had trace penetration with most likely aspiration after the swallow from upper pharyngeal residue. Reasons for aspiration include 1) mild delay in swallow, which could be age related, but more importantly  2) reduced airway closure due to inconsistent epiglottal inversion, reduced BOT retraction. He swallowed small amounts of thins with head neutral fairly well. The thicker the item, the  More it sticks in vallecula and he has subsequent trace penetration with possible aspiration. 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 Awaiting ENT consult. Family reports worsening dysphagia over the weekend. .    Patient will benefit from skilled intervention to address the above impairments. Patients rehabilitation potential is considered to be Fair     PLAN :  Recommendations and Planned Interventions:  Easy to chew diet. Give pills with pudding. Ok to christine with liquids when pill is cleared. Await ENT evaluation. Please consult palliative care for goals of care in  This 81 yo male. Frequency/Duration: Patient will be followed by speech-language pathology 3 times a week to address goals. Discharge Recommendations: To Be Determined     SUBJECTIVE:   Patient alert for MBS but slightly confused. .    OBJECTIVE:     Past Medical History:   Diagnosis Date    Anxiety     Arthritis     right shoulder    Atrial fibrillation, chronic ([facility] 09/23/2014    Colon cancer ([facility] [address] 2002 colon resection    Depression 9/28/2015    DJD (degenerative joint disease) of knee     left TKR    Dyslipidemia     Embolic stroke involving right middle cerebral artery [facility]) Nov 2013    presented with dysarthria, MRI Several tiny acute infarcts in the right middle cerebral artery    Falls     Gout     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    Hypertension     Memory loss     Peripheral neuropathy     Vertigo      Past Surgical History:   Procedure Laterality Date    HX CATARACT REMOVAL      HX GI      colon resection    HX ORTHOPAEDIC      left knee, left shoulder    [place] ABDOMEN SURGERY PROC UNLISTED      colorectal    [place] CARDIAC SURG PROCEDURE UNLIST      cardio version     Prior Level of Function/Home Situation: lives with family  Home Situation  Home Environment: Apartment  One/Two Story Residence: Other (Comment)  Living Alone: No  Support Systems: Assisted living, Family member(s)  Patient Expects to be Discharged to[de-identified] Assisted living  Current DME Used/Available at Home: None  Diet prior to admission: regular, thins  Current Diet:  EAsy to chew   Radiologist: [employer] Views: Lateral  Patient Position: upright in hausted chair. patient woke up for MBS. he spit out the first sip due to confusion    Trial 1: Trial 2:   Consistency Presented: Thin liquid;Puree; Solid;Pill/Tablet     How Presented: SLP-fed/presented;Spoon;Straw      ORAL PHASE:   He spit the first sip of liquid in confusion. Bolus Acceptance:  (tended to take large sips)     Bolus Formation/Control:  (oral holding at times, wincing):    :     Propulsion: Delayed (# of seconds) (curtis with liuqids)     Oral Residue:  (some mild sublingual and lateral sulci residue)   PHARYNGEAL PHASE:      Initiation of Swallow: Triggered at vallecula (with thins at times)     Timing: Pooling 1-5 sec     Penetration:  (penetration with thins before and during the swallow in moderate amounts to cords when he was taking a pill. He did tilt his head back to take a pill, which exacerbated issues with  airway closure/protection. Also had penetration   in trace amounts after the swallow from vallecular residue. )-persistent and trace that most likely will result in aspiration due to no reaction. Aspiration/Timing: Silent ;Trace (during the swallow with thins and pills in mild-moderate amounts. most likely also will have trace aspiration after the swallow with penetration from vallecular residue)     Pharyngeal Clearance: Vallecular residue;Greater than 50% with pudding, cracker. Mild residue with thins. Poor awareness of residue. Attempted Modifications: Alternate liquids/solids     Effective Modifications:  (reduced residue, but did not clear all. mild-moderate residue left after the swallow. keep head at neutral or chin tuck. avoid neck hyperextsnsion)                   Trial 3: Trial 4:                            :    :                                                                        Decreased Tongue Base Retraction?: Yes-mil-moderate. Laryngeal Elevation: Reduced excursion with laryngeal vestibule gap; Inadequate epiglottic inversion (epiglottal inversion varied from good to half)   Sometimes swallow delay showed pharynx full with partial epiglottal inversion and sometimes full. Pharyngeal Symmetry: Not assessed                NOMS:   The NOMS functional outcome measure was used to quantify this patient's level of swallowing impairment. Based on the NOMS, the patient was determined to be at level 4 for swallow function         NOMS Swallowing Levels:  Level 1 (CN): NPO  Level 2 (CM): NPO but takes consistency in therapy  Level 3 (CL): Takes less than 50% of nutrition p.o. and continues with nonoral feedings; and/or safe with mod cues; and/or max diet restriction  Level 4 (CK):  Safe swallow but needs mod cues; and/or mod diet restriction; and/or still requires some nonoral feeding/supplements  Level 5 (CJ): Safe swallow with min diet restriction; and/or needs min cues  Level 6 (CI): Independent with p.o.; rare cues; usually self cues; may need to avoid some foods or needs extra time  Level 7 ([address] [place] [address]): Independent for all p.o.  ALYSE. (2003). National Outcomes Measurement System (NOMS): Adult Speech-Language Pathology User's Guide. COMMUNICATION/EDUCATION:   Patient was educated regarding His deficit(s) of dysphagia  as this relates to His diagnosis of probably H&N CA. He demonstrated Guarded understanding as evidenced by discussion. Also [place]. Will talk with family. .    The patients plan of care including findings from MBS, recommendations, planned interventions, and recommended diet changes were discussed with: Registered nurse. Patient is unable to participate in goal setting and plan of care.     Thank you for this referral.  Shell Davila, SLP  Time Calculation: 30 mins

## 2021-08-30 NOTE — PROGRESS NOTES
Spoke with patient -no understanding- and family about MBS results and recommendations: We discussed implications of trace aspiration and head positioning techniques to reduce aspiration. Currently recommending Easy to Chew diet and thins with straw in head neutral position. Will follow up in am. Await ENT evaluation of possible pharyngeal tumor.
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
## 2021-08-30 NOTE — PROGRESS NOTES
Bedside shift change report given to [facility] (oncoming nurse) by [facility] nurse). Report included the following information SBAR, Kardex, ED Summary, Intake/Output, and Recent Results. 0040: noticed pt HR has been increased, RN and charge RN discussing pt HR, pt has been sustaining 118-122 since about 2300. Pt was in the 62s for dayshift but seems to have had several bursts of tachycardia through the day, but has been sustaining since 2300.    0050: EKG done, showed sinus tach. 0110: called Dr. Blas Fang per MD orders to notify for HR over 120, MD stated \"We don't treat sinus tach\". No orders received. Bedside shift change report given to Katherine(oncoming nurse) by [facility] (offgoing nurse). Report included the following information SBAR, Kardex, ED Summary, Intake/Output, and Recent Results. This patient was assisted with Intentional Toileting every 2 hours during this shift as appropriate. Documentation of ambulation and output reflected on Flowsheet as appropriate. Purposeful hourly rounding was completed using AIDET and 5Ps. Outcomes of PHR documented as they occurred. Bed alarm in use as appropriate. Dual Suction and ambubag in place.

## 2021-08-31 VITALS
WEIGHT: 148 LBS | RESPIRATION RATE: 18 BRPM | BODY MASS INDEX: 27.23 KG/M2 | SYSTOLIC BLOOD PRESSURE: 106 MMHG | HEIGHT: 62 IN | OXYGEN SATURATION: 95 % | DIASTOLIC BLOOD PRESSURE: 68 MMHG | HEART RATE: 120 BPM | TEMPERATURE: 98.4 F

## 2021-08-31 LAB
ALBUMIN SERPL-MCNC: 2.3 G/DL (ref 3.5–5)
ALBUMIN/GLOB SERPL: 0.7 {RATIO} (ref 1.1–2.2)
ALP SERPL-CCNC: 153 U/L (ref 45–117)
ALT SERPL-CCNC: 12 U/L (ref 12–78)
ANION GAP SERPL CALC-SCNC: 1 MMOL/L (ref 5–15)
AST SERPL-CCNC: 13 U/L (ref 15–37)
ATRIAL RATE: 118 BPM
BASOPHILS # BLD: 0 K/UL (ref 0–0.1)
BASOPHILS NFR BLD: 0 % (ref 0–1)
BILIRUB SERPL-MCNC: 0.4 MG/DL (ref 0.2–1)
BUN SERPL-MCNC: 13 MG/DL (ref 6–20)
BUN/CREAT SERPL: 19 (ref 12–20)
CALCIUM SERPL-MCNC: 7.8 MG/DL (ref 8.5–10.1)
CALCULATED R AXIS, ECG10: -16 DEGREES
CALCULATED T AXIS, ECG11: 62 DEGREES
CHLORIDE SERPL-SCNC: 109 MMOL/L (ref 97–108)
CO2 SERPL-SCNC: 31 MMOL/L (ref 21–32)
COMMENT, HOLDF: NORMAL
COVID-19 RAPID TEST, COVR: NOT DETECTED
CREAT SERPL-MCNC: 0.68 MG/DL (ref 0.7–1.3)
DIAGNOSIS, 93000: NORMAL
DIFFERENTIAL METHOD BLD: ABNORMAL
EOSINOPHIL # BLD: 0.3 K/UL (ref 0–0.4)
EOSINOPHIL NFR BLD: 4 % (ref 0–7)
ERYTHROCYTE [DISTWIDTH] IN BLOOD BY AUTOMATED COUNT: 16.4 % (ref 11.5–14.5)
GLOBULIN SER CALC-MCNC: 3.2 G/DL (ref 2–4)
GLUCOSE SERPL-MCNC: 109 MG/DL (ref 65–100)
HCT VFR BLD AUTO: 28.1 % (ref 36.6–50.3)
HGB BLD-MCNC: 8.5 G/DL (ref 12.1–17)
IMM GRANULOCYTES # BLD AUTO: 0 K/UL (ref 0–0.04)
IMM GRANULOCYTES NFR BLD AUTO: 0 % (ref 0–0.5)
LYMPHOCYTES # BLD: 0.9 K/UL (ref 0.8–3.5)
LYMPHOCYTES NFR BLD: 13 % (ref 12–49)
MCH RBC QN AUTO: 31.7 PG (ref 26–34)
MCHC RBC AUTO-ENTMCNC: 30.2 G/DL (ref 30–36.5)
MCV RBC AUTO: 104.9 FL (ref 80–99)
MONOCYTES # BLD: 0.7 K/UL (ref 0–1)
MONOCYTES NFR BLD: 10 % (ref 5–13)
NEUTS SEG # BLD: 5.1 K/UL (ref 1.8–8)
NEUTS SEG NFR BLD: 73 % (ref 32–75)
NRBC # BLD: 0 K/UL (ref 0–0.01)
NRBC BLD-RTO: 0 PER 100 WBC
P-R INTERVAL, ECG05: 152 MS
PLATELET # BLD AUTO: 283 K/UL (ref 150–400)
PMV BLD AUTO: 9.6 FL (ref 8.9–12.9)
POTASSIUM SERPL-SCNC: 4.3 MMOL/L (ref 3.5–5.1)
PROT SERPL-MCNC: 5.5 G/DL (ref 6.4–8.2)
Q-T INTERVAL, ECG07: 350 MS
QRS DURATION, ECG06: 104 MS
QTC CALCULATION (BEZET), ECG08: 490 MS
RBC # BLD AUTO: 2.68 M/UL (ref 4.1–5.7)
SAMPLES BEING HELD,HOLD: NORMAL
SODIUM SERPL-SCNC: 141 MMOL/L (ref 136–145)
SOURCE, COVRS: NORMAL
VENTRICULAR RATE, ECG03: 118 BPM
WBC # BLD AUTO: 7 K/UL (ref 4.1–11.1)

## 2021-08-31 PROCEDURE — 94760 N-INVAS EAR/PLS OXIMETRY 1: CPT

## 2021-08-31 PROCEDURE — 80053 COMPREHEN METABOLIC PANEL: CPT

## 2021-08-31 PROCEDURE — 74011250637 HC RX REV CODE- 250/637: Performed by: NURSE PRACTITIONER

## 2021-08-31 PROCEDURE — 87635 SARS-COV-2 COVID-19 AMP PRB: CPT

## 2021-08-31 PROCEDURE — 85025 COMPLETE CBC W/AUTO DIFF WBC: CPT

## 2021-08-31 PROCEDURE — 93005 ELECTROCARDIOGRAM TRACING: CPT

## 2021-08-31 PROCEDURE — 77010033678 HC OXYGEN DAILY

## 2021-08-31 PROCEDURE — 74011250637 HC RX REV CODE- 250/637: Performed by: FAMILY MEDICINE

## 2021-08-31 RX ORDER — METOPROLOL TARTRATE 25 MG/1
12.5 TABLET, FILM COATED ORAL 2 TIMES DAILY
Qty: 60 TABLET | Refills: 0 | Status: SHIPPED | OUTPATIENT
Start: 2021-08-31

## 2021-08-31 RX ORDER — LISINOPRIL 2.5 MG/1
2.5 TABLET ORAL
Qty: 30 TABLET | Refills: 0 | Status: SHIPPED | OUTPATIENT
Start: 2021-08-31

## 2021-08-31 RX ADMIN — FOLIC ACID 1 MG: 1 TABLET ORAL at 09:56

## 2021-08-31 RX ADMIN — ATORVASTATIN CALCIUM 10 MG: 20 TABLET, FILM COATED ORAL at 09:56

## 2021-08-31 RX ADMIN — DULOXETINE HYDROCHLORIDE 90 MG: 30 CAPSULE, DELAYED RELEASE PELLETS ORAL at 09:56

## 2021-08-31 RX ADMIN — TAMSULOSIN HYDROCHLORIDE 0.4 MG: 0.4 CAPSULE ORAL at 09:56

## 2021-08-31 RX ADMIN — Medication 10 ML: at 06:10

## 2021-08-31 RX ADMIN — METOPROLOL TARTRATE 12.5 MG: 25 TABLET, FILM COATED ORAL at 09:56

## 2021-08-31 NOTE — PROGRESS NOTES
[facility] visit. Mr. Jyotsna Lam discharged. Prayer and communion offered to Mr. Jyotsna Lam, his son and d daughter-in-law prior to discharge.     ROBERT Rodriguez, RN, ACSW, LCSW   Page:  [phone]

## 2021-08-31 NOTE — PROGRESS NOTES
Bedside shift change report given to [facility] (oncoming nurse) by Fady (offgoing nurse). Report included the following information SBAR, Kardex, ED Summary, Intake/Output, MAR, Recent Results and Cardiac Rhythm Sinus tachy. 1005 Dr Phuong Saldana notified of SpO2 94% on room air and also  Sinus tachy. TRANSFER - OUT REPORT:    Verbal report given to Junior(name) on [place] [employer]  being transferred to The car easily beat) for routine progression of care       Report consisted of patients Situation, Background, Assessment and   Recommendations(SBAR). Information from the following report(s) SBAR, Kardex, ED Summary, Procedure Summary, Intake/Output, MAR, Recent Results and Cardiac Rhythm Sins tachy was reviewed with the receiving nurse. Lines:       Opportunity for questions and clarification was provided.       Patient transported with:   Patient's medications from home

## 2021-08-31 NOTE — DISCHARGE SUMMARY
Physician Discharge Summary     Patient ID:    Ny Vargas  [id number]  31 y.o.  5/25/1927  Saranya Barrett MD    Admit date: 8/25/2021  Discharge date and time: 8/31/2021   Condition at discharge:  good  Admission Diagnoses: Atrial fibrillation with rapid ventricular response [facility]) [I48.91]  Discharge Medications:   Current Discharge Medication List      START taking these medications    Details   lisinopriL (PRINIVIL, ZESTRIL) 2.5 mg tablet Take 1 Tablet by mouth nightly. Qty: 30 Tablet, Refills: 0      metoprolol tartrate (LOPRESSOR) 25 mg tablet Take 0.5 Tablets by mouth two (2) times a day. Qty: 60 Tablet, Refills: 0         CONTINUE these medications which have NOT CHANGED    Details   ascorbic acid, vitamin C, (VITAMIN C) 500 mg tablet Take 500 mg by mouth daily. bisacodyL (DULCOLAX) 10 mg supp Insert 10 mg into rectum daily as needed for Constipation. senna-docusate (PERICOLACE) 8.6-50 mg per tablet Take 1 Tablet by mouth two (2) times a day. omeprazole (PriLOSEC OTC) 20 mg tablet Take 20 mg by mouth daily. Saccharomyces boulardii (Florastor) 250 mg capsule Take 250 mg by mouth daily. For 10 days : 8/24/2021 to 9/3/2021      OTHER Apply house moisturizer to left cheek scab once every shift until resolved, may leave open to ait if intact    Monitor Left heel for dressing once every shift    Clean left heel with normal saline and cover with a hydrocolloid dressing 3 times per week and as needed     Monitor left hip for dressing placement once daily    Clean left hip with normal saline and cover with dry dressing once a day      tamsulosin (FLOMAX) 0.4 mg capsule Take 1 Capsule by mouth daily. Qty: 30 Capsule, Refills: 0      multivitamin (ONE A DAY) tablet Take 1 Tablet by mouth daily. acetaminophen (TYLENOL) 325 mg tablet Take 650 mg by mouth three (3) times daily.       polyethylene glycol (Miralax) 17 gram/dose powder Take 17 g by mouth daily as needed for Constipation. calcium-vitamin D (OS-VINCENT +D3) 500 mg-200 unit per tablet Take 1 Tablet by mouth three (3) times daily (with meals). Qty: 90 Tablet, Refills: 0      rivaroxaban (Xarelto) 15 mg tab tablet Take 15 mg by mouth daily (with dinner). Indications: prevention of thromboembolism in paroxysmal atrial fibrillation      DULoxetine (CYMBALTA) 30 mg capsule Take 90 mg by mouth daily. zinc sulfate 220 mg tablet Take 1 Tab by mouth daily. Qty: 30 Tab, Refills: 0      ondansetron (Zofran ODT) 4 mg disintegrating tablet 1 Tab by SubLINGual route every eight (8) hours as needed for Nausea or Vomiting. Qty: 20 Tab, Refills: 0      simvastatin (Zocor) 10 mg tablet Take 10 mg by mouth nightly. folic acid (FOLVITE) 1 mg tablet Take 1 mg by mouth daily. allopurinol (ZYLOPRIM) 300 mg tablet Take 150 mg by mouth nightly. STOP taking these medications       ciprofloxacin HCl (CIPRO) 750 mg tablet Comments:   Reason for Stopping:         bumetanide (BUMEX) 1 mg tablet Comments:   Reason for Stopping: Follow up Care:    1. Luke Zazueta MD with in 1 weeks  2. specialists as directed. Diet:  Cardiac Diet  Disposition:  [facility] [place].   Advanced Directive:  Discharge Exam:  [See today's progress note.]  CONSULTATIONS: Cardiology, Neurology and ENT    Significant Diagnostic Studies:   Recent Labs     08/31/21 0158 08/29/21 0046   WBC 7.0 11.9*   HGB 8.5* 9.3*   HCT 28.1* 30.4*    298     Recent Labs     08/31/21 0158 08/29/21  0046    140   K 4.3 4.1   * 108   CO2 31 28   BUN 13 18   CREA 0.68* 0.83   * 129*   CA 7.8* 8.1*     Recent Labs     08/31/21 0158   ALT 12   *   TBILI 0.4   TP 5.5*   ALB 2.3*   GLOB 3.2     Lab Results   Component Value Date/Time    Glucose (POC) 110 08/27/2021 10:29 AM    Glucose (POC) 107 07/31/2021 07:49 AM    Glucose (POC) 166 (H) 05/20/2014 08:30 PM    Glucose (POC) 153 (H) 11/16/2013 04:26 PM    Glucose (POC) 134 (H) 11/16/2013 12:25 PM     Lab Results   Component Value Date/Time    TSH 2.93 08/27/2021 12:25 PM         HOSPITAL COURSE & TREATMENT RENDERED:   1. See today's progress note:  Daily Progress Note and Discharge Note: 8/31/2021  Tonny Prescott MD         Assessment/Plan:   Atrial fibrillation with RVR  -Continue Xarelto  -Consulted cardiology     Elevated troponin  -Likely due to A. 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 8   7/18/2021 - Present           CAP (community acquired pneumonia) ICD-10-CM: J18.9  ICD-9-CM: 521   7/18/2021 - Present           Chronic atrial fibrillation ([facility] ICD-10-CM: I48.20  ICD-9-CM: 427.31   11/10/2017 - Present           Sinus node dysfunction (HCC) ICD-10-CM: I49.5  ICD-9-CM: 427.81   4/12/2016 - Present           Depression ICD-10-CM: F32.9  ICD-9-CM: 096   9/28/2015 - Present           Dyslipidemia ICD-10-CM: E78.5  ICD-9-CM: 272.4   9/23/2014 - Present           Gout ICD-10-CM: M10.9  ICD-9-CM: 274.9   Unknown - Present           Hx of carcinoma in situ of prostate ICD-10-CM: Z86.002  ICD-9-CM: V13.89   Unknown - Present           Essential hypertension, benign ICD-10-CM: I10  ICD-9-CM: 401. 1   11/16/2013 - Present           Kidney stones ICD-10-CM: N20.0  ICD-9-CM: 592.0   11/16/2013 - Present           Embolic stroke involving right middle cerebral artery [facility]) ICD-10-CM: I63.411  ICD-9-CM: 434.11   11/1/2013 - Present     Overview Signed 12/7/2013  9:23 AM by Nannette Rogers MD       presented with dysarthria, MRI Several tiny acute infarcts in the right middle cerebral artery                 RESOLVED: Dizziness ICD-10-CM: R42  ICD-9-CM: 780. 4   4/12/2016 - 6/21/2016           RESOLVED: Typical atrial flutter ([address] ICD-10-CM: I48.3  ICD-9-CM: 427.32   5/6/2015 - 9/28/2015           RESOLVED: Paroxysmal atrial fibrillation ([address] ICD-10-CM: I48.0  ICD-9-CM: 427.31   9/23/2014 - 11/10/2017           RESOLVED: Dysarthria ICD-9-CM: 432. 5   11/17/2013 - 12/7/2013           RESOLVED: TIA (transient ischemic attack) ICD-10-CM: G45.9  ICD-9-CM: 435. 9   11/16/2013 - 12/7/2013           RESOLVED: Other and unspecified hyperlipidemia ICD-10-CM: E78.5  ICD-9-CM: 272.4   11/16/2013 - 9/23/2014           RESOLVED: Hypokalemia ICD-10-CM: E87.6  ICD-9-CM: 276.8   11/16/2013 - 12/7/2013                HPI:   J Luis Briseno a 80 y. o. male who presented with elevated heart rate from [facility] rehab.  Staff at the facility noticed patient was increasingly more hypotensive and was in atrial fibrillation. [facility] has a history of atrial fibrillation and takes a beta-blocker. [facility] currently reports no complaints but earlier felt dizzy/lightheaded. No palpitations, SOB, or chest pain.  (Dr Becerra Due)     8/26:  No complaints except he thinks he is being held here against his will. Wandering speech. Not oriented to day/date/location. He thinks he is in [place]. Hb a little lower - rechecking.      8/27:  More agitated and says he is leaving. Daughter in room helping with the agitation. Not oriented to day/date/location. Rate better this AM.  Hb ok at 9.3. Possibly could go back to [facility] today if ok with Cards. A few rales noted this AM - may be atelectasis but will check CXR.       :  \"Back to normal\" today per pts daughter. He is oriented more today and knows he is in the hospital but not which one. Yesterday pt had episode of aphasia and left sided weakness. CT/CTA and MRI neg for acute CVA but incidental laryngeal mass found. Likely the episode was a TIA. This AM he reports he is \"feeling just fine\" and able to speak as usual and no longer has weakness. Remains on Xarelto. Will ask Neuro if he should be on  Xarelto plus ASA? Long discussion with daughter about likelyhood that pharyngeal mass is a cancer. She desires to know what it is but will hold off on any treatment decisions until dx known - will consult ENT.      :  Little change,. He reports no new problems. Daughter reports he remains in his usual state. ENT consult pending.        : Little change. Sleeping this am. Was having issues with voiding but this has improved with Flomax. ENT consult pending.      :  Currently he is doing well and no further inpt work up is planned. Discussed with pt and son and they desire he go back to [facility] today. ENT saw pt yesterday and did flexible fiberoptic nasopharyngoscope yesterday and did not find a mass and will follow up with pt outpt.   Follow up with Dr. Rylan Don later this wk or early next wk.        Review of Systems:   Review of systems not obtained due to patient factors.     Objective:   Physical Exam:   Visit Vitals  /66 (BP 1 Location: Left upper arm, BP Patient Position: At rest)   Pulse (!) 120   Temp 98.1 °F (36.7 °C)   Resp 18   Ht 5' 2\" (1.575 m)   Wt 67.1 kg (148 lb)   SpO2 94%   BMI 27.07 kg/m²    O2 Flow Rate (L/min): 2 l/min O2 Device: Nasal cannula  Temp (24hrs), Av °F (36.7 °C), Min:97.6 °F (36.4 °C), Max:98.2 °F (36.8 °C)    08/31 0701 - 08/31 1900  In: -   [facility] 400 [facility]   08/29 1901 - 08/31 0700  In: 450 [P.O.:450]  Out: 700 [Urine:700]     General:  alert, cooperative, no distress, appears stated age. Head:  Normocephalic, without obvious abnormality, atraumatic. Eyes:  Conjunctivae/corneas clear. PERRL, EOMs intact. Neck: Supple, symmetrical, trachea midline, no adenopathy, thyroid: no enlargement/tenderness/nodules, no carotid bruit and no JVD. Lungs:   Clear today. Chest wall:  No tenderness or deformity. Heart:  Rate in 70s - irreg, no murmur, click, rub or gallop. Abdomen:   Soft, non-tender. Bowel sounds normal. No masses,  No organomegaly. Extremities: Extremities normal, atraumatic, no cyanosis or edema. No calf tenderness or cords. Site of left hip fx repairC/D/I   Pulses: 2+ and symmetric all extremities. Skin:  turgor normal. No rashes   Neurologic/Psy: Diminished hearing. Alert and oriented X 2. Knows he is in the hospital.   equal.  Moves upper and lower extrem to command. No left sided weakness this AM.  No cogwheeling or rigidity. Gait not tested at this time. Sensation grossly normal to touch. Gross motor of extremities normal.        Data Review:    ECHO 8/26/21   Interpretation Summary  Result status: Final result   · Pericardium: There is a large left pleural effusion. There is a small right pleural effusion. · LV: Estimated LVEF is 35 - 40%. Normal cavity size. Mildly increased wall thickness. Moderately reduced systolic function. Left ventricular diastolic dysfunction. · LA: Dilated left atrium. · RA: Dilated right atrium. · MV: Mitral valve non-specific thickening. Mild mitral annular calcification. · TV: Mild tricuspid valve regurgitation is present.            EXAMINATION:  CT ANGIOGRAPHY HEAD AND NECK  8/27/21  COMPARISON: No prior CTs are available for comparison.  MRA of the brain was  reviewed.   FINDINGS:  CTA NECK:  Aortic Arch: Atherosclerotic plaque. .  Right Common Carotid Artery: No significant abnormality. Right Internal Carotid Artery: No significant abnormality. NASCET Right: 0-25%. Left Common Carotid Artery: No significant abnormality. Left Internal Carotid Artery: No significant abnormality. NASCET Left: 0-25%. .  Carotid stenosis determined using NASCET criteria. Right Vertebral Artery: Small caliber. .  Left Vertebral Artery: Dominant vessel tortuous. No significant stenosis. Cervical Soft Tissues: Soft tissue mass involving the left aryepiglottic fold  extending to the left true vocal cord measuring approximately 2.0 x 1.5 cm which  extends laterally to the left thyroid cartilage. . Superior to inferior extension  approximately 4.1 cm. No definite adenopathy is identified in the neck. Evaluation is limited by the phase of the contrast bolus. Lung Apices: Bilateral pleural effusions. Bones: No destructive bone lesion. Multilevel degenerative changes in the  cervical spine. Additional Comments: N/A.  CTA HEAD:  Posterior Circulation: No flow limiting stenosis or occlusion. Anterior Circulation: No flow limiting stenosis or occlusion. Additional Comments: Small bilateral posterior communicating arteries. Chronic  small vessel ischemic disease. .  There are no regional areas of elevated Tmax, decreased cerebral blood flow or  blood volume. rCBF < 30% = 0 cc. Tmax > 6 seconds = 0 cc. There is an area of relative hypoperfusion involving the right temporal lobe and  right occipital lobe. This may be secondary to motion artifact  IMPRESSION  1.  No acute process. No arterial thrombosis/occlusion, dissection, or  flow-limiting stenosis. 2.  Mass centered on the left aryepiglottic fold extending superiorly to  inferiorly from the left vallecula to the left true vocal cord. 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 3.  Bilateral pleural effusions.  Multilevel degenerative disc disease in the  cervical spine. Intracranial chronic small vessel ischemic disease      EXAM: CT CODE NEURO HEAD WO CONTRAST 8/27/21  INDICATION: code s  COMPARISON: 7/31/2021. CONTRAST: None. FINDINGS:  The ventricles and sulci are normal in size, shape and configuration. .  Subcortical deep white matter hypodensities. . There is no intracranial  hemorrhage, extra-axial collection, or mass effect. The basilar cisterns are  open. No CT evidence of acute infarct. The bone windows demonstrate no abnormalities. Near complete opacification of  the left sphenoid sinus. Mucous retention cyst in the right maxillary sinus. IMPRESSION  No acute intracranial abnormality. Chronic small vessel ischemic disease.      EXAM: MRI BRAIN WO CONT 8/27/21  INDICATION: stroke  COMPARISON: CT head 8/27/2021, CTA head neck 8/27/2021. CONTRAST: None. FINDINGS:  Generalized parenchymal volume loss with commensurate dilation of the sulci and  ventricular system. There are chronic infarcts noted in the right parietal lobe  and bilateral medial occipital lobes. There is no acute infarct, hemorrhage,  extra-axial fluid collection, or mass effect. There is no cerebellar tonsillar  herniation. Expected arterial flow-voids are present. Near complete opacification of the left sphenoid sinus with frothy secretions. Trace left mastoid effusion. The orbital contents are within normal limits with  bilateral lens implants. No significant osseous or scalp lesions are identified. IMPRESSION  1. No acute intracranial abnormality. 2. Generalized parenchymal volume loss, and chronic infarcts in the right  parietal lobe and bilateral medial occipital lobes. 3. Near complete opacification of the left sphenoid sinus with frothy  secretions, which may represent acute on chronic sinusitis.  Correlate  clinically.     EEG 8/27/21  IMPRESSION/INTERPRETATION:  This EEG recorded while the patient is noted to be awake, yet unresponsive, is abnormal secondary to diffuse slowing and disorganization of the background rhythms indicative of a moderate degree of bihemispheric dysfunction as is commonly seen with an encephalopathy which may have contributions from toxic, metabolic, diffuse structural, and/or pharmacologic effects and clinical correlation is recommended.  No epileptiform abnormalities are seen. Reyna Hurley MD        PROCEDURE: flexible fiberoptic nasopharyngoscope inserted transnasally through left nasal cavity and advanced into oropharynx and larynx. Findings:  - normal tongue base.  - vocal folds wnl. No visible laryngeal mass. Subglottis and proximal trachea clear.  - MILD ERYTHEMA/??? LESION EXTENDING ALONG LEFT LATERAL PHARYNGEAL WALL TOWARD AE FOLD/HYPOPHARYNX BUT NO BULKY MASS.   Belinda Holloway MD            Lab Results   Component Value Date/Time     Cholesterol, total 128 08/28/2021 08:01 AM     HDL Cholesterol 54 08/28/2021 08:01 AM     LDL, calculated 58 08/28/2021 08:01 AM     VLDL, calculated 16 08/28/2021 08:01 AM     Triglyceride 80 08/28/2021 08:01 AM     CHOL/HDL Ratio       Signed:  Jonatan Stern MD  8/31/2021  10:24 AM

## 2021-08-31 NOTE — PROGRESS NOTES
Bedside shift change report given to Nanette (oncoming nurse) by Andre Dunlap (offgoing nurse). Report included the following information SBAR, Kardex, ED Summary, Intake/Output, MAR and Cardiac Rhythm Sinus Tach.

## 2021-08-31 NOTE — PROGRESS NOTES
Pharmacist Discharge Medication Reconciliation    Discharge Provider:  Dr Noé Faustin       Discharge Medications:      My Medications        START taking these medications        Instructions Each Dose to Equal Morning Noon Evening Bedtime   lisinopriL 2.5 mg tablet  Commonly known as: Real Gildardo  Your next dose is: 8/31 pm      Take 1 Tablet by mouth nightly. 2.5 mg         metoprolol tartrate 25 mg tablet  Commonly known as: LOPRESSOR  Your next dose is: 8/31 pm      Take 0.5 Tablets by mouth two (2) times a day. 12.5 mg                CONTINUE taking these medications        Instructions Each Dose to Equal Morning Noon Evening Bedtime   acetaminophen 325 mg tablet  Commonly known as: TYLENOL  Your next dose is: 8/31 pm      Take 650 mg by mouth three (3) times daily. 650 mg         allopurinoL 300 mg tablet  Commonly known as: ZYLOPRIM  Your next dose is: 8/31 pm      Take 150 mg by mouth nightly. 150 mg         ascorbic acid (vitamin C) 500 mg tablet  Commonly known as: VITAMIN C  Your next dose is: 9/1 am      Take 500 mg by mouth daily. 500 mg         bisacodyL 10 mg Supp  Commonly known as: DULCOLAX      Insert 10 mg into rectum daily as needed for Constipation. 10 mg         calcium-vitamin D 500 mg-200 unit per tablet  Commonly known as: OS-VINCENT +D3      Take 1 Tablet by mouth three (3) times daily (with meals). 1 Tablet         DULoxetine 30 mg capsule  Commonly known as: CYMBALTA      Take 90 mg by mouth daily. 90 mg         Florastor 250 mg capsule  Generic drug: Saccharomyces boulardii      Take 250 mg by mouth daily. For 10 days : 8/24/2021 to 9/3/2021   316 mg         folic acid 1 mg tablet  Commonly known as: FOLVITE      Take 1 mg by mouth daily. 1 mg         Miralax 17 gram/dose powder  Generic drug: polyethylene glycol      Take 17 g by mouth daily as needed for Constipation.    17 g         multivitamin tablet  Commonly known as: ONE A DAY      Take 1 Tablet by mouth daily.   1 Tablet         ondansetron 4 mg disintegrating tablet  Commonly known as: Zofran ODT      1 Tab by SubLINGual route every eight (8) hours as needed for Nausea or Vomiting. 4 mg         OTHER      Apply house moisturizer to left cheek scab once every shift until resolved, may leave open to ait if intact    Monitor Left heel for dressing once every shift    Clean left heel with normal saline and cover with a hydrocolloid dressing 3 times per week and as needed     Monitor left hip for dressing placement once daily    Clean left hip with normal saline and cover with dry dressing once a day          PriLOSEC OTC 20 mg tablet  Generic drug: omeprazole      Take 20 mg by mouth daily. 20 mg         senna-docusate 8.6-50 mg per tablet  Commonly known as: PERICOLACE      Take 1 Tablet by mouth two (2) times a day. 1 Tablet         tamsulosin 0.4 mg capsule  Commonly known as: FLOMAX      Take 1 Capsule by mouth daily. 0.4 mg         Xarelto 15 mg Tab tablet  Generic drug: rivaroxaban      Take 15 mg by mouth daily (with dinner). Indications: prevention of thromboembolism in paroxysmal atrial fibrillation   15 mg         zinc sulfate 50 mg zinc (220 mg) tablet      Take 1 Tab by mouth daily. 220 mg         Zocor 10 mg tablet  Generic drug: simvastatin      Take 10 mg by mouth nightly.    10 mg                STOP taking these medications      bumetanide 1 mg tablet  Commonly known as: BUMEX        ciprofloxacin HCl 750 mg tablet  Commonly known as: CIPRO        clindamycin 150 mg capsule  Commonly known as: CLEOCIN        clindamycin 300 mg capsule  Commonly known as: CLEOCIN                  Where to Get Your Medications        These medications were sent to [facility], [address] [place] [address]. [id number], [place] [id number]      Phone: [phone]   lisinopriL 2.5 mg tablet  metoprolol tartrate 25 mg tablet          The patient's chart, MAR, and AVS were reviewed by   Moo Powers,   Contact: [phone]

## 2021-08-31 NOTE — PROGRESS NOTES
Transition of Care Plan to SNF/Rehab    Communication to Patient/Family:  Met with patient and family and they are agreeable to the transition plan. The Plan for Transition of Care is related to the following treatment goals: The Patient and/or patient representative was provided with a choice of provider and agrees  with the discharge plan. Yes [x] No []    A Freedom of choice list was provided with basic dialogue that supports the patient's individualized plan of care/goals and shares the quality data associated with the providers. Yes [x] No []    SNF/Rehab Transition:  Patient has been accepted to [facility] SNF/Rehab and meets criteria for admission. Patient will transported by family and expected to leave at 2pm.    Communication to SNF/Rehab:  Bedside RN, Robb Benjamin, has been notified to update the transition plan to the facility and call report ([phone]). Discharge information has been updated on the AVS. And communicated to facility via [employer], or CC link. Discharge instructions to be fax'd to facility at [facility] #). [phone]     [x] BCPI-A  Patient has been identified as part of the [address] associated with that Bundle Program, please contact Cardiac Coordination Team at [phone]. Bundle information has been communication to [facility]. Nursing Please include all hard scripts for controlled substances, med rec and dc summary, and AVS in packet. Reviewed and confirmed with facility, [facility], can manage the patient care needs for the following:     Rachel Connelly with (X) only those applicable:  Medication:  [x]Medications are available at the facility  []IV Antibiotics    []Controlled Substance  hard copies available sent.   []Weekly Labs    Equipment:  []CPAP/BiPAP  []Wound Vacuum  []Avendaño or Urinary Device  []PICC/Central Line  []Nebulizer  []Ventilator    Treatment:  []Isolation (for MRSA, VRE, etc.)  []Surgical Drain Management  []Tracheostomy Care  []Dressing Changes  []Dialysis with transportation  []PEG Care  []Oxygen  []Daily Weights for Heart Failure    Dietary:  []Any diet limitations  []Tube Feedings   []Total Parenteral Management (TPN)    Financial Resources:  []Medicaid Application Completed    []UAI Completed and copy given to pt/family  and copy given to pt/family  []A screening has previously been completed. []Level II Completed    [] Private pay individual who will not become   financially eligible for Medicaid within 6 months from admission to a [address] [place] [address] facility. [] Individual refused to have screening conducted. []Medicaid Application Completed    []The screening denied because it was determined individual did not need/did not qualify for nursing facility level of care. [] Out of state residents seeking direct admission to a [address] facility. [] Individuals who are inpatients of an out of state hospital, or in state or out of state veterans/ hospital and seek direct admission to a [address] facility  [] Individuals who are pateints or residents of a state owned/operated facility that is licensed by Department of [employer] ([employer]) and seek direct admission to [address] [place] [address]  [] A screening not required for enrollment in [facility] services as set out in [facility]-  [] [facility] [employer]) staff shall perform screenings of the [facility] clients. Advanced Care Plan:  []Surrogate Decision Maker of Care  []POA  []Communicated Code Status and copy sent. Other: JUAN Lawrence

## 2021-08-31 NOTE — PROGRESS NOTES
Daily Progress Note and Discharge Note: 8/31/2021  Georgie Lim MD    Assessment/Plan:   Atrial fibrillation with RVR  -Continue Xarelto  -Consulted cardiology     Elevated troponin  -Likely due to A.  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  - incidental finding on CTA of laryngeal mass - ENT consulted and will follow up outpt       Problem List:  Problem List as of 8/31/2021 Date Reviewed: 12/20/2018        Codes Class Noted - Resolved    Atrial fibrillation with rapid ventricular response (HCC) ICD-10-CM: I48.91  ICD-9-CM: 427.31  8/25/2021 - Present        NSTEMI (non-ST elevated myocardial infarction) ([facility] ICD-10-CM: I21.4  ICD-9-CM: 410.70  7/31/2021 - Present        Hip fracture ([facility] ICD-10-CM: S72.009A  ICD-9-CM: 820.8  7/18/2021 - Present        CAP (community acquired pneumonia) ICD-10-CM: J18.9  ICD-9-CM: 486  7/18/2021 - Present        Chronic atrial fibrillation ([facility] ICD-10-CM: I48.20  ICD-9-CM: 427.31  11/10/2017 - Present        Sinus node dysfunction ([facility] ICD-10-CM: I49.5  ICD-9-CM: 427.81  4/12/2016 - Present        Depression ICD-10-CM: F32.9  ICD-9-CM: 311  9/28/2015 - Present        Dyslipidemia ICD-10-CM: E78.5  ICD-9-CM: 272.4  9/23/2014 - Present        Gout ICD-10-CM: M10.9  ICD-9-CM: 274.9  Unknown - Present        Hx of carcinoma in situ of prostate ICD-10-CM: Z86.002  ICD-9-CM: V13.89  Unknown - Present        Essential hypertension, benign ICD-10-CM: I10  ICD-9-CM: 401.1  11/16/2013 - Present        Kidney stones ICD-10-CM: N20.0  ICD-9-CM: 592.0 11/16/2013 - Present        Embolic stroke involving right middle cerebral artery ([facility] ICD-10-CM: I63.411  ICD-9-CM: 434.11  11/1/2013 - Present    Overview Signed 12/7/2013  9:23 AM by Arlette Pacheco MD     presented with dysarthria, MRI Several tiny acute infarcts in the right middle cerebral artery             RESOLVED: Dizziness ICD-10-CM: R42  ICD-9-CM: 780.4  4/12/2016 - 6/21/2016        RESOLVED: Typical atrial flutter ([facility] ICD-10-CM: I48.3  ICD-9-CM: 427.32  5/6/2015 - 9/28/2015        RESOLVED: Paroxysmal atrial fibrillation ([facility] ICD-10-CM: I48.0  ICD-9-CM: 427.31  9/23/2014 - 11/10/2017        RESOLVED: Dysarthria ICD-9-CM: 784.5  11/17/2013 - 12/7/2013        RESOLVED: TIA (transient ischemic attack) ICD-10-CM: G45.9  ICD-9-CM: 435.9  11/16/2013 - 12/7/2013        RESOLVED: Other and unspecified hyperlipidemia ICD-10-CM: E78.5  ICD-9-CM: 272.4  11/16/2013 - 9/23/2014        RESOLVED: Hypokalemia ICD-10-CM: E87.6  ICD-9-CM: 276.8  11/16/2013 - 12/7/2013            HPI:   Ralene Bloch is a 80 y.o. male who presented with elevated heart rate from [facility] rehab. Staff at the facility noticed patient was increasingly more hypotensive and was in atrial fibrillation. He has a history of atrial fibrillation and takes a beta-blocker. He currently reports no complaints but earlier felt dizzy/lightheaded. No palpitations, SOB, or chest pain. (Dr Luz Montero)    8/26:  No complaints except he thinks he is being held here against his will. Wandering speech. Not oriented to day/date/location. He thinks he is in [place]. Hb a little lower - rechecking.     8/27:  More agitated and says he is leaving. Daughter in room helping with the agitation. Not oriented to day/date/location. Rate better this AM.  Hb ok at 9.3. Possibly could go back to [employer] today if ok with Cards.    A few rales noted this AM - may be atelectasis but will check CXR.      8/28:  \"Back to normal\" today per pts daughter. He is oriented more today and knows he is in the hospital but not which one. Yesterday pt had episode of aphasia and left sided weakness. CT/CTA and MRI neg for acute CVA but incidental laryngeal mass found. Likely the episode was a TIA. This AM he reports he is \"feeling just fine\" and able to speak as usual and no longer has weakness. Remains on Xarelto. Will ask Neuro if he should be on  Xarelto plus ASA? Long discussion with daughter about likelyhood that pharyngeal mass is a cancer. She desires to know what it is but will hold off on any treatment decisions until dx known - will consult ENT.     :  Little change,. He reports no new problems. Daughter reports he remains in his usual state. ENT consult pending. : Little change. Sleeping this am. Was having issues with voiding but this has improved with Flomax. ENT consult pending. :  Currently he is doing well and no further inpt work up is planned. Discussed with pt and son and they desire he go back to [facility] today. ENT saw pt yesterday and did flexible fiberoptic nasopharyngoscope yesterday and did not find a mass and will follow up with pt outpt. Follow up with Dr. Marialuisa Brown later this wk or early next wk.        Review of Systems:   Review of systems not obtained due to patient factors. Objective:   Physical Exam:   Visit Vitals  /66 (BP 1 Location: Left upper arm, BP Patient Position: At rest)   Pulse (!) 120   Temp 98.1 °F (36.7 °C)   Resp 18   Ht 5' 2\" (1.575 m)   Wt 67.1 kg (148 lb)   SpO2 94%   BMI 27.07 kg/m²    O2 Flow Rate (L/min): 2 l/min O2 Device: Nasal cannula  Temp (24hrs), Av °F (36.7 °C), Min:97.6 °F (36.4 °C), Max:98.2 °F (36.8 °C)    701 - 1900  In: -   Out: 476 [NXZAA:196]   1901 - 700  In: 450 [P.O.:450]  Out: 700 [Urine:700]    General:  alert, cooperative, no distress, appears stated age.    Head:  Normocephalic, without obvious abnormality, atraumatic. Eyes:  Conjunctivae/corneas clear. PERRL, EOMs intact. Neck: Supple, symmetrical, trachea midline, no adenopathy, thyroid: no enlargement/tenderness/nodules, no carotid bruit and no JVD. Lungs:   Clear today. Chest wall:  No tenderness or deformity. Heart:  Rate in 70s - irreg, no murmur, click, rub or gallop. Abdomen:   Soft, non-tender. Bowel sounds normal. No masses,  No organomegaly. Extremities: Extremities normal, atraumatic, no cyanosis or edema. No calf tenderness or cords. Site of left hip fx repairC/D/I   Pulses: 2+ and symmetric all extremities. Skin:  turgor normal. No rashes   Neurologic/Psy: Diminished hearing. Alert and oriented X 2. Knows he is in the hospital.   equal.  Moves upper and lower extrem to command. No left sided weakness this AM.  No cogwheeling or rigidity. Gait not tested at this time. Sensation grossly normal to touch. Gross motor of extremities normal.       Data Review:    ECHO 8/26/21   Interpretation Summary  Result status: Final result   · Pericardium: There is a large left pleural effusion. There is a small right pleural effusion. · LV: Estimated LVEF is 35 - 40%. Normal cavity size. Mildly increased wall thickness. Moderately reduced systolic function. Left ventricular diastolic dysfunction. · LA: Dilated left atrium. · RA: Dilated right atrium. · MV: Mitral valve non-specific thickening. Mild mitral annular calcification. · TV: Mild tricuspid valve regurgitation is present. EXAMINATION:  CT ANGIOGRAPHY HEAD AND NECK  8/27/21  COMPARISON: No prior CTs are available for comparison. MRA of the brain was  reviewed.   FINDINGS:  CTA NECK:  Aortic Arch: Atherosclerotic plaque. .  Right Common Carotid Artery: No significant abnormality. Right Internal Carotid Artery: No significant abnormality. NASCET Right: 0-25%. Left Common Carotid Artery: No significant abnormality.   Left Internal Carotid Artery: No significant abnormality. NASCET Left: 0-25%. .  Carotid stenosis determined using NASCET criteria. Right Vertebral Artery: Small caliber. .  Left Vertebral Artery: Dominant vessel tortuous. No significant stenosis. Cervical Soft Tissues: Soft tissue mass involving the left aryepiglottic fold  extending to the left true vocal cord measuring approximately 2.0 x 1.5 cm which  extends laterally to the left thyroid cartilage. . Superior to inferior extension  approximately 4.1 cm. No definite adenopathy is identified in the neck. Evaluation is limited by the phase of the contrast bolus. Lung Apices: Bilateral pleural effusions. Bones: No destructive bone lesion. Multilevel degenerative changes in the  cervical spine. Additional Comments: N/A.  CTA HEAD:  Posterior Circulation: No flow limiting stenosis or occlusion. Anterior Circulation: No flow limiting stenosis or occlusion. Additional Comments: Small bilateral posterior communicating arteries. Chronic  small vessel ischemic disease. .  There are no regional areas of elevated Tmax, decreased cerebral blood flow or  blood volume. rCBF < 30% = 0 cc. Tmax > 6 seconds = 0 cc. There is an area of relative hypoperfusion involving the right temporal lobe and  right occipital lobe. This may be secondary to motion artifact  IMPRESSION  1. No acute process. No arterial thrombosis/occlusion, dissection, or  flow-limiting stenosis. 2.  Mass centered on the left aryepiglottic fold extending superiorly to  inferiorly from the left vallecula to the left true vocal cord. 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 3.  Bilateral pleural effusions. Multilevel degenerative disc disease in the  cervical spine. Intracranial chronic small vessel ischemic disease     EXAM: CT CODE NEURO HEAD WO CONTRAST 8/27/21  INDICATION: code s  COMPARISON: 7/31/2021. CONTRAST: None. FINDINGS:  The ventricles and sulci are normal in size, shape and configuration. .  Subcortical deep white matter hypodensities. . There is no intracranial  hemorrhage, extra-axial collection, or mass effect. The basilar cisterns are  open. No CT evidence of acute infarct. The bone windows demonstrate no abnormalities. Near complete opacification of  the left sphenoid sinus. Mucous retention cyst in the right maxillary sinus. IMPRESSION  No acute intracranial abnormality. Chronic small vessel ischemic disease.      EXAM: MRI BRAIN WO CONT 8/27/21  INDICATION: stroke  COMPARISON: CT head 8/27/2021, CTA head neck 8/27/2021. CONTRAST: None. FINDINGS:  Generalized parenchymal volume loss with commensurate dilation of the sulci and  ventricular system. There are chronic infarcts noted in the right parietal lobe  and bilateral medial occipital lobes. There is no acute infarct, hemorrhage,  extra-axial fluid collection, or mass effect. There is no cerebellar tonsillar  herniation. Expected arterial flow-voids are present. Near complete opacification of the left sphenoid sinus with frothy secretions. Trace left mastoid effusion. The orbital contents are within normal limits with  bilateral lens implants. No significant osseous or scalp lesions are identified. IMPRESSION  1. No acute intracranial abnormality. 2. Generalized parenchymal volume loss, and chronic infarcts in the right  parietal lobe and bilateral medial occipital lobes. 3. Near complete opacification of the left sphenoid sinus with frothy  secretions, which may represent acute on chronic sinusitis. Correlate  clinically.     EEG 8/27/21  IMPRESSION/INTERPRETATION:  This EEG recorded while the patient is noted to be awake, yet unresponsive, is abnormal secondary to diffuse slowing and disorganization of the background rhythms indicative of a moderate degree of bihemispheric dysfunction as is commonly seen with an encephalopathy which may have contributions from toxic, metabolic, diffuse structural, and/or pharmacologic effects and clinical correlation is recommended. No epileptiform abnormalities are seen. Maged Fall MD      PROCEDURE: flexible fiberoptic nasopharyngoscope inserted transnasally through left nasal cavity and advanced into oropharynx and larynx. Findings:  - normal tongue base.  - vocal folds wnl. No visible laryngeal mass. Subglottis and proximal trachea clear.  - MILD ERYTHEMA/??? LESION EXTENDING ALONG LEFT LATERAL PHARYNGEAL WALL TOWARD AE FOLD/HYPOPHARYNX BUT NO BULKY MASS. Olivia Reaves MD     Lab Results   Component Value Date/Time    Cholesterol, total 128 08/28/2021 08:01 AM    HDL Cholesterol 54 08/28/2021 08:01 AM    LDL, calculated 58 08/28/2021 08:01 AM    VLDL, calculated 16 08/28/2021 08:01 AM    Triglyceride 80 08/28/2021 08:01 AM    CHOL/HDL Ratio 2.4 08/28/2021 08:01 AM       Recent Days:  Recent Labs     08/31/21  0158 08/29/21  0046   WBC 7.0 11.9*   HGB 8.5* 9.3*   HCT 28.1* 30.4*    298     Recent Labs     08/31/21 0158 08/29/21  0046    140   K 4.3 4.1   * 108   CO2 31 28   * 129*   BUN 13 18   CREA 0.68* 0.83   CA 7.8* 8.1*   ALB 2.3*  --    TBILI 0.4  --    ALT 12  --      No results for input(s): PH, PCO2, PO2, HCO3, FIO2 in the last 72 hours.     24 Hour Results:  Recent Results (from the past 24 hour(s))   COVID-19 RAPID TEST    Collection Time: 08/31/21  1:58 AM   Result Value Ref Range    Specimen source Nasopharyngeal      COVID-19 rapid test Not detected NOTD     CBC WITH AUTOMATED DIFF    Collection Time: 08/31/21  1:58 AM   Result Value Ref Range    WBC 7.0 4.1 - 11.1 K/uL    RBC 2.68 (L) 4.10 - 5.70 M/uL    HGB 8.5 (L) 12.1 - 17.0 g/dL    HCT 28.1 (L) 36.6 - 50.3 %    .9 (H) 80.0 - 99.0 FL    MCH 31.7 26.0 - 34.0 PG    MCHC 30.2 30.0 - 36.5 g/dL    RDW 16.4 (H) 11.5 - 14.5 %    PLATELET 177 799 - 182 K/uL    MPV 9.6 8.9 - 12.9 FL    NRBC 0.0 0  WBC    ABSOLUTE NRBC 0.00 0.00 - 0.01 K/uL    NEUTROPHILS 73 32 - 75 %    LYMPHOCYTES 13 12 - 49 %    MONOCYTES 10 5 - 13 %    EOSINOPHILS 4 0 - 7 %    BASOPHILS 0 0 - 1 %    IMMATURE GRANULOCYTES 0 0.0 - 0.5 %    ABS. NEUTROPHILS 5.1 1.8 - 8.0 K/UL    ABS. LYMPHOCYTES 0.9 0.8 - 3.5 K/UL    ABS. MONOCYTES 0.7 0.0 - 1.0 K/UL    ABS. EOSINOPHILS 0.3 0.0 - 0.4 K/UL    ABS. BASOPHILS 0.0 0.0 - 0.1 K/UL    ABS. IMM. GRANS. 0.0 0.00 - 0.04 K/UL    DF AUTOMATED     METABOLIC PANEL, COMPREHENSIVE    Collection Time: 08/31/21  1:58 AM   Result Value Ref Range    Sodium 141 136 - 145 mmol/L    Potassium 4.3 3.5 - 5.1 mmol/L    Chloride 109 (H) 97 - 108 mmol/L    CO2 31 21 - 32 mmol/L    Anion gap 1 (L) 5 - 15 mmol/L    Glucose 109 (H) 65 - 100 mg/dL    BUN 13 6 - 20 MG/DL    Creatinine 0.68 (L) 0.70 - 1.30 MG/DL    BUN/Creatinine ratio 19 12 - 20      GFR est AA >60 >60 ml/min/1.73m2    GFR est non-AA >60 >60 ml/min/1.73m2    Calcium 7.8 (L) 8.5 - 10.1 MG/DL    Bilirubin, total 0.4 0.2 - 1.0 MG/DL    ALT (SGPT) 12 12 - 78 U/L    AST (SGOT) 13 (L) 15 - 37 U/L    Alk. phosphatase 153 (H) 45 - 117 U/L    Protein, total 5.5 (L) 6.4 - 8.2 g/dL    Albumin 2.3 (L) 3.5 - 5.0 g/dL    Globulin 3.2 2.0 - 4.0 g/dL    A-G Ratio 0.7 (L) 1.1 - 2.2     SAMPLES BEING HELD    Collection Time: 08/31/21  1:58 AM   Result Value Ref Range    SAMPLES BEING HELD 1BLU,1PST     COMMENT        Add-on orders for these samples will be processed based on acceptable specimen integrity and analyte stability, which may vary by analyte.        Medications reviewed  Current Facility-Administered Medications   Medication Dose Route Frequency    lisinopriL (PRINIVIL, ZESTRIL) tablet 2.5 mg  2.5 mg Oral QHS    metoprolol tartrate (LOPRESSOR) tablet 12.5 mg  12.5 mg Oral BID    sodium chloride (NS) flush 5-40 mL  5-40 mL IntraVENous Q8H    sodium chloride (NS) flush 5-40 mL  5-40 mL IntraVENous PRN    acetaminophen (TYLENOL) tablet 650 mg  650 mg Oral Q6H PRN    Or    acetaminophen (TYLENOL) suppository 650 mg  650 mg Rectal Q6H PRN    polyethylene glycol (MIRALAX) packet 17 g 17 g Oral DAILY PRN    ondansetron (ZOFRAN ODT) tablet 4 mg  4 mg Oral Q8H PRN    Or    ondansetron (ZOFRAN) injection 4 mg  4 mg IntraVENous Q6H PRN    allopurinoL (ZYLOPRIM) tablet 150 mg  150 mg Oral QHS    DULoxetine (CYMBALTA) capsule 90 mg  90 mg Oral DAILY    folic acid (FOLVITE) tablet 1 mg  1 mg Oral DAILY    rivaroxaban (XARELTO) tablet 15 mg  15 mg Oral DAILY    atorvastatin (LIPITOR) tablet 10 mg  10 mg Oral DAILY    tamsulosin (FLOMAX) capsule 0.4 mg  0.4 mg Oral DAILY     Care Plan discussed with: Patient and Nurse  Total time spent with patient and review of records: 30 minutes.     Devora Martinez MD

## 2021-09-01 ENCOUNTER — PATIENT OUTREACH (OUTPATIENT)
Dept: CASE MANAGEMENT | Age: 86
End: 2021-09-01

## 2021-09-01 NOTE — PROGRESS NOTES
Transition of care outreach postponed for 14 days due to patient's discharge to SNF. Per EMR patient discharged to [facility]. Will continue to monitor patient progress.

## 2021-09-15 ENCOUNTER — PATIENT OUTREACH (OUTPATIENT)
Dept: CASE MANAGEMENT | Age: 86
End: 2021-09-15

## 2021-09-15 NOTE — PROGRESS NOTES
Outgoing call placed to [facility] care regarding update of patient status. Spoke to LoSo () patient identified utilizing 2 identifiers. Introduced self and reason for the call. LoSo reports patient is currently admitted to the facility. Will continue to monitor patient progress.

## 2021-09-29 ENCOUNTER — PATIENT OUTREACH (OUTPATIENT)
Dept: CASE MANAGEMENT | Age: 86
End: 2021-09-29

## 2021-09-29 NOTE — Clinical Note
Good morning Ladies,   Mr Julius Pena was transferred to [address] [place] [address] 9/23/2021 I spoke with his caregiver and daughter Brian Murphy (HIPAA)  verified. Mr Julius Pena has a CHF bundle flag I spoke to caregiver at the facility reminding her regarding the importance of daily weights, red flags and diet.   He is being followed by  [facility] for wound care to lt heel possible PT patient s/p hip fx  in August.     Thank you,   [place]

## 2021-09-29 NOTE — PROGRESS NOTES
Outgoing call placed to [facility] regarding update of patient progress. Spoke to MARKLISA (caregiver) patient identified utilizing 2 identifiers. Introduced self and reason for the call. Miracle reports patient was transferred to the [facility]. [address] [place] [address] Dr Discharge Follow-Up      Date/Time:  2021 1:45 PM    Patient was [facility] to [facility] on [facility] and discharged to [facility] on [facility]. The patients discharge diagnosis was Atria Fibrillation w/ RVR. Patient was discharge on 2021 from [facility]. The discharge summary from the post acute facilty was not available at the time of outreach. Patient was contacted within 1 business days of discharge from the post acute facility. LPN Care Coordinator contacted the [address] [place] [address]  by telephone to perform post hospital discharge follow up. Verified name and  with caregiver as identifiers. Provided introduction to self, and explanation of the LPN Care Coordinator role. Caregiver received post acute facility discharge instructions. Reviewed general discharge red flag regarding CHF. LPN Care Coordinator provided contact information for future reference. Advance Care Planning:   Does patient have an Advance Directive:  reviewed and current     Home Health orders at discharge: Caregiver reports patient is being follow by Home services was not sure if it is an in-house service. Regarding left heel wound. Date of initial visit: 2021    Durable Medical Equipment ordered at discharge: none   Per family patient has walker and wheelchair   [facility]: [address] received: n/a    Medication(s):   The post acute facility medication discharge list was not available for this call. Medication review was not  performed with caregiver, who verbalizes understanding of administration of home medications.   There were no barriers to obtaining medications identified at this time. [facility] follow up appointment(s): No future appointments. Non-[facility] follow up appointment(s): TBD   [facility]:  n/a     2nd call placed to Ramesh Prado (pt daughter) HIPAA verified. No answer message left via voicemail. 9/29/2021   Received call from patient's daughter introduced self and reason for the call   Ms. Ynes Foss reports patient medical care will be managed by Dr. Presley Orozco and patient attended a post hospital follow up on  9/10/2021.

## 2021-10-04 ENCOUNTER — PATIENT OUTREACH (OUTPATIENT)
Dept: CASE MANAGEMENT | Age: 86
End: 2021-10-04

## 2021-10-04 NOTE — PROGRESS NOTES
Addended by: DARI REYES on: 5/22/2018 01:58 PM     Modules accepted: Orders     Care Transitions Initial Call    Call within 2 business days of discharge: No     Patient: Edis Macdonald Patient : 1927 MRN: [id number]    Last Discharge [address]       Complaint Diagnosis Description Type Department Provider    21 Irregular Heart Beat Atrial fibrillation with RVR ([facility] [address] . .. ED to Hosp-Admission (Discharged) (ADMIT) Harshal Espino MD; Liss Llamas... Was this an external facility [place]? [place] [facility], then to [facility] from -. Then moved to [place] [address] at [facility]. Challenges to be reviewed by the provider   [facility] [place] [facility] [place],then [place] [facility] [facility] [place],then back to [facility] from -. Currently in Assisted Living in [facility]. Daughter and family says he will remain there for needed level of care. Method of communication with provider : chart routing    Discussed [id number] related testing which was negative on . Family aware. Needed for transfer to rehab. Advance Care Planning:   Does patient have an Advance Directive:  yes; reviewed and current     Inpatient Readmission Risk score: Unplanned Readmit Risk Score: 21    Was this a readmission? yes   Patient stated reason for the admission: per staff at rehab, bp low and rapid pulse. Patients top risk factors for readmission: functional cognitive ability, functional physical ability, falls, ineffective coping and medical condition-third admission in last 4 months. Afib with RVR,NSTEMI,s/p hip fracture, prostate cancer, stroke. Interventions to address risk factors: Scheduled appointment with PCP-saw pcp  on 9/10.   and Obtained and reviewed discharge summary and/or continuity of care documents    Care Transition Nurse (CTN) contacted the family by telephone to perform post hospital discharge assessment. Verified name and  with family as identifiers. Provided introduction to self, and explanation of the CTN role. Spoke with daughter, reports he is doing ok. Adjusting to new YAIMA. Using walker d/t fear of falling. Saw wound care doctor today- reports wound is 40% better on heel. Does still have some confusion but family feels he is where he needs to be for level of care needed. CTN reviewed discharge instructions, medical action plan and red flags with family who verbalized understanding. Were discharge instructions available to patient? yes. Reviewed appropriate site of care based on symptoms and resources available to patient including: PCP. Family given an opportunity to ask questions and does not have any further questions or concerns at this time. The family agrees to contact the PCP office for questions related to their healthcare. Medication reconciliation was performed with caregiver, who verbalizes understanding of administration of home medications. Advised obtaining a 90-day supply of all daily and as-needed medications. Spoke with David Ortiz RN evening supervisor for [facility]. Referral to Pharm D needed: no     Home Health/Outpatient orders at discharge: home health care, PT and [address] [facility] [facility] [address]: [address]  Date of initial visit: resumed care when moved to [facility]. Durable Medical Equipment ordered at discharge: None (already had cane and walker)  [address]: na  Durable Medical Equipment received: na    Covid Risk Education    Educated patient about risk for severe COVID-19 due to risk factors according to CDC guidelines. CTN reviewed discharge instructions, medical action plan and red flag symptoms with the family who verbalized understanding. Discussed COVID vaccination status: yes. Education provided on COVID-19 vaccination as appropriate. Discussed exposure protocols and quarantine with CDC Guidelines.  Family was given an opportunity to verbalize any questions and concerns and agrees to contact CTN or health care provider for questions related to their healthcare. Was patient discharged with a pulse oximeter? No.    Discussed follow-up appointments. If no appointment was previously scheduled, appointment scheduling offered: yes. Is follow up appointment scheduled within 7 days of discharge? yes. Saw pcp, Zeny Ley, on 9/10. [facility] follow up appointment(s): No future appointments. Non-[facility] follow up appointment(s): pending. Plan for follow-up call in 10-14 days based on severity of symptoms and risk factors. Plan for next call: symptom management-assess current symptoms, self management-following discharge instructions and medication management-taking meds as ordered. CTN provided contact information for future needs. Goals Addressed                 This Visit's Progress     Understands red flags post discharge. 10/4/21 [facility] 8/25-8/31 Afib with RVR   Reviewed discharge instructions with daughter.  Reviewed meds with [facility] nurse manager.  Reviewed red flags with daughter: rapid pulse, sob, chest pain, low bp/high bp, fever,nausea,vomiting,diarrhea, edema in lower extremities, confusion.  Saw pcp 9/10, Dr.Herbert Weaver-f/u as advised. Harmeet Stark daughter CTN contact number if questions/concerns.  CTN to check back in about 2 weeks. leon

## 2021-10-14 NOTE — PERIOP NOTES
Bladder scan d/t no void in 8 hrs. Bladder scan detected 601 ml residual volume. Straight cath done for urinary retention >400ml. Urine output from cath 400 ml. Pt expressed relief. No

## 2021-10-20 ENCOUNTER — PATIENT OUTREACH (OUTPATIENT)
Dept: CASE MANAGEMENT | Age: 86
End: 2021-10-20

## 2021-11-02 ENCOUNTER — PATIENT OUTREACH (OUTPATIENT)
Dept: CASE MANAGEMENT | Age: 86
End: 2021-11-02

## 2021-11-02 NOTE — PROGRESS NOTES
Patient has graduated from the Bundle program on 11/2/21. Patient/family has the ability to self-manage at this time Care management goals have been completed. Patient was not referred to the [facility] team for further management. Goals Addressed                 This Visit's Progress     COMPLETED: Understands red flags post discharge. 10/4/21 [facility] 8/25-8/31 Afib with RVR   Reviewed discharge instructions with daughter.  Reviewed meds with [facility] nurse manager.  Reviewed red flags with daughter: rapid pulse, sob, chest pain, low bp/high bp, fever,nausea,vomiting,diarrhea, edema in lower extremities, confusion.  Saw pcp 9/10, Dr.Herbert Weaver-f/u as advised. Sathish Nair daughter CTN contact number if questions/concerns.  CTN to check back in about 2 weeks. mbt  10/20/21  Called [facility], no answer. Called and spoke to daughter,Sharron. Reports he is doing better. Had an iron infusion. Phosphorus was a little low, so started on a supplement. Wearing boot for pressure wound on foot- wound is improving. No red flags noted. Advised daughter will check back in another week or so.mbt  11/2/21  Spoke to daughter, Melburn Felty. Reports he is doing ok. Tends to get a little morose with her but upbeat with others. Says that Bebe Mora is taking great care of him. Has a care conference with his team today. Has moved from Level 4 to Level 3 which is improvement. Heel wound has healed. Advised to continue to f/u with providers. Declined CM services at this time. Wished him well.mbt            Patient has Care Transition Nurse's contact information for any further questions, concerns, or needs. Patients upcoming visits:  No future appointments.

## 2022-01-01 NOTE — DISCHARGE INSTRUCTIONS
Patient Discharge Instructions    Anabelle Barr / [id number] : 1927    Admitted 2021 Discharged: 2021 10:23 AM     ACUTE DIAGNOSES:  Atrial fibrillation with rapid ventricular response ([facility] [I48.91]    CHRONIC MEDICAL DIAGNOSES:  Problem List as of 2021 Date Reviewed: 2018        Codes Class Noted - Resolved    Atrial fibrillation with rapid ventricular response (HCC) ICD-10-CM: I48.91  ICD-9-CM: 427.31  2021 - Present        NSTEMI (non-ST elevated myocardial infarction) ([facility] ICD-10-CM: I21.4  ICD-9-CM: 410.70  2021 - Present        Hip fracture ([facility] ICD-10-CM: S72.009A  ICD-9-CM: 820.8  2021 - Present        CAP (community acquired pneumonia) ICD-10-CM: J18.9  ICD-9-CM: 486  2021 - Present        Chronic atrial fibrillation ([facility] ICD-10-CM: I48.20  ICD-9-CM: 427.31  11/10/2017 - Present        Sinus node dysfunction ([facility] ICD-10-CM: I49.5  ICD-9-CM: 427.81  2016 - Present        Depression ICD-10-CM: F32.9  ICD-9-CM: 672  2015 - Present        Dyslipidemia ICD-10-CM: E78.5  ICD-9-CM: 272.4  2014 - Present        Gout ICD-10-CM: M10.9  ICD-9-CM: 274.9  Unknown - Present        Hx of carcinoma in situ of prostate ICD-10-CM: Z34.040  ICD-9-CM: V13.89  Unknown - Present        Essential hypertension, benign ICD-10-CM: I10  ICD-9-CM: 401.1  2013 - Present        Kidney stones ICD-10-CM: N20.0  ICD-9-CM: 592.0  2013 - Present        Embolic stroke involving right middle cerebral artery ([facility] ICD-10-CM: I63.411  ICD-9-CM: 434.11  2013 - Present    Overview Signed 2013  9:23 AM by Dev Leon MD     presented with dysarthria, MRI Several tiny acute infarcts in the right middle cerebral artery             RESOLVED: Dizziness ICD-10-CM: R42  ICD-9-CM: 780.4  2016 - 2016        RESOLVED: Typical atrial flutter ([address] ICD-10-CM: I48.3  ICD-9-CM: 427.32  2015 - 2015        RESOLVED: Paroxysmal atrial fibrillation [facility]) ICD-10-CM: I48.0  ICD-9-CM: 427.31  9/23/2014 - 11/10/2017        RESOLVED: Dysarthria ICD-9-CM: 784.5  11/17/2013 - 12/7/2013        RESOLVED: TIA (transient ischemic attack) ICD-10-CM: G45.9  ICD-9-CM: 435.9  11/16/2013 - 12/7/2013        RESOLVED: Other and unspecified hyperlipidemia ICD-10-CM: E78.5  ICD-9-CM: 272.4  11/16/2013 - 9/23/2014        RESOLVED: Hypokalemia ICD-10-CM: E87.6  ICD-9-CM: 276.8  11/16/2013 - 12/7/2013              DISCHARGE MEDICATIONS:         · It is important that you take the medication exactly as they are prescribed. · Keep your medication in the bottles provided by the pharmacist and keep a list of the medication names, dosages, and times to be taken in your wallet. · Do not take other medications without consulting your doctor. DIET:  Cardiac Diet  ACTIVITY: Activity as tolerated    ADDITIONAL INFORMATION: If you experience any of the following symptoms then please call your primary care physician or return to the emergency room if you cannot get hold of your doctor: Fever, chills, nausea, vomiting, diarrhea, change in mentation, falling, bleeding, shortness of breath. FOLLOW UP CARE:  Dr. Rachelle Ragland MD  you are to call and set up an appointment to see them with in 1 week. Follow-up with specialists at directed by them      Information obtained by :  I understand that if any problems occur once I am at home I am to contact my physician. I understand and acknowledge receipt of the instructions indicated above.                                                                                                                                            Physician's or R.N.'s Signature                                                                  Date/Time                                                                                                                                              Patient or Representative Signature Date/Time actual/infant

## 2022-03-15 NOTE — ED PROVIDER NOTES
[facility] Surgery \[facility] [place] [facility]\"")  Surgery Department  Operative Note    SUMMARY     Date of Procedure: 3/15/2022     Procedure: Procedure(s) (LRB):  OSTECTOMY, RIGHT INDEX AND LONG FINGERS DIP JOINT/ARTHROTOMY (Right)  EXCISION, GANGLION CYST, HAND (Right)   1.  Right index and long finger mucous cyst excision  2. Right long finger mucous cyst excision  3.  Osteophyte excision right long finger DIP joint  4.  Osteophyte excision right index finger DIP joint  5. Arthrotomy right index finger DIP joint  6. Arthrotomy right long finger DIP joint    Surgeon(s) and Role:     * Farnaz Dorantes MD - Primary    Assisting Surgeon: Isaac Bartholomew MD    Pre-Operative Diagnosis: Mucous cyst of digit of right hand [M67.441]  Osteophyte of right hand [M25.741]    Post-Operative Diagnosis: Post-Op Diagnosis Codes:     * Mucous cyst of digit of right hand [M67.441]     * Osteophyte of right hand [M25.741]    Anesthesia: Local MAC    Indication for Procedure:  56 yo male with longstanding right index and long finger distal interphalangeal joint mucous cysts and osteophytes that had failed conservative treatment.  Risks and benefits of the procedure were discussed with the patient and informed consent was obtained.    Description of the Findings of the Procedure: The patient was seen in the preoperative holding area and the right index and long fingers were marked. The patient was taken to the OR, placed supine on the table, and a padded hand table was used. After monitored anesthesia care was admitted without difficulty, a time-out procedure was performed identifying the patient, the operative site and the procedure to be performed.  A mixture of 1% lidocaine plain and 0.25% bupivacaine was used to block the right long finger.  A tourniquet was placed on the arm and the right upper extremity was prepped and draped in standard sterile fashion. The right upper extremity was exsanguinated with an Esmarch bandage, and the tourniquet was  Mr. Mirta Reynoso is a 81yo male who presents to the ER after ground-level fall. He said that he got up to go to the bathroom this morning. After he had use the restroom, he went to go back into his room. He said that he slept on fell to the ground. He had been using his cane at this time. He reports that he hit his left hip and has pain in his left hip. He denies hitting his head. He denies any chest pain or abdominal pain. He said that he felt well prior to the fall and he denies any other complaints. He has not been able to walk since then. 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    Hypertension     Memory loss     Peripheral neuropathy     Vertigo        Past Surgical History:   Procedure Laterality Date    ABDOMEN SURGERY PROC UNLISTED      colorectal    CARDIAC SURG PROCEDURE UNLIST      cardio version    HX CATARACT REMOVAL      HX GI      colon resection    HX ORTHOPAEDIC      left knee, left shoulder         Family History:   Problem Relation Age of Onset    Stroke Father     Heart Disease Father     Stroke Sister        Social History     Socioeconomic History    Marital status:      Spouse name: Not on file    Number of children: Not on file    Years of education: Not on file    Highest education level: Not on file   Occupational History    Not on file   Tobacco Use    Smoking status: Never Smoker    Smokeless tobacco: Never Used   Substance and Sexual Activity    Alcohol use:  Yes Alcohol/week: 1.0 standard drinks     Types: 1 Standard drinks or equivalent per week     Comment: rarely    Drug use: Not on file    Sexual activity: Not on file   Other Topics Concern    Not on file   Social History Narrative    Not on file     Social Determinants of Health     Financial Resource Strain:     Difficulty of Paying Living Expenses:    Food Insecurity:     Worried About Running Out of Food in the Last Year:     [address] in the Last Year:    Transportation Needs:     Lack of Transportation (Medical):  Lack of Transportation (Non-Medical):    Physical Activity:     Days of Exercise per Week:     Minutes of Exercise per Session:    Stress:     Feeling of Stress :    Social Connections:     Frequency of Communication with Friends and Family:     Frequency of Social Gatherings with Friends and Family:     Attends Mu-ism Services:     Active Member of Clubs or Organizations:     Attends Club or Organization Meetings:     Marital Status:    Intimate Partner Violence:     Fear of Current or Ex-Partner:     Emotionally Abused:     Physically Abused:     Sexually Abused: ALLERGIES: Patient has no known allergies. Review of Systems   Constitutional: Negative for chills and fever. HENT: Negative for rhinorrhea and sore throat. Respiratory: Negative for cough and shortness of breath. Cardiovascular: Negative for chest pain. Gastrointestinal: Negative for abdominal pain, diarrhea, nausea and vomiting. Genitourinary: Negative for dysuria and hematuria. Musculoskeletal: Negative for arthralgias and myalgias. Skin:        Left hip pain   Neurological: Negative for dizziness, weakness and light-headedness. All other systems reviewed and are negative. Vitals:    07/18/21 0744   BP: 133/70   Pulse: (!) 109   Resp: 16   Temp: 98 °F (36.7 °C)   SpO2: 97%            Physical Exam       Vital signs reviewed. Nursing notes reviewed.     Const:  No acute inflated to 250 mm Hg.  A 15 blade scalpel was used to make a 2 cm incision transversely over the right index distal interphalangeal joint.  Littler scissors were used to dissect down to the extensor tendon.  This retracted with a Ragnell throughout the case.  A DIP joint arthrotomy was made and the joint was explored. The stalk of the mucous cyst was sharply excised from the DIP joint and the mass itself was removed, it measured 5x5 mm.  A curette was used to ensure no residual mass remained over the germinal matrix. The dorsal osteophyte on the distal interphalangeal joint was exposed and removed with a rongeur.      A 15 blade scalpel was used to make a 2 cm incision transversely over the right long distal interphalangeal joint.  Littler scissors were used to dissect down to the extensor tendon.  This retracted with a Ragnell throughout the case.  A DIP joint arthrotomy was made and the joint was explored. The stalk of the mucous cyst was sharply excised from the DIP joint and the mass itself was removed, it measured 7x7 mm.  A curette was used to ensure no residual mass remained over the germinal matrix. The dorsal osteophyte on the distal interphalangeal joint was exposed and removed with a rongeur.  The wounds were then irrigated with saline. The neurovascular bundles were identified and protected throughout the case. Bipolar electrocautery was used for hemostasis.  4-0 prolene was used to close the skin in a horizontal mattress fashion.  Adaptic, 4x4, tube gauze and coban were used to dress the hand.  All instrument, sponge and needle counts were correct.  The tourniquet was deflated and the hand and finger was pink and well-perfused.  The patient tolerated the procedure well.  He was taken awake and alert to the recovery room.    Complications: No    Estimated Blood Loss (EBL): minimal           Implants: * No implants in log *    Specimens:   Specimen (24h ago, onward)             Start     Ordered     03/15/22 1040  Specimen to Pathology, Surgery Orthopedics  Once        Comments: Pre-op Diagnosis: Mucous cyst of digit of right hand [M67.441]  Osteophyte of right hand [M25.741]    Procedure(s):  OSTECTOMY, RIGHT INDEX AND LONG FINGERS DIP JOINT  EXCISION, GANGLION CYST, HAND     Number of specimens: 1    Name of specimens: 1.  RIGHT INDEX AND LONG OSTEOPHYTE/MUCOUS CYST   References:    Click here for ordering Quick Tip   Question Answer Comment   Procedure Type: Orthopedics    Release to patient Immediate        03/15/22 1039                        Condition: Good    Disposition: PACU - hemodynamically stable.    Attestation: I was present and scrubbed for the entire procedure.     distress, well developed, well nourished  Head:  Atraumatic, normocephalic  Eyes:  PERRL, conjunctiva normal, no scleral icterus  Neck:  Supple, trachea midline  Cardiovascular: Regular rate, cap refill on the left foot is less than 2 seconds  Resp:  No resp distress, no increased work of breathing  Abd:  Soft, non-tender, non-distended  MSK:  No pedal edema, normal ROM, pain with flexion of the left hip  Neuro:  Alert and oriented x3, no cranial nerve defect  Skin:  Warm, dry, intact  Psych: normal mood and affect, behavior is normal, judgement and thought content is normal        MDM  Number of Diagnoses or Management Options     Amount and/or Complexity of Data Reviewed  Clinical lab tests: ordered and reviewed  Tests in the radiology section of CPT®: ordered and reviewed  Review and summarize past medical records: yes    Patient Progress  Patient progress: stable         Procedures      Perfect Serve Consult for Admission  9:39 AM    ED Room Number: ER07/07  Patient Name and age: Chico Morgan 80 y.o.  male  Working Diagnosis:   1. Closed fracture of left hip, initial encounter ([facility]    2. Pneumonia due to infectious organism, unspecified laterality, unspecified part of lung        COVID-19 Suspicion:  no  Sepsis present:  no  Reassessment needed: no  Readmission: no  Isolation Requirements:  no  Recommended Level of Care:  med/surg  Department:[facility] ED - [phone]  Other:  Ortho to see      Mr. Ella Cabrera is a 79yo male who presents to the ER with complaints of left hip pain after a fall. Pt. Found to have a hip fracture and possible pneumonia. I have started him on abx and pain meds. Pt.  To be seen by ortho and to be evaluated for admission by the hospitalist.

## 2022-03-18 PROBLEM — J18.9 CAP (COMMUNITY ACQUIRED PNEUMONIA): Status: ACTIVE | Noted: 2021-07-18

## 2022-03-18 PROBLEM — I21.4 NSTEMI (NON-ST ELEVATED MYOCARDIAL INFARCTION) (HCC): Status: ACTIVE | Noted: 2021-07-31

## 2022-03-19 PROBLEM — I48.91 ATRIAL FIBRILLATION WITH RAPID VENTRICULAR RESPONSE (HCC): Status: ACTIVE | Noted: 2021-08-25

## 2022-03-20 PROBLEM — S72.009A HIP FRACTURE (HCC): Status: ACTIVE | Noted: 2021-07-18

## 2022-03-20 PROBLEM — I48.20 CHRONIC ATRIAL FIBRILLATION (HCC): Status: ACTIVE | Noted: 2017-11-10

## 2022-06-28 NOTE — PROGRESS NOTES
Spiritual Care Assessment/Progress Note  [address]      NAME: Ira Ash      MRN: [id number]  AGE: 80 y.o. SEX: male  Baptism Affiliation: Church   Language: English     8/3/2021     Total Time (in minutes): 5     Spiritual Assessment begun in [facility] 3 PROG CARE TELE 2 through conversation with:         [x]Patient        [x] Family    [] Friend(s)        Reason for Consult: [facility]     Spiritual beliefs: (Please include comment if needed)     [x] Identifies with a sia tradition: Church        [x] Supported by a sia community:            [] Claims no spiritual orientation:           [] Seeking spiritual identity:                [] Adheres to an individual form of spirituality:           [] Not able to assess:                           Identified resources for coping:      [x] Prayer                               [x] Music                  [] Guided Imagery     [x] Family/friends                 [] Pet visits     [] Devotional reading                         [] Unknown     [] Other:                                               Interventions offered during this visit: (See comments for more details)    Patient Interventions: Affirmation of sia, Communion [employer]), Initial/Spiritual assessment, patient floor, Prayer (actual), Prayer (assurance of)     Family/Friend(s):  Affirmation of sia, Communion (Church), Catharsis/review of pertinent events in supportive environment, Prayer (actual), Prayer (assurance of)     Plan of Care:     [x] Support spiritual and/or cultural needs    [] Support AMD and/or advance care planning process      [] Support grieving process   [] Coordinate Rites and/or Rituals    [] Coordination with community clergy   [] No spiritual needs identified at this time   [] Detailed Plan of Care below (See Comments)  [] Make referral to Music Therapy  [] Make referral to Pet Therapy     [] Make referral to Addiction services  [] Make referral to [facility] Passages  [] Make referral to Spiritual Care Partner  [] No future visits requested        [] Follow up upon further referrals     Comments: Mr. Rodney Donis was sitting up in his chair. His son was with him. Mr. Rodney Donis has difficulty hearing. He is a gracious gentleman. His son shared that Rodney Greenfield  about a year ago and mr. Rodney Donis has been living by himself. His daughter lived near him. After this fall he is going into an assisted living situation today at [facility]. Prayer and communion offered both son and father.     ROBERT Ken, RN, ACSW, LCSW   Page:  [phone] Epidermal Autograft Text: The defect edges were debeveled with a #15 scalpel blade.  Given the location of the defect, shape of the defect and the proximity to free margins an epidermal autograft was deemed most appropriate.  Using a sterile surgical marker, the primary defect shape was transferred to the donor site. The epidermal graft was then harvested.  The skin graft was then placed in the primary defect and oriented appropriately.

## 2023-01-01 ENCOUNTER — HOSPITAL ENCOUNTER (INPATIENT)
Facility: HOSPITAL | Age: 88
LOS: 1 days | Discharge: HOSPICE | DRG: 871 | End: 2023-10-02
Attending: EMERGENCY MEDICINE | Admitting: INTERNAL MEDICINE
Payer: MEDICARE

## 2023-01-01 ENCOUNTER — APPOINTMENT (OUTPATIENT)
Facility: HOSPITAL | Age: 88
DRG: 871 | End: 2023-01-01
Payer: MEDICARE

## 2023-01-01 ENCOUNTER — HOSPITAL ENCOUNTER (INPATIENT)
Facility: HOSPITAL | Age: 88
LOS: 1 days | DRG: 951 | End: 2023-10-03
Attending: FAMILY MEDICINE | Admitting: FAMILY MEDICINE
Payer: COMMERCIAL

## 2023-01-01 ENCOUNTER — HOSPICE ADMISSION (OUTPATIENT)
Age: 88
End: 2023-01-01
Payer: MEDICARE

## 2023-01-01 VITALS
WEIGHT: 135 LBS | BODY MASS INDEX: 24.84 KG/M2 | TEMPERATURE: 97.9 F | SYSTOLIC BLOOD PRESSURE: 69 MMHG | DIASTOLIC BLOOD PRESSURE: 46 MMHG | HEART RATE: 63 BPM | HEIGHT: 62 IN | OXYGEN SATURATION: 96 % | RESPIRATION RATE: 15 BRPM

## 2023-01-01 VITALS
SYSTOLIC BLOOD PRESSURE: 50 MMHG | TEMPERATURE: 97.7 F | OXYGEN SATURATION: 78 % | HEART RATE: 61 BPM | DIASTOLIC BLOOD PRESSURE: 30 MMHG | RESPIRATION RATE: 12 BRPM

## 2023-01-01 DIAGNOSIS — A41.9 SEPTIC SHOCK (HCC): ICD-10-CM

## 2023-01-01 DIAGNOSIS — N17.9 AKI (ACUTE KIDNEY INJURY) (HCC): ICD-10-CM

## 2023-01-01 DIAGNOSIS — J18.9 COMMUNITY ACQUIRED PNEUMONIA, UNSPECIFIED LATERALITY: ICD-10-CM

## 2023-01-01 DIAGNOSIS — T68.XXXA HYPOTHERMIA, INITIAL ENCOUNTER: ICD-10-CM

## 2023-01-01 DIAGNOSIS — R65.21 SEPTIC SHOCK (HCC): ICD-10-CM

## 2023-01-01 DIAGNOSIS — R79.89 ELEVATED TROPONIN: ICD-10-CM

## 2023-01-01 DIAGNOSIS — R00.1 BRADYCARDIA: ICD-10-CM

## 2023-01-01 DIAGNOSIS — J96.01 ACUTE RESPIRATORY FAILURE WITH HYPOXIA (HCC): Primary | ICD-10-CM

## 2023-01-01 DIAGNOSIS — D53.9 MACROCYTIC ANEMIA: ICD-10-CM

## 2023-01-01 DIAGNOSIS — J90 PLEURAL EFFUSION: ICD-10-CM

## 2023-01-01 LAB
ALBUMIN SERPL-MCNC: 2.7 G/DL (ref 3.5–5)
ALBUMIN/GLOB SERPL: 0.8 (ref 1.1–2.2)
ALP SERPL-CCNC: 77 U/L (ref 45–117)
ALT SERPL-CCNC: 20 U/L (ref 12–78)
ANION GAP BLD CALC-SCNC: 13 (ref 10–20)
ANION GAP SERPL CALC-SCNC: 8 MMOL/L (ref 5–15)
APPEARANCE UR: ABNORMAL
APPEARANCE UR: ABNORMAL
AST SERPL-CCNC: 28 U/L (ref 15–37)
B PERT DNA SPEC QL NAA+PROBE: NOT DETECTED
BACTERIA SPEC CULT: ABNORMAL
BACTERIA URNS QL MICRO: ABNORMAL /HPF
BACTERIA URNS QL MICRO: ABNORMAL /HPF
BASE DEFICIT BLD-SCNC: 1 MMOL/L
BASOPHILS # BLD: 0 K/UL (ref 0–0.1)
BASOPHILS NFR BLD: 0 % (ref 0–1)
BILIRUB SERPL-MCNC: 1 MG/DL (ref 0.2–1)
BILIRUB UR QL CFM: NEGATIVE
BILIRUB UR QL CFM: POSITIVE
BORDETELLA PARAPERTUSSIS BY PCR: NOT DETECTED
BUN SERPL-MCNC: 37 MG/DL (ref 6–20)
BUN/CREAT SERPL: 18 (ref 12–20)
C PNEUM DNA SPEC QL NAA+PROBE: NOT DETECTED
CA-I BLD-MCNC: 1.12 MMOL/L (ref 1.12–1.32)
CALCIUM SERPL-MCNC: 8.4 MG/DL (ref 8.5–10.1)
CC UR VC: ABNORMAL
CHLORIDE BLD-SCNC: 104 MMOL/L (ref 100–108)
CHLORIDE SERPL-SCNC: 106 MMOL/L (ref 97–108)
CO2 BLD-SCNC: 23 MMOL/L (ref 19–24)
CO2 SERPL-SCNC: 23 MMOL/L (ref 21–32)
COLOR UR: ABNORMAL
COLOR UR: ABNORMAL
COMMENT:: NORMAL
COMMENT:: NORMAL
CREAT SERPL-MCNC: 2.03 MG/DL (ref 0.7–1.3)
CREAT UR-MCNC: 1.6 MG/DL (ref 0.6–1.3)
DIFFERENTIAL METHOD BLD: ABNORMAL
EKG DIAGNOSIS: NORMAL
EKG Q-T INTERVAL: 504 MS
EKG QRS DURATION: 98 MS
EKG QTC CALCULATION (BAZETT): 455 MS
EKG R AXIS: -5 DEGREES
EKG T AXIS: 193 DEGREES
EKG VENTRICULAR RATE: 49 BPM
EOSINOPHIL # BLD: 0 K/UL (ref 0–0.4)
EOSINOPHIL NFR BLD: 0 % (ref 0–7)
EPITH CASTS URNS QL MICRO: ABNORMAL /LPF
EPITH CASTS URNS QL MICRO: ABNORMAL /LPF
ERYTHROCYTE [DISTWIDTH] IN BLOOD BY AUTOMATED COUNT: 17.8 % (ref 11.5–14.5)
FERRITIN SERPL-MCNC: 68 NG/ML (ref 26–388)
FLUAV SUBTYP SPEC NAA+PROBE: NOT DETECTED
FLUBV RNA SPEC QL NAA+PROBE: NOT DETECTED
FOLATE SERPL-MCNC: 20.3 NG/ML (ref 5–21)
GLOBULIN SER CALC-MCNC: 3.6 G/DL (ref 2–4)
GLUCOSE BLD STRIP.AUTO-MCNC: 86 MG/DL (ref 74–106)
GLUCOSE SERPL-MCNC: 142 MG/DL (ref 65–100)
GLUCOSE UR STRIP.AUTO-MCNC: NEGATIVE MG/DL
GLUCOSE UR STRIP.AUTO-MCNC: NEGATIVE MG/DL
HADV DNA SPEC QL NAA+PROBE: NOT DETECTED
HAPTOGLOB SERPL-MCNC: 126 MG/DL (ref 30–200)
HCO3 BLDA-SCNC: 23 MMOL/L
HCOV 229E RNA SPEC QL NAA+PROBE: NOT DETECTED
HCOV HKU1 RNA SPEC QL NAA+PROBE: NOT DETECTED
HCOV NL63 RNA SPEC QL NAA+PROBE: NOT DETECTED
HCOV OC43 RNA SPEC QL NAA+PROBE: NOT DETECTED
HCT VFR BLD AUTO: 24.6 % (ref 36.6–50.3)
HGB BLD-MCNC: 7.2 G/DL (ref 12.1–17)
HGB UR QL STRIP: ABNORMAL
HGB UR QL STRIP: ABNORMAL
HMPV RNA SPEC QL NAA+PROBE: NOT DETECTED
HPIV1 RNA SPEC QL NAA+PROBE: NOT DETECTED
HPIV2 RNA SPEC QL NAA+PROBE: NOT DETECTED
HPIV3 RNA SPEC QL NAA+PROBE: NOT DETECTED
HPIV4 RNA SPEC QL NAA+PROBE: NOT DETECTED
HYALINE CASTS URNS QL MICRO: ABNORMAL /LPF (ref 0–5)
IMM GRANULOCYTES # BLD AUTO: 0.1 K/UL (ref 0–0.04)
IMM GRANULOCYTES NFR BLD AUTO: 1 % (ref 0–0.5)
IRON SATN MFR SERPL: 9 % (ref 20–50)
IRON SERPL-MCNC: 26 UG/DL (ref 35–150)
KETONES UR QL STRIP.AUTO: ABNORMAL MG/DL
KETONES UR QL STRIP.AUTO: ABNORMAL MG/DL
LACTATE BLD-SCNC: 4.51 MMOL/L (ref 0.4–2)
LACTATE BLD-SCNC: 5.7 MMOL/L (ref 0.4–2)
LACTATE BLD-SCNC: 7.65 MMOL/L (ref 0.4–2)
LACTATE SERPL-SCNC: 4.7 MMOL/L (ref 0.4–2)
LDH SERPL L TO P-CCNC: 277 U/L (ref 85–241)
LEUKOCYTE ESTERASE UR QL STRIP.AUTO: ABNORMAL
LEUKOCYTE ESTERASE UR QL STRIP.AUTO: ABNORMAL
LYMPHOCYTES # BLD: 0.4 K/UL (ref 0.8–3.5)
LYMPHOCYTES NFR BLD: 6 % (ref 12–49)
M PNEUMO DNA SPEC QL NAA+PROBE: NOT DETECTED
MAGNESIUM SERPL-MCNC: 2.6 MG/DL (ref 1.6–2.4)
MCH RBC QN AUTO: 30.3 PG (ref 26–34)
MCHC RBC AUTO-ENTMCNC: 29.3 G/DL (ref 30–36.5)
MCV RBC AUTO: 103.4 FL (ref 80–99)
MONOCYTES # BLD: 0.6 K/UL (ref 0–1)
MONOCYTES NFR BLD: 8 % (ref 5–13)
NEUTS SEG # BLD: 6 K/UL (ref 1.8–8)
NEUTS SEG NFR BLD: 85 % (ref 32–75)
NITRITE UR QL STRIP.AUTO: NEGATIVE
NITRITE UR QL STRIP.AUTO: NEGATIVE
NRBC # BLD: 0 K/UL (ref 0–0.01)
NRBC BLD-RTO: 0 PER 100 WBC
NT PRO BNP: ABNORMAL PG/ML
PCO2 BLDV: 36.7 MMHG (ref 41–51)
PH BLDV: 7.41 (ref 7.32–7.42)
PH UR STRIP: 5 (ref 5–8)
PH UR STRIP: 5 (ref 5–8)
PHOSPHATE SERPL-MCNC: 4.2 MG/DL (ref 2.6–4.7)
PLATELET # BLD AUTO: 197 K/UL (ref 150–400)
PMV BLD AUTO: 11.5 FL (ref 8.9–12.9)
PO2 BLDV: <27 MMHG (ref 25–40)
POTASSIUM BLD-SCNC: 4.4 MMOL/L (ref 3.5–5.5)
POTASSIUM SERPL-SCNC: 4.2 MMOL/L (ref 3.5–5.1)
PROCALCITONIN SERPL-MCNC: 0.06 NG/ML
PROCALCITONIN SERPL-MCNC: 0.08 NG/ML
PROT SERPL-MCNC: 6.3 G/DL (ref 6.4–8.2)
PROT UR STRIP-MCNC: 300 MG/DL
PROT UR STRIP-MCNC: 300 MG/DL
RBC # BLD AUTO: 2.38 M/UL (ref 4.1–5.7)
RBC #/AREA URNS HPF: ABNORMAL /HPF (ref 0–5)
RBC #/AREA URNS HPF: ABNORMAL /HPF (ref 0–5)
RBC MORPH BLD: ABNORMAL
RBC MORPH BLD: ABNORMAL
RSV RNA SPEC QL NAA+PROBE: NOT DETECTED
RV+EV RNA SPEC QL NAA+PROBE: NOT DETECTED
SARS-COV-2 RNA RESP QL NAA+PROBE: NOT DETECTED
SERVICE CMNT-IMP: ABNORMAL
SERVICE CMNT-IMP: ABNORMAL
SODIUM BLD-SCNC: 140 MMOL/L (ref 136–145)
SODIUM SERPL-SCNC: 137 MMOL/L (ref 136–145)
SP GR UR REFRACTOMETRY: 1.02 (ref 1–1.03)
SP GR UR REFRACTOMETRY: 1.02 (ref 1–1.03)
SPECIMEN HOLD: NORMAL
SPECIMEN HOLD: NORMAL
SPECIMEN SITE: ABNORMAL
TIBC SERPL-MCNC: 283 UG/DL (ref 250–450)
TROPONIN I SERPL HS-MCNC: 188 NG/L (ref 0–76)
TROPONIN I SERPL HS-MCNC: 188 NG/L (ref 0–76)
URINE CULTURE IF INDICATED: ABNORMAL
UROBILINOGEN UR QL STRIP.AUTO: 1 EU/DL (ref 0.2–1)
UROBILINOGEN UR QL STRIP.AUTO: 1 EU/DL (ref 0.2–1)
VIT B12 SERPL-MCNC: 1143 PG/ML (ref 193–986)
WBC # BLD AUTO: 7.1 K/UL (ref 4.1–11.1)
WBC URNS QL MICRO: >100 /HPF (ref 0–4)
WBC URNS QL MICRO: >100 /HPF (ref 0–4)
YEAST BUDDING URNS QL: PRESENT
YEAST URNS QL MICRO: PRESENT
YEAST URNS QL MICRO: PRESENT

## 2023-01-01 PROCEDURE — 2700000000 HC OXYGEN THERAPY PER DAY

## 2023-01-01 PROCEDURE — 82607 VITAMIN B-12: CPT

## 2023-01-01 PROCEDURE — 83540 ASSAY OF IRON: CPT

## 2023-01-01 PROCEDURE — 6360000002 HC RX W HCPCS: Performed by: FAMILY MEDICINE

## 2023-01-01 PROCEDURE — 2580000003 HC RX 258: Performed by: INTERNAL MEDICINE

## 2023-01-01 PROCEDURE — 80053 COMPREHEN METABOLIC PANEL: CPT

## 2023-01-01 PROCEDURE — 2580000003 HC RX 258: Performed by: EMERGENCY MEDICINE

## 2023-01-01 PROCEDURE — 84484 ASSAY OF TROPONIN QUANT: CPT

## 2023-01-01 PROCEDURE — C9113 INJ PANTOPRAZOLE SODIUM, VIA: HCPCS | Performed by: INTERNAL MEDICINE

## 2023-01-01 PROCEDURE — 0656 HSPC GENERAL INPATIENT

## 2023-01-01 PROCEDURE — 99222 1ST HOSP IP/OBS MODERATE 55: CPT | Performed by: FAMILY MEDICINE

## 2023-01-01 PROCEDURE — 36415 COLL VENOUS BLD VENIPUNCTURE: CPT

## 2023-01-01 PROCEDURE — 51702 INSERT TEMP BLADDER CATH: CPT

## 2023-01-01 PROCEDURE — 84132 ASSAY OF SERUM POTASSIUM: CPT

## 2023-01-01 PROCEDURE — 96368 THER/DIAG CONCURRENT INF: CPT

## 2023-01-01 PROCEDURE — 85025 COMPLETE CBC W/AUTO DIFF WBC: CPT

## 2023-01-01 PROCEDURE — 94761 N-INVAS EAR/PLS OXIMETRY MLT: CPT

## 2023-01-01 PROCEDURE — 81001 URINALYSIS AUTO W/SCOPE: CPT

## 2023-01-01 PROCEDURE — 82803 BLOOD GASES ANY COMBINATION: CPT

## 2023-01-01 PROCEDURE — 2500000003 HC RX 250 WO HCPCS: Performed by: EMERGENCY MEDICINE

## 2023-01-01 PROCEDURE — 6360000002 HC RX W HCPCS: Performed by: INTERNAL MEDICINE

## 2023-01-01 PROCEDURE — 0202U NFCT DS 22 TRGT SARS-COV-2: CPT

## 2023-01-01 PROCEDURE — 71045 X-RAY EXAM CHEST 1 VIEW: CPT

## 2023-01-01 PROCEDURE — 83605 ASSAY OF LACTIC ACID: CPT

## 2023-01-01 PROCEDURE — A4216 STERILE WATER/SALINE, 10 ML: HCPCS | Performed by: INTERNAL MEDICINE

## 2023-01-01 PROCEDURE — 96365 THER/PROPH/DIAG IV INF INIT: CPT

## 2023-01-01 PROCEDURE — 84295 ASSAY OF SERUM SODIUM: CPT

## 2023-01-01 PROCEDURE — 84100 ASSAY OF PHOSPHORUS: CPT

## 2023-01-01 PROCEDURE — 82746 ASSAY OF FOLIC ACID SERUM: CPT

## 2023-01-01 PROCEDURE — 83010 ASSAY OF HAPTOGLOBIN QUANT: CPT

## 2023-01-01 PROCEDURE — 87086 URINE CULTURE/COLONY COUNT: CPT

## 2023-01-01 PROCEDURE — 82330 ASSAY OF CALCIUM: CPT

## 2023-01-01 PROCEDURE — 6360000002 HC RX W HCPCS: Performed by: EMERGENCY MEDICINE

## 2023-01-01 PROCEDURE — 93005 ELECTROCARDIOGRAM TRACING: CPT | Performed by: EMERGENCY MEDICINE

## 2023-01-01 PROCEDURE — 6370000000 HC RX 637 (ALT 250 FOR IP): Performed by: INTERNAL MEDICINE

## 2023-01-01 PROCEDURE — 99291 CRITICAL CARE FIRST HOUR: CPT

## 2023-01-01 PROCEDURE — 6360000002 HC RX W HCPCS: Performed by: NURSE PRACTITIONER

## 2023-01-01 PROCEDURE — 83880 ASSAY OF NATRIURETIC PEPTIDE: CPT

## 2023-01-01 PROCEDURE — 1100000000 HC RM PRIVATE

## 2023-01-01 PROCEDURE — 84145 PROCALCITONIN (PCT): CPT

## 2023-01-01 PROCEDURE — 83735 ASSAY OF MAGNESIUM: CPT

## 2023-01-01 PROCEDURE — 83615 LACTATE (LD) (LDH) ENZYME: CPT

## 2023-01-01 PROCEDURE — 70450 CT HEAD/BRAIN W/O DYE: CPT

## 2023-01-01 PROCEDURE — 87040 BLOOD CULTURE FOR BACTERIA: CPT

## 2023-01-01 PROCEDURE — 3E033XZ INTRODUCTION OF VASOPRESSOR INTO PERIPHERAL VEIN, PERCUTANEOUS APPROACH: ICD-10-PCS | Performed by: EMERGENCY MEDICINE

## 2023-01-01 PROCEDURE — 83550 IRON BINDING TEST: CPT

## 2023-01-01 PROCEDURE — 99221 1ST HOSP IP/OBS SF/LOW 40: CPT | Performed by: SPECIALIST

## 2023-01-01 PROCEDURE — 82947 ASSAY GLUCOSE BLOOD QUANT: CPT

## 2023-01-01 PROCEDURE — 93010 ELECTROCARDIOGRAM REPORT: CPT | Performed by: STUDENT IN AN ORGANIZED HEALTH CARE EDUCATION/TRAINING PROGRAM

## 2023-01-01 PROCEDURE — 82728 ASSAY OF FERRITIN: CPT

## 2023-01-01 RX ORDER — SODIUM CHLORIDE 9 MG/ML
INJECTION, SOLUTION INTRAVENOUS PRN
Status: DISCONTINUED | OUTPATIENT
Start: 2023-01-01 | End: 2023-01-01 | Stop reason: HOSPADM

## 2023-01-01 RX ORDER — SODIUM CHLORIDE 0.9 % (FLUSH) 0.9 %
5-40 SYRINGE (ML) INJECTION PRN
Status: DISCONTINUED | OUTPATIENT
Start: 2023-01-01 | End: 2023-01-01 | Stop reason: HOSPADM

## 2023-01-01 RX ORDER — MORPHINE SULFATE 2 MG/ML
2 INJECTION, SOLUTION INTRAMUSCULAR; INTRAVENOUS
Status: DISCONTINUED | OUTPATIENT
Start: 2023-01-01 | End: 2023-01-01 | Stop reason: HOSPADM

## 2023-01-01 RX ORDER — LORAZEPAM 2 MG/ML
1 INJECTION INTRAMUSCULAR
Status: DISCONTINUED | OUTPATIENT
Start: 2023-01-01 | End: 2023-01-01 | Stop reason: HOSPADM

## 2023-01-01 RX ORDER — ONDANSETRON 2 MG/ML
4 INJECTION INTRAMUSCULAR; INTRAVENOUS EVERY 6 HOURS PRN
Status: DISCONTINUED | OUTPATIENT
Start: 2023-01-01 | End: 2023-01-01

## 2023-01-01 RX ORDER — SODIUM CHLORIDE 0.9 % (FLUSH) 0.9 %
5-40 SYRINGE (ML) INJECTION EVERY 12 HOURS SCHEDULED
Status: DISCONTINUED | OUTPATIENT
Start: 2023-01-01 | End: 2023-01-01 | Stop reason: HOSPADM

## 2023-01-01 RX ORDER — MORPHINE SULFATE 4 MG/ML
4 INJECTION, SOLUTION INTRAMUSCULAR; INTRAVENOUS
Status: DISCONTINUED | OUTPATIENT
Start: 2023-01-01 | End: 2023-01-01

## 2023-01-01 RX ORDER — LORAZEPAM 2 MG/ML
1 INJECTION INTRAMUSCULAR EVERY 4 HOURS
Status: DISCONTINUED | OUTPATIENT
Start: 2023-01-01 | End: 2023-01-01 | Stop reason: HOSPADM

## 2023-01-01 RX ORDER — LORAZEPAM 2 MG/ML
1 INJECTION INTRAMUSCULAR EVERY 6 HOURS PRN
Status: DISCONTINUED | OUTPATIENT
Start: 2023-01-01 | End: 2023-01-01

## 2023-01-01 RX ORDER — ACETAMINOPHEN 650 MG/1
650 SUPPOSITORY RECTAL EVERY 4 HOURS PRN
Status: DISCONTINUED | OUTPATIENT
Start: 2023-01-01 | End: 2023-01-01 | Stop reason: SDUPTHER

## 2023-01-01 RX ORDER — MORPHINE SULFATE 2 MG/ML
2 INJECTION, SOLUTION INTRAMUSCULAR; INTRAVENOUS EVERY 4 HOURS
Status: DISCONTINUED | OUTPATIENT
Start: 2023-01-01 | End: 2023-01-01 | Stop reason: HOSPADM

## 2023-01-01 RX ORDER — KETOROLAC TROMETHAMINE 15 MG/ML
30 INJECTION, SOLUTION INTRAMUSCULAR; INTRAVENOUS EVERY 6 HOURS PRN
Status: DISCONTINUED | OUTPATIENT
Start: 2023-01-01 | End: 2023-01-01 | Stop reason: HOSPADM

## 2023-01-01 RX ORDER — SCOLOPAMINE TRANSDERMAL SYSTEM 1 MG/1
1 PATCH, EXTENDED RELEASE TRANSDERMAL
Status: DISCONTINUED | OUTPATIENT
Start: 2023-01-01 | End: 2023-01-01 | Stop reason: HOSPADM

## 2023-01-01 RX ORDER — MORPHINE SULFATE 2 MG/ML
2 INJECTION, SOLUTION INTRAMUSCULAR; INTRAVENOUS
Status: DISCONTINUED | OUTPATIENT
Start: 2023-01-01 | End: 2023-01-01

## 2023-01-01 RX ORDER — BISACODYL 10 MG
10 SUPPOSITORY, RECTAL RECTAL DAILY PRN
Status: DISCONTINUED | OUTPATIENT
Start: 2023-01-01 | End: 2023-01-01 | Stop reason: HOSPADM

## 2023-01-01 RX ORDER — MORPHINE SULFATE 2 MG/ML
4 INJECTION, SOLUTION INTRAMUSCULAR; INTRAVENOUS
Status: DISCONTINUED | OUTPATIENT
Start: 2023-01-01 | End: 2023-01-01

## 2023-01-01 RX ORDER — 0.9 % SODIUM CHLORIDE 0.9 %
1000 INTRAVENOUS SOLUTION INTRAVENOUS ONCE
Status: COMPLETED | OUTPATIENT
Start: 2023-01-01 | End: 2023-01-01

## 2023-01-01 RX ORDER — ACETAMINOPHEN 325 MG/1
650 TABLET ORAL EVERY 6 HOURS PRN
Status: DISCONTINUED | OUTPATIENT
Start: 2023-01-01 | End: 2023-01-01

## 2023-01-01 RX ORDER — GLYCOPYRROLATE 0.2 MG/ML
0.2 INJECTION INTRAMUSCULAR; INTRAVENOUS EVERY 4 HOURS PRN
Status: DISCONTINUED | OUTPATIENT
Start: 2023-01-01 | End: 2023-01-01 | Stop reason: HOSPADM

## 2023-01-01 RX ORDER — ONDANSETRON 2 MG/ML
4 INJECTION INTRAMUSCULAR; INTRAVENOUS EVERY 6 HOURS PRN
Status: DISCONTINUED | OUTPATIENT
Start: 2023-01-01 | End: 2023-01-01 | Stop reason: HOSPADM

## 2023-01-01 RX ORDER — HEPARIN SODIUM 5000 [USP'U]/ML
5000 INJECTION, SOLUTION INTRAVENOUS; SUBCUTANEOUS EVERY 8 HOURS SCHEDULED
Status: DISCONTINUED | OUTPATIENT
Start: 2023-01-01 | End: 2023-01-01

## 2023-01-01 RX ORDER — ONDANSETRON 4 MG/1
4 TABLET, ORALLY DISINTEGRATING ORAL EVERY 8 HOURS PRN
Status: DISCONTINUED | OUTPATIENT
Start: 2023-01-01 | End: 2023-01-01

## 2023-01-01 RX ORDER — ACETAMINOPHEN 650 MG/1
650 SUPPOSITORY RECTAL EVERY 6 HOURS PRN
Status: DISCONTINUED | OUTPATIENT
Start: 2023-01-01 | End: 2023-01-01

## 2023-01-01 RX ORDER — ACETAMINOPHEN 650 MG/1
650 SUPPOSITORY RECTAL EVERY 4 HOURS PRN
Status: DISCONTINUED | OUTPATIENT
Start: 2023-01-01 | End: 2023-01-01 | Stop reason: HOSPADM

## 2023-01-01 RX ORDER — PROMETHAZINE HYDROCHLORIDE 25 MG/1
25 SUPPOSITORY RECTAL EVERY 4 HOURS PRN
Status: DISCONTINUED | OUTPATIENT
Start: 2023-01-01 | End: 2023-01-01

## 2023-01-01 RX ORDER — NOREPINEPHRINE BITARTRATE 0.06 MG/ML
.5-2 INJECTION, SOLUTION INTRAVENOUS CONTINUOUS
Status: DISCONTINUED | OUTPATIENT
Start: 2023-01-01 | End: 2023-01-01

## 2023-01-01 RX ORDER — MORPHINE SULFATE 2 MG/ML
4 INJECTION, SOLUTION INTRAMUSCULAR; INTRAVENOUS
Status: DISCONTINUED | OUTPATIENT
Start: 2023-01-01 | End: 2023-01-01 | Stop reason: HOSPADM

## 2023-01-01 RX ORDER — POLYETHYLENE GLYCOL 3350 17 G/17G
17 POWDER, FOR SOLUTION ORAL DAILY PRN
Status: DISCONTINUED | OUTPATIENT
Start: 2023-01-01 | End: 2023-01-01

## 2023-01-01 RX ADMIN — SODIUM CHLORIDE, PRESERVATIVE FREE 10 ML: 5 INJECTION INTRAVENOUS at 20:45

## 2023-01-01 RX ADMIN — NOREPINEPHRINE BITARTRATE 4 MCG/MIN: 1 INJECTION, SOLUTION, CONCENTRATE INTRAVENOUS at 05:21

## 2023-01-01 RX ADMIN — MORPHINE SULFATE 2 MG: 2 INJECTION, SOLUTION INTRAMUSCULAR; INTRAVENOUS at 18:15

## 2023-01-01 RX ADMIN — SODIUM CHLORIDE 40 MG: 9 INJECTION INTRAMUSCULAR; INTRAVENOUS; SUBCUTANEOUS at 08:30

## 2023-01-01 RX ADMIN — PIPERACILLIN AND TAZOBACTAM 4500 MG: 4; .5 INJECTION, POWDER, LYOPHILIZED, FOR SOLUTION INTRAVENOUS at 05:07

## 2023-01-01 RX ADMIN — LORAZEPAM 1 MG: 2 INJECTION INTRAMUSCULAR; INTRAVENOUS at 03:48

## 2023-01-01 RX ADMIN — ONDANSETRON 4 MG: 2 INJECTION INTRAMUSCULAR; INTRAVENOUS at 14:20

## 2023-01-01 RX ADMIN — VANCOMYCIN HYDROCHLORIDE 1500 MG: 1.5 INJECTION, POWDER, LYOPHILIZED, FOR SOLUTION INTRAVENOUS at 05:08

## 2023-01-01 RX ADMIN — MORPHINE SULFATE 2 MG: 2 INJECTION, SOLUTION INTRAMUSCULAR; INTRAVENOUS at 06:24

## 2023-01-01 RX ADMIN — LORAZEPAM 1 MG: 2 INJECTION INTRAMUSCULAR; INTRAVENOUS at 10:38

## 2023-01-01 RX ADMIN — LORAZEPAM 1 MG: 2 INJECTION INTRAMUSCULAR; INTRAVENOUS at 18:15

## 2023-01-01 RX ADMIN — MORPHINE SULFATE 2 MG: 2 INJECTION, SOLUTION INTRAMUSCULAR; INTRAVENOUS at 10:39

## 2023-01-01 RX ADMIN — MORPHINE SULFATE 2 MG: 2 INJECTION, SOLUTION INTRAMUSCULAR; INTRAVENOUS at 17:54

## 2023-01-01 RX ADMIN — MORPHINE SULFATE 2 MG: 2 INJECTION, SOLUTION INTRAMUSCULAR; INTRAVENOUS at 14:47

## 2023-01-01 RX ADMIN — MORPHINE SULFATE 2 MG: 2 INJECTION, SOLUTION INTRAMUSCULAR; INTRAVENOUS at 00:09

## 2023-01-01 RX ADMIN — SODIUM CHLORIDE, PRESERVATIVE FREE 10 ML: 5 INJECTION INTRAVENOUS at 07:13

## 2023-01-01 RX ADMIN — MORPHINE SULFATE 2 MG: 2 INJECTION, SOLUTION INTRAMUSCULAR; INTRAVENOUS at 23:22

## 2023-01-01 RX ADMIN — MORPHINE SULFATE 2 MG: 2 INJECTION, SOLUTION INTRAMUSCULAR; INTRAVENOUS at 03:47

## 2023-01-01 RX ADMIN — SODIUM CHLORIDE 1000 ML: 9 INJECTION, SOLUTION INTRAVENOUS at 04:54

## 2023-01-01 RX ADMIN — LORAZEPAM 1 MG: 2 INJECTION INTRAMUSCULAR; INTRAVENOUS at 20:44

## 2023-01-01 RX ADMIN — SODIUM CHLORIDE, PRESERVATIVE FREE 10 ML: 5 INJECTION INTRAVENOUS at 09:00

## 2023-01-01 RX ADMIN — SODIUM CHLORIDE 1000 ML: 9 INJECTION, SOLUTION INTRAVENOUS at 04:53

## 2023-01-01 RX ADMIN — MORPHINE SULFATE 2 MG: 2 INJECTION, SOLUTION INTRAMUSCULAR; INTRAVENOUS at 20:44

## 2023-01-01 RX ADMIN — LORAZEPAM 1 MG: 2 INJECTION INTRAMUSCULAR; INTRAVENOUS at 23:27

## 2023-01-01 RX ADMIN — MORPHINE SULFATE 2 MG: 2 INJECTION, SOLUTION INTRAMUSCULAR; INTRAVENOUS at 08:57

## 2023-01-01 RX ADMIN — LORAZEPAM 1 MG: 2 INJECTION INTRAMUSCULAR; INTRAVENOUS at 14:47

## 2023-01-01 RX ADMIN — MORPHINE SULFATE 4 MG: 4 INJECTION, SOLUTION INTRAMUSCULAR; INTRAVENOUS at 14:22

## 2023-01-01 RX ADMIN — HEPARIN SODIUM 5000 UNITS: 5000 INJECTION INTRAVENOUS; SUBCUTANEOUS at 08:29

## 2023-01-01 ASSESSMENT — PAIN DESCRIPTION - LOCATION
LOCATION: GENERALIZED
LOCATION: BACK
LOCATION: GENERALIZED

## 2023-01-01 ASSESSMENT — PAIN SCALES - GENERAL
PAINLEVEL_OUTOF10: 2
PAINLEVEL_OUTOF10: 0
PAINLEVEL_OUTOF10: 6
PAINLEVEL_OUTOF10: 0
PAINLEVEL_OUTOF10: 5
PAINLEVEL_OUTOF10: 6

## 2023-01-01 ASSESSMENT — PAIN DESCRIPTION - ORIENTATION: ORIENTATION: RIGHT

## 2023-01-01 ASSESSMENT — PAIN DESCRIPTION - DESCRIPTORS
DESCRIPTORS: PATIENT UNABLE TO DESCRIBE
DESCRIPTORS: ACHING;SHARP

## 2023-01-01 ASSESSMENT — PAIN - FUNCTIONAL ASSESSMENT: PAIN_FUNCTIONAL_ASSESSMENT: 0-10

## 2023-05-11 RX ORDER — SIMVASTATIN 10 MG
10 TABLET ORAL NIGHTLY
COMMUNITY

## 2023-05-11 RX ORDER — LISINOPRIL 2.5 MG/1
1 TABLET ORAL NIGHTLY
COMMUNITY
Start: 2021-08-31

## 2023-05-11 RX ORDER — ASCORBIC ACID 500 MG
500 TABLET ORAL DAILY
COMMUNITY

## 2023-05-11 RX ORDER — BISACODYL 10 MG
SUPPOSITORY, RECTAL RECTAL DAILY PRN
COMMUNITY

## 2023-05-11 RX ORDER — ACETAMINOPHEN 325 MG/1
TABLET ORAL 3 TIMES DAILY
COMMUNITY

## 2023-05-11 RX ORDER — FOLIC ACID 1 MG/1
1 TABLET ORAL DAILY
COMMUNITY

## 2023-05-11 RX ORDER — DULOXETIN HYDROCHLORIDE 30 MG/1
90 CAPSULE, DELAYED RELEASE ORAL DAILY
COMMUNITY

## 2023-05-11 RX ORDER — ONDANSETRON 4 MG/1
TABLET, ORALLY DISINTEGRATING ORAL EVERY 8 HOURS PRN
COMMUNITY
Start: 2021-01-19

## 2023-05-11 RX ORDER — B-COMPLEX WITH VITAMIN C
1 TABLET ORAL
COMMUNITY
Start: 2021-07-25

## 2023-05-11 RX ORDER — ALLOPURINOL 300 MG/1
TABLET ORAL
COMMUNITY

## 2023-05-11 RX ORDER — SACCHAROMYCES BOULARDII 250 MG
CAPSULE ORAL DAILY
COMMUNITY

## 2023-05-11 RX ORDER — TAMSULOSIN HYDROCHLORIDE 0.4 MG/1
1 CAPSULE ORAL DAILY
COMMUNITY
Start: 2021-08-03

## 2023-05-11 RX ORDER — AMOXICILLIN 250 MG
1 CAPSULE ORAL 2 TIMES DAILY
COMMUNITY

## 2023-05-11 RX ORDER — POLYETHYLENE GLYCOL 3350 17 G/17G
17 POWDER, FOR SOLUTION ORAL DAILY PRN
COMMUNITY

## 2023-05-11 RX ORDER — UREA 10 %
LOTION (ML) TOPICAL DAILY
COMMUNITY
Start: 2021-01-19

## 2023-05-11 RX ORDER — OMEPRAZOLE 20 MG/1
20 TABLET, DELAYED RELEASE ORAL DAILY
COMMUNITY

## 2023-10-01 PROBLEM — R57.9 SHOCK (HCC): Status: ACTIVE | Noted: 2023-01-01

## 2023-10-01 PROBLEM — I50.21 ACUTE HFREF (HEART FAILURE WITH REDUCED EJECTION FRACTION) (HCC): Status: ACTIVE | Noted: 2023-01-01

## 2023-10-01 PROBLEM — R29.6 RECURRENT FALLS: Status: ACTIVE | Noted: 2023-01-01

## 2023-10-01 PROBLEM — R79.89 ELEVATED TROPONIN: Status: ACTIVE | Noted: 2023-01-01

## 2023-10-01 PROBLEM — E88.09 HYPOALBUMINEMIA: Status: ACTIVE | Noted: 2023-01-01

## 2023-10-01 PROBLEM — J96.01 ACUTE HYPOXEMIC RESPIRATORY FAILURE (HCC): Status: ACTIVE | Noted: 2023-01-01

## 2023-10-01 PROBLEM — I48.91 ATRIAL FIBRILLATION WITH RAPID VENTRICULAR RESPONSE (HCC): Status: RESOLVED | Noted: 2021-08-25 | Resolved: 2023-01-01

## 2023-10-01 PROBLEM — I48.20 CHRONIC ATRIAL FIBRILLATION (HCC): Status: ACTIVE | Noted: 2017-11-10

## 2023-10-01 PROBLEM — R79.89 ELEVATED BRAIN NATRIURETIC PEPTIDE (BNP) LEVEL: Status: ACTIVE | Noted: 2023-01-01

## 2023-10-01 PROBLEM — I21.4 NSTEMI (NON-ST ELEVATED MYOCARDIAL INFARCTION) (HCC): Status: RESOLVED | Noted: 2021-07-31 | Resolved: 2023-01-01

## 2023-10-01 PROBLEM — I25.10 CAD (CORONARY ARTERY DISEASE): Status: ACTIVE | Noted: 2023-01-01

## 2023-10-01 PROBLEM — R62.7 FTT (FAILURE TO THRIVE) IN ADULT: Status: ACTIVE | Noted: 2023-01-01

## 2023-10-01 PROBLEM — S72.009A HIP FRACTURE (HCC): Status: RESOLVED | Noted: 2021-07-18 | Resolved: 2023-01-01

## 2023-10-01 PROBLEM — M19.90 ARTHRITIS: Status: ACTIVE | Noted: 2023-01-01

## 2023-10-01 PROBLEM — R53.81 DEBILITATED PATIENT: Status: ACTIVE | Noted: 2023-01-01

## 2023-10-01 PROBLEM — J18.9 CAP (COMMUNITY ACQUIRED PNEUMONIA): Status: RESOLVED | Noted: 2021-07-18 | Resolved: 2023-01-01

## 2023-10-01 PROBLEM — N39.0 UTI (URINARY TRACT INFECTION): Status: ACTIVE | Noted: 2023-01-01

## 2023-10-01 PROBLEM — E87.20 LACTIC ACIDOSIS: Status: ACTIVE | Noted: 2023-01-01

## 2023-10-01 PROBLEM — A41.9 SEPTIC SHOCK (HCC): Status: ACTIVE | Noted: 2023-01-01

## 2023-10-01 PROBLEM — R63.4 WEIGHT LOSS: Status: ACTIVE | Noted: 2023-01-01

## 2023-10-01 PROBLEM — D64.9 ANEMIA: Status: ACTIVE | Noted: 2023-01-01

## 2023-10-01 PROBLEM — J81.1 PULMONARY EDEMA: Status: ACTIVE | Noted: 2023-01-01

## 2023-10-01 PROBLEM — T68.XXXA HYPOTHERMIA: Status: ACTIVE | Noted: 2023-01-01

## 2023-10-01 PROBLEM — F03.90 DEMENTIA (HCC): Status: ACTIVE | Noted: 2023-01-01

## 2023-10-01 PROBLEM — R65.21 SEPTIC SHOCK (HCC): Status: ACTIVE | Noted: 2023-01-01

## 2023-10-01 PROBLEM — G62.9 PERIPHERAL NEUROPATHY: Status: ACTIVE | Noted: 2023-01-01

## 2023-10-01 PROBLEM — N17.9 AKI (ACUTE KIDNEY INJURY) (HCC): Status: RESOLVED | Noted: 2023-01-01 | Resolved: 2023-01-01

## 2023-10-01 PROBLEM — N17.9 AKI (ACUTE KIDNEY INJURY) (HCC): Status: ACTIVE | Noted: 2023-01-01

## 2023-10-01 NOTE — ED PROVIDER NOTES
[facility] EMERGENCY DEPT  EMERGENCY DEPARTMENT ENCOUNTER      Patient Name: Nicole Barney  MRN: [id number]  [address] [facility] 5/25/1927  Date of Evaluation: 10/1/2023  Physician: Loretta Apley, MD    CHIEF COMPLAINT       Chief Complaint   Patient presents with    Fall    Hypotension       HISTORY OF PRESENT ILLNESS   (Location/Symptom, Timing/Onset, Context/Setting, Quality, Duration, Modifying Factors, Severity)   Nicole Barney, 80 y.o., male     59-year-old male with a history of atrial fibrillation presents with a chief complaint of multiple falls, hypoxia, hypotension and double pneumonia. EMS reports that the patient fell last at around 830 and again around 230 this morning. His blood pressure was found to be low by nursing home staff. He is currently being treated for pneumonia according to EMS. He was found to be hypoxic on several liters of nasal cannula oxygen which he was on at the nursing facility. He does not reportedly wear oxygen normally. He arrives on nonrebreather by EMS. Nursing Notes were reviewed.     REVIEW OF SYSTEMS    (Not required)   Review of Systems    PAST MEDICAL HISTORY     Past Medical History:   Diagnosis Date    Anxiety     Arthritis     right shoulder    Atrial fibrillation, chronic ([address]) 09/23/2014    Colon cancer ([address]) 2002    colon resection    Depression 9/28/2015    DJD (degenerative joint disease) of knee     left TKR    Dyslipidemia     Embolic stroke involving right middle cerebral artery [facility]) Nov 2013    presented with dysarthria, MRI Several tiny acute infarcts in the right middle cerebral artery    Falls     Gout     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    Hypertension     Memory loss     Peripheral neuropathy     Vertigo        SURGICAL HISTORY       Past Surgical History:   Procedure Laterality Date    CATARACT REMOVAL      GI      colon resection    ORTHOPEDIC SURGERY      left knee, left shoulder    SC ABDOMEN SURGERY PROC UNLISTED

## 2023-10-01 NOTE — ED NOTES
TRANSFER - OUT REPORT:    Verbal report given to Darina Epley RN on Juanito Humphrey  being transferred to 5th floor for routine progression of patient care       Report consisted of patient's Situation, Background, Assessment and   Recommendations(SBAR). Information from the following report(s) Nurse Handoff Report, Index, Intake/Output, MAR, and Recent Results was reviewed with the receiving nurse. [place] Fall Assessment:    Presents to emergency department  because of falls (Syncope, seizure, or loss of consciousness): Yes  Age > 79: Yes  Altered Mental Status, Intoxication with alcohol or substance confusion (Disorientation, impaired judgment, poor safety awaremess, or inability to follow instructions): Yes  Impaired Mobility: Ambulates or transfers with assistive devices or assistance; Unable to ambulate or transer.: Yes  Nursing Judgement: Yes          Lines:   Peripheral IV 10/01/23 Right; Anterior Cephalic (Active)        Opportunity for questions and clarification was provided.       Patient transported with:  Maria Eugenia Segal RN  10/01/23 4111

## 2023-10-01 NOTE — CARE COORDINATION
Care Management Note:  CM received hospice order from ED MD for GIP eval. CM sent referral to [address] [place] [address]. CM called weekend on-call hospice [facility] to provide update and family contact information to schedule [facility] bedside eval. CM following.

## 2023-10-01 NOTE — PROGRESS NOTES
[address] [place] [address] 1788  [phone]    Hospitalist Progress Note    NAME: Griselda Merritt   :  1927  MRM:  [id number]    Date/Time of service 10/1/2023  7:41 AM    To assist coordination of care and communication with nursing and staff, this note may be preliminary early in the day, but finalized by end of the day. Total time spent with patient: 45 Minutes I personally rounded from 7:45 to 8:30 AM, in addition to the time spent by my partner, Dr Ciarra Galeana, earlier today. I personally reviewed chart, notes, data and current medications in the medical record. I have personally examined and treated the patient at bedside during this period. I personally made additional diagnoses, placed additional orders and had additional discussions. Assessment and Plan:     Septic Shock / Hypothermia - POA, due to UTI. Alternatively hypovolemic shock, cardiogenic shock and hypothermic shock. Getting levophed. He is intravascularly depleted, but has extravascular fluid excess. Hydration is likely to worsen his breathing. Check RVP and follow blood cx. Grim prognosis. Withdrawal of is being considered with family. Hospice consulted. ARF (acute renal failure) / dehydration / Lactic acidosis - Severe intravascular depletion, complicated by likely CHF and pulmonary edema. Consult renal. Hold ACE    UTI (urinary tract infection) / BPH - POA, possible based on UA. Repeat UA. Follow Cx. Zosyn for now. Check procalcitonin. Continue flomax when alert. Acute hypoxemic respiratory failure / Pulmonary edema - POA, had recently started oxygen. For now oxygen as needed. He is DNR/DNI. Consult pulmonary      Acute HFrEF (heart failure with reduced ejection fraction) / Elevated brain natriuretic peptide (BNP) level - POA, possible, based on edema and pBNP. Check ECHO. Consult cardiology    Atrial fibrillation, chronic - Rate low.   Probably

## 2023-10-01 NOTE — ED NOTES
MAP 65 @4027 with Levophed at [address] [place] [address], [address] [place] [address]  10/01/23 8739

## 2023-10-01 NOTE — PROGRESS NOTES
[address] [facility]  [phone]     Patient Name: Carmel Vasquez  YOB: 1927  Age: 80 y.o. [facility] RN Note:  Call from [facility] requesting GIP eval. Spoke with daughter Jatin Rogers ([phone]). Reports patient in ED since this morning with hypotension, AMS. Levophed initiated and has since been discontinued. Family is in agreement for CMO. Will meet with family shortly at bedside. Offered [facility] to which she declined as prognosis is short with patient expected to die in next 24 - 48 hours. Patient assessed and is not appropriate for GIP at this time. He is resting quietly with eyes closed. Does not respond to voice. Trace movement of LE's noted. Daughters Rafael Kerr and Loco Renteria at bedside. They are requesting CMO and agree with use of Morphine and Lorazepam. No attending identified. Mely Heard MD contacted via PS and states is not attending. PS sent to JESSEE Fernández MD with following recommendations:  Morphine 2 mg IV every 4 hours and prn q15 min as well as Lorazepam 1 mg IV every 4 hours and q15  min prn. No response. Outgoing call to JOSE Stringer MD (Palliative/Hospice) to discuss. Unable to provide orders in absence of identified atttending. Spoke with RN and asked that she make priority to obtain. Family has requested to be notified @ [phone] upon death of patient. Thank you for the opportunity to be of service to Mr. Obed Amado and his family.     Dennis Randall RN  Clinical Hospice Liaison  [phone]

## 2023-10-01 NOTE — ED TRIAGE NOTES
Pt is coming from [facility] unit  Staff reports 2 falls one at 2:30pm yesterday and one at 0230 this morning   Reports low BP     Pt is alert, not oriented or answering question ( this is baseline per EMS)

## 2023-10-01 NOTE — ACP (ADVANCE CARE PLANNING)
Advance Care Planning Note    Name: Brenda Saldaña  YOB: 1927  MRN: [id number]  Admission Date: 10/1/2023  4:21 AM    Date of discussion: 10/1/2023    Active Diagnoses: These active diagnoses are of sufficient risk that focused discussion on advance care planning is indicated in order to allow the patient to thoughtfully consider personal goals of care, and if situations arise that prevent the ability to personally give input, to ensure appropriate representation of their personal desires for different levels and aggressiveness of care. Discussion:     Persons present and participating in discussion: Patty Patel MD, daughter    Discussion: shock treatment and prognosis in setting of end stage dementia, Hx CVA, FTT. Patient already DNR. 3 childrem jointly make decisions. Daughter is interested in converting to comfort measures now and transition to hospice. She is contacting siblings for confirmation. Time Spent:     Total time spent face-to-face in education and discussion: 18 minutes.      Albino Varghese MD  Date of Service:  10/1/2023  9:43 AM

## 2023-10-01 NOTE — ED NOTES
Family and Dr. Alexia Abad have decided to move to hospice care. Dr. Alexia Abad to transition orders to comfort care.      Per Dr. Alexia Abad, verbal order given to discontinue all current medication administrations @ [place], [place]  10/01/23 5198

## 2023-10-01 NOTE — H&P
[address] [place] [address] 1788  [phone]    Hospital Medicine History and Physical      NAME:       Rita Kapadia   :       1927   MRN:      [id number]     Date of service:   10/1/2023     Chief  Complaint:  Recurrent falls, shock     History Of Presenting Illness:       Mr. Heather Romero is a 80 y.o. male who is being admitted for a likely septic shock. Mr. Heather Romero was brought to our Emergency Department today from [facility] after he fell down twice. He is hard of hearing. I have discussed with the ED team as well as reviewed some documents from the facility. He was apparently being treated for pneumonia and has been on supplemental oxygen at 2L/min. In the ED, he was found to be in shock with a BP of 68/44, HR 45, RR 22 and a temp of 97. Initial lactic acid was 7.65 and a troponin 188 and a CXR done showed bilateral pleural effusions and bibasilar atelectasis. Code sepsis was initiated. He received 2L of IV fluid bolus and his BP improved a little. He was started on IV Levophed for a suspected septic shock. The ED physician attempted to reach out to his daughter but was taken to voicemail. A request was made for admission. Not on File    Prior to Admission medications    Medication Sig Start Date End Date Taking?  Authorizing Provider   acetaminophen (TYLENOL) 325 MG tablet Take by mouth 3 times daily    Ar Automatic Reconciliation   allopurinol (ZYLOPRIM) 300 MG tablet Take by mouth    Ar Automatic Reconciliation   ascorbic acid (VITAMIN C) 500 MG tablet Take 1 tablet by mouth daily    Ar Automatic Reconciliation   bisacodyl (DULCOLAX) 10 MG suppository Place rectally daily as needed    Ar Automatic Reconciliation   Calcium Carbonate-Vitamin D (OYSTER SHELL CALCIUM/D) 500-5 MG-MCG TABS Take 1 tablet by mouth 3 times daily (with

## 2023-10-01 NOTE — PROGRESS NOTES
Antimicrobial Stewardship Review:  Zosyn, Meropenem, Cefepime  Name of Antibiotic:  Zosyn  Indication:Aspiration PNA    Recommendation: CrCl<20, BMI<40, Dose will be adjusted to 3.375 gm q8h with extended infusion per [facility] policy.

## 2023-10-01 NOTE — PROGRESS NOTES
[address] [facility]  [phone]     Patient Name: Kati Donald  YOB: 1927  Age: 80 y.o. [facility] RN Note:  Call from [facility] requesting GIP eval. Spoke with daughter [place] Jaime ([phone]). Reports patient in ED since this morning with hypotension, AMS. Levophed initiated and has since been discontinued. Family is in agreement for CMO. Will meet with family shortly at bedside. Offered [address] to which she declined as prognosis is short with patient expected to die in next 24 - 48 hours. Patient assessed and is not appropriate for GIP at this time. He is resting quietly with eyes closed. Does not respond to voice. Trace movement of LE's noted. Daughters Cuba Ruiz and Jos Fish at bedside. They are requesting CMO and agree with use of Morphine and Lorazepam. No attending identified. Prudence Castillo MD contacted via PS and states is not attending. PS sent to JESSEE Arenas MD with following recommendations:  Morphine 2 mg IV every 4 hours and prn q15 min as well as Lorazepam 1 mg IV every 4 hours and q15  min prn. No response. Outgoing call to JOSE London MD (Palliative/Hospice) to discuss. Unable to provide orders in absence of identified atttending. Thank you for the opportunity to be of service to Mr. Clyde Puri and his family.     Mahnaz Garg RN  Clinical Hospice Liaison  [phone]

## 2023-10-02 PROBLEM — R06.03 RESPIRATORY DISTRESS: Status: ACTIVE | Noted: 2023-01-01

## 2023-10-02 PROBLEM — R52 NONVERBAL SIGNS OF PAIN: Status: ACTIVE | Noted: 2023-01-01

## 2023-10-02 PROBLEM — A41.9 SEPSIS (HCC): Status: ACTIVE | Noted: 2023-01-01

## 2023-10-02 PROBLEM — R45.1 RESTLESSNESS AND AGITATION: Status: ACTIVE | Noted: 2023-01-01

## 2023-10-02 PROBLEM — Z51.5 HOSPICE CARE: Status: ACTIVE | Noted: 2023-01-01

## 2023-10-02 NOTE — H&P
10       PSYCHOSOCIAL/SPIRITUAL ASSESSMENT     Principal Problem:    Sepsis ([address])  Resolved Problems:    * No resolved hospital problems. *    Past Medical History:   Diagnosis Date    Anxiety and depression     Arthritis     Atrial fibrillation, chronic (HCC)     Debilitated patient     Dementia ([address])     Dyslipidemia     FTT (failure to thrive) in adult     Gout     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    Hx of colon cancer, stage I     Hx of completed stroke 6093    emoblic    Hypertension     Peripheral neuropathy     Weight loss       Past Surgical History:   Procedure Laterality Date    CATARACT REMOVAL      GI      colon resection    ORTHOPEDIC SURGERY      left knee, left shoulder    TN UNLISTED PROCEDURE ABDOMEN PERITONEUM & OMENTUM      colorectal    TN UNLISTED PROCEDURE CARDIAC SURGERY      cardio version      Social History     Tobacco Use    Smoking status: Never    Smokeless tobacco: Never   Substance Use Topics    Alcohol use:  Yes     Alcohol/week: 1.0 standard drink of alcohol     Family History   Problem Relation Age of Onset    Heart Disease Father     Stroke Sister     Stroke Father       Not on File   Current Facility-Administered Medications   Medication Dose Route Frequency    morphine (PF) injection 2 mg  2 mg IntraVENous Q4H    morphine (PF) injection 2 mg  2 mg IntraVENous Q15 Min PRN    LORazepam (ATIVAN) injection 1 mg  1 mg IntraVENous Q4H    LORazepam (ATIVAN) injection 1 mg  1 mg IntraVENous Q15 Min PRN    ketorolac (TORADOL) injection 30 mg  30 mg IntraVENous Q6H PRN    acetaminophen (TYLENOL) suppository 650 mg  650 mg Rectal Q4H PRN    sodium chloride flush 0.9 % injection 5-40 mL  5-40 mL IntraVENous PRN    bisacodyl (DULCOLAX) suppository 10 mg  10 mg Rectal Daily PRN    glycopyrrolate (ROBINUL) injection 0.2 mg  0.2 mg IntraVENous Q4H PRN        PHYSICAL EXAM     Wt Readings from Last 3 Encounters:   10/01/23 61.2 kg (135 lb)   04/08/21 68.9 kg (152 lb)

## 2023-10-02 NOTE — DISCHARGE SUMMARY
Discharge Summary       PATIENT ID: Zhanna Huffman  MRN: [id number]   YOB: 1927    DATE OF ADMISSION: 10/1/2023  4:21 AM    DATE OF DISCHARGE: 10/2/2023 11:35 AM  PRIMARY CARE PROVIDER: Kim Hanks MD     ATTENDING PHYSICIAN: Nai Serrato MD  DISCHARGING PROVIDER: Nai Serrato MD    To contact this individual call [phone] and ask the  to page. If unavailable ask to be transferred the Adult Hospitalist Department. CONSULTATIONS: IP CONSULT TO PALLIATIVE CARE  IP CONSULT TO INTENSIVIST  IP CONSULT TO CARDIOLOGY  IP CONSULT TO HOSPICE  IP CONSULT TO SPIRITUAL SERVICES  IP CONSULT TO SOCIAL WORK    PROCEDURES/SURGERIES: * No surgery found *    [address] COURSE:   Mr. Elizabeth Pascual is a 80 y.o. male who is being admitted for a likely septic shock. Mr. Elizabeth Pascual was brought to our Emergency Department today from [facility] after he fell down twice. He is hard of hearing. I have discussed with the ED team as well as reviewed some documents from the facility. He was apparently being treated for pneumonia and has been on supplemental oxygen at 2L/min. In the ED, he was found to be in shock with a BP of 68/44, HR 45, RR 22 and a temp of 97. Initial lactic acid was 7.65 and a troponin 188 and a CXR done showed bilateral pleural effusions and bibasilar atelectasis. Code sepsis was initiated. He received 2L of IV fluid bolus and his BP improved a little. He was started on IV Levophed for a suspected septic shock. The ED physician attempted to reach out to his daughter but was taken to voicemail. A request was made for admission. DISCHARGE DIAGNOSES / PLAN:       Septic Shock  ARF  UTI  A-fib  CAD  CVA  Dementia  Acute on Chronic HFrEF  -- Patient initially admitted on pressors. Transitioned to comfort care and now transitioning to hospice. See daily notes for details on discussions. Patient DC to hospice team today.           DISCHARGE MEDICATIONS:

## 2023-10-02 NOTE — PROGRESS NOTES
Chart reviewed. Noted plan to discharge with hospice services. No aggressive nutrition interventions needed at this time. Will assist as needed.     Diet: NPO    Betty Solomon RD MS  Ext: [phone], or via [employer]

## 2023-10-03 NOTE — DISCHARGE SUMMARY
Hospice Discharge Summary    [facility]  [facility]        Date of Admission: 10/2/2023  Date of Discharge: 10/3/23    Kati Donald is a 80y.o. year old who was admitted to [facility] at [facility] with a Hospice diagnosis of Sepsis ([address]) [A41.9].       The patient's care was focused on comfort and the patient passed away on 10/3/23

## 2023-10-04 LAB
BACTERIA SPEC CULT: ABNORMAL
BACTERIA SPEC CULT: ABNORMAL
CC UR VC: ABNORMAL
SERVICE CMNT-IMP: ABNORMAL

## 2023-10-04 NOTE — PROGRESS NOTES
[facility] Condolence Call; [facility] Alisa Strong to offer condolences and support. [facility] offered [facility] information for ongoing support. She would like to have grief counseling. Her brother Tj Kat ( [phone]) may want BR support, she will check with him and pass on the information if he does. He did not make to bedside when pt passed and has  a lot of regret about that.        Valerie Ness [facility], [address] [facility]  [phone]
[address] [facility]  [phone]    Discharge/Death Nursing Note   Patient Name: Kuldip Mcgee  YOB: 1927  Age: 80 y.o. Date of Death: 10/03/23  Admitted Date: 10/2/2023  Time of Death: [address] [facility] [address] of Care: [facility]  Level of Care: GIP  Patient Room: [address] [place] [address] Attending: Taylor Ferrara MD  Hospice Diagnosis: Sepsis [facility]) [A41.9]    Death Devi Edwards of death completed by: Kayla Dupree. LPN    Agency staff was not present at the time of death    At the time of death the patient was documented as unresponsive. Absent of breath sounds. Pupils fixed and dilated.  Apical pulse absent after auscultating for 60sec(  The pt  within [facility]    The following were notified of the patient's death: hospice intake, family    Medications were disposed of per facility protocol     Discharge Summary   Discharge Reason: Death    Summary of Care Provided:    [x] Post mortem care provided by bedside RN  [x] Notification of  home by nursing supervisor  [] Referrals/Community resources provided:   [] Goals completed  [] Durable Medical Equipment vendor notified     Disciplines involved: [x] RN [x] SW [x]  [] BAER [] Vol [] PT [] OT [] ST [] BC    [] IDT communication/notification    Attending Physician, Dr. Fabian Uriostegui, notified of death    Bereaved   Verify bereaved identified with name, address, telephone number and risk level          10/2/2023    12:47 PM   Demographics   Marital Status 
Patient Name: Marisabel Velez  YOB: 1927  Age: 80 y.o. Samuel Moran RN Note:  At bedside to see patient who has . Patient is unresponsive. Absent of breath sounds. Pupils fixed and dilated. Apical pulse absent after auscultating for 60sec  TOD:  Pronounced on behalf of  Katherine Domingo MD  by Dustin Cazares. DANIE @ [phone]. Thank you for the opportunity to be of service to this patient.
Responded to notification of Mr. Meghan Rutledge death on 5 Med surg. His two daughters were at bedside. They shared stories from their lives with him. They are mourning yet glad he is reunited with his wife in heaven. Provided spiritua support and assurance of prayers.   Mónica Mcmanus., MS., [facility]  Page a  [phone]- Renny Norris ([phone])
Spiritual Care Assessment/Progress Note  [address] [facility]    Name: Zhanna Huffman MRN: [id number]    Age: 80 y.o. Sex: male   Language: English     Date: 10/2/2023            Total Time Calculated: 45 min              Spiritual Assessment begun in [facility] 5M1 MED SURG 1  Service Provided For[de-identified] Patient and family together  Referral/Consult From[de-identified] Multi-disciplinary team  Encounter Overview/Reason : Spiritual/Emotional Needs    Spiritual beliefs:      [x] Involved in a gilmer tradition/spiritual practice: [address]     [] Supported by a gilmer community:      [] Claims no spiritual orientation:      [] Seeking spiritual identity:           [] Adheres to an individual form of spirituality:      [] Not able to assess:                Identified resources for coping and support system:   Support System: Family members       [] Prayer                  [] Devotional reading               [] Music                  [] Guided Imagery     [] Pet visits                                        [] Other: (COMMENT)     Specific area/focus of visit   Encounter:    Crisis:    Spiritual/Emotional needs: Type: Spiritual Support  Ritual, Rites and Sacraments:    Grief, Loss, and Adjustments: Type: End of Life  Ethics/Mediation:    Behavioral Health:    Palliative Care: Advance Care Planning:      Plan/Referrals: Other (Comment) (Please contact Spiritual Care for further consults.)    Narrative:  visit for the patient on Med Surg with a Spiritual Care consult. Reviewed pt's chart and spoke with pt's nurse. Pt and pt's son-in-law (VEE) were present. Pt is receiving medicine for comfort, and was resting peacefully. Pt's VEE shared stories about the pt's life and family. Family hopeful for a peaceful transition after a long, healthy life. Chart notes indicate pt participates in [address] practices when hospitalized.  brought the prayer card for Spiritual Communion and prayed with/for the pt.  Advised family of
unknown  Learning preference (written material, demonstration, visual)? written  Learning barriers (ESOL, [place], poor vision)? none    CLINICAL INFORMATION     Wt Readings from Last 3 Encounters:   10/01/23 135 lb (61.2 kg)   04/08/21 152 lb (68.9 kg)     Ht Readings from Last 3 Encounters:   10/01/23 5' 2\" (1.575 m)   04/08/21 5' 7\" (1.702 m)     There is no height or weight on file to calculate BMI. There were no vitals filed for this visit. LAB VALUES  [unfilled]  [unfilled]  No results found for: \"TP\", \"ALBR\", \"ALB\"    Currently this patient has:  [] Supplemental O2 [x] Peripheral IV  [] PICC    [] PORT   [x] Webster Catheter [] NG Tube   [] PEG Tube [] Ostomy    [] AICD: Has ICD been deactivated? [] Yes [] No:______    PLAN     1. 1. GIP admission for management of pain, dyspnea and anxiety  2. Schedule IV morphine 2mg every 4 hours and every 15 min PRN pain, air hunger, schedule IV ativan 1mg every 4 hr and every 15 min PRN  3. PRN comfort set for breakthrough symptoms  4. SW and  available for ongoing support  5. Maintain peripheral IV per hospital protocol  6. Maintain webster for urinary retention and maintain per hospital protocols. 7. Provide emotional support to family at bedside  8. Frequent rounding and communication with     Hospice Team Frequency Orders:  Skilled Nurse -  Daily visits x 7 days /every other day x 7 days  with 5 PRN visits for symptom control. MSW - 1 visit for initial assessment/evaluation for family support and need for volunteer services. Fransisca Sierrak - 1 visit for initial assessment/evaluation for spiritual support.      ADVANCE CARE PLANNING (Complete in ACP Flow Sheet)   Code Status: DNR  Durable DNR: []  Yes  [x]  No  Code Status Discussed/Confirmed: yes  Preference for Other Life Sustaining Treatment Discussed/Confirmed: yes  Hospitalization Preference:Patient would like to receive end of life care in the hospital      10/2/2023    12:47 PM   Demographics
term)    []   (long term)    []  Death of a child (recent)    []  Death of a parent (recent)   []  Death of a spouse (recent)   []  Employment status changed   []  Family discord    []  Financial loss/Inadequate inther (specify):come  []  Job loss  []  Legal issues unresolved  []  Lifestyle change  []  Marital discord  []  Marriage within the last year  []  Paperwork (insurance/legal/etc) overwhelming  []  Separation/Divorce  []  Other (specify):  Notes:       Interventions/Plan of Care   []  MSW will assess social and emotional factors related to coping with end of life issues  []  MSW will provide community resource planning/referral   []  MSW to assist with relocation to different care setting if/when symptoms stabilize  [x]  Pt/Pts family will receive emotional support. []  Pt/Pts family will share the details surrounding the pts disease progression  []  Pt/Pts Family will show expression of grief by participating in life review. []  Pt/Pts Family will be educated and able to verbalize understanding of mental, emotional, and physical symptoms of grief. []  Pt/Pts Family will be educated and able to identify skills and social support to help cope with grief. []  Pts family will receive support for grief with emphasis on developmentally appropriate language.   [] Other:   Notes:       Discharge Planning   []  Home with family member    []  Return back to nursing home/facility  []  Needs assistance with placement/paid caregivers  []  Short Stay under routine level of care at [facility]   [x]  Other will likely pass at [facility]  Notes:     MSW Assessment Completed by: Juanito Garrison LCSW  10/02/23    Time In: 4: 30 pm        Time Out:5: 30 pm

## 2023-10-07 LAB
BACTERIA SPEC CULT: NORMAL
BACTERIA SPEC CULT: NORMAL
SERVICE CMNT-IMP: NORMAL
SERVICE CMNT-IMP: NORMAL

## 2024-08-23 NOTE — PROGRESS NOTES
Problem: Pressure Injury - Risk of  Goal: *Prevention of pressure injury  Description: Document Isidro Scale and appropriate interventions in the flowsheet. Outcome: Progressing Towards Goal  Note: Pressure Injury Interventions:       Moisture Interventions: Absorbent underpads, Limit adult briefs    Activity Interventions: Increase time out of bed    Mobility Interventions: Float heels, HOB 30 degrees or less, PT/OT evaluation    Nutrition Interventions: Document food/fluid/supplement intake, Offer support with meals,snacks and hydration                     Problem: Patient Education: Go to Patient Education Activity  Goal: Patient/Family Education  Outcome: Progressing Towards Goal     Problem: Falls - Risk of  Goal: *Absence of Falls  Description: Document Isaac Fall Risk and appropriate interventions in the flowsheet.   Outcome: Progressing Towards Goal  Note: Fall Risk Interventions:       Mentation Interventions: Bed/chair exit alarm, Family/sitter at bedside    Medication Interventions: Bed/chair exit alarm, Patient to call before getting OOB, Teach patient to arise slowly    Elimination Interventions: Bed/chair exit alarm, Call light in reach, Elevated toilet seat, Patient to call for help with toileting needs    History of Falls Interventions: Bed/chair exit alarm, Utilize gait belt for transfer/ambulation         Problem: Patient Education: Go to Patient Education Activity  Goal: Patient/Family Education  Outcome: Progressing Towards Goal None known

## (undated) DEVICE — YANKAUER,BULB TIP,W/O VENT,RIGID,STERILE: Brand: MEDLINE

## (undated) DEVICE — SUTURE MCRYL SZ 4-0 L27IN ABSRB UD SH L26MM 1/2 CIR Y415H

## (undated) DEVICE — ALCOHOL RUBBING ISO 16OZ 70%

## (undated) DEVICE — SOLUTION IRRIG 1000ML 0.9% SOD CHL USP POUR PLAS BTL

## (undated) DEVICE — C-ARM: Brand: UNBRANDED

## (undated) DEVICE — DERMABOND SKIN ADH 0.7ML -- DERMABOND ADVANCED 12/BX

## (undated) DEVICE — GOWN,SIRUS,NONRNF,SETINSLV,2XL,18/CS: Brand: MEDLINE

## (undated) DEVICE — SUTURE ABSORBABLE BRAIDED 3-0 SHB 18 IN UD VICRYL + VCPB864D

## (undated) DEVICE — BASIC GENERAL-SFMC: Brand: MEDLINE INDUSTRIES, INC.

## (undated) DEVICE — 6619 2 PTNT ISO SYS INCISE AREA&LT;(&GT;&&LT;)&GT;P: Brand: STERI-DRAPE™ IOBAN™ 2

## (undated) DEVICE — DRILL, AO, STERILE

## (undated) DEVICE — K-WIRE

## (undated) DEVICE — STERILE POLYISOPRENE POWDER-FREE SURGICAL GLOVES WITH EMOLLIENT COATING: Brand: PROTEXIS

## (undated) DEVICE — GUIDE WIRE, BALL-TIPPED, STERILE

## (undated) DEVICE — 3M™ STERI-DRAPE™ U-DRAPE 1015: Brand: STERI-DRAPE™

## (undated) DEVICE — KIT POS FOAM HANA TBL

## (undated) DEVICE — TOWEL SURG W17XL27IN STD BLU COT NONFENESTRATED PREWASHED

## (undated) DEVICE — STERILE POLYISOPRENE POWDER-FREE SURGICAL GLOVES: Brand: PROTEXIS

## (undated) DEVICE — REAMER SHAFT, MOD.TRINKLE: Brand: BIXCUT

## (undated) DEVICE — CANISTER, RIGID, 3000CC: Brand: MEDLINE INDUSTRIES, INC.